# Patient Record
Sex: FEMALE | Race: WHITE | NOT HISPANIC OR LATINO
[De-identification: names, ages, dates, MRNs, and addresses within clinical notes are randomized per-mention and may not be internally consistent; named-entity substitution may affect disease eponyms.]

---

## 2020-12-11 ENCOUNTER — TRANSCRIPTION ENCOUNTER (OUTPATIENT)
Age: 80
End: 2020-12-11

## 2020-12-12 ENCOUNTER — RESULT REVIEW (OUTPATIENT)
Age: 80
End: 2020-12-12

## 2020-12-12 ENCOUNTER — INPATIENT (INPATIENT)
Facility: HOSPITAL | Age: 80
LOS: 9 days | Discharge: NURSING, REHAB OR LONG-TERM CARE | DRG: 853 | End: 2020-12-22
Attending: SURGERY | Admitting: SURGERY
Payer: MEDICARE

## 2020-12-12 VITALS
WEIGHT: 149.91 LBS | SYSTOLIC BLOOD PRESSURE: 170 MMHG | HEART RATE: 91 BPM | OXYGEN SATURATION: 96 % | DIASTOLIC BLOOD PRESSURE: 84 MMHG | RESPIRATION RATE: 18 BRPM | TEMPERATURE: 100 F | HEIGHT: 64 IN

## 2020-12-12 DIAGNOSIS — K81.9 CHOLECYSTITIS, UNSPECIFIED: ICD-10-CM

## 2020-12-12 DIAGNOSIS — Z90.49 ACQUIRED ABSENCE OF OTHER SPECIFIED PARTS OF DIGESTIVE TRACT: Chronic | ICD-10-CM

## 2020-12-12 LAB
ALBUMIN SERPL ELPH-MCNC: 3.8 G/DL — SIGNIFICANT CHANGE UP (ref 3.3–5)
ALP SERPL-CCNC: 98 U/L — SIGNIFICANT CHANGE UP (ref 40–120)
ALT FLD-CCNC: 35 U/L — SIGNIFICANT CHANGE UP (ref 10–45)
ANION GAP SERPL CALC-SCNC: 14 MMOL/L — SIGNIFICANT CHANGE UP (ref 5–17)
APTT BLD: 25 SEC — LOW (ref 27.5–35.5)
AST SERPL-CCNC: 36 U/L — SIGNIFICANT CHANGE UP (ref 10–40)
BASE EXCESS BLDV CALC-SCNC: 2.8 MMOL/L — HIGH (ref -2–2)
BASOPHILS # BLD AUTO: 0 K/UL — SIGNIFICANT CHANGE UP (ref 0–0.2)
BASOPHILS NFR BLD AUTO: 0 % — SIGNIFICANT CHANGE UP (ref 0–2)
BILIRUB SERPL-MCNC: 1 MG/DL — SIGNIFICANT CHANGE UP (ref 0.2–1.2)
BUN SERPL-MCNC: 14 MG/DL — SIGNIFICANT CHANGE UP (ref 7–23)
CA-I SERPL-SCNC: 1.07 MMOL/L — LOW (ref 1.12–1.3)
CALCIUM SERPL-MCNC: 8.7 MG/DL — SIGNIFICANT CHANGE UP (ref 8.4–10.5)
CHLORIDE BLDV-SCNC: 96 MMOL/L — SIGNIFICANT CHANGE UP (ref 96–108)
CHLORIDE SERPL-SCNC: 92 MMOL/L — LOW (ref 96–108)
CO2 BLDV-SCNC: 28 MMOL/L — SIGNIFICANT CHANGE UP (ref 22–30)
CO2 SERPL-SCNC: 24 MMOL/L — SIGNIFICANT CHANGE UP (ref 22–31)
CREAT SERPL-MCNC: 0.71 MG/DL — SIGNIFICANT CHANGE UP (ref 0.5–1.3)
EOSINOPHIL # BLD AUTO: 0 K/UL — SIGNIFICANT CHANGE UP (ref 0–0.5)
EOSINOPHIL NFR BLD AUTO: 0 % — SIGNIFICANT CHANGE UP (ref 0–6)
GAS PNL BLDV: 125 MMOL/L — LOW (ref 135–145)
GAS PNL BLDV: SIGNIFICANT CHANGE UP
GLUCOSE BLDV-MCNC: 145 MG/DL — HIGH (ref 70–99)
GLUCOSE SERPL-MCNC: 142 MG/DL — HIGH (ref 70–99)
HCO3 BLDV-SCNC: 27 MMOL/L — SIGNIFICANT CHANGE UP (ref 21–29)
HCT VFR BLD CALC: 42.8 % — SIGNIFICANT CHANGE UP (ref 34.5–45)
HCT VFR BLDA CALC: 47 % — SIGNIFICANT CHANGE UP (ref 39–50)
HGB BLD CALC-MCNC: 15.4 G/DL — SIGNIFICANT CHANGE UP (ref 11.5–15.5)
HGB BLD-MCNC: 14.6 G/DL — SIGNIFICANT CHANGE UP (ref 11.5–15.5)
HIV 1 & 2 AB SERPL IA.RAPID: SIGNIFICANT CHANGE UP
INR BLD: 1.19 RATIO — HIGH (ref 0.88–1.16)
LACTATE BLDV-MCNC: 2 MMOL/L — SIGNIFICANT CHANGE UP (ref 0.7–2)
LYMPHOCYTES # BLD AUTO: 1.05 K/UL — SIGNIFICANT CHANGE UP (ref 1–3.3)
LYMPHOCYTES # BLD AUTO: 4.3 % — LOW (ref 13–44)
MAGNESIUM SERPL-MCNC: 1.9 MG/DL — SIGNIFICANT CHANGE UP (ref 1.6–2.6)
MCHC RBC-ENTMCNC: 30.6 PG — SIGNIFICANT CHANGE UP (ref 27–34)
MCHC RBC-ENTMCNC: 34.1 GM/DL — SIGNIFICANT CHANGE UP (ref 32–36)
MCV RBC AUTO: 89.7 FL — SIGNIFICANT CHANGE UP (ref 80–100)
MONOCYTES # BLD AUTO: 0 K/UL — SIGNIFICANT CHANGE UP (ref 0–0.9)
MONOCYTES NFR BLD AUTO: 0 % — LOW (ref 2–14)
NEUTROPHILS # BLD AUTO: 23.28 K/UL — HIGH (ref 1.8–7.4)
NEUTROPHILS NFR BLD AUTO: 94.8 % — HIGH (ref 43–77)
PCO2 BLDV: 43 MMHG — SIGNIFICANT CHANGE UP (ref 35–50)
PH BLDV: 7.42 — SIGNIFICANT CHANGE UP (ref 7.35–7.45)
PHOSPHATE SERPL-MCNC: 2.3 MG/DL — LOW (ref 2.5–4.5)
PLATELET # BLD AUTO: 265 K/UL — SIGNIFICANT CHANGE UP (ref 150–400)
PO2 BLDV: 35 MMHG — SIGNIFICANT CHANGE UP (ref 25–45)
POTASSIUM BLDV-SCNC: 2.9 MMOL/L — CRITICAL LOW (ref 3.5–5.3)
POTASSIUM SERPL-MCNC: 3 MMOL/L — LOW (ref 3.5–5.3)
POTASSIUM SERPL-SCNC: 3 MMOL/L — LOW (ref 3.5–5.3)
PROT SERPL-MCNC: 6.6 G/DL — SIGNIFICANT CHANGE UP (ref 6–8.3)
PROTHROM AB SERPL-ACNC: 14.2 SEC — HIGH (ref 10.6–13.6)
RBC # BLD: 4.77 M/UL — SIGNIFICANT CHANGE UP (ref 3.8–5.2)
RBC # FLD: 13.2 % — SIGNIFICANT CHANGE UP (ref 10.3–14.5)
RH IG SCN BLD-IMP: POSITIVE — SIGNIFICANT CHANGE UP
SAO2 % BLDV: 63 % — LOW (ref 67–88)
SARS-COV-2 RNA SPEC QL NAA+PROBE: SIGNIFICANT CHANGE UP
SODIUM SERPL-SCNC: 130 MMOL/L — LOW (ref 135–145)
WBC # BLD: 24.33 K/UL — HIGH (ref 3.8–10.5)
WBC # FLD AUTO: 24.33 K/UL — HIGH (ref 3.8–10.5)

## 2020-12-12 PROCEDURE — 99285 EMERGENCY DEPT VISIT HI MDM: CPT | Mod: CS,GC

## 2020-12-12 PROCEDURE — 71045 X-RAY EXAM CHEST 1 VIEW: CPT | Mod: 26

## 2020-12-12 PROCEDURE — 76705 ECHO EXAM OF ABDOMEN: CPT | Mod: 26,RT

## 2020-12-12 PROCEDURE — 47562 LAPAROSCOPIC CHOLECYSTECTOMY: CPT

## 2020-12-12 PROCEDURE — 88304 TISSUE EXAM BY PATHOLOGIST: CPT | Mod: 26

## 2020-12-12 PROCEDURE — 74177 CT ABD & PELVIS W/CONTRAST: CPT | Mod: 26

## 2020-12-12 RX ORDER — ACETAMINOPHEN 500 MG
1000 TABLET ORAL EVERY 6 HOURS
Refills: 0 | Status: COMPLETED | OUTPATIENT
Start: 2020-12-12 | End: 2020-12-13

## 2020-12-12 RX ORDER — SODIUM CHLORIDE 9 MG/ML
1000 INJECTION INTRAMUSCULAR; INTRAVENOUS; SUBCUTANEOUS ONCE
Refills: 0 | Status: COMPLETED | OUTPATIENT
Start: 2020-12-12 | End: 2020-12-12

## 2020-12-12 RX ORDER — ACETAMINOPHEN 500 MG
975 TABLET ORAL ONCE
Refills: 0 | Status: COMPLETED | OUTPATIENT
Start: 2020-12-12 | End: 2020-12-12

## 2020-12-12 RX ORDER — METOPROLOL TARTRATE 50 MG
25 TABLET ORAL
Refills: 0 | Status: DISCONTINUED | OUTPATIENT
Start: 2020-12-12 | End: 2020-12-12

## 2020-12-12 RX ORDER — ONDANSETRON 8 MG/1
4 TABLET, FILM COATED ORAL ONCE
Refills: 0 | Status: DISCONTINUED | OUTPATIENT
Start: 2020-12-12 | End: 2020-12-12

## 2020-12-12 RX ORDER — MORPHINE SULFATE 50 MG/1
2 CAPSULE, EXTENDED RELEASE ORAL ONCE
Refills: 0 | Status: COMPLETED | OUTPATIENT
Start: 2020-12-12 | End: 2020-12-12

## 2020-12-12 RX ORDER — POTASSIUM CHLORIDE 20 MEQ
10 PACKET (EA) ORAL ONCE
Refills: 0 | Status: COMPLETED | OUTPATIENT
Start: 2020-12-12 | End: 2020-12-12

## 2020-12-12 RX ORDER — OXYCODONE HYDROCHLORIDE 5 MG/1
5 TABLET ORAL EVERY 6 HOURS
Refills: 0 | Status: DISCONTINUED | OUTPATIENT
Start: 2020-12-12 | End: 2020-12-18

## 2020-12-12 RX ORDER — OXYCODONE HYDROCHLORIDE 5 MG/1
2.5 TABLET ORAL EVERY 4 HOURS
Refills: 0 | Status: DISCONTINUED | OUTPATIENT
Start: 2020-12-12 | End: 2020-12-18

## 2020-12-12 RX ORDER — ASPIRIN/CALCIUM CARB/MAGNESIUM 324 MG
81 TABLET ORAL DAILY
Refills: 0 | Status: DISCONTINUED | OUTPATIENT
Start: 2020-12-12 | End: 2020-12-12

## 2020-12-12 RX ORDER — SODIUM CHLORIDE 9 MG/ML
1000 INJECTION, SOLUTION INTRAVENOUS
Refills: 0 | Status: DISCONTINUED | OUTPATIENT
Start: 2020-12-12 | End: 2020-12-12

## 2020-12-12 RX ORDER — METOPROLOL TARTRATE 50 MG
25 TABLET ORAL
Refills: 0 | Status: DISCONTINUED | OUTPATIENT
Start: 2020-12-12 | End: 2020-12-22

## 2020-12-12 RX ORDER — POTASSIUM CHLORIDE 20 MEQ
40 PACKET (EA) ORAL ONCE
Refills: 0 | Status: COMPLETED | OUTPATIENT
Start: 2020-12-12 | End: 2020-12-12

## 2020-12-12 RX ORDER — PIPERACILLIN AND TAZOBACTAM 4; .5 G/20ML; G/20ML
3.38 INJECTION, POWDER, LYOPHILIZED, FOR SOLUTION INTRAVENOUS EVERY 8 HOURS
Refills: 0 | Status: COMPLETED | OUTPATIENT
Start: 2020-12-12 | End: 2020-12-19

## 2020-12-12 RX ORDER — PIPERACILLIN AND TAZOBACTAM 4; .5 G/20ML; G/20ML
3.38 INJECTION, POWDER, LYOPHILIZED, FOR SOLUTION INTRAVENOUS EVERY 8 HOURS
Refills: 0 | Status: DISCONTINUED | OUTPATIENT
Start: 2020-12-12 | End: 2020-12-12

## 2020-12-12 RX ORDER — LEVOTHYROXINE SODIUM 125 MCG
125 TABLET ORAL DAILY
Refills: 0 | Status: DISCONTINUED | OUTPATIENT
Start: 2020-12-12 | End: 2020-12-12

## 2020-12-12 RX ORDER — PIPERACILLIN AND TAZOBACTAM 4; .5 G/20ML; G/20ML
3.38 INJECTION, POWDER, LYOPHILIZED, FOR SOLUTION INTRAVENOUS ONCE
Refills: 0 | Status: COMPLETED | OUTPATIENT
Start: 2020-12-12 | End: 2020-12-12

## 2020-12-12 RX ORDER — ENOXAPARIN SODIUM 100 MG/ML
40 INJECTION SUBCUTANEOUS DAILY
Refills: 0 | Status: DISCONTINUED | OUTPATIENT
Start: 2020-12-12 | End: 2020-12-17

## 2020-12-12 RX ORDER — ASPIRIN/CALCIUM CARB/MAGNESIUM 324 MG
81 TABLET ORAL DAILY
Refills: 0 | Status: DISCONTINUED | OUTPATIENT
Start: 2020-12-13 | End: 2020-12-17

## 2020-12-12 RX ORDER — ENOXAPARIN SODIUM 100 MG/ML
40 INJECTION SUBCUTANEOUS DAILY
Refills: 0 | Status: DISCONTINUED | OUTPATIENT
Start: 2020-12-12 | End: 2020-12-12

## 2020-12-12 RX ORDER — LEVOTHYROXINE SODIUM 125 MCG
125 TABLET ORAL DAILY
Refills: 0 | Status: DISCONTINUED | OUTPATIENT
Start: 2020-12-12 | End: 2020-12-22

## 2020-12-12 RX ORDER — SODIUM CHLORIDE 9 MG/ML
1000 INJECTION, SOLUTION INTRAVENOUS
Refills: 0 | Status: DISCONTINUED | OUTPATIENT
Start: 2020-12-12 | End: 2020-12-13

## 2020-12-12 RX ORDER — HYDROMORPHONE HYDROCHLORIDE 2 MG/ML
0.2 INJECTION INTRAMUSCULAR; INTRAVENOUS; SUBCUTANEOUS
Refills: 0 | Status: DISCONTINUED | OUTPATIENT
Start: 2020-12-12 | End: 2020-12-12

## 2020-12-12 RX ORDER — SIMVASTATIN 20 MG/1
20 TABLET, FILM COATED ORAL AT BEDTIME
Refills: 0 | Status: DISCONTINUED | OUTPATIENT
Start: 2020-12-12 | End: 2020-12-12

## 2020-12-12 RX ADMIN — Medication 100 MILLIEQUIVALENT(S): at 11:05

## 2020-12-12 RX ADMIN — Medication 975 MILLIGRAM(S): at 11:04

## 2020-12-12 RX ADMIN — PIPERACILLIN AND TAZOBACTAM 25 GRAM(S): 4; .5 INJECTION, POWDER, LYOPHILIZED, FOR SOLUTION INTRAVENOUS at 22:05

## 2020-12-12 RX ADMIN — SODIUM CHLORIDE 1000 MILLILITER(S): 9 INJECTION INTRAMUSCULAR; INTRAVENOUS; SUBCUTANEOUS at 11:04

## 2020-12-12 RX ADMIN — PIPERACILLIN AND TAZOBACTAM 200 GRAM(S): 4; .5 INJECTION, POWDER, LYOPHILIZED, FOR SOLUTION INTRAVENOUS at 11:31

## 2020-12-12 RX ADMIN — Medication 40 MILLIEQUIVALENT(S): at 11:10

## 2020-12-12 RX ADMIN — SODIUM CHLORIDE 50 MILLILITER(S): 9 INJECTION, SOLUTION INTRAVENOUS at 21:00

## 2020-12-12 NOTE — H&P ADULT - NSHPPHYSICALEXAM_GEN_ALL_CORE
Gen: AAOx3, NAD, mentating normally  Neuro: Cranial nerves II-XII grossly intact  HEENT: Atraumatic, normocephalic  CV: RRR, normal S1/S2, no audible m/r/g  Pulm: Breathing comfortably on RA. Equal chest rise b/l. Lung fields CTAB  Abd: Soft, mildly distended, tender in the right estevan-abdomen, mostly in the RUQ, with some focal guarding. No rebound tenderness.  Back/flank: No CVA tenderness  Extremities: WWP. Moving all 4 extremities spontaneously. Strength 5/5. Sensation intact  Skin: No rashes or suspicious lesions

## 2020-12-12 NOTE — H&P ADULT - HISTORY OF PRESENT ILLNESS
80F pmhx HTN, COPD, Hypothyroidism, MI (age 36), lung ca s/p partial right pneumonectomy, 30 pack/yr smoker, open appy (70 yrs ago), who is presenting with 2 days of epigastric pain & emesis. She reports feeling increased bloating over the last few weeks. Two nights ago she began experiencing epigastric abdominal pain, which was followed by several episodes of emesis. The patient reports her pain continued through the following day and was associated with some chills. She denies CP, SOB, dysuria, diarrhea or constipation.    She reports her MI at age 36 was secondary to hypokalemia, which was from a diuretic she was told to take (unsure why). She reports having had a TTE and stress test about 20 years ago, which were both normal. She lives alone after her   6 months ago. Her bedroom is up 1 flight of stairs, which she walks without issue. She can walk about 3 city blocks before becoming tired.

## 2020-12-12 NOTE — ED ADULT NURSE NOTE - OBJECTIVE STATEMENT
81 y/o female brought in by EMS with a c/o 8/10 abdominal pain. According to EMS patient was nauseous so they gave 4mg IVP zofran and started a bolus of NS, patient received about 100cc prior to arrival to ED. A&Ox3. Ambulatory. PMH HTN, HLD, MI, lung CA (in remission), diverticulosis. Patient reports feeling bloating for the past month, and as of thursday having 8/10 RLQ abdominal pain. Patient reports nausea and 1 episodes of vomiting on Tuesday. Patient reports she is not passing gas and her last BM was thursday. Abdomen is soft, distended, with generalized tenderness to palpation, and hypoactive bowel sounds. Patient denies chest pain, SOB, fever, chills, diarrhea, urinary symptoms, blood in stool/urine. 79 y/o female brought in by EMS with a c/o 8/10 abdominal pain. According to EMS patient was nauseous so they gave 4mg IVP zofran and started a bolus of NS, patient received about 100cc prior to arrival to ED. A&Ox3. Ambulatory. PMH HTN, HLD, MI, lung CA (in remission), diverticulosis. Patient reports feeling bloating for the past month, and as of thursday having 8/10 RLQ abdominal pain. Patient reports nausea and 1 episodes of vomiting on Tuesday. Patient reports she is not passing gas and her last BM was thursday. Abdomen is soft, distended, with generalized tenderness to palpation, and hypoactive bowel sounds. Patient denies chest pain, SOB, fever, chills, diarrhea, urinary symptoms, blood in stool/urine. Safety measures maintained. NSR on monitor. MD at bedside. No acute distress or further complaints at this time.

## 2020-12-12 NOTE — ED PROVIDER NOTE - CARE PLAN
----- Message from Lin Ybarra sent at 10/11/2018  2:09 PM CDT -----  Contact: mike Prince  MRN: [id number]  Home Phone [phone]  Work Phone Not on file.  Mobile [phone]    Patient Care Team:  Freeman Saavedra MD as PCP - General (Family Medicine)  Cleo Clark MD as Obstetrician (Obstetrics and Gynecology)  OB? No  What phone number can you be reached at? [phone]  Message: Pt is on the birth control patch / Xulane (dose unknown) apply one patch weekly. The insurance is requiring that the pt have an Rx for a 90 day supply, and must be filled at [facility] Pt requested to speak to nurse. Thank you.   [facility] [place]   
Tito STRAUSS desire refill of Ortho Evra patch. Pt insurance made her change pharmacies, and pt needs a 90 day supply  Patient Active Problem List   Diagnosis    Asthma, currently active    Supervision of normal pregnancy    Full-term premature rupture of membranes with onset of labor within 24 hours of rupture     Prior to Admission medications    Medication Sig Start Date End Date Taking? Authorizing Provider   albuterol sulfate (PROAIR RESPICLICK) 90 mcg/actuation AePB Inhale 180 mcg into the lungs 4 (four) times daily as needed (wheezing). 11/9/16   Freeman Saavedra MD   beclomethasone (QVAR) 80 mcg/actuation Aero Inhale 2 puffs into the lungs 2 (two) times daily. 11/9/16   Freeman Saavedra MD   clonazePAM (KLONOPIN) 0.5 MG tablet Take 1 tablet (0.5 mg total) by mouth 2 (two) times daily as needed for Anxiety (panic, anxiety). 3/5/18 3/5/19  Puneet Cali MD   escitalopram oxalate (LEXAPRO) 10 MG tablet TAKE 1 TABLET BY MOUTH ONCE DAILY 6/28/18   Puneet Cali MD   norelgestromin-ethinyl estradiol (ORTHO EVRA) 150-35 mcg/24 hr Place 1 patch onto the skin once a week. 8/28/18 8/28/19  Cleo Clark MD   phentermine (ADIPEX-P) 37.5 mg tablet Take 1 tablet (37.5 mg total) by mouth before breakfast. 5/2/18   Cleo Clark MD   phentermine (ADIPEX-P) 37.5 mg tablet Take 1 tablet (37.5 mg total) by mouth before breakfast. 8/28/18   Cleo Clark MD   VENTOLIN HFA 90 mcg/actuation inhaler INHALE TWO PUFFS BY MOUTH EVERY 4 HOURS AS NEEDED 9/16/17   Freeman Saavedra MD       
Principal Discharge DX:	Cholecystitis

## 2020-12-12 NOTE — ED PROVIDER NOTE - FAMILY HISTORY
Mother  Still living? Unknown  Family history of colonic diverticulitis, Age at diagnosis: Age Unknown     Sibling  Still living? Unknown  Family history of colonic diverticulitis, Age at diagnosis: Age Unknown

## 2020-12-12 NOTE — ED ADULT NURSE REASSESSMENT NOTE - NS ED NURSE REASSESS COMMENT FT1
Surgery resident at bedside obtaining consent. Patient escorted to restroom via felipe smith assisted by RN. Patient fully undressed and in a gown belongings placed in labeled bag and valuables placed in Safe. Patient verbalized understanding. Patient to be transported to OR shortly.

## 2020-12-12 NOTE — ED PROVIDER NOTE - NS ED ROS FT
REVIEW OF SYSTEMS:    CONSTITUTIONAL: No weakness, +chills  EYES/ENT: No visual changes;  No vertigo or throat pain   NECK: No pain or stiffness  RESPIRATORY: No cough, wheezing, hemoptysis; No shortness of breath  CARDIOVASCULAR: No chest pain or palpitations  GASTROINTESTINAL: + abdominal  pain. +nausea, vomiting, No diarrhea or constipation. No melena or hematochezia.  GENITOURINARY: No dysuria, frequency or hematuria  NEUROLOGICAL: No numbness or weakness  SKIN: No itching, burning, rashes, or lesions   All other review of systems is negative unless indicated above.

## 2020-12-12 NOTE — ED PROVIDER NOTE - CLINICAL SUMMARY MEDICAL DECISION MAKING FREE TEXT BOX
80y F with PMH of HTN, HLD, MI c/b afib p/w abd pain and vomiting x2 days, inability to tolerate po intake, concerning for SBO vs diverticulitis. Plan: EKG, CTAP, CBC, CMP, cultures, pain control, IVF.

## 2020-12-12 NOTE — CHART NOTE - NSCHARTNOTEFT_GEN_A_CORE
ACS/Trauma POST-OP NOTE:     Status post: Laparoscopic Cholecystectomy  Subjective:  Patient seen and examined at bedside  In no acute distress  Has not yet ambulated or voided  Denies CP, SOB, N/V, Abd pain, dizziness      Objective:    Vital Signs Last 24 Hrs  T(C): 36.8 (12 Dec 2020 22:39), Max: 37.6 (12 Dec 2020 09:36)  T(F): 98.2 (12 Dec 2020 22:39), Max: 99.6 (12 Dec 2020 09:36)  HR: 96 (12 Dec 2020 22:39) (88 - 110)  BP: 159/78 (12 Dec 2020 22:39) (140/66 - 177/86)  BP(mean): 101 (12 Dec 2020 21:15) (95 - 107)  RR: 17 (12 Dec 2020 22:39) (16 - 18)  SpO2: 92% (12 Dec 2020 22:39) (91% - 100%)    Physical Exam:  General Appearance:  Appears well, NAD, AAO x3  Chest: Equal expansion bilaterally, equal breath sounds  CV: Pulse regular presently  Abdomen: Soft, nondistended, mild tenderness near umbilical port site, 4 port site dressings c/d/i  Extremities: Grossly symmetric, Warm and well perfused x4      I&O's Detail    12 Dec 2020 07:01  -  12 Dec 2020 22:44  --------------------------------------------------------  IN:    IV PiggyBack: 100 mL    Lactated Ringers: 50 mL  Total IN: 150 mL    OUT:  Total OUT: 0 mL    Total NET: 150 mL          LABS:                        14.6   24.33 )-----------( 265      ( 12 Dec 2020 10:11 )             42.8     12-12    130<L>  |  92<L>  |  14  ----------------------------<  142<H>  3.0<L>   |  24  |  0.71    Ca    8.7      12 Dec 2020 10:11  Phos  2.3     12-12  Mg     1.9     12-12    TPro  6.6  /  Alb  3.8  /  TBili  1.0  /  DBili  x   /  AST  36  /  ALT  35  /  AlkPhos  98  12-12    PT/INR - ( 12 Dec 2020 13:16 )   PT: 14.2 sec;   INR: 1.19 ratio         PTT - ( 12 Dec 2020 13:16 )  PTT:25.0 sec        Daily Height in cm: 162.56 (12 Dec 2020 16:43)    Daily     MEDICATIONS  (STANDING):  acetaminophen  IVPB .. 1000 milliGRAM(s) IV Intermittent every 6 hours  enoxaparin Injectable 40 milliGRAM(s) SubCutaneous daily  lactated ringers. 1000 milliLiter(s) (50 mL/Hr) IV Continuous <Continuous>  levothyroxine 125 MICROGram(s) Oral daily  metoprolol tartrate 25 milliGRAM(s) Oral two times a day  piperacillin/tazobactam IVPB.. 3.375 Gram(s) IV Intermittent every 8 hours    MEDICATIONS  (PRN):  oxyCODONE    IR 2.5 milliGRAM(s) Oral every 4 hours PRN Moderate Pain (4 - 6)  oxyCODONE    IR 5 milliGRAM(s) Oral every 6 hours PRN Severe Pain (7 - 10)      ASSESSMENT: 80 year old femal who presented with acute cholecystitis and is now several hours status post laparoscopic cholecystectomy, recovering well on the floor.    PLAN:  - Pain management   - Diet: NPO with sips/chips, ADAT in AM  - Monitor Vitals   - f/u AM labs   - VTE ppx    ACS/Trauma Surgery   p. [id number] ACS/Trauma POST-OP NOTE:     Status post: Laparoscopic Cholecystectomy  Subjective:  Patient seen and examined at bedside  In no acute distress  Has not yet ambulated or voided  Denies CP, SOB, N/V, Abd pain, dizziness      Objective:    Vital Signs Last 24 Hrs  T(C): 36.8 (12 Dec 2020 22:39), Max: 37.6 (12 Dec 2020 09:36)  T(F): 98.2 (12 Dec 2020 22:39), Max: 99.6 (12 Dec 2020 09:36)  HR: 96 (12 Dec 2020 22:39) (88 - 110)  BP: 159/78 (12 Dec 2020 22:39) (140/66 - 177/86)  BP(mean): 101 (12 Dec 2020 21:15) (95 - 107)  RR: 17 (12 Dec 2020 22:39) (16 - 18)  SpO2: 92% (12 Dec 2020 22:39) (91% - 100%)    Physical Exam:  General Appearance:  Appears well, NAD, AAO x3  Chest: Equal expansion bilaterally, equal breath sounds  CV: Pulse regular presently  Abdomen: Soft, nondistended, mild tenderness near umbilical port site, 4 port site dressings c/d/i  Extremities: Grossly symmetric, Warm and well perfused x4      I&O's Detail    12 Dec 2020 07:01  -  12 Dec 2020 22:44  --------------------------------------------------------  IN:    IV PiggyBack: 100 mL    Lactated Ringers: 50 mL  Total IN: 150 mL    OUT:  Total OUT: 0 mL    Total NET: 150 mL          LABS:                        14.6   24.33 )-----------( 265      ( 12 Dec 2020 10:11 )             42.8     12-12    130<L>  |  92<L>  |  14  ----------------------------<  142<H>  3.0<L>   |  24  |  0.71    Ca    8.7      12 Dec 2020 10:11  Phos  2.3     12-12  Mg     1.9     12-12    TPro  6.6  /  Alb  3.8  /  TBili  1.0  /  DBili  x   /  AST  36  /  ALT  35  /  AlkPhos  98  12-12    PT/INR - ( 12 Dec 2020 13:16 )   PT: 14.2 sec;   INR: 1.19 ratio         PTT - ( 12 Dec 2020 13:16 )  PTT:25.0 sec        Daily Height in cm: 162.56 (12 Dec 2020 16:43)    Daily     MEDICATIONS  (STANDING):  acetaminophen  IVPB .. 1000 milliGRAM(s) IV Intermittent every 6 hours  enoxaparin Injectable 40 milliGRAM(s) SubCutaneous daily  lactated ringers. 1000 milliLiter(s) (50 mL/Hr) IV Continuous <Continuous>  levothyroxine 125 MICROGram(s) Oral daily  metoprolol tartrate 25 milliGRAM(s) Oral two times a day  piperacillin/tazobactam IVPB.. 3.375 Gram(s) IV Intermittent every 8 hours    MEDICATIONS  (PRN):  oxyCODONE    IR 2.5 milliGRAM(s) Oral every 4 hours PRN Moderate Pain (4 - 6)  oxyCODONE    IR 5 milliGRAM(s) Oral every 6 hours PRN Severe Pain (7 - 10)      ASSESSMENT: 80 year old femal who presented with acute cholecystitis and is now several hours status post laparoscopic cholecystectomy, recovering well on the floor.    PLAN:  - Pain management   - Diet: regular  - Monitor Vitals   - f/u AM labs   - VTE ppx    ACS/Trauma Surgery   p. [id number]

## 2020-12-12 NOTE — ED PROVIDER NOTE - ATTENDING CONTRIBUTION TO CARE
Freeman DO: I have personally performed a face to face medical and diagnostic evaluation of the patient. I have discussed with and reviewed the resident's note and agree with the History, ROS, Physical Exam and MDM unless otherwise indicated. A brief summary of my personal evaluation and impression can be found below.    80F hx of htn, MI age 36 c/b afib (reportedly not on tx and no cardiac issues since then), h/o appendectomy, presenting w/ 2 days of mod to severe nonradiating gradually worsening abd pain RLQ w/ abd distension, vomiting x 2 over the last 2 days, difficulty tolerating po, no diarrhea and last stool 2 days ago, reportedly passing gas, no urinary complaints. No numbness/weakness. Denies melena/hematochezia. Denies fevers, endorses chills.   On assessment, well appearing, axo3, mucous membranes dry, sclera clear and nonicteric, heart s1s2- no murmurs, lungs cta b/l, abd mildly distended + mod TTP RLQ and RUQ, no LE edema, pulses intact and symmetrical in all four extremitiies.   Pt orally 99.6F, plan for ct a/p eval for colitis vs diverticulitis vs obstruction, pain reproducible, clinical symptoms less suggestive of  mesenteric ischemia and reproducible and timing less suggestive of vascular pathology. Pain control. bcx and consider empiric abx if rectally febrile. screening ekg and cxr

## 2020-12-12 NOTE — ED ADULT NURSE REASSESSMENT NOTE - NS ED NURSE REASSESS COMMENT FT1
Pt undressed and belongings placed in [place] bag and placed in belonging cabinet in ED. Valuables checked w/ patient at bedside and placed in envelope. Pt transported to OR in stretcher by transport.

## 2020-12-12 NOTE — H&P ADULT - NSICDXFAMILYHX_GEN_ALL_CORE_FT
FAMILY HISTORY:  Mother  Still living? Unknown  Family history of colonic diverticulitis, Age at diagnosis: Age Unknown    Sibling  Still living? Unknown  Family history of colonic diverticulitis, Age at diagnosis: Age Unknown

## 2020-12-12 NOTE — ED PROVIDER NOTE - PHYSICAL EXAMINATION
PHYSICAL EXAM:  GENERAL: uncomfortable appearing elderly woman  HEAD:  Atraumatic, Normocephalic  EYES: EOMI, PERRLA, conjunctiva and sclera clear  NECK: Supple, No JVD  CHEST/LUNG: Clear to auscultation bilaterally; No wheeze  HEART: Regular rate and rhythm; No murmurs, rubs, or gallops  ABDOMEN: Soft, + TTP diffusely, Bowel sounds difficult to appreciate, mild distention   EXTREMITIES:  2+ Peripheral Pulses, No clubbing, cyanosis, or edema  PSYCH: AAOx3  NEUROLOGY: non-focal, THORPE  SKIN: No rashes or lesions

## 2020-12-12 NOTE — H&P ADULT - NSHPLABSRESULTS_GEN_ALL_CORE
CBC (12-12 @ 10:11)                              14.6                           24.33<H>  )----------------(  265        94.8<H>% Neutrophils, 4.3<L>% Lymphocytes, ANC: 23.28<H>                              42.8      BMP (12-12 @ 10:11)             130<L>  |  92<L>   |  14    		Ca++ --      Ca 8.7                ---------------------------------( 142<H>		Mg 1.9                3.0<L>  |  24      |  0.71  			Ph 2.3<L>    LFTs (12-12 @ 10:11)      TPro 6.6 / Alb 3.8 / TBili 1.0 / DBili -- / AST 36 / ALT 35 / AlkPhos 98    Coags (12-12 @ 13:16)  aPTT 25.0<L> / INR 1.19<H> / PT 14.2<H>        VBG (12-12 @ 10:11)     7.42 / 43 / 35 / 27 / 2.8<H> / 63<L>%     Lactate: 2.0      < from: CT Abdomen and Pelvis w/ IV Cont (12.12.20 @ 12:04) >    FINDINGS:  LOWER CHEST: Calcified coronary artery atherosclerosis. Elevated right hemidiaphragm.    LIVER: Normal morphology. Subcentimeter hypodensity at the hepatic dome, too small to adequately characterize (2:17).  BILE DUCTS: Normal caliber.  GALLBLADDER: Distended gallbladder with wall thickening to8 mm and surrounding fat stranding. Cholelithiasis. Probable calculi within the cystic duct (2:34, 602:49). Discontinuity of gallbladder mucosal enhancement (for example: 602:31 and 602:41) concerning for mucosal ulceration, without evidence of frankperforation.  SPLEEN: Within normal limits.  PANCREAS: Within normal limits.  ADRENALS: Within normal limits.  KIDNEYS/URETERS: Right lower pole 3.6 x 2.4 cm simple cyst along with bilateral subcentimeter hypodensities which are too small to adequately characterize. No hydronephrosis.    BLADDER: Within normal limits.  REPRODUCTIVE ORGANS: Normal uterus and adnexa. Pessary positioned within the superior vagina.    BOWEL: Small hiatal hernia. Wall thickening of the first portion of the duodenum which appear secondary to gallbladder inflammation. Periampullary duodenal diverticulum. No bowel obstruction. Appendix is normal. Moderate diverticulosis extending from transverse colon through sigmoid, without evidence of acute diverticulitis.  PERITONEUM: No ascites.  VESSELS: Calcified atherosclerosis. The infrarenal abdominal aorta is mildly ectatic to 2.0 cm diameter (602:43).  RETROPERITONEUM/LYMPH NODES: No lymphadenopathy.  ABDOMINAL WALL: Small fat-containing umbilical hernia.  BONES: Thoracolumbar degenerative changes.    IMPRESSION:  Acute cholecystitis with probable visualization of a gallstone within the cystic duct and findings suggesting mucosal ulceration.    < end of copied text >

## 2020-12-12 NOTE — ED ADULT NURSE REASSESSMENT NOTE - NS ED NURSE REASSESS COMMENT FT1
Surgery at the bedside assessing patient. Safety measures maintained. Bed in the lowest position. No acute distress noted or further complaints at this time.

## 2020-12-12 NOTE — ED PROVIDER NOTE - OBJECTIVE STATEMENT
80 y F with PMH of HTN, HLD, presenting with 2 days of abd pain and NB emesis. Pain started suddenly after vomiting her dinner 2 nights ago and is 8/10 in severity. It is worse on the R side, denies radiation to the back. Unable to tolerate po since then, vomited her dinner last night as well. Reports feeling "hot" and chills, abd bloating and nausea. Feels like she has been distended for about 2 months now, not passing gas but has been belching. Last BM 2 days ago. PSH - appendectomy at 10 yrs old.  Fam hx of diverticulitis in both her mother and sister.  passed away 6 mos ago, lives alone.   PMh also notable for MI at age 36 and afib.  PMD Emmanuel Walters 80 y F with PMH of HTN, HLD, hypothyroidism presenting with 2 days of abd pain and NB emesis. Pain started suddenly after vomiting her dinner 2 nights ago and is 8/10 in severity. It is worse on the R side, denies radiation to the back. Unable to tolerate po since then, vomited her dinner last night as well. Reports feeling "hot" and chills, abd bloating and nausea. Feels like she has been distended for about 2 months now, not passing gas but has been belching. Last BM 2 days ago. PSH - appendectomy at 10 yrs old.  Fam hx of diverticulitis in both her mother and sister.  passed away 6 mos ago, lives alone.   PMh also notable for MI at age 36 and afib.  Meds: synthroid, metoprolol BID, zocor. Pharmacy [facility] at [address]  PMD Peter Walters

## 2020-12-13 LAB
ALBUMIN SERPL ELPH-MCNC: 3 G/DL — LOW (ref 3.3–5)
ALP SERPL-CCNC: 100 U/L — SIGNIFICANT CHANGE UP (ref 40–120)
ALT FLD-CCNC: 48 U/L — HIGH (ref 10–45)
ANION GAP SERPL CALC-SCNC: 12 MMOL/L — SIGNIFICANT CHANGE UP (ref 5–17)
AST SERPL-CCNC: 48 U/L — HIGH (ref 10–40)
BILIRUB SERPL-MCNC: 0.7 MG/DL — SIGNIFICANT CHANGE UP (ref 0.2–1.2)
BUN SERPL-MCNC: 13 MG/DL — SIGNIFICANT CHANGE UP (ref 7–23)
CALCIUM SERPL-MCNC: 8.4 MG/DL — SIGNIFICANT CHANGE UP (ref 8.4–10.5)
CHLORIDE SERPL-SCNC: 100 MMOL/L — SIGNIFICANT CHANGE UP (ref 96–108)
CO2 SERPL-SCNC: 21 MMOL/L — LOW (ref 22–31)
CREAT SERPL-MCNC: 0.75 MG/DL — SIGNIFICANT CHANGE UP (ref 0.5–1.3)
GLUCOSE SERPL-MCNC: 112 MG/DL — HIGH (ref 70–99)
HCT VFR BLD CALC: 39.4 % — SIGNIFICANT CHANGE UP (ref 34.5–45)
HGB BLD-MCNC: 13.2 G/DL — SIGNIFICANT CHANGE UP (ref 11.5–15.5)
MAGNESIUM SERPL-MCNC: 1.9 MG/DL — SIGNIFICANT CHANGE UP (ref 1.6–2.6)
MCHC RBC-ENTMCNC: 30.8 PG — SIGNIFICANT CHANGE UP (ref 27–34)
MCHC RBC-ENTMCNC: 33.5 GM/DL — SIGNIFICANT CHANGE UP (ref 32–36)
MCV RBC AUTO: 91.8 FL — SIGNIFICANT CHANGE UP (ref 80–100)
NRBC # BLD: 0 /100 WBCS — SIGNIFICANT CHANGE UP (ref 0–0)
NT-PROBNP SERPL-SCNC: 1001 PG/ML — HIGH (ref 0–300)
PHOSPHATE SERPL-MCNC: 2.3 MG/DL — LOW (ref 2.5–4.5)
PLATELET # BLD AUTO: 221 K/UL — SIGNIFICANT CHANGE UP (ref 150–400)
POTASSIUM SERPL-MCNC: 3.8 MMOL/L — SIGNIFICANT CHANGE UP (ref 3.5–5.3)
POTASSIUM SERPL-SCNC: 3.8 MMOL/L — SIGNIFICANT CHANGE UP (ref 3.5–5.3)
PROT SERPL-MCNC: 5.6 G/DL — LOW (ref 6–8.3)
RBC # BLD: 4.29 M/UL — SIGNIFICANT CHANGE UP (ref 3.8–5.2)
RBC # FLD: 13.6 % — SIGNIFICANT CHANGE UP (ref 10.3–14.5)
SODIUM SERPL-SCNC: 133 MMOL/L — LOW (ref 135–145)
WBC # BLD: 17.05 K/UL — HIGH (ref 3.8–10.5)
WBC # FLD AUTO: 17.05 K/UL — HIGH (ref 3.8–10.5)

## 2020-12-13 RX ORDER — BUDESONIDE AND FORMOTEROL FUMARATE DIHYDRATE 160; 4.5 UG/1; UG/1
2 AEROSOL RESPIRATORY (INHALATION)
Refills: 0 | Status: DISCONTINUED | OUTPATIENT
Start: 2020-12-13 | End: 2020-12-15

## 2020-12-13 RX ORDER — SODIUM,POTASSIUM PHOSPHATES 278-250MG
1 POWDER IN PACKET (EA) ORAL EVERY 6 HOURS
Refills: 0 | Status: COMPLETED | OUTPATIENT
Start: 2020-12-13 | End: 2020-12-14

## 2020-12-13 RX ORDER — SIMVASTATIN 20 MG/1
20 TABLET, FILM COATED ORAL AT BEDTIME
Refills: 0 | Status: DISCONTINUED | OUTPATIENT
Start: 2020-12-13 | End: 2020-12-22

## 2020-12-13 RX ORDER — IPRATROPIUM/ALBUTEROL SULFATE 18-103MCG
3 AEROSOL WITH ADAPTER (GRAM) INHALATION EVERY 6 HOURS
Refills: 0 | Status: DISCONTINUED | OUTPATIENT
Start: 2020-12-13 | End: 2020-12-22

## 2020-12-13 RX ADMIN — PIPERACILLIN AND TAZOBACTAM 25 GRAM(S): 4; .5 INJECTION, POWDER, LYOPHILIZED, FOR SOLUTION INTRAVENOUS at 13:24

## 2020-12-13 RX ADMIN — Medication 25 MILLIGRAM(S): at 18:02

## 2020-12-13 RX ADMIN — Medication 1000 MILLIGRAM(S): at 18:32

## 2020-12-13 RX ADMIN — Medication 1000 MILLIGRAM(S): at 05:49

## 2020-12-13 RX ADMIN — BUDESONIDE AND FORMOTEROL FUMARATE DIHYDRATE 2 PUFF(S): 160; 4.5 AEROSOL RESPIRATORY (INHALATION) at 10:43

## 2020-12-13 RX ADMIN — Medication 400 MILLIGRAM(S): at 00:06

## 2020-12-13 RX ADMIN — Medication 1 TABLET(S): at 18:03

## 2020-12-13 RX ADMIN — Medication 25 MILLIGRAM(S): at 05:18

## 2020-12-13 RX ADMIN — Medication 400 MILLIGRAM(S): at 11:19

## 2020-12-13 RX ADMIN — Medication 1000 MILLIGRAM(S): at 00:36

## 2020-12-13 RX ADMIN — Medication 400 MILLIGRAM(S): at 05:19

## 2020-12-13 RX ADMIN — Medication 1000 MILLIGRAM(S): at 11:49

## 2020-12-13 RX ADMIN — SIMVASTATIN 20 MILLIGRAM(S): 20 TABLET, FILM COATED ORAL at 22:13

## 2020-12-13 RX ADMIN — Medication 400 MILLIGRAM(S): at 18:02

## 2020-12-13 RX ADMIN — Medication 81 MILLIGRAM(S): at 11:19

## 2020-12-13 RX ADMIN — PIPERACILLIN AND TAZOBACTAM 25 GRAM(S): 4; .5 INJECTION, POWDER, LYOPHILIZED, FOR SOLUTION INTRAVENOUS at 22:12

## 2020-12-13 RX ADMIN — PIPERACILLIN AND TAZOBACTAM 25 GRAM(S): 4; .5 INJECTION, POWDER, LYOPHILIZED, FOR SOLUTION INTRAVENOUS at 05:19

## 2020-12-13 RX ADMIN — ENOXAPARIN SODIUM 40 MILLIGRAM(S): 100 INJECTION SUBCUTANEOUS at 11:20

## 2020-12-13 RX ADMIN — BUDESONIDE AND FORMOTEROL FUMARATE DIHYDRATE 2 PUFF(S): 160; 4.5 AEROSOL RESPIRATORY (INHALATION) at 22:14

## 2020-12-13 RX ADMIN — Medication 3 MILLILITER(S): at 18:02

## 2020-12-13 RX ADMIN — Medication 125 MICROGRAM(S): at 05:18

## 2020-12-13 NOTE — PROGRESS NOTE ADULT - SUBJECTIVE AND OBJECTIVE BOX
GENERAL SURGERY PROGRESS NOTE   ___________________________________________________________________    VANI GONZALEZ | [id number] | 80y Female | [facility] 2MON 202 W1 | LOS 1d    Attending: Ming Dasilva    ___________________________________________________________________      SUBJECTIVE:   Patient seen today during morning rounds at bedside and without acute distress. Denies chest pain, fever, severe pain, or SOB.     Diet, Regular (12-12-20 @ 19:57) [Active]      PMH:   MI (myocardial infarction)    Hypertension    History of appendectomy          OBJECTIVE:    Allegies:  NKDA    MEDICATIONS  (STANDING):  acetaminophen  IVPB .. 1000 milliGRAM(s) IV Intermittent every 6 hours  aspirin  chewable 81 milliGRAM(s) Oral daily  budesonide 160 MICROgram(s)/formoterol 4.5 MICROgram(s) Inhaler 2 Puff(s) Inhalation two times a day  enoxaparin Injectable 40 milliGRAM(s) SubCutaneous daily  lactated ringers. 1000 milliLiter(s) (50 mL/Hr) IV Continuous <Continuous>  levothyroxine 125 MICROGram(s) Oral daily  metoprolol tartrate 25 milliGRAM(s) Oral two times a day  piperacillin/tazobactam IVPB.. 3.375 Gram(s) IV Intermittent every 8 hours    MEDICATIONS  (PRN):  oxyCODONE    IR 2.5 milliGRAM(s) Oral every 4 hours PRN Moderate Pain (4 - 6)  oxyCODONE    IR 5 milliGRAM(s) Oral every 6 hours PRN Severe Pain (7 - 10)      Vitals:  Height (cm): 162.6  Weight (kg): 68  BMI (kg/m2): 25.7  T(C): 36.9 (12-13-20 @ 09:26), Max: 37.1 (12-13-20 @ 05:18)  HR: 78 (12-13-20 @ 09:26) (76 - 110)  BP: 158/85 (12-13-20 @ 09:26) (140/66 - 174/75)  RR: 18 (12-13-20 @ 09:26) (16 - 18)  SpO2: 91% (12-13-20 @ 09:26) (91% - 100%)      Physical Exam:  General Appearance:  Appears well, NAD, AAO x3  Chest: Equal expansion bilaterally, equal breath sounds  CV: Pulse regular presently  Abdomen: Soft, nondistended, mild tenderness near umbilical port site, 4 port site dressings c/d/i  Extremities: Grossly symmetric, Warm and well perfused x4    Intake&Output:  Totals:    12-12-20 @ 07:01  -  12-13-20 @ 07:00  --------------------------------------------------------  IN: 1050 mL / OUT: 900 mL / NET: 150 mL      Outputs:    12-12-20 @ 07:01  -  12-13-20 @ 07:00  --------------------------------------------------------  OUT:    Post-Void Residual per Intermittent Catheterization (mL): 800 mL    Voided (mL): 100 mL  Total OUT: 900 mL        Urine Output:    12-12-20 @ 07:01  -  12-13-20 @ 07:00  --------------------------------------------------------  OUT: 13.24 mL/kg/d        Laboratory:                        13.2   17.05 )-----------( 221      ( 13 Dec 2020 07:01 )             39.4               12-13    133<L>  |  100  |  13  ----------------------------<  112<H>  3.8   |  21<L>  |  0.75    Ca    8.4      13 Dec 2020 07:00  Phos  2.3     12-13  Mg     1.9     12-13    TPro  5.6<L>  /  Alb  3.0<L>  /  TBili  0.7  /  DBili  x   /  AST  48<H>  /  ALT  48<H>  /  AlkPhos  100  12-13    LIVER FUNCTIONS - ( 13 Dec 2020 07:00 )  Alb: 3.0 g/dL / Pro: 5.6 g/dL / ALK PHOS: 100 U/L / ALT: 48 U/L / AST: 48 U/L / GGT: x           PT/INR - ( 12 Dec 2020 13:16 )   PT: 14.2 sec;   INR: 1.19 ratio         PTT - ( 12 Dec 2020 13:16 )  PTT:25.0 sec    COVID-19 PCR: NotDetec (12 Dec 2020 11:29)  ,   ,   CAPILLARY BLOOD GLUCOSE            Recent Imaging:      Reviewed laboratory and imaging

## 2020-12-13 NOTE — PROGRESS NOTE ADULT - ATTENDING COMMENTS
complains of wheezing - also present on exam  patient stated she takes budesonide inhaler which was given with good effect  I will request pulmonary consultation to further optimize  will optimize pulmonary status prior to discharge

## 2020-12-13 NOTE — PROGRESS NOTE ADULT - ASSESSMENT
80F pmhx HTN, COPD, Hypothyroidism, MI (age 36), lung ca s/p partial right pneumonectomy, 30 pack/yr smoker, open appy (70 yrs ago) now s/p laparoscopic cholecystectomy.     PLAN:  - Diet: REgular  - Zosyn  - Pain meds prn  - DVT ppx lovenox  - ASA 81    ACS Surgery [id number] 80F pmhx HTN, COPD, Hypothyroidism, MI (age 36), lung ca s/p partial right pneumonectomy, 30 pack/yr smoker, open appy (70 yrs ago) now s/p laparoscopic cholecystectomy.     PLAN:  - Diet: Regular  - Zosyn  - Pain meds prn  - DVT ppx lovenox  - ASA 81    ACS Surgery [phone]

## 2020-12-14 DIAGNOSIS — K81.9 CHOLECYSTITIS, UNSPECIFIED: ICD-10-CM

## 2020-12-14 DIAGNOSIS — E03.9 HYPOTHYROIDISM, UNSPECIFIED: ICD-10-CM

## 2020-12-14 DIAGNOSIS — J96.01 ACUTE RESPIRATORY FAILURE WITH HYPOXIA: ICD-10-CM

## 2020-12-14 DIAGNOSIS — R00.0 TACHYCARDIA, UNSPECIFIED: ICD-10-CM

## 2020-12-14 DIAGNOSIS — Z29.9 ENCOUNTER FOR PROPHYLACTIC MEASURES, UNSPECIFIED: ICD-10-CM

## 2020-12-14 DIAGNOSIS — F32.9 MAJOR DEPRESSIVE DISORDER, SINGLE EPISODE, UNSPECIFIED: ICD-10-CM

## 2020-12-14 DIAGNOSIS — R74.01 ELEVATION OF LEVELS OF LIVER TRANSAMINASE LEVELS: ICD-10-CM

## 2020-12-14 DIAGNOSIS — J44.9 CHRONIC OBSTRUCTIVE PULMONARY DISEASE, UNSPECIFIED: ICD-10-CM

## 2020-12-14 LAB
ALBUMIN SERPL ELPH-MCNC: 3.4 G/DL — SIGNIFICANT CHANGE UP (ref 3.3–5)
ALP SERPL-CCNC: 210 U/L — HIGH (ref 40–120)
ALT FLD-CCNC: 95 U/L — HIGH (ref 10–45)
ANION GAP SERPL CALC-SCNC: 14 MMOL/L — SIGNIFICANT CHANGE UP (ref 5–17)
AST SERPL-CCNC: 123 U/L — HIGH (ref 10–40)
BILIRUB DIRECT SERPL-MCNC: 0.4 MG/DL — HIGH (ref 0–0.2)
BILIRUB INDIRECT FLD-MCNC: 0.3 MG/DL — SIGNIFICANT CHANGE UP (ref 0.2–1)
BILIRUB SERPL-MCNC: 0.7 MG/DL — SIGNIFICANT CHANGE UP (ref 0.2–1.2)
BUN SERPL-MCNC: 15 MG/DL — SIGNIFICANT CHANGE UP (ref 7–23)
CALCIUM SERPL-MCNC: 8.8 MG/DL — SIGNIFICANT CHANGE UP (ref 8.4–10.5)
CHLORIDE SERPL-SCNC: 104 MMOL/L — SIGNIFICANT CHANGE UP (ref 96–108)
CO2 SERPL-SCNC: 23 MMOL/L — SIGNIFICANT CHANGE UP (ref 22–31)
CREAT SERPL-MCNC: 0.76 MG/DL — SIGNIFICANT CHANGE UP (ref 0.5–1.3)
GLUCOSE SERPL-MCNC: 83 MG/DL — SIGNIFICANT CHANGE UP (ref 70–99)
HCT VFR BLD CALC: 40.3 % — SIGNIFICANT CHANGE UP (ref 34.5–45)
HGB BLD-MCNC: 13.6 G/DL — SIGNIFICANT CHANGE UP (ref 11.5–15.5)
MAGNESIUM SERPL-MCNC: 2.2 MG/DL — SIGNIFICANT CHANGE UP (ref 1.6–2.6)
MCHC RBC-ENTMCNC: 30.6 PG — SIGNIFICANT CHANGE UP (ref 27–34)
MCHC RBC-ENTMCNC: 33.7 GM/DL — SIGNIFICANT CHANGE UP (ref 32–36)
MCV RBC AUTO: 90.8 FL — SIGNIFICANT CHANGE UP (ref 80–100)
NRBC # BLD: 0 /100 WBCS — SIGNIFICANT CHANGE UP (ref 0–0)
PHOSPHATE SERPL-MCNC: 1.6 MG/DL — LOW (ref 2.5–4.5)
PLATELET # BLD AUTO: 262 K/UL — SIGNIFICANT CHANGE UP (ref 150–400)
POTASSIUM SERPL-MCNC: 3.3 MMOL/L — LOW (ref 3.5–5.3)
POTASSIUM SERPL-SCNC: 3.3 MMOL/L — LOW (ref 3.5–5.3)
PROT SERPL-MCNC: 6.1 G/DL — SIGNIFICANT CHANGE UP (ref 6–8.3)
RBC # BLD: 4.44 M/UL — SIGNIFICANT CHANGE UP (ref 3.8–5.2)
RBC # FLD: 13.7 % — SIGNIFICANT CHANGE UP (ref 10.3–14.5)
SODIUM SERPL-SCNC: 141 MMOL/L — SIGNIFICANT CHANGE UP (ref 135–145)
TROPONIN T, HIGH SENSITIVITY RESULT: 21 NG/L — SIGNIFICANT CHANGE UP (ref 0–51)
TROPONIN T, HIGH SENSITIVITY RESULT: 36 NG/L — SIGNIFICANT CHANGE UP (ref 0–51)
TSH SERPL-MCNC: 1.4 UIU/ML — SIGNIFICANT CHANGE UP (ref 0.27–4.2)
WBC # BLD: 13.44 K/UL — HIGH (ref 3.8–10.5)
WBC # FLD AUTO: 13.44 K/UL — HIGH (ref 3.8–10.5)

## 2020-12-14 PROCEDURE — 99223 1ST HOSP IP/OBS HIGH 75: CPT | Mod: GC

## 2020-12-14 PROCEDURE — 71045 X-RAY EXAM CHEST 1 VIEW: CPT | Mod: 26

## 2020-12-14 PROCEDURE — 99223 1ST HOSP IP/OBS HIGH 75: CPT

## 2020-12-14 PROCEDURE — 93010 ELECTROCARDIOGRAM REPORT: CPT

## 2020-12-14 RX ORDER — ESCITALOPRAM OXALATE 10 MG/1
10 TABLET, FILM COATED ORAL DAILY
Refills: 0 | Status: DISCONTINUED | OUTPATIENT
Start: 2020-12-14 | End: 2020-12-22

## 2020-12-14 RX ORDER — POTASSIUM CHLORIDE 20 MEQ
10 PACKET (EA) ORAL
Refills: 0 | Status: COMPLETED | OUTPATIENT
Start: 2020-12-14 | End: 2020-12-14

## 2020-12-14 RX ORDER — KETOROLAC TROMETHAMINE 30 MG/ML
15 SYRINGE (ML) INJECTION ONCE
Refills: 0 | Status: DISCONTINUED | OUTPATIENT
Start: 2020-12-14 | End: 2020-12-14

## 2020-12-14 RX ORDER — POLYETHYLENE GLYCOL 3350 17 G/17G
17 POWDER, FOR SOLUTION ORAL DAILY
Refills: 0 | Status: DISCONTINUED | OUTPATIENT
Start: 2020-12-14 | End: 2020-12-16

## 2020-12-14 RX ORDER — METOPROLOL TARTRATE 50 MG
5 TABLET ORAL ONCE
Refills: 0 | Status: COMPLETED | OUTPATIENT
Start: 2020-12-14 | End: 2020-12-14

## 2020-12-14 RX ORDER — ONDANSETRON 8 MG/1
4 TABLET, FILM COATED ORAL ONCE
Refills: 0 | Status: COMPLETED | OUTPATIENT
Start: 2020-12-14 | End: 2020-12-14

## 2020-12-14 RX ORDER — POTASSIUM CHLORIDE 20 MEQ
40 PACKET (EA) ORAL EVERY 4 HOURS
Refills: 0 | Status: DISCONTINUED | OUTPATIENT
Start: 2020-12-14 | End: 2020-12-14

## 2020-12-14 RX ORDER — POLYETHYLENE GLYCOL 3350 17 G/17G
17 POWDER, FOR SOLUTION ORAL ONCE
Refills: 0 | Status: COMPLETED | OUTPATIENT
Start: 2020-12-14 | End: 2020-12-14

## 2020-12-14 RX ORDER — LEVOTHYROXINE SODIUM 125 MCG
1 TABLET ORAL
Qty: 0 | Refills: 0 | DISCHARGE

## 2020-12-14 RX ORDER — METOCLOPRAMIDE HCL 10 MG
5 TABLET ORAL ONCE
Refills: 0 | Status: COMPLETED | OUTPATIENT
Start: 2020-12-14 | End: 2020-12-14

## 2020-12-14 RX ORDER — SENNA PLUS 8.6 MG/1
2 TABLET ORAL AT BEDTIME
Refills: 0 | Status: DISCONTINUED | OUTPATIENT
Start: 2020-12-14 | End: 2020-12-16

## 2020-12-14 RX ADMIN — ENOXAPARIN SODIUM 40 MILLIGRAM(S): 100 INJECTION SUBCUTANEOUS at 11:23

## 2020-12-14 RX ADMIN — Medication 15 MILLIGRAM(S): at 08:09

## 2020-12-14 RX ADMIN — Medication 5 MILLIGRAM(S): at 07:40

## 2020-12-14 RX ADMIN — PIPERACILLIN AND TAZOBACTAM 25 GRAM(S): 4; .5 INJECTION, POWDER, LYOPHILIZED, FOR SOLUTION INTRAVENOUS at 13:21

## 2020-12-14 RX ADMIN — ESCITALOPRAM OXALATE 10 MILLIGRAM(S): 10 TABLET, FILM COATED ORAL at 17:50

## 2020-12-14 RX ADMIN — Medication 100 MILLIEQUIVALENT(S): at 13:42

## 2020-12-14 RX ADMIN — Medication 62.5 MILLIMOLE(S): at 09:54

## 2020-12-14 RX ADMIN — Medication 25 MILLIGRAM(S): at 05:41

## 2020-12-14 RX ADMIN — BUDESONIDE AND FORMOTEROL FUMARATE DIHYDRATE 2 PUFF(S): 160; 4.5 AEROSOL RESPIRATORY (INHALATION) at 09:55

## 2020-12-14 RX ADMIN — Medication 15 MILLIGRAM(S): at 07:39

## 2020-12-14 RX ADMIN — Medication 81 MILLIGRAM(S): at 11:23

## 2020-12-14 RX ADMIN — Medication 5 MILLIGRAM(S): at 22:48

## 2020-12-14 RX ADMIN — PIPERACILLIN AND TAZOBACTAM 25 GRAM(S): 4; .5 INJECTION, POWDER, LYOPHILIZED, FOR SOLUTION INTRAVENOUS at 21:48

## 2020-12-14 RX ADMIN — Medication 1 TABLET(S): at 01:11

## 2020-12-14 RX ADMIN — Medication 40 MILLIEQUIVALENT(S): at 09:54

## 2020-12-14 RX ADMIN — Medication 3 MILLILITER(S): at 11:23

## 2020-12-14 RX ADMIN — Medication 125 MICROGRAM(S): at 05:41

## 2020-12-14 RX ADMIN — POLYETHYLENE GLYCOL 3350 17 GRAM(S): 17 POWDER, FOR SOLUTION ORAL at 17:52

## 2020-12-14 RX ADMIN — Medication 83.33 MILLIMOLE(S): at 14:10

## 2020-12-14 RX ADMIN — PIPERACILLIN AND TAZOBACTAM 25 GRAM(S): 4; .5 INJECTION, POWDER, LYOPHILIZED, FOR SOLUTION INTRAVENOUS at 05:40

## 2020-12-14 RX ADMIN — ONDANSETRON 4 MILLIGRAM(S): 8 TABLET, FILM COATED ORAL at 20:39

## 2020-12-14 RX ADMIN — Medication 3 MILLILITER(S): at 17:51

## 2020-12-14 RX ADMIN — Medication 100 MILLIEQUIVALENT(S): at 18:20

## 2020-12-14 RX ADMIN — Medication 25 MILLIGRAM(S): at 17:50

## 2020-12-14 RX ADMIN — Medication 100 MILLIEQUIVALENT(S): at 15:35

## 2020-12-14 NOTE — CONSULT NOTE ADULT - PROBLEM SELECTOR RECOMMENDATION 6
Adviar BID when daughter brings it in today  Pulm following  No wheezing on exam Resumed home Lexapro 10mg PO daily confirmed with PMD

## 2020-12-14 NOTE — CONSULT NOTE ADULT - SUBJECTIVE AND OBJECTIVE BOX
VANI GONZALEZ  80y  Female      Patient is a 80y old  Female who presents with a chief complaint of abdominal pain  HPI:  80F pmhx HTN, COPD, Hypothyroidism, MI (age 36), lung ca s/p partial right pneumonectomy, 30 pack/yr smoker, open appy (70 yrs ago), who is presenting with 2 days of epigastric pain & emesis. She reports feeling increased bloating over the last few weeks. Two nights ago she began experiencing epigastric abdominal pain, which was followed by several episodes of emesis. The patient reports her pain continued through the following day and was associated with some chills. She denies CP, SOB, dysuria, diarrhea or constipation.  She also states that she was treated for Atrial Fibrillation several years ago for 1 year but not sure of which medication she was treated with.     She reports her MI at age 36 was secondary to hypokalemia, which was from a diuretic she was told to take (unsure why). She reports having had a TTE and stress test about 20 years ago, which were both normal. She lives alone after her   6 months ago. Her bedroom is up 1 flight of stairs, which she walks without issue. She can walk about 3 city blocks before becoming tired.    INTERVAL HPI/OVERNIGHT EVENTS: She has new onset rapid heart rate and shortness of breath.            REVIEW OF SYSTEMS:  CONSTITUTIONAL: No fever, weight loss, or fatigue  EYES: No eye pain, visual disturbances, or discharge  ENMT:  No difficulty hearing, tinnitus, vertigo; No sinus or throat pain  NECK: No pain or stiffness  BREASTS: No pain, masses, or nipple discharge  RESPIRATORY: No cough, wheezing, chills or hemoptysis; No shortness of breath  CARDIOVASCULAR: No chest pain, palpitations, dizziness, or leg swelling  GASTROINTESTINAL: No abdominal or epigastric pain. No nausea, vomiting, or hematemesis; No diarrhea or constipation. No melena or hematochezia.  GENITOURINARY: No dysuria, frequency, hematuria, or incontinence  NEUROLOGICAL: No headaches, memory loss, loss of strength, numbness, or tremors  SKIN: No itching, burning, rashes, or lesions   LYMPH NODES: No enlarged glands  ENDOCRINE: No heat or cold intolerance; No hair loss  MUSCULOSKELETAL: No joint pain or swelling; No muscle, back, or extremity pain  PSYCHIATRIC: No depression, anxiety, mood swings, or difficulty sleeping  HEME/LYMPH: No easy bruising, or bleeding gums  ALLERY AND IMMUNOLOGIC: No hives or eczema    T(C): 37 (20 @ 13:45), Max: 37.4 (20 @ 05:11)  HR: 82 (20 @ 13:45) (60 - 128)  BP: 150/80 (20 @ 13:45) (122/73 - 166/91)  RR: 18 (20 @ 13:45) (18 - 20)  SpO2: 95% (20 @ 13:45) (85% - 95%)  Wt(kg): --Vital Signs Last 24 Hrs  T(C): 37 (14 Dec 2020 13:45), Max: 37.4 (14 Dec 2020 05:11)  T(F): 98.6 (14 Dec 2020 13:45), Max: 99.3 (14 Dec 2020 05:11)  HR: 82 (14 Dec 2020 13:45) (60 - 128)  BP: 150/80 (14 Dec 2020 13:45) (122/73 - 166/91)  BP(mean): --  RR: 18 (14 Dec 2020 13:45) (18 - 20)  SpO2: 95% (14 Dec 2020 13:45) (85% - 95%)    PHYSICAL EXAM:  GENERAL: NAD, well-groomed, well-developed; non toxic  HEAD:  Atraumatic, Normocephalic  EYES: EOMI, PERRLA, conjunctiva and sclera clear  ENMT: No tonsillar erythema, exudates, or enlargement; Moist mucous membranes, Good dentition, No lesions  NECK: Supple, No JVD, Normal thyroid  NERVOUS SYSTEM:  Alert & Oriented X3, Good concentration; Motor Strength 5/5 B/L upper and lower extremities; DTRs 2+ intact and symmetric  CHEST/LUNG: Clear to percussion bilaterally; No rales, rhonchi, wheezing, or rubs  HEART: Regular rate and rhythm; No murmurs, rubs, or gallops  ABDOMEN: Soft, Nontender, Nondistended; Bowel sounds present  EXTREMITIES:  2+ Peripheral Pulses, No clubbing, cyanosis, or edema  LYMPH: No lymphadenopathy noted  SKIN: No rashes or lesions    Consultant(s) Notes Reviewed:  [x ] YES  [ ] NO  Care Discussed with Consultants/Other Providers [ x] YES  [ ] NO    LABS:                        13.6   13.44 )-----------( 262      ( 14 Dec 2020 08:02 )             40.3     12-14    141  |  104  |  15  ----------------------------<  83  3.3<L>   |  23  |  0.76    Ca    8.8      14 Dec 2020 08:02  Phos  1.6     12-14  Mg     2.2     12-14    TPro  5.6<L>  /  Alb  3.0<L>  /  TBili  0.7  /  DBili  x   /  AST  48<H>  /  ALT  48<H>  /  AlkPhos  100  12-13        CAPILLARY BLOOD GLUCOSE                RADIOLOGY & ADDITIONAL TESTS:    Imaging Personally Reviewed:  [ ] YES  [ ] NO VANI GONZALEZ  80y  Female      Patient is a 80y old  Female who presents with a chief complaint of abdominal pain  HPI:  80F pmhx HTN, COPD, Hypothyroidism, MI (age 36), lung ca s/p partial right pneumonectomy, 30 pack/yr smoker, open appy (70 yrs ago), who is presenting with 2 days of epigastric pain & emesis. She reports feeling increased bloating over the last few weeks. Two nights ago she began experiencing epigastric abdominal pain, which was followed by several episodes of emesis. The patient reports her pain continued through the following day and was associated with some chills. She denies CP, SOB, dysuria, diarrhea or constipation.  She also states that she was treated for Atrial Fibrillation several years ago for 1 year but not sure of which medication she was treated with.     She reports her MI at age 36 was secondary to hypokalemia, which was from a diuretic she was told to take (unsure why). She reports having had a TTE and stress test about 20 years ago, which were both normal. She lives alone after her   6 months ago. Her bedroom is up 1 flight of stairs, which she walks without issue. She can walk about 3 city blocks before becoming tired.    INTERVAL HPI/OVERNIGHT EVENTS: She has new onset rapid heart rate and shortness of breath.     PAST MEDICAL & SURGICAL HISTORY:  MI (myocardial infarction)    Hypertension    History of appendectomy    Allergies    No Known Allergies    Intolerances    Social History:  Former smoker no ETOH or drug use    FAMILY HISTORY:  Family history of colonic diverticulitis (Mother, Sibling)           REVIEW OF SYSTEMS:  CONSTITUTIONAL: No fever, weight loss, or fatigue  EYES: No eye pain, visual disturbances, or discharge  ENMT:  No difficulty hearing, tinnitus, vertigo; No sinus or throat pain  NECK: No pain or stiffness  BREASTS: No pain, masses, or nipple discharge  RESPIRATORY: No cough, wheezing, chills or hemoptysis; No shortness of breath  CARDIOVASCULAR: No chest pain, palpitations, dizziness, or leg swelling  GASTROINTESTINAL: No abdominal or epigastric pain. No nausea, vomiting, or hematemesis; No diarrhea or constipation. No melena or hematochezia.  GENITOURINARY: No dysuria, frequency, hematuria, or incontinence  NEUROLOGICAL: No headaches, memory loss, loss of strength, numbness, or tremors  SKIN: No itching, burning, rashes, or lesions   LYMPH NODES: No enlarged glands  ENDOCRINE: No heat or cold intolerance; No hair loss  MUSCULOSKELETAL: No joint pain or swelling; No muscle, back, or extremity pain  PSYCHIATRIC: No depression, anxiety, mood swings, or difficulty sleeping  HEME/LYMPH: No easy bruising, or bleeding gums  ALLERY AND IMMUNOLOGIC: No hives or eczema    T(C): 37 (20 @ 13:45), Max: 37.4 (20 @ 05:11)  HR: 82 (20 @ 13:45) (60 - 128)  BP: 150/80 (20 @ 13:45) (122/73 - 166/91)  RR: 18 (20 @ 13:45) (18 - 20)  SpO2: 95% (20 @ 13:45) (85% - 95%)  Wt(kg): --Vital Signs Last 24 Hrs  T(C): 37 (14 Dec 2020 13:45), Max: 37.4 (14 Dec 2020 05:11)  T(F): 98.6 (14 Dec 2020 13:45), Max: 99.3 (14 Dec 2020 05:11)  HR: 82 (14 Dec 2020 13:45) (60 - 128)  BP: 150/80 (14 Dec 2020 13:45) (122/73 - 166/91)  BP(mean): --  RR: 18 (14 Dec 2020 13:45) (18 - 20)  SpO2: 95% (14 Dec 2020 13:45) (85% - 95%)    PHYSICAL EXAM:  GENERAL: NAD, well-groomed, well-developed; non toxic  HEAD:  Atraumatic, Normocephalic  EYES: EOMI, PERRLA, conjunctiva and sclera clear  ENMT: No tonsillar erythema, exudates, or enlargement; Moist mucous membranes, Good dentition, No lesions  NECK: Supple, No JVD, Normal thyroid  NERVOUS SYSTEM:  Alert & Oriented X3, Good concentration; Motor Strength 5/5 B/L upper and lower extremities; DTRs 2+ intact and symmetric  CHEST/LUNG: Clear to percussion bilaterally; No rales, rhonchi, wheezing, or rubs  HEART: Regular rate and rhythm; No murmurs, rubs, or gallops  ABDOMEN: Soft, Nontender, Nondistended; Bowel sounds present  EXTREMITIES:  2+ Peripheral Pulses, No clubbing, cyanosis, or edema  LYMPH: No lymphadenopathy noted  SKIN: No rashes or lesions    Consultant(s) Notes Reviewed:  [x ] YES  [ ] NO  Care Discussed with Consultants/Other Providers [ x] YES  [ ] NO    LABS:                        13.6   13.44 )-----------( 262      ( 14 Dec 2020 08:02 )             40.3     12-14    141  |  104  |  15  ----------------------------<  83  3.3<L>   |  23  |  0.76    Ca    8.8      14 Dec 2020 08:02  Phos  1.6     12-14  Mg     2.2     12-14    TPro  5.6<L>  /  Alb  3.0<L>  /  TBili  0.7  /  DBili  x   /  AST  48<H>  /  ALT  48<H>  /  AlkPhos  100  12-13        CAPILLARY BLOOD GLUCOSE                RADIOLOGY & ADDITIONAL TESTS:    Imaging Personally Reviewed:  [ ] YES  [ ] NO

## 2020-12-14 NOTE — CONSULT NOTE ADULT - PROBLEM SELECTOR RECOMMENDATION 7
Lovenox ppx  Regular diet  PT pending  IS  Keep Mg > 2 K>4 and phos repleted Adviar BID when daughter brings it in today  Pulm following  No wheezing on exam

## 2020-12-14 NOTE — CONSULT NOTE ADULT - PROBLEM SELECTOR RECOMMENDATION 3
-No hx of liver disease   -Added on LFTs for today, if goes up then can check RUQ US if surgery team thinks necessary, but no need for further work up for now

## 2020-12-14 NOTE — CONSULT NOTE ADULT - SUBJECTIVE AND OBJECTIVE BOX
HPI:    PAST MEDICAL & SURGICAL HISTORY:  MI (myocardial infarction)    Hypertension    History of appendectomy        FAMILY HISTORY:  Family history of colonic diverticulitis (Mother, Sibling)        SOCIAL HISTORY:  Smoking: __ packs x ___ years  EtOH Use:  Marital Status:  Occupation:  Exposures:  Recent Travel:    Allergies    No Known Allergies    Intolerances        HOME MEDICATIONS:    REVIEW OF SYSTEMS:  CONSTITUTIONAL: No weakness, fevers or chills  EYES/ENT: No visual changes;  No vertigo or throat pain   NECK: No pain or stiffness  RESPIRATORY: No cough, wheezing, hemoptysis; No shortness of breath  CARDIOVASCULAR: No chest pain or palpitations  GASTROINTESTINAL: No abdominal or epigastric pain. No nausea, vomiting, or hematemesis; No diarrhea or constipation. No melena or hematochezia.  GENITOURINARY: No dysuria, frequency or hematuria  NEUROLOGICAL: No numbness or weakness  SKIN: No itching, burning, rashes, or lesions   All other review of systems is negative unless indicated above.    OBJECTIVE:  ICU Vital Signs Last 24 Hrs  T(C): 36.8 (14 Dec 2020 08:34), Max: 37.4 (14 Dec 2020 05:11)  T(F): 98.2 (14 Dec 2020 08:34), Max: 99.3 (14 Dec 2020 05:11)  HR: 86 (14 Dec 2020 09:12) (60 - 128)  BP: 151/83 (14 Dec 2020 09:12) (122/73 - 166/91)  BP(mean): --  ABP: --  ABP(mean): --  RR: 18 (14 Dec 2020 09:12) (18 - 20)  SpO2: 92% (14 Dec 2020 09:12) (85% - 95%)        12-13 @ 07:01  -  12-14 @ 07:00  --------------------------------------------------------  IN: 2040 mL / OUT: 2350 mL / NET: -310 mL    12-14 @ 07:01  -  12-14 @ 09:36  --------------------------------------------------------  IN: 0 mL / OUT: 450 mL / NET: -450 mL      CAPILLARY BLOOD GLUCOSE          PHYSICAL EXAM:  General: WN/WD NAD  Neurology: A&Ox3, nonfocal, THORPE x 4  Eyes: PERRLA/ EOMI, Gross vision intact  ENT/Neck: Neck supple, trachea midline, No JVD, Gross hearing intact  Respiratory: CTA B/L, No wheezing, rales, rhonchi  CV: RRR, +S1/S2, -S3/S4, no murmurs, rubs or gallops  Abdominal: Soft, NT, ND +BS, No HSM  MSK: 5/5 strength UE/LE bilaterally  Extremities: No edema, 2+ peripheral pulses  Skin: No Rashes, Hematoma, Ecchymosis  Incisions:   Tubes:    HOSPITAL MEDICATIONS:  MEDICATIONS  (STANDING):  albuterol/ipratropium for Nebulization 3 milliLiter(s) Nebulizer every 6 hours  aspirin  chewable 81 milliGRAM(s) Oral daily  budesonide 160 MICROgram(s)/formoterol 4.5 MICROgram(s) Inhaler 2 Puff(s) Inhalation two times a day  enoxaparin Injectable 40 milliGRAM(s) SubCutaneous daily  levothyroxine 125 MICROGram(s) Oral daily  metoprolol tartrate 25 milliGRAM(s) Oral two times a day  piperacillin/tazobactam IVPB.. 3.375 Gram(s) IV Intermittent every 8 hours  potassium chloride    Tablet ER 40 milliEquivalent(s) Oral every 4 hours  simvastatin 20 milliGRAM(s) Oral at bedtime  sodium phosphate IVPB 30 milliMole(s) IV Intermittent once  sodium phosphate IVPB 15 milliMole(s) IV Intermittent once    MEDICATIONS  (PRN):  oxyCODONE    IR 2.5 milliGRAM(s) Oral every 4 hours PRN Moderate Pain (4 - 6)  oxyCODONE    IR 5 milliGRAM(s) Oral every 6 hours PRN Severe Pain (7 - 10)      LABS:                        13.6   13.44 )-----------( 262      ( 14 Dec 2020 08:02 )             40.3     12-14    141  |  104  |  15  ----------------------------<  83  3.3<L>   |  23  |  0.76    Ca    8.8      14 Dec 2020 08:02  Phos  1.6     12-14  Mg     2.2     12-14    TPro  5.6<L>  /  Alb  3.0<L>  /  TBili  0.7  /  DBili  x   /  AST  48<H>  /  ALT  48<H>  /  AlkPhos  100  12-13    PT/INR - ( 12 Dec 2020 13:16 )   PT: 14.2 sec;   INR: 1.19 ratio         PTT - ( 12 Dec 2020 13:16 )  PTT:25.0 sec      Venous Blood Gas:  12-12 @ 10:11  7.42/43/35/27/63  VBG Lactate: 2.0      MICROBIOLOGY:     RADIOLOGY:  [ ] Reviewed by me    Point of Care Ultrasound Findings:    PFT:    EKG:   HPI:  80F PMH HTN, COPD, Hypothyroidism, MI (age 36), lung ca s/p partial right pneumonectomy, 30 pack/yr smoker, open appy (70 yrs ago), who is presenting with 2 days of epigastric pain & emesis. She reports feeling increased bloating over the last few weeks. Two nights ago she began experiencing epigastric abdominal pain, which was followed by several episodes of emesis. She was found to have cholecystisis and underwent laparoscopic cholecystectomy on . Post-operatively, patient has been noted to be having decrased SpO2 while in bed. Currently oxycodone for pain. States that she cannot take deep breath 2/2 abdominal pain. Incentive spirometer at bedside which she doesn't use. Uses advair 500 at home, currently not on inhalers in the hospital. When off supplemental O2 and asked to transiently hyperventilate on RA, patient's SpO2 uptrended to 98%.    Pulmonary consulted for hypoxemia    PAST MEDICAL & SURGICAL HISTORY:  MI (myocardial infarction)    Hypertension    History of appendectomy        FAMILY HISTORY:  Family history of colonic diverticulitis (Mother, Sibling)        SOCIAL HISTORY:  Smokin pack years  EtOH Use: denies  Occupation: none  Exposures: none  Recent Travel: none    Allergies    No Known Allergies    Intolerances        HOME MEDICATIONS:    REVIEW OF SYSTEMS:  CONSTITUTIONAL: No weakness, fevers or chills  EYES/ENT: No visual changes;  No vertigo or throat pain   NECK: No pain or stiffness  RESPIRATORY: +cough. No wheezing, hemoptysis; No shortness of breath  CARDIOVASCULAR: No chest pain or palpitations  GASTROINTESTINAL: +abdominal/epigastric pain with inspiration. No nausea, vomiting, or hematemesis; No diarrhea or constipation. No melena or hematochezia.  GENITOURINARY: No dysuria, frequency or hematuria  NEUROLOGICAL: No numbness or weakness  SKIN: No itching, burning, rashes, or lesions   All other review of systems is negative unless indicated above.    OBJECTIVE:  ICU Vital Signs Last 24 Hrs  T(C): 36.8 (14 Dec 2020 08:34), Max: 37.4 (14 Dec 2020 05:11)  T(F): 98.2 (14 Dec 2020 08:34), Max: 99.3 (14 Dec 2020 05:11)  HR: 86 (14 Dec 2020 09:12) (60 - 128)  BP: 151/83 (14 Dec 2020 09:12) (122/73 - 166/91)  BP(mean): --  ABP: --  ABP(mean): --  RR: 18 (14 Dec 2020 09:12) (18 - 20)  SpO2: 92% (14 Dec 2020 09:12) (85% - 95%)         @ 07:01  -   @ 07:00  --------------------------------------------------------  IN: 2040 mL / OUT: 2350 mL / NET: -310 mL     @ 07: @ 09:36  --------------------------------------------------------  IN: 0 mL / OUT: 450 mL / NET: -450 mL      CAPILLARY BLOOD GLUCOSE          PHYSICAL EXAM:  General: WN/WD NAD  Neurology: A&Ox3, nonfocal, THORPE x 4  Eyes: PERRLA/ EOMI, Gross vision intact  ENT/Neck: Neck supple, trachea midline, No JVD, Gross hearing intact  Respiratory: Decreased BS 2/2 splinting. Otherwise CTA bilaterally.   CV: RRR, +S1/S2, -S3/S4, no murmurs, rubs or gallops  Abdominal: Soft, +TTP, ND +BS, No HSM  MSK: 5/5 strength UE/LE bilaterally  Extremities: No edema, 2+ peripheral pulses  Skin: No Rashes, Hematoma, Ecchymosis      HOSPITAL MEDICATIONS:  MEDICATIONS  (STANDING):  albuterol/ipratropium for Nebulization 3 milliLiter(s) Nebulizer every 6 hours  aspirin  chewable 81 milliGRAM(s) Oral daily  budesonide 160 MICROgram(s)/formoterol 4.5 MICROgram(s) Inhaler 2 Puff(s) Inhalation two times a day  enoxaparin Injectable 40 milliGRAM(s) SubCutaneous daily  levothyroxine 125 MICROGram(s) Oral daily  metoprolol tartrate 25 milliGRAM(s) Oral two times a day  piperacillin/tazobactam IVPB.. 3.375 Gram(s) IV Intermittent every 8 hours  potassium chloride    Tablet ER 40 milliEquivalent(s) Oral every 4 hours  simvastatin 20 milliGRAM(s) Oral at bedtime  sodium phosphate IVPB 30 milliMole(s) IV Intermittent once  sodium phosphate IVPB 15 milliMole(s) IV Intermittent once    MEDICATIONS  (PRN):  oxyCODONE    IR 2.5 milliGRAM(s) Oral every 4 hours PRN Moderate Pain (4 - 6)  oxyCODONE    IR 5 milliGRAM(s) Oral every 6 hours PRN Severe Pain (7 - 10)      LABS:                        13.6   13.44 )-----------( 262      ( 14 Dec 2020 08:02 )             40.3     12-    141  |  104  |  15  ----------------------------<  83  3.3<L>   |  23  |  0.76    Ca    8.8      14 Dec 2020 08:02  Phos  1.6       Mg     2.2         TPro  5.6<L>  /  Alb  3.0<L>  /  TBili  0.7  /  DBili  x   /  AST  48<H>  /  ALT  48<H>  /  AlkPhos  100  1213    PT/INR - ( 12 Dec 2020 13:16 )   PT: 14.2 sec;   INR: 1.19 ratio         PTT - ( 12 Dec 2020 13:16 )  PTT:25.0 sec      Venous Blood Gas:   @ 10:11  7.42/43/35/27/63  VBG Lactate: 2.0

## 2020-12-14 NOTE — CONSULT NOTE ADULT - PROBLEM SELECTOR RECOMMENDATION 2
Afib 2/2 sepsis vs. hyperthyroid vs. less likely PE  -Rectal temp negative, no signs of infectious etiology no sepsis  -On Levothyroxine 125mcg PO Daily only 6 days a week did have elevated Free T4 recently so will check again here  -Discussed with her PMD DR. Walters 12/14 who agrees given age and fact that she is not persistent afib would hold off on full anticoagulation now and he will reassess in the office.  Will not start Full AC based on this discussion  -Metoprolol 25mg PO BID - did not miss doses  -If persists after advair resumed, pain controlled and if TSH/T4 is normal would consider CTA at that point

## 2020-12-14 NOTE — CONSULT NOTE ADULT - PROBLEM SELECTOR RECOMMENDATION 9
COPD off of Advair vs. splinting from pain causing atelectasis vs. hyperthyroid vs. less likely PE  Daughter brining in her Advair and should resume when she brings it  Pain control

## 2020-12-14 NOTE — PROGRESS NOTE ADULT - ATTENDING COMMENTS
Patient seen and examined and agree with resident note.  POD # 2 s/p laparoscopic cholecystectomy for gangrenous cholecystitis  Mrs. Corrales went into atrial fibrillation with RVR with resolution after given metoprolol. The patient did not complain of chest pain or dyspnea.   CXR was clear   Tolerating diet   Labs reviewed and improving leukocytosis  Appreciate pulmonary consult and will continue symbicort and duonebs for COPD.

## 2020-12-14 NOTE — PROGRESS NOTE ADULT - SUBJECTIVE AND OBJECTIVE BOX
CHIEF COMPLAINT:    Interval Events:    REVIEW OF SYSTEMS:  CONSTITUTIONAL: No weakness, fevers or chills  EYES/ENT: No visual changes;  No vertigo or throat pain   NECK: No pain or stiffness  RESPIRATORY: No cough, wheezing, hemoptysis; No shortness of breath  CARDIOVASCULAR: No chest pain or palpitations  GASTROINTESTINAL: No abdominal or epigastric pain. No nausea, vomiting, or hematemesis; No diarrhea or constipation. No melena or hematochezia.  GENITOURINARY: No dysuria, frequency or hematuria  NEUROLOGICAL: No numbness or weakness  SKIN: No itching, burning, rashes, or lesions   All other review of systems is negative unless indicated above.    OBJECTIVE:  ICU Vital Signs Last 24 Hrs  T(C): 36.8 (14 Dec 2020 08:34), Max: 37.4 (14 Dec 2020 05:11)  T(F): 98.2 (14 Dec 2020 08:34), Max: 99.3 (14 Dec 2020 05:11)  HR: 86 (14 Dec 2020 09:12) (60 - 128)  BP: 151/83 (14 Dec 2020 09:12) (122/73 - 166/91)  BP(mean): --  ABP: --  ABP(mean): --  RR: 18 (14 Dec 2020 09:12) (18 - 20)  SpO2: 92% (14 Dec 2020 09:12) (85% - 95%)        12-13 @ 07:01  -  12-14 @ 07:00  --------------------------------------------------------  IN: 2040 mL / OUT: 2350 mL / NET: -310 mL    12-14 @ 07:01  -  12-14 @ 09:34  --------------------------------------------------------  IN: 0 mL / OUT: 450 mL / NET: -450 mL      CAPILLARY BLOOD GLUCOSE          PHYSICAL EXAM:  General: WN/WD NAD  Neurology: A&Ox3, nonfocal, THORPE x 4  Eyes: PERRLA/ EOMI, Gross vision intact  ENT/Neck: Neck supple, trachea midline, No JVD, Gross hearing intact  Respiratory: CTA B/L, No wheezing, rales, rhonchi  CV: RRR, +S1/S2, -S3/S4, no murmurs, rubs or gallops  Abdominal: Soft, NT, ND +BS, No HSM  MSK: 5/5 strength UE/LE bilaterally  Extremities: No edema, 2+ peripheral pulses  Skin: No Rashes, Hematoma, Ecchymosis  Incisions:   Tubes:    HOSPITAL MEDICATIONS:  MEDICATIONS  (STANDING):  albuterol/ipratropium for Nebulization 3 milliLiter(s) Nebulizer every 6 hours  aspirin  chewable 81 milliGRAM(s) Oral daily  budesonide 160 MICROgram(s)/formoterol 4.5 MICROgram(s) Inhaler 2 Puff(s) Inhalation two times a day  enoxaparin Injectable 40 milliGRAM(s) SubCutaneous daily  levothyroxine 125 MICROGram(s) Oral daily  metoprolol tartrate 25 milliGRAM(s) Oral two times a day  piperacillin/tazobactam IVPB.. 3.375 Gram(s) IV Intermittent every 8 hours  potassium chloride    Tablet ER 40 milliEquivalent(s) Oral every 4 hours  simvastatin 20 milliGRAM(s) Oral at bedtime  sodium phosphate IVPB 30 milliMole(s) IV Intermittent once  sodium phosphate IVPB 15 milliMole(s) IV Intermittent once    MEDICATIONS  (PRN):  oxyCODONE    IR 2.5 milliGRAM(s) Oral every 4 hours PRN Moderate Pain (4 - 6)  oxyCODONE    IR 5 milliGRAM(s) Oral every 6 hours PRN Severe Pain (7 - 10)      LABS:                        13.6   13.44 )-----------( 262      ( 14 Dec 2020 08:02 )             40.3     Hgb Trend: 13.6<--, 13.2<--, 14.6<--  12-14    141  |  104  |  15  ----------------------------<  83  3.3<L>   |  23  |  0.76    Ca    8.8      14 Dec 2020 08:02  Phos  1.6     12-14  Mg     2.2     12-14    TPro  5.6<L>  /  Alb  3.0<L>  /  TBili  0.7  /  DBili  x   /  AST  48<H>  /  ALT  48<H>  /  AlkPhos  100  12-13    Creatinine Trend: 0.76<--, 0.75<--, 0.71<--  PT/INR - ( 12 Dec 2020 13:16 )   PT: 14.2 sec;   INR: 1.19 ratio         PTT - ( 12 Dec 2020 13:16 )  PTT:25.0 sec      Venous Blood Gas:  12-12 @ 10:11  7.42/43/35/27/63  VBG Lactate: 2.0      MICROBIOLOGY:     Culture - Blood (collected 12 Dec 2020 13:26)  Source: .Blood Blood-Venous  Preliminary Report (13 Dec 2020 14:01):    No growth to date.    Culture - Blood (collected 12 Dec 2020 13:26)  Source: .Blood Blood-Peripheral  Preliminary Report (13 Dec 2020 14:01):    No growth to date.        RADIOLOGY:  [ ] Reviewed by me    PULMONARY FUNCTION TESTS:    EKG:

## 2020-12-14 NOTE — PROGRESS NOTE ADULT - ASSESSMENT
80F pmhx HTN, COPD, Hypothyroidism, MI (age 36), lung ca s/p partial right pneumonectomy, 30 pack/yr smoker, open appy (70 yrs ago) now s/p laparoscopic cholecystectomy.     PLAN:  - Diet: Regular  - Zosyn  - Pain meds prn  - DVT ppx lovenox  - ASA 81  - Will consult gyn for pessary placement    [facility] [id number] 80F pmhx HTN, COPD, Hypothyroidism, MI (age 36), lung ca s/p partial right pneumonectomy, 30 pack/yr smoker, open appy (70 yrs ago) now s/p laparoscopic cholecystectomy.     PLAN:  - Diet: Regular  - Zosyn  - Pain meds prn  - DVT ppx lovenox  - ASA 81  - Will consult gyn for pessary placement  - AM CXR     [facility] [id number]

## 2020-12-14 NOTE — PROGRESS NOTE ADULT - SUBJECTIVE AND OBJECTIVE BOX
GENERAL SURGERY PROGRESS NOTE   ___________________________________________________________________    VANI GONZALEZ | [id number] | 80y Female | [facility] 2MON 202 W1 | LOS 2d    Attending: Ming Dasilva    ___________________________________________________________________      SUBJECTIVE:   Patient seen today during morning rounds at bedside and without acute distress. Denies chest pain, fever, severe pain, or SOB.     OVernight: Patient with urinary retention was straight cath following bladder scan.     Diet, Regular (12-12-20 @ 19:57) [Active]      PMH:   MI (myocardial infarction)    Hypertension    History of appendectomy          OBJECTIVE:    Allegies:  NKDA    MEDICATIONS  (STANDING):  albuterol/ipratropium for Nebulization 3 milliLiter(s) Nebulizer every 6 hours  aspirin  chewable 81 milliGRAM(s) Oral daily  budesonide 160 MICROgram(s)/formoterol 4.5 MICROgram(s) Inhaler 2 Puff(s) Inhalation two times a day  enoxaparin Injectable 40 milliGRAM(s) SubCutaneous daily  levothyroxine 125 MICROGram(s) Oral daily  metoprolol tartrate 25 milliGRAM(s) Oral two times a day  piperacillin/tazobactam IVPB.. 3.375 Gram(s) IV Intermittent every 8 hours  simvastatin 20 milliGRAM(s) Oral at bedtime    MEDICATIONS  (PRN):  oxyCODONE    IR 2.5 milliGRAM(s) Oral every 4 hours PRN Moderate Pain (4 - 6)  oxyCODONE    IR 5 milliGRAM(s) Oral every 6 hours PRN Severe Pain (7 - 10)      Vitals:    T(C): 36.6 (12-14-20 @ 00:58), Max: 37.3 (12-13-20 @ 16:31)  HR: 88 (12-14-20 @ 00:58) (60 - 97)  BP: 161/94 (12-14-20 @ 00:58) (122/73 - 174/75)  RR: 18 (12-14-20 @ 00:58) (18 - 18)  SpO2: 94% (12-14-20 @ 00:58) (91% - 95%)      Physical Exam:  General Appearance:  Appears well, NAD, AAO x3  Chest: Equal expansion bilaterally, equal breath sounds  CV: Pulse regular presently  Abdomen: Soft, nondistended, mild tenderness near umbilical port site, 4 port site dressings c/d/i  Extremities: Grossly symmetric, Warm and well perfused x4    Intake&Output:  Totals:    12-12-20 @ 07:01  -  12-13-20 @ 07:00  --------------------------------------------------------  IN: 1050 mL / OUT: 900 mL / NET: 150 mL    12-13-20 @ 07:01  -  12-14-20 @ 04:58  --------------------------------------------------------  IN: 1940 mL / OUT: 2250 mL / NET: -310 mL      Outputs:    12-12-20 @ 07:01  -  12-13-20 @ 07:00  --------------------------------------------------------  OUT:    Post-Void Residual per Intermittent Catheterization (mL): 800 mL    Voided (mL): 100 mL  Total OUT: 900 mL      12-13-20 @ 07:01  -  12-14-20 @ 04:58  --------------------------------------------------------  OUT:    Intermittent Catheterization - Urethral (mL): 1800 mL    Voided (mL): 450 mL  Total OUT: 2250 mL        Urine Output:    12-12-20 @ 07:01  -  12-13-20 @ 07:00  --------------------------------------------------------  OUT: 13.24 mL/kg/d    12-13-20 @ 07:01  -  12-14-20 @ 04:58  --------------------------------------------------------  OUT: 33.09 mL/kg/d        Laboratory:                        13.2   17.05 )-----------( 221      ( 13 Dec 2020 07:01 )             39.4               12-13    133<L>  |  100  |  13  ----------------------------<  112<H>  3.8   |  21<L>  |  0.75    Ca    8.4      13 Dec 2020 07:00  Phos  2.3     12-13  Mg     1.9     12-13    TPro  5.6<L>  /  Alb  3.0<L>  /  TBili  0.7  /  DBili  x   /  AST  48<H>  /  ALT  48<H>  /  AlkPhos  100  12-13    LIVER FUNCTIONS - ( 13 Dec 2020 07:00 )  Alb: 3.0 g/dL / Pro: 5.6 g/dL / ALK PHOS: 100 U/L / ALT: 48 U/L / AST: 48 U/L / GGT: x           PT/INR - ( 12 Dec 2020 13:16 )   PT: 14.2 sec;   INR: 1.19 ratio         PTT - ( 12 Dec 2020 13:16 )  PTT:25.0 sec    COVID-19 PCR: NotDetec (12 Dec 2020 11:29)  , CARDIAC MARKERS ( 13 Dec 2020 15:29 )  x     / x     / x     / x     / x      High Sensitivity Troponin:x      Serum Pro-BNP: 1001 pg/mL  ----------    ,   CAPILLARY BLOOD GLUCOSE            Recent Imaging:      Reviewed laboratory and imaging

## 2020-12-15 ENCOUNTER — TRANSCRIPTION ENCOUNTER (OUTPATIENT)
Age: 80
End: 2020-12-15

## 2020-12-15 LAB
ALBUMIN SERPL ELPH-MCNC: 3.1 G/DL — LOW (ref 3.3–5)
ALP SERPL-CCNC: 526 U/L — HIGH (ref 40–120)
ALT FLD-CCNC: 281 U/L — HIGH (ref 10–45)
ANION GAP SERPL CALC-SCNC: 15 MMOL/L — SIGNIFICANT CHANGE UP (ref 5–17)
AST SERPL-CCNC: 255 U/L — HIGH (ref 10–40)
BILIRUB DIRECT SERPL-MCNC: 2.7 MG/DL — HIGH (ref 0–0.2)
BILIRUB INDIRECT FLD-MCNC: 0.7 MG/DL — SIGNIFICANT CHANGE UP (ref 0.2–1)
BILIRUB SERPL-MCNC: 3.4 MG/DL — HIGH (ref 0.2–1.2)
BUN SERPL-MCNC: 8 MG/DL — SIGNIFICANT CHANGE UP (ref 7–23)
CALCIUM SERPL-MCNC: 8.1 MG/DL — LOW (ref 8.4–10.5)
CHLORIDE SERPL-SCNC: 101 MMOL/L — SIGNIFICANT CHANGE UP (ref 96–108)
CO2 SERPL-SCNC: 22 MMOL/L — SIGNIFICANT CHANGE UP (ref 22–31)
CREAT SERPL-MCNC: 0.6 MG/DL — SIGNIFICANT CHANGE UP (ref 0.5–1.3)
GLUCOSE SERPL-MCNC: 120 MG/DL — HIGH (ref 70–99)
HCT VFR BLD CALC: 39 % — SIGNIFICANT CHANGE UP (ref 34.5–45)
HGB BLD-MCNC: 13.3 G/DL — SIGNIFICANT CHANGE UP (ref 11.5–15.5)
MAGNESIUM SERPL-MCNC: 2 MG/DL — SIGNIFICANT CHANGE UP (ref 1.6–2.6)
MCHC RBC-ENTMCNC: 30.8 PG — SIGNIFICANT CHANGE UP (ref 27–34)
MCHC RBC-ENTMCNC: 34.1 GM/DL — SIGNIFICANT CHANGE UP (ref 32–36)
MCV RBC AUTO: 90.3 FL — SIGNIFICANT CHANGE UP (ref 80–100)
NRBC # BLD: 0 /100 WBCS — SIGNIFICANT CHANGE UP (ref 0–0)
PHOSPHATE SERPL-MCNC: 2.1 MG/DL — LOW (ref 2.5–4.5)
PLATELET # BLD AUTO: 294 K/UL — SIGNIFICANT CHANGE UP (ref 150–400)
POTASSIUM SERPL-MCNC: 3.1 MMOL/L — LOW (ref 3.5–5.3)
POTASSIUM SERPL-SCNC: 3.1 MMOL/L — LOW (ref 3.5–5.3)
PROT SERPL-MCNC: 5.8 G/DL — LOW (ref 6–8.3)
RBC # BLD: 4.32 M/UL — SIGNIFICANT CHANGE UP (ref 3.8–5.2)
RBC # FLD: 13.8 % — SIGNIFICANT CHANGE UP (ref 10.3–14.5)
SODIUM SERPL-SCNC: 138 MMOL/L — SIGNIFICANT CHANGE UP (ref 135–145)
T4 FREE SERPL-MCNC: 1.5 NG/DL — SIGNIFICANT CHANGE UP (ref 0.9–1.8)
WBC # BLD: 11.66 K/UL — HIGH (ref 3.8–10.5)
WBC # FLD AUTO: 11.66 K/UL — HIGH (ref 3.8–10.5)

## 2020-12-15 PROCEDURE — 99233 SBSQ HOSP IP/OBS HIGH 50: CPT

## 2020-12-15 PROCEDURE — 99233 SBSQ HOSP IP/OBS HIGH 50: CPT | Mod: GC

## 2020-12-15 RX ORDER — ONDANSETRON 8 MG/1
4 TABLET, FILM COATED ORAL EVERY 6 HOURS
Refills: 0 | Status: DISCONTINUED | OUTPATIENT
Start: 2020-12-15 | End: 2020-12-22

## 2020-12-15 RX ORDER — ONDANSETRON 8 MG/1
4 TABLET, FILM COATED ORAL ONCE
Refills: 0 | Status: COMPLETED | OUTPATIENT
Start: 2020-12-15 | End: 2020-12-15

## 2020-12-15 RX ORDER — POTASSIUM PHOSPHATE, MONOBASIC POTASSIUM PHOSPHATE, DIBASIC 236; 224 MG/ML; MG/ML
15 INJECTION, SOLUTION INTRAVENOUS ONCE
Refills: 0 | Status: COMPLETED | OUTPATIENT
Start: 2020-12-15 | End: 2020-12-15

## 2020-12-15 RX ORDER — DEXTROSE MONOHYDRATE, SODIUM CHLORIDE, AND POTASSIUM CHLORIDE 50; .745; 4.5 G/1000ML; G/1000ML; G/1000ML
1000 INJECTION, SOLUTION INTRAVENOUS
Refills: 0 | Status: DISCONTINUED | OUTPATIENT
Start: 2020-12-15 | End: 2020-12-17

## 2020-12-15 RX ORDER — POTASSIUM CHLORIDE 20 MEQ
40 PACKET (EA) ORAL EVERY 4 HOURS
Refills: 0 | Status: COMPLETED | OUTPATIENT
Start: 2020-12-15 | End: 2020-12-15

## 2020-12-15 RX ADMIN — PIPERACILLIN AND TAZOBACTAM 25 GRAM(S): 4; .5 INJECTION, POWDER, LYOPHILIZED, FOR SOLUTION INTRAVENOUS at 13:27

## 2020-12-15 RX ADMIN — Medication 40 MILLIEQUIVALENT(S): at 10:05

## 2020-12-15 RX ADMIN — BUDESONIDE AND FORMOTEROL FUMARATE DIHYDRATE 2 PUFF(S): 160; 4.5 AEROSOL RESPIRATORY (INHALATION) at 10:05

## 2020-12-15 RX ADMIN — Medication 125 MICROGRAM(S): at 05:51

## 2020-12-15 RX ADMIN — POTASSIUM PHOSPHATE, MONOBASIC POTASSIUM PHOSPHATE, DIBASIC 62.5 MILLIMOLE(S): 236; 224 INJECTION, SOLUTION INTRAVENOUS at 19:31

## 2020-12-15 RX ADMIN — Medication 10 MILLIGRAM(S): at 11:14

## 2020-12-15 RX ADMIN — Medication 25 MILLIGRAM(S): at 05:51

## 2020-12-15 RX ADMIN — ESCITALOPRAM OXALATE 10 MILLIGRAM(S): 10 TABLET, FILM COATED ORAL at 11:14

## 2020-12-15 RX ADMIN — ENOXAPARIN SODIUM 40 MILLIGRAM(S): 100 INJECTION SUBCUTANEOUS at 11:13

## 2020-12-15 RX ADMIN — PIPERACILLIN AND TAZOBACTAM 25 GRAM(S): 4; .5 INJECTION, POWDER, LYOPHILIZED, FOR SOLUTION INTRAVENOUS at 05:52

## 2020-12-15 RX ADMIN — DEXTROSE MONOHYDRATE, SODIUM CHLORIDE, AND POTASSIUM CHLORIDE 100 MILLILITER(S): 50; .745; 4.5 INJECTION, SOLUTION INTRAVENOUS at 13:30

## 2020-12-15 RX ADMIN — SIMVASTATIN 20 MILLIGRAM(S): 20 TABLET, FILM COATED ORAL at 21:57

## 2020-12-15 RX ADMIN — Medication 3 MILLILITER(S): at 11:14

## 2020-12-15 RX ADMIN — Medication 81 MILLIGRAM(S): at 11:14

## 2020-12-15 RX ADMIN — Medication 3 MILLILITER(S): at 17:27

## 2020-12-15 RX ADMIN — SENNA PLUS 2 TABLET(S): 8.6 TABLET ORAL at 21:54

## 2020-12-15 RX ADMIN — Medication 83.33 MILLIMOLE(S): at 08:26

## 2020-12-15 RX ADMIN — PIPERACILLIN AND TAZOBACTAM 25 GRAM(S): 4; .5 INJECTION, POWDER, LYOPHILIZED, FOR SOLUTION INTRAVENOUS at 21:54

## 2020-12-15 RX ADMIN — ONDANSETRON 4 MILLIGRAM(S): 8 TABLET, FILM COATED ORAL at 10:50

## 2020-12-15 RX ADMIN — Medication 25 MILLIGRAM(S): at 17:26

## 2020-12-15 RX ADMIN — Medication 40 MILLIEQUIVALENT(S): at 08:25

## 2020-12-15 NOTE — DISCHARGE NOTE PROVIDER - CARE PROVIDERS DIRECT ADDRESSES
[email or web address]

## 2020-12-15 NOTE — DISCHARGE NOTE PROVIDER - HOSPITAL COURSE
80F pmhx HTN, COPD, Hypothyroidism, MI (age 36), lung ca s/p partial right pneumonectomy, 30 pack/yr smoker, open appy (70 yrs ago) now s/p laparoscopic cholecystectomy for gangrenous cholecystitis. Patient during this admission had poor urine output due to pessary removal. Patient to have Sterling cathter placed and discharged for follow up with PCP for pessary replacement. Post operative course complicated by atrial fibrillation resolved following administration of metoprolol. Patient afebrile, stable, tolerating pain with oral medications. Surgical sites clean dry and intact. Patient seen by pulmonologist inpatient for asthma related symptoms with recommendations for duoneb treatments. 80F pmhx HTN, COPD (30 PYH smoking), Hypothyroidism, diverticulosis, CAD/MI (age 36), lung ca s/p partial right pneumonectomy, p/w abd pain s/p laparoscopic cholecystectomy 12/12 for acute gangrenous cholecystitis with hospital course c/b afib RVR , acute hypoxic respiratory failure, and transaminitis MRCP showing postoperative changes without CBD stone and acute blood loss anemia likely secondary to diverticular bleed. During the early part of this admission the patient was having difficulty voiding due to pessary removal, a huggins was placed and then removed. The patient passed trial of void. Patient to follow up with PCP in regards to replacement of pessary. Post operatively pulmonary was consulted for acute hypoxic respiratory failure postoperatively likely secondary to splinting yielding hypoventilation with resulting atelectasis. Patient brought in her own home Advair and resumed taking it and the hypoxic respiratory failure resolved. Patient instructed to follow up with her primary pulmonologist, Dr. Saez. Hepatology was consulted for transaminitis. A MRCP was completed with no choledocholithiasis. Gastroenterology was consulted for rectal bleed, the patient received 1 unit of packed red blood cells. Acute GI blood loss anemia likely secondary to diverticular bleed which resolved. Patient to follow up outpatient with Dr. Cadena. Medicine was consulted for medical co-management. Patient with an episode of Afib with RVR while in the hospital, PCP Dr. Walters aware. At this time the patient is not in persistent afib and hospitalist and PCP recommend to hold off on full AC and to continue with home metoprolol dosing.   Patient was evaluated by PT whom recommends discharge to [facility], and patient in agreeance.     At the time of discharge, the patient was hemodynamically stable, tolerating PO diet, voiding urine, passing stool, ambulating and was comfortable with adequate pain control. The patient was instructed to follow up with Dr. Dasilva, Dr. Saez, Dr. Cadena and PCP Dr. Walters within 1-2 weeks after discharge. The patient felt comfortable with discharge. The patient was discharged to subacute rehab. The patient had no other issues.   80F pmhx HTN, COPD (30 PYH smoking), Hypothyroidism, diverticulosis, CAD/MI (age 36), lung ca s/p partial right pneumonectomy, p/w abd pain s/p laparoscopic cholecystectomy 12/12 for acute gangrenous cholecystitis with hospital course c/b afib RVR , acute hypoxic respiratory failure, and transaminitis MRCP showing postoperative changes without CBD stone and acute blood loss anemia likely secondary to diverticular bleed. During the early part of this admission the patient was having difficulty voiding due to pessary removal, a hgugins was placed and then removed. The patient passed trial of void. Patient to follow up with PCP in regards to replacement of pessary. Post operatively pulmonary was consulted for acute hypoxic respiratory failure postoperatively likely secondary to splinting yielding hypoventilation with resulting atelectasis. Patient brought in her own home Advair and resumed taking it and the hypoxic respiratory failure resolved. Patient instructed to follow up with her primary pulmonologist, Dr. Saez. Hepatology was consulted for transaminitis. A MRCP was completed with no choledocholithiasis. Gastroenterology was consulted for rectal bleed, the patient received 1 unit of packed red blood cells. Acute GI blood loss anemia likely secondary to diverticular bleed which resolved. Patient to follow up outpatient with Dr. Cadena. Medicine was consulted for medical co-management. Patient with an episode of Afib with RVR while in the hospital, PCP Dr. Walters aware. At this time the patient is not in persistent afib and hospitalist and PCP recommend to hold off on full AC and to continue with home metoprolol dosing.   Patient was evaluated by PT whom recommends discharge to [facility], and patient in agreeance.     At the time of discharge, the patient was hemodynamically stable, tolerating PO diet, voiding urine, passing stool, ambulating and was comfortable with adequate pain control. The patient was instructed to follow up with Dr. Dasilva, Dr. Saez, Dr. Cadena and PCP Dr. Walters within 1-2 weeks after discharge. The patient felt comfortable with discharge. The patient was discharged to subacute rehab. The patient had no other issues.    12/28/20 Post discharge addendum  The patient had sepsis on admission secondary to acute cholecystitis.    The patient also had acute postoperative hypoxic respiratory failure requiring supplemental oxygen support and pulmonary consultation.  Apolinar Fowler MD

## 2020-12-15 NOTE — PROGRESS NOTE ADULT - ASSESSMENT
80F PMH HTN, COPD, Hypothyroidism, MI (age 36), lung ca s/p partial right pneumonectomy, 30 pack/yr smoker, open appy (70 yrs ago), currently 3 days s/p lap caroline with transient hypoxemia 2/2 to what appears to be splinting due to post-operative pain in the context of sub-optimal inhaler use.     # Post-operative splinting yielding hypoventilation with resulting atelectasis, V/W mismatch and hypoxemia  - IS 10x/hour ATC  - Duonebs standing q6h  - pain control to prevent splinting  - COPD inhaler use as below  - Would advise OOB to chair as tolerated  - Incentive spirometer as tolerated    # COPD:  - currently not in exacerbation. Please start patient on Advair 500 (patient states she will be bringing it from home)  - If Advair cannot be obtained, please use Symbicort 160 2 puffs BID  - Duonebs as above  - No need for azithromycin    Irvin Norris, PGY-5  Pulmonary and Critical Care Medicine  [phone]

## 2020-12-15 NOTE — CONSULT NOTE ADULT - SUBJECTIVE AND OBJECTIVE BOX
Gastroenterology Consultation:    Patient is a 80y old  Female who presents with a chief complaint of S/p cholecystectomy (15 Dec 2020 09:03)      Admitted on: 12-12-20  HPI:  80F pmhx HTN, COPD, Hypothyroidism, MI (age 36), lung ca s/p partial right pneumonectomy, 30 pack/yr smoker, open appy (70 yrs ago), who  presented with 2 days of epigastric pain & emesis. Patient was found to have acute cholecystitis and today is day 3 post operative. Hepatology are consulted for elevated LFTs. To note that LFTs were completely normal the day of OR and gradually worsened  over the next few days to reach 3.4/2.7/526/255/281 with mixed pattern but mainly cholestatic . Patient still complain of RUQ pain associated with nausea . Patient denies any history of liver disease , any alcohol abuse.         PAST MEDICAL & SURGICAL HISTORY:  MI (myocardial infarction)    Hypertension    History of appendectomy      FAMILY HISTORY:  Family history of colonic diverticulitis (Mother, Sibling)      Home Medications:  Advair Diskus 100 mcg-50 mcg inhalation powder: 1 puff(s) inhaled 2 times a day (14 Dec 2020 14:24)  aspirin 81 mg oral tablet, chewable: 1 tab(s) orally once a day (12 Dec 2020 15:34)  Colace 100 mg oral capsule: 1 cap(s) orally 2 times a day (12 Dec 2020 15:34)  Lexapro 10 mg oral tablet: 1 tab(s) orally once a day (14 Dec 2020 14:24)  metoprolol tartrate 25 mg oral tablet: 1 tab(s) orally 2 times a day (12 Dec 2020 15:34)  Synthroid 125 mcg (0.125 mg) oral tablet: 1 tab(s) orally once a day HOLD ON SUNDAYS (14 Dec 2020 14:06)  Zocor 20 mg oral tablet: 1 tab(s) orally once a day (at bedtime) (12 Dec 2020 15:34)    MEDICATIONS  (STANDING):  albuterol/ipratropium for Nebulization 3 milliLiter(s) Nebulizer every 6 hours  aspirin  chewable 81 milliGRAM(s) Oral daily  dextrose 5% + sodium chloride 0.45% with potassium chloride 20 mEq/L 1000 milliLiter(s) (100 mL/Hr) IV Continuous <Continuous>  enoxaparin Injectable 40 milliGRAM(s) SubCutaneous daily  escitalopram 10 milliGRAM(s) Oral daily  levothyroxine 125 MICROGram(s) Oral daily  metoprolol tartrate 25 milliGRAM(s) Oral two times a day  piperacillin/tazobactam IVPB.. 3.375 Gram(s) IV Intermittent every 8 hours  polyethylene glycol 3350 17 Gram(s) Oral daily  senna 2 Tablet(s) Oral at bedtime  simvastatin 20 milliGRAM(s) Oral at bedtime    MEDICATIONS  (PRN):  bisacodyl Suppository 10 milliGRAM(s) Rectal daily PRN Constipation  oxyCODONE    IR 2.5 milliGRAM(s) Oral every 4 hours PRN Moderate Pain (4 - 6)  oxyCODONE    IR 5 milliGRAM(s) Oral every 6 hours PRN Severe Pain (7 - 10)      Allergies  No Known Allergies      Review of Systems:   Constitutional:  No Fever, No Chills  ENT/Mouth:  No Hearing Changes,  No Difficulty Swallowing  Eyes:  No Eye Pain, No Vision Changes  Cardiovascular:  No Chest Pain, No Palpitations  Respiratory:  No Cough, No Dyspnea  Gastrointestinal:  As described in HPI  Musculoskeletal:  No Joint Swelling, No Back Pain  Skin:  No Skin Lesions, No Jaundice  Neuro:  No Syncope, No Dizziness  Heme/Lymph:  No Bruising, No Bleeding.          Physical Examination:  T(C): 37.3 (12-15-20 @ 13:22), Max: 37.3 (12-15-20 @ 13:22)  HR: 87 (12-15-20 @ 13:22) (77 - 90)  BP: 145/77 (12-15-20 @ 13:22) (139/78 - 187/77)  RR: 18 (12-15-20 @ 13:22) (18 - 18)  SpO2: 94% (12-15-20 @ 13:22) (92% - 97%)      12-13-20 @ 07:01  -  12-14-20 @ 07:00  --------------------------------------------------------  IN: 2040 mL / OUT: 2350 mL / NET: -310 mL    12-14-20 @ 07:01  -  12-15-20 @ 07:00  --------------------------------------------------------  IN: 2450 mL / OUT: 1550 mL / NET: 900 mL    12-15-20 @ 07:01  -  12-15-20 @ 15:52  --------------------------------------------------------  IN: 740 mL / OUT: 1000 mL / NET: -260 mL        Constitutional: No acute distress.  Eyes:. Conjunctivae are clear, Sclera is non-icteric.  Ears Nose and Throat: The external ears are normal appearing,  Oral mucosa is pink and moist.  Respiratory:  No signs of respiratory distress. Lung sounds are clear bilaterally.  Cardiovascular:  S1 S2, Regular rate and rhythm.  GI: Abdomen is soft, symmetric, and  tenderness at RUQ without distension.  Bowel sounds are present and normoactive in all four quadrants. No masses, hepatomegaly, or splenomegaly are noted.   Neuro: No Tremor, No involuntary movements  Skin: No rashes, No Jaundice.          Data: (reviewed by attending)                        13.3   11.66 )-----------( 294      ( 15 Dec 2020 07:12 )             39.0     Hgb Trend:  13.3  12-15-20 @ 07:12  13.6  12-14-20 @ 08:02  13.2  12-13-20 @ 07:01      12-13-20 @ 07:01  -  12-14-20 @ 07:00  --------------------------------------------------------  IN: 0 mL      12-15    138  |  101  |  8   ----------------------------<  120<H>  3.1<L>   |  22  |  0.60    Ca    8.1<L>      15 Dec 2020 07:11  Phos  2.1     12-15  Mg     2.0     12-15    TPro  5.8<L>  /  Alb  3.1<L>  /  TBili  3.4<H>  /  DBili  2.7<H>  /  AST  255<H>  /  ALT  281<H>  /  AlkPhos  526<H>  12-15    Liver panel trend:  TBili 3.4   /      /      /   AlkP 526   /   Tptn 5.8   /   Alb 3.1    /   DBili 2.7      12-15  TBili 0.7   /      /   ALT 95   /   AlkP 210   /   Tptn 6.1   /   Alb 3.4    /   DBili 0.4      12-14  TBili 0.7   /   AST 48   /   ALT 48   /   AlkP 100   /   Tptn 5.6   /   Alb 3.0    /   DBili --      12-13  TBili 1.0   /   AST 36   /   ALT 35   /   AlkP 98   /   Tptn 6.6   /   Alb 3.8    /   DBili --      12-12              Radiology:(reviewed by attending)

## 2020-12-15 NOTE — PROGRESS NOTE ADULT - SUBJECTIVE AND OBJECTIVE BOX
TRAUMA SURGERY [facility] MEDICINE PROGRESS NOTE    Patient is a 80y old  Female who presents with a chief complaint of S/p cholecystectomy (15 Dec 2020 09:03)    SUBJECTIVE / OVERNIGHT EVENTS:  overnight no acute events  no n/v/f/chills, cp, sob, abd pain  this morning, has been feeling nausea bc constipated for 5 days now    CAPILLARY BLOOD GLUCOSE    I&O's Summary    14 Dec 2020 07:01  -  15 Dec 2020 07:00  --------------------------------------------------------  IN: 2450 mL / OUT: 1550 mL / NET: 900 mL    15 Dec 2020 07:01  -  15 Dec 2020 16:15  --------------------------------------------------------  IN: 740 mL / OUT: 1000 mL / NET: -260 mL    Vital Signs Last 24 Hrs  T(C): 37.3 (15 Dec 2020 13:22), Max: 37.3 (15 Dec 2020 13:22)  T(F): 99.1 (15 Dec 2020 13:22), Max: 99.1 (15 Dec 2020 13:22)  HR: 87 (15 Dec 2020 13:22) (77 - 90)  BP: 145/77 (15 Dec 2020 13:22) (139/78 - 187/77)  BP(mean): --  RR: 18 (15 Dec 2020 13:22) (18 - 18)  SpO2: 94% (15 Dec 2020 13:22) (92% - 97%)    PHYSICAL EXAM:  GENERAL:  Well appearing, f  in NAD  HEAD:  NCAT  EYES: PERRLA, conjunctiva clear  NECK: Supple, No JVD  CHEST/LUNG: CTA B/L. No w/r/r.  HEART: Reg rate. Normal S1, S2. No m/r/g.   ABDOMEN: SNTND. Bowel sounds present  EXTREMITIES:  2+ Peripheral Pulses, No clubbing, cyanosis, edema.  PSYCH: AAOx3  SKIN: No rashes or lesions    LABS:                        13.3   11.66 )-----------( 294      ( 15 Dec 2020 07:12 )             39.0     12-15    138  |  101  |  8   ----------------------------<  120<H>  3.1<L>   |  22  |  0.60    Ca    8.1<L>      15 Dec 2020 07:11  Phos  2.1     12-15  Mg     2.0     12-15    TPro  5.8<L>  /  Alb  3.1<L>  /  TBili  3.4<H>  /  DBili  2.7<H>  /  AST  255<H>  /  ALT  281<H>  /  AlkPhos  526<H>  12-15      MEDICATIONS  (STANDING):  albuterol/ipratropium for Nebulization 3 milliLiter(s) Nebulizer every 6 hours  aspirin  chewable 81 milliGRAM(s) Oral daily  dextrose 5% + sodium chloride 0.45% with potassium chloride 20 mEq/L 1000 milliLiter(s) (100 mL/Hr) IV Continuous <Continuous>  enoxaparin Injectable 40 milliGRAM(s) SubCutaneous daily  escitalopram 10 milliGRAM(s) Oral daily  levothyroxine 125 MICROGram(s) Oral daily  metoprolol tartrate 25 milliGRAM(s) Oral two times a day  piperacillin/tazobactam IVPB.. 3.375 Gram(s) IV Intermittent every 8 hours  polyethylene glycol 3350 17 Gram(s) Oral daily  senna 2 Tablet(s) Oral at bedtime  simvastatin 20 milliGRAM(s) Oral at bedtime    MEDICATIONS  (PRN):  bisacodyl Suppository 10 milliGRAM(s) Rectal daily PRN Constipation  oxyCODONE    IR 2.5 milliGRAM(s) Oral every 4 hours PRN Moderate Pain (4 - 6)  oxyCODONE    IR 5 milliGRAM(s) Oral every 6 hours PRN Severe Pain (7 - 10)

## 2020-12-15 NOTE — DISCHARGE NOTE PROVIDER - NSDCFUADDINST_GEN_ALL_CORE_FT
Please follow up outpatient with your primary care doctor, gastroenterologist and pulmonologist doctor within 2 weeks of discharge from the hospital.     Please follow up with Dr. Dasilva, your surgeon, within 2 weeks of discharge from the hospital.

## 2020-12-15 NOTE — PROGRESS NOTE ADULT - ATTENDING COMMENTS
Patient seen and examined, data and imaging personally reviewed.  She is feeling better today on Advair.  LUngs are clear she is not in respiratory distress.  Agree with current management.  She will follow up with her pulmonologist - Dr. William Saez as an outpatient.  Please reconsult as needed.

## 2020-12-15 NOTE — DISCHARGE NOTE PROVIDER - PROVIDER TOKENS
PROVIDER:[TOKEN:[[id number]],FOLLOWUP:[2 weeks]],PROVIDER:[TOKEN:[[id number]],FOLLOWUP:[2 weeks]]

## 2020-12-15 NOTE — PROGRESS NOTE ADULT - ATTENDING COMMENTS
Patient seen and examined and agree with resident note.  POD # 2 s/p laparoscopic cholecystectomy for gangrenous cholecystitis  Mrs. Corrales went into atrial fibrillation with RVR with resolution after given metoprolol. The patient did not complain of chest pain or dyspnea.   CXR was clear   Tolerating diet   Labs reviewed and improving leukocytosis  Appreciate pulmonary consult and will continue symbicort and duonebs for COPD. Patient seen and examined and agree with resident note.  POD # 3 s/p laparoscopic cholecystectomy for gangrenous cholecystitis  -Had nausea this am which resolved. Tolerating diet.   Atrial fibrillation with RVR with rate control -will continue metoprolol. The patient did not complain of chest pain or dyspnea.   Labs reviewed and leukocytosis continues to improve.   LFTs increased today including TB at 3.4 and DB 2.7  AST//281  COPD- continue symbicort and duonebs.  Hypophosphatemia -  repleted and will recheck AM labs    Will make patient NPo at this time for further workup of transamitis and hyperbilirubinemia. GI to be consulted and will order an MRI to rule out biliary obstruction.

## 2020-12-15 NOTE — PHYSICAL THERAPY INITIAL EVALUATION ADULT - ADDITIONAL COMMENTS
Pt lives alone in a PH, 1 [address]ON[address] with 11 steps to bedroom. Previously (I) with ADLs and ambulated without an assistive device. Has a walk-in shower, (+) grab bars. DME: single cane.

## 2020-12-15 NOTE — PHYSICAL THERAPY INITIAL EVALUATION ADULT - ACTIVE RANGE OF MOTION EXAMINATION, REHAB EVAL
bilateral lower extremity Active ROM was WNL (within normal limits)/ange. upper extremity Active ROM was WNL (within normal limits)

## 2020-12-15 NOTE — PHYSICAL THERAPY INITIAL EVALUATION ADULT - DISCHARGE DISPOSITION, PT EVAL
Anticipating Home with Home PT for dec'd strength, balance and gait for functional mobility. DME: TBA. CM Cassie aware./home w/ home PT

## 2020-12-15 NOTE — PROGRESS NOTE ADULT - SUBJECTIVE AND OBJECTIVE BOX
SURGERY DAILY PROGRESS NOTE:     SUBJECTIVE/ROS: Patient seen and examined. She admits to persistent nausea, denies vomiting. Abdominal pain is controlled. She denies passing flatus, no bm yet.        MEDICATIONS  (STANDING):  albuterol/ipratropium for Nebulization 3 milliLiter(s) Nebulizer every 6 hours  aspirin  chewable 81 milliGRAM(s) Oral daily  budesonide 160 MICROgram(s)/formoterol 4.5 MICROgram(s) Inhaler 2 Puff(s) Inhalation two times a day  enoxaparin Injectable 40 milliGRAM(s) SubCutaneous daily  escitalopram 10 milliGRAM(s) Oral daily  levothyroxine 125 MICROGram(s) Oral daily  metoprolol tartrate 25 milliGRAM(s) Oral two times a day  piperacillin/tazobactam IVPB.. 3.375 Gram(s) IV Intermittent every 8 hours  polyethylene glycol 3350 17 Gram(s) Oral daily  senna 2 Tablet(s) Oral at bedtime  simvastatin 20 milliGRAM(s) Oral at bedtime    MEDICATIONS  (PRN):  oxyCODONE    IR 2.5 milliGRAM(s) Oral every 4 hours PRN Moderate Pain (4 - 6)  oxyCODONE    IR 5 milliGRAM(s) Oral every 6 hours PRN Severe Pain (7 - 10)      OBJECTIVE:    Vital Signs Last 24 Hrs  T(C): 37.2 (15 Dec 2020 05:53), Max: 37.2 (15 Dec 2020 00:51)  T(F): 98.9 (15 Dec 2020 05:53), Max: 98.9 (15 Dec 2020 00:51)  HR: 83 (15 Dec 2020 05:53) (82 - 109)  BP: 176/79 (15 Dec 2020 06:20) (139/78 - 187/77)  BP(mean): --  RR: 18 (15 Dec 2020 05:53) (18 - 19)  SpO2: 96% (15 Dec 2020 05:53) (92% - 96%)        I&O's Detail    14 Dec 2020 07:01  -  15 Dec 2020 07:00  --------------------------------------------------------  IN:    IV PiggyBack: 750 mL    IV PiggyBack: 300 mL    IV PiggyBack: 300 mL    Oral Fluid: 1100 mL  Total IN: 2450 mL    OUT:    Voided (mL): 1550 mL  Total OUT: 1550 mL    Total NET: 900 mL          Daily     Daily     LABS:                        13.6   13.44 )-----------( 262      ( 14 Dec 2020 08:02 )             40.3     12-14    141  |  104  |  15  ----------------------------<  83  3.3<L>   |  23  |  0.76    Ca    8.8      14 Dec 2020 08:02  Phos  1.6     12-14  Mg     2.2     12-14    TPro  6.1  /  Alb  3.4  /  TBili  0.7  /  DBili  0.4<H>  /  AST  123<H>  /  ALT  95<H>  /  AlkPhos  210<H>  12-14                    Physical Exam:  General Appearance:  Appears well, NAD, AAO x3  Chest: Equal expansion bilaterally, equal breath sounds  CV: Pulse regular presently  Abdomen: Soft, nondistended, mild tenderness near umbilical port site, 4 port site dressings c/d/i  Extremities: Grossly symmetric, Warm and well perfused x4

## 2020-12-15 NOTE — DISCHARGE NOTE PROVIDER - NSDCMRMEDTOKEN_GEN_ALL_CORE_FT
Advair Diskus 100 mcg-50 mcg inhalation powder: 1 puff(s) inhaled 2 times a day  aspirin 81 mg oral tablet, chewable: 1 tab(s) orally once a day  Colace 100 mg oral capsule: 1 cap(s) orally 2 times a day  Lexapro 10 mg oral tablet: 1 tab(s) orally once a day  metoprolol tartrate 25 mg oral tablet: 1 tab(s) orally 2 times a day  Synthroid 125 mcg (0.125 mg) oral tablet: 1 tab(s) orally once a day HOLD ON SUNDAYS  Zocor 20 mg oral tablet: 1 tab(s) orally once a day (at bedtime)   Advair Diskus 100 mcg-50 mcg inhalation powder: 1 puff(s) inhaled 2 times a day  aspirin 81 mg oral tablet, chewable: 1 tab(s) orally once a day  Colace 100 mg oral capsule: 1 cap(s) orally 2 times a day  Lexapro 10 mg oral tablet: 1 tab(s) orally once a day  metoprolol tartrate 25 mg oral tablet: 1 tab(s) orally 2 times a day  pantoprazole 40 mg oral delayed release tablet: 1 tab(s) orally once a day (before a meal)  Please continue for 30 days   Synthroid 125 mcg (0.125 mg) oral tablet: 1 tab(s) orally once a day HOLD ON SUNDAYS  Zocor 20 mg oral tablet: 1 tab(s) orally once a day (at bedtime)

## 2020-12-15 NOTE — DISCHARGE NOTE PROVIDER - NSDCCPCAREPLAN_GEN_ALL_CORE_FT
PRINCIPAL DISCHARGE DIAGNOSIS  Diagnosis: Cholecystitis  Assessment and Plan of Treatment:        PRINCIPAL DISCHARGE DIAGNOSIS  Diagnosis: Cholecystitis  Assessment and Plan of Treatment: Recovering from surgery  WOUND CARE: The stitches will dissolve on their own.   BATHING: Please do not submerge wound underwater. You may shower and/or sponge bathe.  ACTIVITY: No heavy lifting or straining. Otherwise, you may return to your usual level of physical activity. If you are taking narcotic pain medication (such as Percocet), do NOT drive a car, operate machinery or make important decisions.  DIET: Return to your usual diet.  NOTIFY YOUR SURGEON IF: You have any bleeding that does not stop, any pus draining from your wound, any fever (over 100.4 F) or chills, persistent nausea/vomiting, persistent diarrhea, or if your pain is not controlled on your discharge pain medications.  FOLLOW-UP:  1. Please call to make a follow-up appointment within one week of discharge with Dr. Dasilva.   2. Please follow up with your primary care physician in one-two weeks regarding your hospitalization.        SECONDARY DISCHARGE DIAGNOSES  Diagnosis: GI bleed  Assessment and Plan of Treatment: You were seen by the gastroenterology team while in the hospital. You received one unit of red blood cells for a downtrending blood count when you had the bloody bowel movements.   Please follow up with your gastroenterologist, Dr. Cadena, within 2 weeks of discharge.   You are being discharge on a medication called Protonix. It is used for acid reflux. Please take once a day for 30 days.    Diagnosis: COPD (chronic obstructive pulmonary disease)  Assessment and Plan of Treatment: Please follow up with your pulmonologist, Dr. Saez, within 2 weeks of dicharge from the hospital.

## 2020-12-15 NOTE — PROGRESS NOTE ADULT - SUBJECTIVE AND OBJECTIVE BOX
CHIEF COMPLAINT: Abdominal pain, nausea    Interval Events: No acute events overnight. Patient reports that she feels nauseous this AM and has not had a BM. She reports that her breathing is acceptable at rest but worsens with exertion. She denies using the incentive spirometer.    REVIEW OF SYSTEMS:  Constitutional: No fevers or chills.    CV: No chest pain. No palpitations.  Resp: No dyspnea at rest. No wheezing.  GI: +Nausea. No vomiting. No diarrhea.  MSK: No joint pain or pain in any extremities  Integumentary: No skin lesions. No pedal edema.  Neurological: No gross motor weakness. No sensory changes.  [x] All other systems negative  [ ] Unable to assess ROS because ________    OBJECTIVE:  ICU Vital Signs Last 24 Hrs  T(C): 37.2 (15 Dec 2020 05:53), Max: 37.2 (15 Dec 2020 00:51)  T(F): 98.9 (15 Dec 2020 05:53), Max: 98.9 (15 Dec 2020 00:51)  HR: 83 (15 Dec 2020 05:53) (82 - 86)  BP: 176/79 (15 Dec 2020 06:20) (139/78 - 187/77)  BP(mean): --  ABP: --  ABP(mean): --  RR: 18 (15 Dec 2020 05:53) (18 - 18)  SpO2: 96% (15 Dec 2020 05:53) (92% - 96%)        12-14 @ 07:01  -  12-15 @ 07:00  --------------------------------------------------------  IN: 2450 mL / OUT: 1550 mL / NET: 900 mL    12-15 @ 07:01  -  12-15 @ 09:03  --------------------------------------------------------  IN: 500 mL / OUT: 0 mL / NET: 500 mL      CAPILLARY BLOOD GLUCOSE          PHYSICAL EXAM:  General: Awake, alert, oriented X 3.   HEENT: Atraumatic, normocephalic.   Lymph Nodes: No palpable lymphadenopathy  Neck: No JVD. No carotid bruit.   Respiratory: Clear to auscultation bilaterally.  Cardiovascular: S1 S2 normal. No murmurs, rubs or gallops.   Abdomen: Soft.  Extremities: Warm to touch. Peripheral pulse palpable. No pedal edema.   Neurological: Motor and sensory examination equal and normal in all four extremities.  Psychiatry: Appropriate mood and affect.    HOSPITAL MEDICATIONS:  MEDICATIONS  (STANDING):  albuterol/ipratropium for Nebulization 3 milliLiter(s) Nebulizer every 6 hours  aspirin  chewable 81 milliGRAM(s) Oral daily  budesonide 160 MICROgram(s)/formoterol 4.5 MICROgram(s) Inhaler 2 Puff(s) Inhalation two times a day  enoxaparin Injectable 40 milliGRAM(s) SubCutaneous daily  escitalopram 10 milliGRAM(s) Oral daily  levothyroxine 125 MICROGram(s) Oral daily  metoprolol tartrate 25 milliGRAM(s) Oral two times a day  piperacillin/tazobactam IVPB.. 3.375 Gram(s) IV Intermittent every 8 hours  polyethylene glycol 3350 17 Gram(s) Oral daily  potassium chloride    Tablet ER 40 milliEquivalent(s) Oral every 4 hours  senna 2 Tablet(s) Oral at bedtime  simvastatin 20 milliGRAM(s) Oral at bedtime    MEDICATIONS  (PRN):  oxyCODONE    IR 2.5 milliGRAM(s) Oral every 4 hours PRN Moderate Pain (4 - 6)  oxyCODONE    IR 5 milliGRAM(s) Oral every 6 hours PRN Severe Pain (7 - 10)      LABS:                        13.3   11.66 )-----------( 294      ( 15 Dec 2020 07:12 )             39.0     12-15    138  |  101  |  8   ----------------------------<  120<H>  3.1<L>   |  22  |  0.60    Ca    8.1<L>      15 Dec 2020 07:11  Phos  2.1     12-15  Mg     2.0     12-15    TPro  6.1  /  Alb  3.4  /  TBili  0.7  /  DBili  0.4<H>  /  AST  123<H>  /  ALT  95<H>  /  AlkPhos  210<H>  12-14              MICROBIOLOGY:     RADIOLOGY:  [ ] Reviewed and interpreted by me    Point of Care Ultrasound Findings:    PFT:    EKG:

## 2020-12-15 NOTE — PHYSICAL THERAPY INITIAL EVALUATION ADULT - PERTINENT HX OF CURRENT PROBLEM, REHAB EVAL
80F PMHx of HTN, COPD, Hypothyroidism, MI (age 36), lung ca s/p partial right pneumonectomy, 30 pack/yr smoker, open appy (70 yrs ago) p/w epigastric pain & emesis x 2 days. Found with acute cholecystitis and now s/p lap cholecystectomy on 12/12. She reports her MI at age 36 was secondary to hypokalemia, which was from a diuretic she was told to take (unsure why). She reports having had a TTE and stress test about 20 years ago, which were both normal. CONT'D BELOW:

## 2020-12-15 NOTE — PROGRESS NOTE ADULT - ASSESSMENT
80F pmhx HTN, COPD (30 PYH smoking), Hypothyroidism, MI (age 36), lung ca s/p partial right pneumonectomy, p/w abd pain s/p laparoscopic cholecystectomy 12/12, with hospital course c/b afib RVR, acute hypoxic respiratory failure, and transaminitis awaiting MRCP.

## 2020-12-15 NOTE — PHYSICAL THERAPY INITIAL EVALUATION ADULT - PRECAUTIONS/LIMITATIONS, REHAB EVAL
CT Abdomen and Pelvis 12/12: Acute cholecystitis with probable visualization of a gallstone within the cystic duct and findings suggesting mucosal ulceration. CXR 12/14: The heart is enlarged. Elevated right hemidiaphragm. (-) pleural effusion or pneumothorax. No free air is seen under the diaphragm. No acute bony pathology could be identified.

## 2020-12-15 NOTE — PHYSICAL THERAPY INITIAL EVALUATION ADULT - PLANNED THERAPY INTERVENTIONS, PT EVAL
gait training/transfer training/balance training/bed mobility training/GOAL: Pt will ascend/descend (12) steps (I) with U HR and step to pattern in 4 weeks./strengthening

## 2020-12-15 NOTE — PROGRESS NOTE ADULT - ASSESSMENT
80F pmhx HTN, COPD, Hypothyroidism, MI (age 36), lung ca s/p partial right pneumonectomy, 30 pack/yr smoker, open appy (70 yrs ago) now s/p laparoscopic cholecystectomy, POD 3.     PLAN:  - Diet: Regular  - Zosyn  - Pain meds prn  - DVT ppx lovenox  - ASA 81      ACS Surgery x90

## 2020-12-16 DIAGNOSIS — K59.00 CONSTIPATION, UNSPECIFIED: ICD-10-CM

## 2020-12-16 LAB
ALBUMIN SERPL ELPH-MCNC: 2.8 G/DL — LOW (ref 3.3–5)
ALBUMIN SERPL ELPH-MCNC: 2.9 G/DL — LOW (ref 3.3–5)
ALP SERPL-CCNC: 387 U/L — HIGH (ref 40–120)
ALP SERPL-CCNC: 428 U/L — HIGH (ref 40–120)
ALT FLD-CCNC: 227 U/L — HIGH (ref 10–45)
ALT FLD-CCNC: 249 U/L — HIGH (ref 10–45)
ANION GAP SERPL CALC-SCNC: 11 MMOL/L — SIGNIFICANT CHANGE UP (ref 5–17)
ANION GAP SERPL CALC-SCNC: 11 MMOL/L — SIGNIFICANT CHANGE UP (ref 5–17)
AST SERPL-CCNC: 115 U/L — HIGH (ref 10–40)
AST SERPL-CCNC: 155 U/L — HIGH (ref 10–40)
BASE EXCESS BLDV CALC-SCNC: 1.3 MMOL/L — SIGNIFICANT CHANGE UP (ref -2–2)
BILIRUB SERPL-MCNC: 1 MG/DL — SIGNIFICANT CHANGE UP (ref 0.2–1.2)
BILIRUB SERPL-MCNC: 1.9 MG/DL — HIGH (ref 0.2–1.2)
BUN SERPL-MCNC: 16 MG/DL — SIGNIFICANT CHANGE UP (ref 7–23)
BUN SERPL-MCNC: 8 MG/DL — SIGNIFICANT CHANGE UP (ref 7–23)
CA-I SERPL-SCNC: 1.09 MMOL/L — LOW (ref 1.12–1.3)
CALCIUM SERPL-MCNC: 8.4 MG/DL — SIGNIFICANT CHANGE UP (ref 8.4–10.5)
CALCIUM SERPL-MCNC: 8.4 MG/DL — SIGNIFICANT CHANGE UP (ref 8.4–10.5)
CHLORIDE BLDV-SCNC: 106 MMOL/L — SIGNIFICANT CHANGE UP (ref 96–108)
CHLORIDE SERPL-SCNC: 100 MMOL/L — SIGNIFICANT CHANGE UP (ref 96–108)
CHLORIDE SERPL-SCNC: 103 MMOL/L — SIGNIFICANT CHANGE UP (ref 96–108)
CK MB CFR SERPL CALC: 1.5 NG/ML — SIGNIFICANT CHANGE UP (ref 0–3.8)
CK SERPL-CCNC: 28 U/L — SIGNIFICANT CHANGE UP (ref 25–170)
CO2 BLDV-SCNC: 26 MMOL/L — SIGNIFICANT CHANGE UP (ref 22–30)
CO2 SERPL-SCNC: 23 MMOL/L — SIGNIFICANT CHANGE UP (ref 22–31)
CO2 SERPL-SCNC: 23 MMOL/L — SIGNIFICANT CHANGE UP (ref 22–31)
CREAT SERPL-MCNC: 0.67 MG/DL — SIGNIFICANT CHANGE UP (ref 0.5–1.3)
CREAT SERPL-MCNC: 0.71 MG/DL — SIGNIFICANT CHANGE UP (ref 0.5–1.3)
GAS PNL BLDV: 132 MMOL/L — LOW (ref 135–145)
GAS PNL BLDV: SIGNIFICANT CHANGE UP
GAS PNL BLDV: SIGNIFICANT CHANGE UP
GLUCOSE BLDC GLUCOMTR-MCNC: 124 MG/DL — HIGH (ref 70–99)
GLUCOSE BLDV-MCNC: 152 MG/DL — HIGH (ref 70–99)
GLUCOSE SERPL-MCNC: 144 MG/DL — HIGH (ref 70–99)
GLUCOSE SERPL-MCNC: 151 MG/DL — HIGH (ref 70–99)
HAV IGM SER-ACNC: SIGNIFICANT CHANGE UP
HBV CORE IGM SER-ACNC: SIGNIFICANT CHANGE UP
HBV SURFACE AG SER-ACNC: SIGNIFICANT CHANGE UP
HCO3 BLDV-SCNC: 25 MMOL/L — SIGNIFICANT CHANGE UP (ref 21–29)
HCT VFR BLD CALC: 36.5 % — SIGNIFICANT CHANGE UP (ref 34.5–45)
HCT VFR BLD CALC: 37.7 % — SIGNIFICANT CHANGE UP (ref 34.5–45)
HCT VFR BLDA CALC: 40 % — SIGNIFICANT CHANGE UP (ref 39–50)
HCV AB S/CO SERPL IA: 0.11 S/CO — SIGNIFICANT CHANGE UP (ref 0–0.99)
HCV AB SERPL-IMP: SIGNIFICANT CHANGE UP
HGB BLD CALC-MCNC: 13 G/DL — SIGNIFICANT CHANGE UP (ref 11.5–15.5)
HGB BLD-MCNC: 12.2 G/DL — SIGNIFICANT CHANGE UP (ref 11.5–15.5)
HGB BLD-MCNC: 12.2 G/DL — SIGNIFICANT CHANGE UP (ref 11.5–15.5)
LACTATE BLDV-MCNC: 2.4 MMOL/L — HIGH (ref 0.7–2)
MAGNESIUM SERPL-MCNC: 2.1 MG/DL — SIGNIFICANT CHANGE UP (ref 1.6–2.6)
MCHC RBC-ENTMCNC: 30.1 PG — SIGNIFICANT CHANGE UP (ref 27–34)
MCHC RBC-ENTMCNC: 30.5 PG — SIGNIFICANT CHANGE UP (ref 27–34)
MCHC RBC-ENTMCNC: 32.4 GM/DL — SIGNIFICANT CHANGE UP (ref 32–36)
MCHC RBC-ENTMCNC: 33.4 GM/DL — SIGNIFICANT CHANGE UP (ref 32–36)
MCV RBC AUTO: 91.3 FL — SIGNIFICANT CHANGE UP (ref 80–100)
MCV RBC AUTO: 93.1 FL — SIGNIFICANT CHANGE UP (ref 80–100)
NRBC # BLD: 0 /100 WBCS — SIGNIFICANT CHANGE UP (ref 0–0)
NRBC # BLD: 0 /100 WBCS — SIGNIFICANT CHANGE UP (ref 0–0)
OTHER CELLS CSF MANUAL: 17 ML/DL — LOW (ref 18–22)
PCO2 BLDV: 39 MMHG — SIGNIFICANT CHANGE UP (ref 35–50)
PH BLDV: 7.42 — SIGNIFICANT CHANGE UP (ref 7.35–7.45)
PHOSPHATE SERPL-MCNC: 2.9 MG/DL — SIGNIFICANT CHANGE UP (ref 2.5–4.5)
PLATELET # BLD AUTO: 297 K/UL — SIGNIFICANT CHANGE UP (ref 150–400)
PLATELET # BLD AUTO: 362 K/UL — SIGNIFICANT CHANGE UP (ref 150–400)
PO2 BLDV: 84 MMHG — HIGH (ref 25–45)
POTASSIUM BLDV-SCNC: 3.5 MMOL/L — SIGNIFICANT CHANGE UP (ref 3.5–5.3)
POTASSIUM SERPL-MCNC: 3.6 MMOL/L — SIGNIFICANT CHANGE UP (ref 3.5–5.3)
POTASSIUM SERPL-MCNC: 3.9 MMOL/L — SIGNIFICANT CHANGE UP (ref 3.5–5.3)
POTASSIUM SERPL-SCNC: 3.6 MMOL/L — SIGNIFICANT CHANGE UP (ref 3.5–5.3)
POTASSIUM SERPL-SCNC: 3.9 MMOL/L — SIGNIFICANT CHANGE UP (ref 3.5–5.3)
PROT SERPL-MCNC: 5.9 G/DL — LOW (ref 6–8.3)
PROT SERPL-MCNC: 5.9 G/DL — LOW (ref 6–8.3)
RBC # BLD: 4 M/UL — SIGNIFICANT CHANGE UP (ref 3.8–5.2)
RBC # BLD: 4.05 M/UL — SIGNIFICANT CHANGE UP (ref 3.8–5.2)
RBC # FLD: 13.8 % — SIGNIFICANT CHANGE UP (ref 10.3–14.5)
RBC # FLD: 13.9 % — SIGNIFICANT CHANGE UP (ref 10.3–14.5)
SAO2 % BLDV: 97 % — HIGH (ref 67–88)
SODIUM SERPL-SCNC: 134 MMOL/L — LOW (ref 135–145)
SODIUM SERPL-SCNC: 137 MMOL/L — SIGNIFICANT CHANGE UP (ref 135–145)
SURGICAL PATHOLOGY STUDY: SIGNIFICANT CHANGE UP
TROPONIN T, HIGH SENSITIVITY RESULT: 21 NG/L — SIGNIFICANT CHANGE UP (ref 0–51)
WBC # BLD: 13.84 K/UL — HIGH (ref 3.8–10.5)
WBC # BLD: 9.22 K/UL — SIGNIFICANT CHANGE UP (ref 3.8–10.5)
WBC # FLD AUTO: 13.84 K/UL — HIGH (ref 3.8–10.5)
WBC # FLD AUTO: 9.22 K/UL — SIGNIFICANT CHANGE UP (ref 3.8–10.5)

## 2020-12-16 PROCEDURE — 99233 SBSQ HOSP IP/OBS HIGH 50: CPT

## 2020-12-16 PROCEDURE — 74181 MRI ABDOMEN W/O CONTRAST: CPT | Mod: 26

## 2020-12-16 PROCEDURE — 93010 ELECTROCARDIOGRAM REPORT: CPT

## 2020-12-16 RX ORDER — PANTOPRAZOLE SODIUM 20 MG/1
40 TABLET, DELAYED RELEASE ORAL
Refills: 0 | Status: DISCONTINUED | OUTPATIENT
Start: 2020-12-16 | End: 2020-12-17

## 2020-12-16 RX ORDER — MAGNESIUM HYDROXIDE 400 MG/1
30 TABLET, CHEWABLE ORAL ONCE
Refills: 0 | Status: COMPLETED | OUTPATIENT
Start: 2020-12-16 | End: 2020-12-16

## 2020-12-16 RX ORDER — POLYETHYLENE GLYCOL 3350 17 G/17G
17 POWDER, FOR SOLUTION ORAL
Refills: 0 | Status: DISCONTINUED | OUTPATIENT
Start: 2020-12-16 | End: 2020-12-16

## 2020-12-16 RX ORDER — HYDRALAZINE HCL 50 MG
5 TABLET ORAL ONCE
Refills: 0 | Status: COMPLETED | OUTPATIENT
Start: 2020-12-16 | End: 2020-12-16

## 2020-12-16 RX ADMIN — Medication 81 MILLIGRAM(S): at 12:47

## 2020-12-16 RX ADMIN — ONDANSETRON 4 MILLIGRAM(S): 8 TABLET, FILM COATED ORAL at 20:00

## 2020-12-16 RX ADMIN — Medication 3 MILLILITER(S): at 23:28

## 2020-12-16 RX ADMIN — Medication 3 MILLILITER(S): at 17:49

## 2020-12-16 RX ADMIN — Medication 25 MILLIGRAM(S): at 17:50

## 2020-12-16 RX ADMIN — POLYETHYLENE GLYCOL 3350 17 GRAM(S): 17 POWDER, FOR SOLUTION ORAL at 17:50

## 2020-12-16 RX ADMIN — MAGNESIUM HYDROXIDE 30 MILLILITER(S): 400 TABLET, CHEWABLE ORAL at 13:03

## 2020-12-16 RX ADMIN — PIPERACILLIN AND TAZOBACTAM 25 GRAM(S): 4; .5 INJECTION, POWDER, LYOPHILIZED, FOR SOLUTION INTRAVENOUS at 21:38

## 2020-12-16 RX ADMIN — Medication 25 MILLIGRAM(S): at 05:00

## 2020-12-16 RX ADMIN — SIMVASTATIN 20 MILLIGRAM(S): 20 TABLET, FILM COATED ORAL at 21:38

## 2020-12-16 RX ADMIN — Medication 125 MICROGRAM(S): at 05:00

## 2020-12-16 RX ADMIN — PIPERACILLIN AND TAZOBACTAM 25 GRAM(S): 4; .5 INJECTION, POWDER, LYOPHILIZED, FOR SOLUTION INTRAVENOUS at 05:00

## 2020-12-16 RX ADMIN — ESCITALOPRAM OXALATE 10 MILLIGRAM(S): 10 TABLET, FILM COATED ORAL at 12:48

## 2020-12-16 RX ADMIN — ENOXAPARIN SODIUM 40 MILLIGRAM(S): 100 INJECTION SUBCUTANEOUS at 12:47

## 2020-12-16 RX ADMIN — PIPERACILLIN AND TAZOBACTAM 25 GRAM(S): 4; .5 INJECTION, POWDER, LYOPHILIZED, FOR SOLUTION INTRAVENOUS at 13:04

## 2020-12-16 RX ADMIN — DEXTROSE MONOHYDRATE, SODIUM CHLORIDE, AND POTASSIUM CHLORIDE 50 MILLILITER(S): 50; .745; 4.5 INJECTION, SOLUTION INTRAVENOUS at 20:00

## 2020-12-16 RX ADMIN — Medication 5 MILLIGRAM(S): at 01:21

## 2020-12-16 NOTE — PROGRESS NOTE ADULT - SUBJECTIVE AND OBJECTIVE BOX
General Surgery Progress Note    S: Patient seen and examined at bedside. Patient was hypertensive overnight. Still reporting pain. Denies chest pain, shortness of breath, nausea/vomiting. Has not had a BM for 6 days. Reports feelings gassy and bloated, requesting an enema. Tolerating regular diet    Physical Exam:  General: No acute distress  Respiratory: Nonlabored, lungs clear to auscultation  Abdominal: Soft, nondistended, mildly tender. No rebound or guarding.   Extremities: WWP, moving all extremities spontaneously      PAST MEDICAL HISTORY:  MI (myocardial infarction)    Hypertension          MEDICATIONS:  albuterol/ipratropium for Nebulization 3 milliLiter(s) Nebulizer every 6 hours  aspirin  chewable 81 milliGRAM(s) Oral daily  bisacodyl Suppository 10 milliGRAM(s) Rectal daily PRN  dextrose 5% + sodium chloride 0.45% with potassium chloride 20 mEq/L 1000 milliLiter(s) IV Continuous <Continuous>  enoxaparin Injectable 40 milliGRAM(s) SubCutaneous daily  escitalopram 10 milliGRAM(s) Oral daily  levothyroxine 125 MICROGram(s) Oral daily  metoprolol tartrate 25 milliGRAM(s) Oral two times a day  ondansetron Injectable 4 milliGRAM(s) IV Push every 6 hours PRN  oxyCODONE    IR 2.5 milliGRAM(s) Oral every 4 hours PRN  oxyCODONE    IR 5 milliGRAM(s) Oral every 6 hours PRN  piperacillin/tazobactam IVPB.. 3.375 Gram(s) IV Intermittent every 8 hours  polyethylene glycol 3350 17 Gram(s) Oral daily  senna 2 Tablet(s) Oral at bedtime  simvastatin 20 milliGRAM(s) Oral at bedtime  sorbitol 70%/mineral oil/magnesium hydroxide/glycerin Enema 120 milliLiter(s) Rectal once      ALLERGIES:  No Known Allergies      VITALS & I/Os:  Vital Signs Last 24 Hrs  T(C): 37.2 (16 Dec 2020 05:03), Max: 37.3 (15 Dec 2020 13:22)  T(F): 99 (16 Dec 2020 05:03), Max: 99.1 (15 Dec 2020 13:22)  HR: 86 (16 Dec 2020 05:03) (77 - 90)  BP: 158/78 (16 Dec 2020 05:51) (144/78 - 180/94)  BP(mean): --  RR: 18 (16 Dec 2020 05:03) (18 - 18)  SpO2: 92% (16 Dec 2020 05:03) (91% - 97%)    I&O's Summary    15 Dec 2020 07:01  -  16 Dec 2020 07:00  --------------------------------------------------------  IN: 2840 mL / OUT: 1450 mL / NET: 1390 mL          LABS:                        12.2   9.22  )-----------( 297      ( 16 Dec 2020 00:51 )             36.5     12-16    137  |  103  |  8   ----------------------------<  144<H>  3.9   |  23  |  0.67    Ca    8.4      16 Dec 2020 00:50  Phos  2.9     12-16  Mg     2.1     12-16    TPro  5.9<L>  /  Alb  2.9<L>  /  TBili  1.9<H>  /  DBili  x   /  AST  155<H>  /  ALT  249<H>  /  AlkPhos  428<H>  12-16    Lactate:

## 2020-12-16 NOTE — PROGRESS NOTE ADULT - SUBJECTIVE AND OBJECTIVE BOX
[facility] Division of Hospital Medicine  Adamaris Medley MD  Pager (M-F, 0Q-0N): [phone]  Other Times:  [phone]    Patient is a 80y old  Female who presents with a chief complaint of abd pain (15 Dec 2020 16:15)      SUBJECTIVE / OVERNIGHT EVENTS:  c/o constipation x 6 days with bloating,  Had enema today with small balls coming out but still feels very constipated.  No nausea/vomiting    ADDITIONAL REVIEW OF SYSTEMS:    MEDICATIONS  (STANDING):  albuterol/ipratropium for Nebulization 3 milliLiter(s) Nebulizer every 6 hours  aspirin  chewable 81 milliGRAM(s) Oral daily  dextrose 5% + sodium chloride 0.45% with potassium chloride 20 mEq/L 1000 milliLiter(s) (100 mL/Hr) IV Continuous <Continuous>  enoxaparin Injectable 40 milliGRAM(s) SubCutaneous daily  escitalopram 10 milliGRAM(s) Oral daily  levothyroxine 125 MICROGram(s) Oral daily  magnesium hydroxide Suspension 30 milliLiter(s) Oral once  metoprolol tartrate 25 milliGRAM(s) Oral two times a day  piperacillin/tazobactam IVPB.. 3.375 Gram(s) IV Intermittent every 8 hours  polyethylene glycol 3350 17 Gram(s) Oral two times a day  senna 2 Tablet(s) Oral at bedtime  simvastatin 20 milliGRAM(s) Oral at bedtime    MEDICATIONS  (PRN):  bisacodyl Suppository 10 milliGRAM(s) Rectal daily PRN Constipation  ondansetron Injectable 4 milliGRAM(s) IV Push every 6 hours PRN Nausea and/or Vomiting  oxyCODONE    IR 2.5 milliGRAM(s) Oral every 4 hours PRN Moderate Pain (4 - 6)  oxyCODONE    IR 5 milliGRAM(s) Oral every 6 hours PRN Severe Pain (7 - 10)      CAPILLARY BLOOD GLUCOSE        I&O's Summary    15 Dec 2020 07:01  -  16 Dec 2020 07:00  --------------------------------------------------------  IN: 2840 mL / OUT: 1450 mL / NET: 1390 mL        PHYSICAL EXAM:  Vital Signs Last 24 Hrs  T(C): 36.8 (16 Dec 2020 10:08), Max: 37.3 (15 Dec 2020 13:22)  T(F): 98.2 (16 Dec 2020 10:08), Max: 99.1 (15 Dec 2020 13:22)  HR: 70 (16 Dec 2020 10:08) (70 - 87)  BP: 149/67 (16 Dec 2020 10:08) (144/78 - 180/94)  BP(mean): --  RR: 18 (16 Dec 2020 10:08) (18 - 18)  SpO2: 95% (16 Dec 2020 10:08) (91% - 95%)  GENERAL:  Well appearing, f  in NAD  HEAD:  NCAT  EYES: PERRLA, conjunctiva clear  NECK: Supple, No JVD  CHEST/LUNG: CTA B/L. No w/r/r.  HEART: Reg rate. Normal S1, S2. No m/r/g.   ABDOMEN: SNTND. Bowel sounds present x 4 quadrants, no rebound or guarding  EXTREMITIES:  2+ Peripheral Pulses, No clubbing, cyanosis, edema.  PSYCH: AAOx3  SKIN: No rashes or lesions    LABS:                        12.2 9.22  )-----------( 297      ( 16 Dec 2020 00:51 )             36.5     12-16    137  |  103  |  8   ----------------------------<  144<H>  3.9   |  23  |  0.67    Ca    8.4      16 Dec 2020 00:50  Phos  2.9     12-16  Mg     2.1     12-16    TPro  5.9<L>  /  Alb  2.9<L>  /  TBili  1.9<H>  /  DBili  x   /  AST  155<H>  /  ALT  249<H>  /  AlkPhos  428<H>  12-16                RADIOLOGY & ADDITIONAL TESTS:  Results Reviewed:   Imaging Personally Reviewed:  < from: MR MRCP No Cont (12.16.20 @ 09:30) >  IMPRESSION:  Status post cholecystectomy with small loculated fluid in the operative bed with layering debris, may reflect postsurgical change. However, a small biloma cannot be entirely excluded. One week CT or ultrasound follow-up may be obtained as clinically indicated. Normal caliber biliary tree without choledocholithiasis.    < end of copied text >    Electrocardiogram Personally Reviewed:    COORDINATION OF CARE:  Care Discussed with Consultants/Other Providers [Y/N]: Dr Hensley trauma  Prior or Outpatient Records Reviewed [Y/N]:

## 2020-12-16 NOTE — PROGRESS NOTE ADULT - ASSESSMENT
80F pmhx HTN, COPD, Hypothyroidism, MI (age 36), lung ca s/p partial right pneumonectomy, 30 pack/yr smoker, open appy (70 yrs ago) now s/p laparoscopic cholecystectomy, POD 4.     PLAN:  - Diet: Regular  - Zosyn  - Pain meds prn  - DVT ppx lovenox  - ASA 81  - MRCP today  - Enema for bowl function  - LFTs donwtrending, will continue to trend  - Dispo planning      ACS Surgery [phone]

## 2020-12-16 NOTE — PROGRESS NOTE ADULT - SUBJECTIVE AND OBJECTIVE BOX
GENERAL SURGERY PROGRESS NOTE   ___________________________________________________________________    VANI GONZALEZ | [id number] | 80y Female | [facility]MON 202 W1 | LOS 4d    Attending: Ming Dasilva    ___________________________________________________________________      SUBJECTIVE:   Patient seen today during morning rounds at bedside and without acute distress. Denies chest pain, fever, severe pain, or SOB. Uncomfortable because she has not been able to have a bowel movement.     Diet, NPO:   Except Medications (12-15-20 @ 13:15) [Active]          PMH:   MI (myocardial infarction)    Hypertension    History of appendectomy          OBJECTIVE:    Allegies:  NKDA    MEDICATIONS  (STANDING):  albuterol/ipratropium for Nebulization 3 milliLiter(s) Nebulizer every 6 hours  aspirin  chewable 81 milliGRAM(s) Oral daily  dextrose 5% + sodium chloride 0.45% with potassium chloride 20 mEq/L 1000 milliLiter(s) (100 mL/Hr) IV Continuous <Continuous>  enoxaparin Injectable 40 milliGRAM(s) SubCutaneous daily  escitalopram 10 milliGRAM(s) Oral daily  levothyroxine 125 MICROGram(s) Oral daily  metoprolol tartrate 25 milliGRAM(s) Oral two times a day  piperacillin/tazobactam IVPB.. 3.375 Gram(s) IV Intermittent every 8 hours  polyethylene glycol 3350 17 Gram(s) Oral daily  senna 2 Tablet(s) Oral at bedtime  simvastatin 20 milliGRAM(s) Oral at bedtime  sorbitol 70%/mineral oil/magnesium hydroxide/glycerin Enema 120 milliLiter(s) Rectal once    MEDICATIONS  (PRN):  bisacodyl Suppository 10 milliGRAM(s) Rectal daily PRN Constipation  ondansetron Injectable 4 milliGRAM(s) IV Push every 6 hours PRN Nausea and/or Vomiting  oxyCODONE    IR 2.5 milliGRAM(s) Oral every 4 hours PRN Moderate Pain (4 - 6)  oxyCODONE    IR 5 milliGRAM(s) Oral every 6 hours PRN Severe Pain (7 - 10)      Vitals:    T(C): 37.2 (12-16-20 @ 05:03), Max: 37.3 (12-15-20 @ 13:22)  HR: 86 (12-16-20 @ 05:03) (77 - 90)  BP: 158/78 (12-16-20 @ 05:51) (144/78 - 180/94)  RR: 18 (12-16-20 @ 05:03) (18 - 18)  SpO2: 92% (12-16-20 @ 05:03) (91% - 97%)      Physical Exam:   Constitutional: resting in bed with no acute distress  Neuro: AAOx3  Respiratory:  unlabored breathing, clear respiration  Gastrointestinal: Soft, nondistended, mild tenderness near umbilical port site, 4 port site dressings c/d/i  Extremities:  No edema, no calf tenderness  Skin: Non-jaundiced, no cyanosis or rash observed    Intake&Output:  Totals:    12-15-20 @ 07:01  -  12-16-20 @ 07:00  --------------------------------------------------------  IN: 2840 mL / OUT: 1450 mL / NET: 1390 mL      Outputs:    12-15-20 @ 07:01  -  12-16-20 @ 07:00  --------------------------------------------------------  OUT:    Voided (mL): 1450 mL  Total OUT: 1450 mL        Urine Output:    12-15-20 @ 07:01  -  12-16-20 @ 07:00  --------------------------------------------------------  OUT: 21.32 mL/kg/d        Laboratory:                        12.2   9.22  )-----------( 297      ( 16 Dec 2020 00:51 )             36.5               12-16    137  |  103  |  8   ----------------------------<  144<H>  3.9   |  23  |  0.67    Ca    8.4      16 Dec 2020 00:50  Phos  2.9     12-16  Mg     2.1     12-16    TPro  5.9<L>  /  Alb  2.9<L>  /  TBili  1.9<H>  /  DBili  x   /  AST  155<H>  /  ALT  249<H>  /  AlkPhos  428<H>  12-16    LIVER FUNCTIONS - ( 16 Dec 2020 00:50 )  Alb: 2.9 g/dL / Pro: 5.9 g/dL / ALK PHOS: 428 U/L / ALT: 249 U/L / AST: 155 U/L / GGT: x               COVID-19 PCR: NotDetec (12 Dec 2020 11:29)  ,   ,   CAPILLARY BLOOD GLUCOSE            Recent Imaging:      Reviewed laboratory and imaging

## 2020-12-16 NOTE — PROGRESS NOTE ADULT - ATTENDING COMMENTS
Patient seen and examined and agree with resident note.  POD # 5 s/p laparoscopic cholecystectomy for gangrenous cholecystitis  -Patient complaining of constipation and given suppository today.  Does not have appetite at this time but will continue on clear liquid diet.  LFTs improving and MRCP negative for biliary obstruction. Small biloma noted.   Atrial fibrillation with RVR with rate control -will continue metoprolol.   Labs reviewed and leukocytosis slight elevation but patient is afebrile so will continue to monitor.   COPD- continue symbicort and duonebs.  Awaiting bowel movement. Once patient is less nauseous will advance diet.

## 2020-12-16 NOTE — PROGRESS NOTE ADULT - ASSESSMENT
80F pmhx HTN, COPD, Hypothyroidism, MI (age 36), lung ca s/p partial right pneumonectomy, 30 pack/yr smoker, open appy (70 yrs ago) now s/p laparoscopic cholecystectomy, POD 4. Patient with increasing total bilirubin but now trending down and LFTs as well. Undergoing MRCP for evaluation of retained stone which is likely to have passed recently explaining improving LFTs and Bilirubin.      PLAN:  - Diet: Regular after MRCP  - Zosyn  - Pain meds prn  - DVT ppx lovenox  - ASA 81  - F/U MRCP   - Enema for constipation; bowel regimen  - Discharge planning    ACS Surgery [phone]

## 2020-12-16 NOTE — PROGRESS NOTE ADULT - ASSESSMENT
80F pmhx HTN, COPD (30 PYH smoking), Hypothyroidism, MI (age 36), lung ca s/p partial right pneumonectomy, p/w abd pain s/p laparoscopic cholecystectomy 12/12, with hospital course c/b afib RVR, acute hypoxic respiratory failure, and transaminitis MRCP showing small loculated fluid in the operative bed with layering debris, may reflect postsurgical change. However, a small biloma cannot be entirely excluded.

## 2020-12-17 LAB
ALBUMIN SERPL ELPH-MCNC: 2.4 G/DL — LOW (ref 3.3–5)
ALP SERPL-CCNC: 260 U/L — HIGH (ref 40–120)
ALT FLD-CCNC: 193 U/L — HIGH (ref 10–45)
ANION GAP SERPL CALC-SCNC: 10 MMOL/L — SIGNIFICANT CHANGE UP (ref 5–17)
AST SERPL-CCNC: 91 U/L — HIGH (ref 10–40)
BILIRUB SERPL-MCNC: 0.5 MG/DL — SIGNIFICANT CHANGE UP (ref 0.2–1.2)
BLD GP AB SCN SERPL QL: NEGATIVE — SIGNIFICANT CHANGE UP
BUN SERPL-MCNC: 43 MG/DL — HIGH (ref 7–23)
CALCIUM SERPL-MCNC: 8 MG/DL — LOW (ref 8.4–10.5)
CHLORIDE SERPL-SCNC: 101 MMOL/L — SIGNIFICANT CHANGE UP (ref 96–108)
CO2 SERPL-SCNC: 23 MMOL/L — SIGNIFICANT CHANGE UP (ref 22–31)
CREAT SERPL-MCNC: 0.76 MG/DL — SIGNIFICANT CHANGE UP (ref 0.5–1.3)
CULTURE RESULTS: SIGNIFICANT CHANGE UP
CULTURE RESULTS: SIGNIFICANT CHANGE UP
GLUCOSE SERPL-MCNC: 126 MG/DL — HIGH (ref 70–99)
HCT VFR BLD CALC: 24.4 % — LOW (ref 34.5–45)
HCT VFR BLD CALC: 27.6 % — LOW (ref 34.5–45)
HCT VFR BLD CALC: 27.7 % — LOW (ref 34.5–45)
HGB BLD-MCNC: 8.2 G/DL — LOW (ref 11.5–15.5)
HGB BLD-MCNC: 8.8 G/DL — LOW (ref 11.5–15.5)
HGB BLD-MCNC: 9.3 G/DL — LOW (ref 11.5–15.5)
MAGNESIUM SERPL-MCNC: 1.8 MG/DL — SIGNIFICANT CHANGE UP (ref 1.6–2.6)
MCHC RBC-ENTMCNC: 30 PG — SIGNIFICANT CHANGE UP (ref 27–34)
MCHC RBC-ENTMCNC: 30.8 PG — SIGNIFICANT CHANGE UP (ref 27–34)
MCHC RBC-ENTMCNC: 30.9 PG — SIGNIFICANT CHANGE UP (ref 27–34)
MCHC RBC-ENTMCNC: 31.8 GM/DL — LOW (ref 32–36)
MCHC RBC-ENTMCNC: 33.6 GM/DL — SIGNIFICANT CHANGE UP (ref 32–36)
MCHC RBC-ENTMCNC: 33.7 GM/DL — SIGNIFICANT CHANGE UP (ref 32–36)
MCV RBC AUTO: 91.7 FL — SIGNIFICANT CHANGE UP (ref 80–100)
MCV RBC AUTO: 91.7 FL — SIGNIFICANT CHANGE UP (ref 80–100)
MCV RBC AUTO: 94.5 FL — SIGNIFICANT CHANGE UP (ref 80–100)
NRBC # BLD: 0 /100 WBCS — SIGNIFICANT CHANGE UP (ref 0–0)
PHOSPHATE SERPL-MCNC: 2.3 MG/DL — LOW (ref 2.5–4.5)
PLATELET # BLD AUTO: 316 K/UL — SIGNIFICANT CHANGE UP (ref 150–400)
PLATELET # BLD AUTO: 323 K/UL — SIGNIFICANT CHANGE UP (ref 150–400)
PLATELET # BLD AUTO: 327 K/UL — SIGNIFICANT CHANGE UP (ref 150–400)
POTASSIUM SERPL-MCNC: 4.2 MMOL/L — SIGNIFICANT CHANGE UP (ref 3.5–5.3)
POTASSIUM SERPL-SCNC: 4.2 MMOL/L — SIGNIFICANT CHANGE UP (ref 3.5–5.3)
PROT SERPL-MCNC: 4.8 G/DL — LOW (ref 6–8.3)
RBC # BLD: 2.66 M/UL — LOW (ref 3.8–5.2)
RBC # BLD: 2.93 M/UL — LOW (ref 3.8–5.2)
RBC # BLD: 3.01 M/UL — LOW (ref 3.8–5.2)
RBC # FLD: 13.8 % — SIGNIFICANT CHANGE UP (ref 10.3–14.5)
RBC # FLD: 13.8 % — SIGNIFICANT CHANGE UP (ref 10.3–14.5)
RBC # FLD: 13.9 % — SIGNIFICANT CHANGE UP (ref 10.3–14.5)
RH IG SCN BLD-IMP: POSITIVE — SIGNIFICANT CHANGE UP
SODIUM SERPL-SCNC: 134 MMOL/L — LOW (ref 135–145)
SPECIMEN SOURCE: SIGNIFICANT CHANGE UP
SPECIMEN SOURCE: SIGNIFICANT CHANGE UP
WBC # BLD: 13.34 K/UL — HIGH (ref 3.8–10.5)
WBC # BLD: 15.29 K/UL — HIGH (ref 3.8–10.5)
WBC # BLD: 15.77 K/UL — HIGH (ref 3.8–10.5)
WBC # FLD AUTO: 13.34 K/UL — HIGH (ref 3.8–10.5)
WBC # FLD AUTO: 15.29 K/UL — HIGH (ref 3.8–10.5)
WBC # FLD AUTO: 15.77 K/UL — HIGH (ref 3.8–10.5)

## 2020-12-17 PROCEDURE — 99232 SBSQ HOSP IP/OBS MODERATE 35: CPT | Mod: GC

## 2020-12-17 PROCEDURE — 74177 CT ABD & PELVIS W/CONTRAST: CPT | Mod: 26

## 2020-12-17 PROCEDURE — 99222 1ST HOSP IP/OBS MODERATE 55: CPT | Mod: GC

## 2020-12-17 PROCEDURE — 99233 SBSQ HOSP IP/OBS HIGH 50: CPT

## 2020-12-17 RX ORDER — ACETAMINOPHEN 500 MG
1000 TABLET ORAL EVERY 6 HOURS
Refills: 0 | Status: COMPLETED | OUTPATIENT
Start: 2020-12-17 | End: 2020-12-18

## 2020-12-17 RX ORDER — ACETAMINOPHEN 500 MG
650 TABLET ORAL ONCE
Refills: 0 | Status: DISCONTINUED | OUTPATIENT
Start: 2020-12-17 | End: 2020-12-17

## 2020-12-17 RX ORDER — MAGNESIUM SULFATE 500 MG/ML
2 VIAL (ML) INJECTION ONCE
Refills: 0 | Status: COMPLETED | OUTPATIENT
Start: 2020-12-17 | End: 2020-12-17

## 2020-12-17 RX ORDER — PANTOPRAZOLE SODIUM 20 MG/1
40 TABLET, DELAYED RELEASE ORAL EVERY 12 HOURS
Refills: 0 | Status: DISCONTINUED | OUTPATIENT
Start: 2020-12-17 | End: 2020-12-19

## 2020-12-17 RX ORDER — DEXTROSE MONOHYDRATE, SODIUM CHLORIDE, AND POTASSIUM CHLORIDE 50; .745; 4.5 G/1000ML; G/1000ML; G/1000ML
1000 INJECTION, SOLUTION INTRAVENOUS
Refills: 0 | Status: DISCONTINUED | OUTPATIENT
Start: 2020-12-17 | End: 2020-12-19

## 2020-12-17 RX ADMIN — Medication 125 MICROGRAM(S): at 05:22

## 2020-12-17 RX ADMIN — PANTOPRAZOLE SODIUM 40 MILLIGRAM(S): 20 TABLET, DELAYED RELEASE ORAL at 17:16

## 2020-12-17 RX ADMIN — PIPERACILLIN AND TAZOBACTAM 25 GRAM(S): 4; .5 INJECTION, POWDER, LYOPHILIZED, FOR SOLUTION INTRAVENOUS at 13:57

## 2020-12-17 RX ADMIN — PANTOPRAZOLE SODIUM 40 MILLIGRAM(S): 20 TABLET, DELAYED RELEASE ORAL at 05:22

## 2020-12-17 RX ADMIN — Medication 3 MILLILITER(S): at 12:52

## 2020-12-17 RX ADMIN — ESCITALOPRAM OXALATE 10 MILLIGRAM(S): 10 TABLET, FILM COATED ORAL at 11:44

## 2020-12-17 RX ADMIN — Medication 400 MILLIGRAM(S): at 23:03

## 2020-12-17 RX ADMIN — Medication 3 MILLILITER(S): at 17:16

## 2020-12-17 RX ADMIN — PIPERACILLIN AND TAZOBACTAM 25 GRAM(S): 4; .5 INJECTION, POWDER, LYOPHILIZED, FOR SOLUTION INTRAVENOUS at 21:18

## 2020-12-17 RX ADMIN — Medication 83.33 MILLIMOLE(S): at 11:42

## 2020-12-17 RX ADMIN — Medication 3 MILLILITER(S): at 23:04

## 2020-12-17 RX ADMIN — PIPERACILLIN AND TAZOBACTAM 25 GRAM(S): 4; .5 INJECTION, POWDER, LYOPHILIZED, FOR SOLUTION INTRAVENOUS at 05:22

## 2020-12-17 RX ADMIN — DEXTROSE MONOHYDRATE, SODIUM CHLORIDE, AND POTASSIUM CHLORIDE 100 MILLILITER(S): 50; .745; 4.5 INJECTION, SOLUTION INTRAVENOUS at 21:18

## 2020-12-17 RX ADMIN — SIMVASTATIN 20 MILLIGRAM(S): 20 TABLET, FILM COATED ORAL at 21:18

## 2020-12-17 RX ADMIN — Medication 400 MILLIGRAM(S): at 17:18

## 2020-12-17 RX ADMIN — Medication 25 MILLIGRAM(S): at 17:17

## 2020-12-17 RX ADMIN — Medication 1000 MILLIGRAM(S): at 23:33

## 2020-12-17 RX ADMIN — Medication 3 MILLILITER(S): at 05:23

## 2020-12-17 RX ADMIN — Medication 50 GRAM(S): at 08:55

## 2020-12-17 NOTE — PROGRESS NOTE ADULT - ATTENDING COMMENTS
Patient seen and examined and agree with resident note.  POD # 6 s/p laparoscopic cholecystectomy for gangrenous cholecystitis  Patient with GIB and drop in Hb 12-->9 and underwent CT scan that demonstrated diffuse colonic diverticulosis.   NPO for GIB.   Hemodynamically stable at this time.   LFTs improving and MRCP negative for biliary obstruction. Small biloma noted.   Atrial fibrillation with RVR with rate control -will continue metoprolol.   Labs reviewed and leukocytosis elevation likely secondary to GIB.   COPD- continue symbicort and duonebs.  Appreciate GI consult and will transfuse as needed for acute blood loss anemia.

## 2020-12-17 NOTE — PROGRESS NOTE ADULT - ASSESSMENT
80F pmhx HTN, COPD (30 PYH smoking), Hypothyroidism, diverticulosis, CAD/MI (age 36), lung ca s/p partial right pneumonectomy, p/w abd pain s/p laparoscopic cholecystectomy 12/12, with hospital course c/b afib RVR, acute hypoxic respiratory failure, and transaminitis MRCP showing postoperative changes, now with hospital course c/b acute blood loss anemia.

## 2020-12-17 NOTE — PROGRESS NOTE ADULT - SUBJECTIVE AND OBJECTIVE BOX
TRAUMA SURGERY [facility] MEDICINE PROGRESS NOTE    Patient is a 80y old  Female who presents with a chief complaint of cholecystitis (17 Dec 2020 12:31)    SUBJECTIVE / OVERNIGHT EVENTS:  overnight no acute events  was having continued bloody dark stool.  overall feels terrible  with nausea this morning  states she hasn't eating in 3 days.  no f/chills, cp    CAPILLARY BLOOD GLUCOSE      POCT Blood Glucose.: 124 mg/dL (16 Dec 2020 17:17)    I&O's Summary    16 Dec 2020 07:01  -  17 Dec 2020 07:00  --------------------------------------------------------  IN: 1540 mL / OUT: 0 mL / NET: 1540 mL        Vital Signs Last 24 Hrs  T(C): 37.2 (17 Dec 2020 09:37), Max: 37.2 (17 Dec 2020 04:54)  T(F): 98.9 (17 Dec 2020 09:37), Max: 98.9 (17 Dec 2020 04:54)  HR: 114 (17 Dec 2020 10:50) (80 - 114)  BP: 99/64 (17 Dec 2020 10:50) (94/58 - 164/71)  BP(mean): --  RR: 18 (17 Dec 2020 10:50) (18 - 18)  SpO2: 98% (17 Dec 2020 10:50) (94% - 98%)    PHYSICAL EXAM:  GENERAL:  Well appearing, f  in NAD  HEAD:  NCAT  EYES: PERRLA, conjunctiva clear  NECK: Supple, No JVD  CHEST/LUNG: CTA B/L. No w/r/r.  HEART: Reg rate. Normal S1, S2. No m/r/g.   ABDOMEN: SNTND. Bowel sounds present  EXTREMITIES:  2+ Peripheral Pulses, No clubbing, cyanosis, edema.  PSYCH: AAOx3, irritable mood  SKIN: No rashes or lesions    LABS:                        8.8    15.29 )-----------( 316      ( 17 Dec 2020 09:00 )             27.7     12-17    134<L>  |  101  |  43<H>  ----------------------------<  126<H>  4.2   |  23  |  0.76    Ca    8.0<L>      17 Dec 2020 07:39  Phos  2.3     12-17  Mg     1.8     12-17    TPro  4.8<L>  /  Alb  2.4<L>  /  TBili  0.5  /  DBili  x   /  AST  91<H>  /  ALT  193<H>  /  AlkPhos  260<H>  12-17      CARDIAC MARKERS ( 16 Dec 2020 17:33 )  x     / x     / 28 U/L / x     / 1.5 ng/mL    MEDICATIONS  (STANDING):  albuterol/ipratropium for Nebulization 3 milliLiter(s) Nebulizer every 6 hours  dextrose 5% + sodium chloride 0.45% with potassium chloride 20 mEq/L 1000 milliLiter(s) (50 mL/Hr) IV Continuous <Continuous>  escitalopram 10 milliGRAM(s) Oral daily  levothyroxine 125 MICROGram(s) Oral daily  metoprolol tartrate 25 milliGRAM(s) Oral two times a day  pantoprazole  Injectable 40 milliGRAM(s) IV Push every 12 hours  piperacillin/tazobactam IVPB.. 3.375 Gram(s) IV Intermittent every 8 hours  simvastatin 20 milliGRAM(s) Oral at bedtime    MEDICATIONS  (PRN):  ondansetron Injectable 4 milliGRAM(s) IV Push every 6 hours PRN Nausea and/or Vomiting  oxyCODONE    IR 5 milliGRAM(s) Oral every 6 hours PRN Severe Pain (7 - 10)  oxyCODONE    IR 2.5 milliGRAM(s) Oral every 4 hours PRN Moderate Pain (4 - 6)

## 2020-12-17 NOTE — PROVIDER CONTACT NOTE (OTHER) - SITUATION
BP 94/68, 
Pt had bloody BM
+;  EKG : A-fib w/rapid ventricular response,
Bladder scan urine 710cc
HR Elevated
Pt BP elevated to 187/77.
Pt c/o mild nausea.
Pt due to void at 0300 and only voided 100cc
Pt states she is unable to void
pt with bp of 180/94
BP dropped from 153/74 @1649 to 103/68 @2038; repeat /67 @2139

## 2020-12-17 NOTE — PROVIDER CONTACT NOTE (OTHER) - RECOMMENDATIONS
MD notified and aware
MD notified and aware
MD notified and aware, MD @ bedside to assess
Straight cath
EKG? Labs?
MD Notified. Maybe bladder scan and straight cath?
MD Ricketts notified.
Pending
Straight cath for residual urine?
TAN Loyd notified.
one time BP medication

## 2020-12-17 NOTE — PROVIDER CONTACT NOTE (OTHER) - BACKGROUND
pt a/f lap caroline
pt s/p lap caroline
Hx MI
Pt s/p lap caroline on 12/12.
Pt s/p lap caroline on 12/12. Had recurrent c/o nausea over night. Episode of emesis x1.
pt had a lap choli
pt s/p lap caroline
pt s/p lap choli
s/p lap caroline on 12/12
s/p lap caroline on 12/12. PMH COPD.

## 2020-12-17 NOTE — PROGRESS NOTE ADULT - SUBJECTIVE AND OBJECTIVE BOX
Chief Complaint:  Patient is a 80y old  Female who presents with a chief complaint of abdominal pain (16 Dec 2020 12:42)      Interval Events:   - overnight patient with several bloody bowel movements, downtrending Hg, also w/ worsening leukocytosis  - liver enzymes improving, MRCP w/ nl biliary tree    Allergies:  No Known Allergies      Hospital Medications:  albuterol/ipratropium for Nebulization 3 milliLiter(s) Nebulizer every 6 hours  dextrose 5% + sodium chloride 0.45% with potassium chloride 20 mEq/L 1000 milliLiter(s) IV Continuous <Continuous>  escitalopram 10 milliGRAM(s) Oral daily  levothyroxine 125 MICROGram(s) Oral daily  metoprolol tartrate 25 milliGRAM(s) Oral two times a day  ondansetron Injectable 4 milliGRAM(s) IV Push every 6 hours PRN  oxyCODONE    IR 5 milliGRAM(s) Oral every 6 hours PRN  oxyCODONE    IR 2.5 milliGRAM(s) Oral every 4 hours PRN  pantoprazole    Tablet 40 milliGRAM(s) Oral before breakfast  piperacillin/tazobactam IVPB.. 3.375 Gram(s) IV Intermittent every 8 hours  simvastatin 20 milliGRAM(s) Oral at bedtime        PHYSICAL EXAM:   Vital Signs:  Vital Signs Last 24 Hrs  T(C): 37.2 (17 Dec 2020 09:37), Max: 37.2 (17 Dec 2020 04:54)  T(F): 98.9 (17 Dec 2020 09:37), Max: 98.9 (17 Dec 2020 04:54)  HR: 114 (17 Dec 2020 10:50) (80 - 114)  BP: 99/64 (17 Dec 2020 10:50) (94/58 - 164/71)  BP(mean): --  RR: 18 (17 Dec 2020 10:50) (18 - 18)  SpO2: 98% (17 Dec 2020 10:50) (94% - 98%)  Daily     Daily     Constitutional: No acute distress.  Eyes:. Conjunctivae are clear, Sclera is non-icteric.  Ears Nose and Throat: The external ears are normal appearing,  Oral mucosa is pink and moist.  Respiratory:  No signs of respiratory distress. Lung sounds are clear bilaterally.  Cardiovascular:  S1 S2, Regular rate and rhythm.  GI: Abdomen is soft, symmetric, and  tenderness at RUQ without distension.  Bowel sounds are present and normoactive in all four quadrants. No masses, hepatomegaly, or splenomegaly are noted.   Neuro: No Tremor, No involuntary movements  Skin: No rashes, No Jaundice.      LABS:                        8.8    15.29 )-----------( 316      ( 17 Dec 2020 09:00 )             27.7     Mean Cell Volume: 94.5 fl (12-17-20 @ 09:00)    12-17    134<L>  |  101  |  43<H>  ----------------------------<  126<H>  4.2   |  23  |  0.76    Ca    8.0<L>      17 Dec 2020 07:39  Phos  2.3     12-17  Mg     1.8     12-17    TPro  4.8<L>  /  Alb  2.4<L>  /  TBili  0.5  /  DBili  x   /  AST  91<H>  /  ALT  193<H>  /  AlkPhos  260<H>  12-17    LIVER FUNCTIONS - ( 17 Dec 2020 07:39 )  Alb: 2.4 g/dL / Pro: 4.8 g/dL / ALK PHOS: 260 U/L / ALT: 193 U/L / AST: 91 U/L / GGT: x                     Imaging:             Chief Complaint:  Patient is a 80y old  Female who presents with a chief complaint of abdominal pain (16 Dec 2020 12:42)      Interval Events:   - liver enzymes improving  - no complaints    Allergies:  No Known Allergies      Hospital Medications:  albuterol/ipratropium for Nebulization 3 milliLiter(s) Nebulizer every 6 hours  dextrose 5% + sodium chloride 0.45% with potassium chloride 20 mEq/L 1000 milliLiter(s) IV Continuous <Continuous>  escitalopram 10 milliGRAM(s) Oral daily  levothyroxine 125 MICROGram(s) Oral daily  metoprolol tartrate 25 milliGRAM(s) Oral two times a day  ondansetron Injectable 4 milliGRAM(s) IV Push every 6 hours PRN  oxyCODONE    IR 5 milliGRAM(s) Oral every 6 hours PRN  oxyCODONE    IR 2.5 milliGRAM(s) Oral every 4 hours PRN  pantoprazole    Tablet 40 milliGRAM(s) Oral before breakfast  piperacillin/tazobactam IVPB.. 3.375 Gram(s) IV Intermittent every 8 hours  simvastatin 20 milliGRAM(s) Oral at bedtime        PHYSICAL EXAM:   Vital Signs:  Vital Signs Last 24 Hrs  T(C): 37.2 (17 Dec 2020 09:37), Max: 37.2 (17 Dec 2020 04:54)  T(F): 98.9 (17 Dec 2020 09:37), Max: 98.9 (17 Dec 2020 04:54)  HR: 114 (17 Dec 2020 10:50) (80 - 114)  BP: 99/64 (17 Dec 2020 10:50) (94/58 - 164/71)  BP(mean): --  RR: 18 (17 Dec 2020 10:50) (18 - 18)  SpO2: 98% (17 Dec 2020 10:50) (94% - 98%)  Daily     Daily     Constitutional: No acute distress.  Eyes:. Conjunctivae are clear, Sclera is non-icteric.  Ears Nose and Throat: The external ears are normal appearing,  Oral mucosa is pink and moist.  Respiratory:  No signs of respiratory distress. Lung sounds are clear bilaterally.  Cardiovascular:  S1 S2, Regular rate and rhythm.  GI: Abdomen is soft, symmetric, and  tenderness at RUQ without distension.  Bowel sounds are present and normoactive in all four quadrants. No masses, hepatomegaly, or splenomegaly are noted.   Neuro: No Tremor, No involuntary movements  Skin: No rashes, No Jaundice.      LABS:                        8.8    15.29 )-----------( 316      ( 17 Dec 2020 09:00 )             27.7     Mean Cell Volume: 94.5 fl (12-17-20 @ 09:00)    12-17    134<L>  |  101  |  43<H>  ----------------------------<  126<H>  4.2   |  23  |  0.76    Ca    8.0<L>      17 Dec 2020 07:39  Phos  2.3     12-17  Mg     1.8     12-17    TPro  4.8<L>  /  Alb  2.4<L>  /  TBili  0.5  /  DBili  x   /  AST  91<H>  /  ALT  193<H>  /  AlkPhos  260<H>  12-17    LIVER FUNCTIONS - ( 17 Dec 2020 07:39 )  Alb: 2.4 g/dL / Pro: 4.8 g/dL / ALK PHOS: 260 U/L / ALT: 193 U/L / AST: 91 U/L / GGT: x                     Imaging:

## 2020-12-17 NOTE — PROVIDER CONTACT NOTE (OTHER) - ACTION/TREATMENT ORDERED:
As per MD, no further action at this time, will continue to monitor.
As per MD, will come to bedside to assess. Awaiting further orders, will continue to monitor.
As per MD, no further action at this time, will continue to monitor.
Straight cath
MD made aware. Will continue to monitor
Pending
Pending IVP Zofran order. Continue to monitor.
Per TAN Loyd straight cath patient now & will attempt to have gyn come in AM to place patient's pessary. After that patient will have another TOV. No further intervention required, cont to monitor
Pt placed on 2L nasal cannula & sat increased to 91%. Per Trauma team do STAT EKG & draw STAT troponin. Team will be to bedside to assess patient during AM rounds shortly. Day shift RN Viky made aware
Reassess VS. Continue to monitor.
hydralazine ordered will give and recheck BP in 30 minutes

## 2020-12-17 NOTE — PROGRESS NOTE ADULT - SUBJECTIVE AND OBJECTIVE BOX
SURGERY DAILY PROGRESS NOTE:     24 hour events:     SUBJECTIVE/ROS: Patient seen and examined. She had rectal bleeding with her bowel movements overnight.      MEDICATIONS  (STANDING):  albuterol/ipratropium for Nebulization 3 milliLiter(s) Nebulizer every 6 hours  aspirin  chewable 81 milliGRAM(s) Oral daily  dextrose 5% + sodium chloride 0.45% with potassium chloride 20 mEq/L 1000 milliLiter(s) (50 mL/Hr) IV Continuous <Continuous>  enoxaparin Injectable 40 milliGRAM(s) SubCutaneous daily  escitalopram 10 milliGRAM(s) Oral daily  levothyroxine 125 MICROGram(s) Oral daily  magnesium sulfate  IVPB 2 Gram(s) IV Intermittent once  metoprolol tartrate 25 milliGRAM(s) Oral two times a day  pantoprazole    Tablet 40 milliGRAM(s) Oral before breakfast  piperacillin/tazobactam IVPB.. 3.375 Gram(s) IV Intermittent every 8 hours  simvastatin 20 milliGRAM(s) Oral at bedtime  sodium phosphate IVPB 30 milliMole(s) IV Intermittent once    MEDICATIONS  (PRN):  ondansetron Injectable 4 milliGRAM(s) IV Push every 6 hours PRN Nausea and/or Vomiting  oxyCODONE    IR 5 milliGRAM(s) Oral every 6 hours PRN Severe Pain (7 - 10)  oxyCODONE    IR 2.5 milliGRAM(s) Oral every 4 hours PRN Moderate Pain (4 - 6)      OBJECTIVE:    Vital Signs Last 24 Hrs  T(C): 37.2 (17 Dec 2020 04:54), Max: 37.2 (17 Dec 2020 04:54)  T(F): 98.9 (17 Dec 2020 04:54), Max: 98.9 (17 Dec 2020 04:54)  HR: 98 (17 Dec 2020 04:54) (70 - 100)  BP: 112/75 (17 Dec 2020 04:54) (94/58 - 164/71)  BP(mean): --  RR: 18 (17 Dec 2020 04:54) (18 - 18)  SpO2: 96% (17 Dec 2020 04:54) (95% - 97%)        I&O's Detail    16 Dec 2020 07:01  -  17 Dec 2020 07:00  --------------------------------------------------------  IN:    dextrose 5% + sodium chloride 0.45% w/ Additives: 900 mL    IV PiggyBack: 300 mL    Oral Fluid: 340 mL  Total IN: 1540 mL    OUT:    Voided (mL): 0 mL  Total OUT: 0 mL    Total NET: 1540 mL          Daily     Daily     LABS:                        9.3    13.34 )-----------( 323      ( 17 Dec 2020 07:39 )             27.6     12-17    134<L>  |  101  |  43<H>  ----------------------------<  126<H>  4.2   |  23  |  0.76    Ca    8.0<L>      17 Dec 2020 07:39  Phos  2.3     12-17  Mg     1.8     12-17    TPro  4.8<L>  /  Alb  2.4<L>  /  TBili  0.5  /  DBili  x   /  AST  91<H>  /  ALT  193<H>  /  AlkPhos  260<H>  12-17                  PHYSICAL EXAM:    Constitutional: resting in bed with no acute distress  Neuro: AAOx3  Respiratory:  unlabored breathing, clear respiration  Gastrointestinal: Soft, nondistended, mild tenderness near umbilical port site, 4 port site dressings c/d/i  Extremities:  No edema, no calf tenderness  Skin: Non-jaundiced, no cyanosis or rash observed           SURGERY DAILY PROGRESS NOTE:     24 hour events: Pt was hypotensive to 94/68 overnight and had dark red bloody stools.    SUBJECTIVE/ROS: Patient seen and examined. Pain and nausea are controlled. She denies vomiting.     MEDICATIONS  (STANDING):  albuterol/ipratropium for Nebulization 3 milliLiter(s) Nebulizer every 6 hours  aspirin  chewable 81 milliGRAM(s) Oral daily  dextrose 5% + sodium chloride 0.45% with potassium chloride 20 mEq/L 1000 milliLiter(s) (50 mL/Hr) IV Continuous <Continuous>  enoxaparin Injectable 40 milliGRAM(s) SubCutaneous daily  escitalopram 10 milliGRAM(s) Oral daily  levothyroxine 125 MICROGram(s) Oral daily  magnesium sulfate  IVPB 2 Gram(s) IV Intermittent once  metoprolol tartrate 25 milliGRAM(s) Oral two times a day  pantoprazole    Tablet 40 milliGRAM(s) Oral before breakfast  piperacillin/tazobactam IVPB.. 3.375 Gram(s) IV Intermittent every 8 hours  simvastatin 20 milliGRAM(s) Oral at bedtime  sodium phosphate IVPB 30 milliMole(s) IV Intermittent once    MEDICATIONS  (PRN):  ondansetron Injectable 4 milliGRAM(s) IV Push every 6 hours PRN Nausea and/or Vomiting  oxyCODONE    IR 5 milliGRAM(s) Oral every 6 hours PRN Severe Pain (7 - 10)  oxyCODONE    IR 2.5 milliGRAM(s) Oral every 4 hours PRN Moderate Pain (4 - 6)      OBJECTIVE:    Vital Signs Last 24 Hrs  T(C): 37.2 (17 Dec 2020 04:54), Max: 37.2 (17 Dec 2020 04:54)  T(F): 98.9 (17 Dec 2020 04:54), Max: 98.9 (17 Dec 2020 04:54)  HR: 98 (17 Dec 2020 04:54) (70 - 100)  BP: 112/75 (17 Dec 2020 04:54) (94/58 - 164/71)  BP(mean): --  RR: 18 (17 Dec 2020 04:54) (18 - 18)  SpO2: 96% (17 Dec 2020 04:54) (95% - 97%)        I&O's Detail    16 Dec 2020 07:01  -  17 Dec 2020 07:00  --------------------------------------------------------  IN:    dextrose 5% + sodium chloride 0.45% w/ Additives: 900 mL    IV PiggyBack: 300 mL    Oral Fluid: 340 mL  Total IN: 1540 mL    OUT:    Voided (mL): 0 mL  Total OUT: 0 mL    Total NET: 1540 mL          Daily     Daily     LABS:                        9.3    13.34 )-----------( 323      ( 17 Dec 2020 07:39 )             27.6     12-17    134<L>  |  101  |  43<H>  ----------------------------<  126<H>  4.2   |  23  |  0.76    Ca    8.0<L>      17 Dec 2020 07:39  Phos  2.3     12-17  Mg     1.8     12-17    TPro  4.8<L>  /  Alb  2.4<L>  /  TBili  0.5  /  DBili  x   /  AST  91<H>  /  ALT  193<H>  /  AlkPhos  260<H>  12-17                  PHYSICAL EXAM:    Constitutional: resting in bed with no acute distress  Neuro: AAOx3  Respiratory:  unlabored breathing, clear respiration  Gastrointestinal: Soft, nondistended, mild tenderness near umbilical port site, 4 port site dressings c/d/i  Extremities:  No edema, no calf tenderness  Skin: Non-jaundiced, no cyanosis or rash observed

## 2020-12-17 NOTE — CONSULT NOTE ADULT - SUBJECTIVE AND OBJECTIVE BOX
Patient is a 80y old  Female who presented with a chief complaint of cholecystitis (17 Dec 2020 12:31)      HPI:    80F pmhx HTN, COPD (30 PYH smoking), Hypothyroidism, diverticulosis, CAD/MI (age 36), lung ca s/p partial right pneumonectomy, p/w abd pain s/p laparoscopic cholecystectomy 12/12 (acute gangrenous cholecystitis), with hospital course c/b afib RVR (per Medicine - no AC), acute hypoxic respiratory failure, and transaminitis MRCP showing postoperative changes without CBD stone (Hepatology following, LFTs improving), now with hospital course c/b acute blood loss anemia overnight for which we are asked to see the pt.    Pt known to Dr Cadena   7/2016 Colonoscopy +sigmoid diverticulosis +hemorrhoids  Pt reports no BM x 7 days  was given laxatives and then received SMOG enema yesterday AM with subsequent stools (reports 6-10 stools yesterday during day); overnight reported episodes of dropping BP with hypotension down to 94/58 overnight with associated crampy abdominal discomfort followed by rectal bleeding -dark red blood.  Also noted to have rise in WBC yesterday PM with associated elevated Lactate.  Pt on IV Zosyn since 12/12/2020    Today reports generalized weakness, mild abdominal discomfort   no nausea or vomiting  no melena  no fever or chills      PAST MEDICAL & SURGICAL HISTORY:  MI (myocardial infarction)  Hypertension  History of appendectomy  Lap Cholecystectomy 12/12/2020      Allergies    No Known Allergies    Intolerances        MEDICATIONS  (STANDING):  albuterol/ipratropium for Nebulization 3 milliLiter(s) Nebulizer every 6 hours  dextrose 5% + sodium chloride 0.45% with potassium chloride 20 mEq/L 1000 milliLiter(s) (50 mL/Hr) IV Continuous <Continuous>  escitalopram 10 milliGRAM(s) Oral daily  levothyroxine 125 MICROGram(s) Oral daily  metoprolol tartrate 25 milliGRAM(s) Oral two times a day  pantoprazole  Injectable 40 milliGRAM(s) IV Push every 12 hours  piperacillin/tazobactam IVPB.. 3.375 Gram(s) IV Intermittent every 8 hours  simvastatin 20 milliGRAM(s) Oral at bedtime    MEDICATIONS  (PRN):  ondansetron Injectable 4 milliGRAM(s) IV Push every 6 hours PRN Nausea and/or Vomiting  oxyCODONE    IR 5 milliGRAM(s) Oral every 6 hours PRN Severe Pain (7 - 10)  oxyCODONE    IR 2.5 milliGRAM(s) Oral every 4 hours PRN Moderate Pain (4 - 6)      Social History:  former tobacco  no ETOH abuse    Family History   IBD (  ) Yes   ( X ) No  GI Malignancy (  )  Yes    ( X ) No    Health Management     Last Colonoscopy - 7/2016 (see HPI)      Advanced Directives: (   X  ) None    (      ) DNR    (     ) DNI    (     ) Health Care Proxy:     Review of Systems:    General:  No wt loss, fevers, chills, night sweats  CV:  No pain, palpitations, +AF (new, post-op)  Resp:  No dyspnea, cough, tachypnea, wheezing  GI:  see HPI  :  No pain, bleeding, incontinence, nocturia +pessary in place  Muscle:  No pain, focal weakness  Neuro:  No weakness, tingling, memory problems  Psych:  No fatigue, insomnia, mood problems, depression  Endocrine:  No polyuria, polydypsia, cold/heat intolerance  Heme:  No petechiae, ecchymosis, easy bruisability  Skin:  No rash, tattoos, scars, edema      Vital Signs Last 24 Hrs  T(C): 37.2 (17 Dec 2020 09:37), Max: 37.2 (17 Dec 2020 04:54)  T(F): 98.9 (17 Dec 2020 09:37), Max: 98.9 (17 Dec 2020 04:54)  HR: 114 (17 Dec 2020 10:50) (80 - 114)  BP: 99/64 (17 Dec 2020 10:50) (94/58 - 164/71)  BP(mean): --  RR: 18 (17 Dec 2020 10:50) (18 - 18)  SpO2: 98% (17 Dec 2020 10:50) (94% - 98%)    PHYSICAL EXAM:    Constitutional: NAD, well-developed WF non toxic appearing but appears fatigued  Neck: No LAD, supple no JVD  Respiratory: Clear b/l  Cardiovascular: S1 and S2, RRR  Gastrointestinal: BS+, soft, op. port sites clean +mild-moderate left abdomen tenderness with mod/deep palpation without rebound, guarding or rigidity  Rectal: +dark red blood visible at anal introitus normal tone  +dark red blood on gloved finger, no clots, no palpable mass/lesion. no fecal impaction  Extremities: No peripheral edema, neg clubbing, cyanosis  Vascular: 2+ peripheral pulses  Neurological: A/O x 3, no focal deficits  Psychiatric: Normal mood, normal affect  Skin: No rashes        LABS:                        8.8    15.29 )-----------( 316      ( 17 Dec 2020 09:00 )             27.7     Hemoglobin: 9.3 g/dL (12.17.20 @ 07:39)   Hemoglobin: 12.2 g/dL (12.16.20 @ 17:33)     12-17    134<L>  |  101  |  43<H>  ----------------------------<  126<H>  4.2   |  23  |  0.76    Ca    8.0<L>      17 Dec 2020 07:39  Phos  2.3     12-17  Mg     1.8     12-17    TPro  4.8<L>  /  Alb  2.4<L>  /  TBili  0.5  /  DBili  x   /  AST  91<H>  /  ALT  193<H>  /  AlkPhos  260<H>  12-17    Thyroid Stimulating Hormone, Serum (12.14.20 @ 17:58)   Thyroid Stimulating Hormone, Serum: 1.40 uIU/mL     Blood Gas Venous - Lactate (12.16.20 @ 17:36)   Blood Gas Venous - Lactate: 2.4:     Troponin T, High Sensitivity (12.16.20 @ 17:33)   Troponin T, High Sensitivity Result: 21: Specimen not hemolyzed   CKMB Mass Assay (12.16.20 @ 17:33)   CKMB Units: 1.5 ng/mL     COVID-19 PCR . (12.12.20 @ 11:29)   COVID-19 PCR: NotDetec      RADIOLOGY & ADDITIONAL TESTS:  EXAM:  MR MRCP                        PROCEDURE DATE:  12/16/2020        INTERPRETATION:  CLINICAL INFORMATION: Postop day 4 cholecystectomy with elevated LFTs, though improving. Assess the common bile duct.    COMPARISON: Abdominal ultrasound and CT abdomen and pelvis 12/12/2020.    PROCEDURE:  MRI/MRCP was performed without intravenous contrast. Radial and 3D MRCP sequences were obtained.  IV Contrast: None.  MRCP was performed.    FINDINGS:    Evaluation of the solid visceral organs and vasculature is limited without the administration of intravenous contrast.    LOWER CHEST: Trace bilateral pleural effusions. Elevation of the right hemidiaphragm.    LIVER: Normal morphology. No significant steatosis.    GALLBLADDER/BILE DUCTS: Post cholecystectomy. Trace perihepatic ascites and small loculated fluid in the operative bed measuring 5.5 x 2.4 cm (series 4, image 19), with dependent debris.  Debris demonstrates mild T1 hyperintensity and T2 hypointensity, possibly bile, sludge or hemorrhagic debris. Biliary tree is normal caliber with the extrahepatic common bile duct measuring 8 mm with normal distal tapering. No choledocholithiasis.    SPLEEN: Within normal limits.  PANCREAS: Within normal limits.  ADRENALS: Within normal limits.  KIDNEYS/URETERS: No hydronephrosis. Bilateral cysts.    VISUALIZED PORTIONS:  BOWEL: A 1.9 cm periampullary duodenal diverticulum. Mild wall thickening of the first portion of the duodenum, likely reactive, similar to 12/12/2020 CT. Colonic diverticulosis, heavily involving the left colon.  PERITONEUM: Trace perihepatic ascites and small loculated fluid in the operative bed, as above.  VESSELS: Within normal limits.  RETROPERITONEUM/LYMPH NODES: No lymphadenopathy.  ABDOMINAL WALL: Left flank post surgical change.  BONES: L4 vertebral body hemangioma. Mild degenerative changes.    IMPRESSION:  Status post cholecystectomy with small loculated fluid in the operative bed with layering debris, may reflect postsurgical change. However, a small biloma cannot be entirely excluded. One week CT or ultrasound follow-up may be obtained as clinically indicated. Normal caliber biliary tree without choledocholithiasis.    Findings were discussed with Dr. Brooks 12/16/2020 11:07 AM by Dr. Ramirezwith repeat back confirmation.

## 2020-12-17 NOTE — CONSULT NOTE ADULT - ASSESSMENT
80F pmhx HTN, COPD (30 PYH smoking), Hypothyroidism, diverticulosis, CAD/MI (age 36), lung ca s/p partial right pneumonectomy, p/w abd pain s/p laparoscopic cholecystectomy 12/12 (acute gangrenous cholecystitis), with hospital course c/b afib RVR (per Medicine - no AC), acute hypoxic respiratory failure, and transaminitis MRCP showing postoperative changes without CBD stone (Hepatology following, LFTs improving), now with hospital course c/b acute blood loss anemia    7/2016 Colonoscopy +sigmoid diverticulosis +hemorrhoids  COVID negative    Acute GI blood loss anemia - given clinical presentation of severe constipation followed by hypotension and abdominal cramping preceding episodes of rectal bleeding; high suspicion for ischemic colitis though Diverticular bleed is also in DDx    RECS:  -CT with IV contrast (ordered); if shows left sided colitis, then likely etiology is ischemic colitis in this setting.  If no colitis, then likely diverticular bleed  -Continue IV Zosyn  -trend lactate  -IV hydration  -continue IV PPI for now  -keep NPO for now  -transfusion support PRN  -hold off on endoscopic evaluation at this time    Discussed with Surgical attending, PA and Hospitalist  Discussed with pt, all questions answered    Thank you for the courtesy of this consult.    Curly Oliveira PA-C    [facility]  [phone]  After hours and weekend coverage [phone]    
80F pmhx HTN, COPD, Hypothyroidism, MI (age 36), lung ca s/p partial right pneumonectomy, 30 pack/yr smoker, open appy (70 yrs ago) now s/p laparoscopic cholecystectomy with new tachycardia.
80F pmhx HTN, COPD, Hypothyroidism, MI (age 36), lung ca s/p partial right pneumonectomy, 30 pack/yr smoker, open appy (70 yrs ago), who  presented with 2 days of epigastric pain & emesis. Patient was found to have acute cholecystitis and today is day 3 post operative. Hepatology are consulted for elevated LFTs.      #transaminitis : r/o biliary obstruction versus less likely others ( medication induced , viral )    REC:  MRCP abdomen   acute hepatitis panel   trend LFTs  avoid hepatotoxic medications 
80F PMH HTN, COPD, Hypothyroidism, MI (age 36), lung ca s/p partial right pneumonectomy, 30 pack/yr smoker, open appy (70 yrs ago), currently 3 days s/p lap caroline with transient hypoxemia 2/2 to what appears to be splinting due to post-operative pain in the context of sub-optimal inhaler use.     # Post-operative splinting yielding hypoventilation with resulting atelectasis, V/W mismatch and hypoxemia  - IS 10x/hour ATC  - Duonebs standing q6h  - pain control to prevent splinting  - COPD inhaler use as below    # COPD:  - currently not in exacerbation. Please start patient on Advair 500 (patient states she will be bringing it from home)  - If Advair cannot be obtained, please use Symbicort 160 2 puffs BID  - Duonebs as above  - No need for azithromycin        Christiano Izaguirre MD  PGY-6  Pulmonary and Critical Care Fellow  Pager: [phone]

## 2020-12-17 NOTE — PROVIDER CONTACT NOTE (OTHER) - NAME OF MD/NP/PA/DO NOTIFIED:
Irene ward
MD Irvin Quevedo
MD Lane
MD Mccarthy, Jem
MD Ricketts
TAN Loyd
TAN Loyd
TAN Thurman
Trauma team
MD Lane
MD Lane

## 2020-12-17 NOTE — PROVIDER CONTACT NOTE (OTHER) - DATE AND TIME:
17-Dec-2020 01:30
13-Dec-2020 03:10
13-Dec-2020 17:08
14-Dec-2020 01:35
14-Dec-2020 06:45
14-Dec-2020 07:26
14-Dec-2020 20:38
15-Dec-2020 06:06
16-Dec-2020 01:10
16-Dec-2020 21:27
16-Dec-2020 21:40

## 2020-12-17 NOTE — PROVIDER CONTACT NOTE (OTHER) - REASON
+;  EKG : A-fib w/rapid ventricular response
BP elevated to 187/77
Bladder scan urine 710cc
C/o nausea
HR Elevated
Pt due to void at 0300 and only voided 100cc
Pt states she is unable to fully void
pt with elevated BP
Pt had bloody BM
BP 94/68, 
BP dropped from 153/74 @1649 to 103/68 @2038; repeat /67 @2139

## 2020-12-17 NOTE — CONSULT NOTE ADULT - ATTENDING COMMENTS
Suspect retained stone. Please obtain MRI/MRCP.
Patient seen and examined, data and imaging personally reviewed.  History as noted above.  Agree with plan as outlined above.  IN addition, getting out of bed to chair will help prevent atelectasis.  Incentive spirometry.
Resolving bleed  Suspect diverticular  Continue clears  will follow up
Desiree Garcia DO  [facility]  [phone]

## 2020-12-17 NOTE — PROGRESS NOTE ADULT - ASSESSMENT
80F pmhx HTN, COPD, Hypothyroidism, MI (age 36), lung ca s/p partial right pneumonectomy, 30 pack/yr smoker, open appy (70 yrs ago), who  presented with 2 days of epigastric pain & emesis. Patient was found to have acute cholecystitis and today is day 4 post operative. Hepatology are consulted for elevated LFTs.      #transaminitis : now improving, likely from passed stone, MRCP wnl  # anemia - acute on chronic anemia, reports bloody stool    REC:  acute hepatitis panel   trend LFTs  avoid hepatotoxic medications   trend CBC  pantorpazole 40mg IV BID  pending clinical course can discuss role of EGD 80F pmhx HTN, COPD, Hypothyroidism, MI (age 36), lung ca s/p partial right pneumonectomy, 30 pack/yr smoker, open appy (70 yrs ago), who  presented with 2 days of epigastric pain & emesis. Patient was found to have acute cholecystitis and today is day 4 post operative. Hepatology are consulted for elevated LFTs.      #transaminitis : now improving, likely from passed stone, MRCP wnl  # anemia - acute on chronic anemia, reports bloody stool    REC:  trend LFTs  avoid hepatotoxic medications   trend CBC

## 2020-12-18 LAB
ALBUMIN SERPL ELPH-MCNC: 2.5 G/DL — LOW (ref 3.3–5)
ALP SERPL-CCNC: 196 U/L — HIGH (ref 40–120)
ALT FLD-CCNC: 146 U/L — HIGH (ref 10–45)
ANION GAP SERPL CALC-SCNC: 8 MMOL/L — SIGNIFICANT CHANGE UP (ref 5–17)
AST SERPL-CCNC: 55 U/L — HIGH (ref 10–40)
BILIRUB SERPL-MCNC: 0.4 MG/DL — SIGNIFICANT CHANGE UP (ref 0.2–1.2)
BUN SERPL-MCNC: 24 MG/DL — HIGH (ref 7–23)
CALCIUM SERPL-MCNC: 7.9 MG/DL — LOW (ref 8.4–10.5)
CHLORIDE SERPL-SCNC: 105 MMOL/L — SIGNIFICANT CHANGE UP (ref 96–108)
CO2 SERPL-SCNC: 24 MMOL/L — SIGNIFICANT CHANGE UP (ref 22–31)
CREAT SERPL-MCNC: 0.74 MG/DL — SIGNIFICANT CHANGE UP (ref 0.5–1.3)
GLUCOSE SERPL-MCNC: 91 MG/DL — SIGNIFICANT CHANGE UP (ref 70–99)
HCT VFR BLD CALC: 24.6 % — LOW (ref 34.5–45)
HCT VFR BLD CALC: 24.8 % — LOW (ref 34.5–45)
HCT VFR BLD CALC: 26.9 % — LOW (ref 34.5–45)
HGB BLD-MCNC: 7.7 G/DL — LOW (ref 11.5–15.5)
HGB BLD-MCNC: 8.1 G/DL — LOW (ref 11.5–15.5)
HGB BLD-MCNC: 9 G/DL — LOW (ref 11.5–15.5)
MAGNESIUM SERPL-MCNC: 2.1 MG/DL — SIGNIFICANT CHANGE UP (ref 1.6–2.6)
MCHC RBC-ENTMCNC: 30.2 PG — SIGNIFICANT CHANGE UP (ref 27–34)
MCHC RBC-ENTMCNC: 30.5 PG — SIGNIFICANT CHANGE UP (ref 27–34)
MCHC RBC-ENTMCNC: 30.9 PG — SIGNIFICANT CHANGE UP (ref 27–34)
MCHC RBC-ENTMCNC: 31.3 GM/DL — LOW (ref 32–36)
MCHC RBC-ENTMCNC: 32.7 GM/DL — SIGNIFICANT CHANGE UP (ref 32–36)
MCHC RBC-ENTMCNC: 33.5 GM/DL — SIGNIFICANT CHANGE UP (ref 32–36)
MCV RBC AUTO: 91.2 FL — SIGNIFICANT CHANGE UP (ref 80–100)
MCV RBC AUTO: 92.5 FL — SIGNIFICANT CHANGE UP (ref 80–100)
MCV RBC AUTO: 98.8 FL — SIGNIFICANT CHANGE UP (ref 80–100)
NRBC # BLD: 0 /100 WBCS — SIGNIFICANT CHANGE UP (ref 0–0)
PHOSPHATE SERPL-MCNC: 3.1 MG/DL — SIGNIFICANT CHANGE UP (ref 2.5–4.5)
PLATELET # BLD AUTO: 301 K/UL — SIGNIFICANT CHANGE UP (ref 150–400)
PLATELET # BLD AUTO: 306 K/UL — SIGNIFICANT CHANGE UP (ref 150–400)
PLATELET # BLD AUTO: 311 K/UL — SIGNIFICANT CHANGE UP (ref 150–400)
POTASSIUM SERPL-MCNC: 3.7 MMOL/L — SIGNIFICANT CHANGE UP (ref 3.5–5.3)
POTASSIUM SERPL-SCNC: 3.7 MMOL/L — SIGNIFICANT CHANGE UP (ref 3.5–5.3)
PROT SERPL-MCNC: 4.7 G/DL — LOW (ref 6–8.3)
RBC # BLD: 2.49 M/UL — LOW (ref 3.8–5.2)
RBC # BLD: 2.68 M/UL — LOW (ref 3.8–5.2)
RBC # BLD: 2.95 M/UL — LOW (ref 3.8–5.2)
RBC # FLD: 13.9 % — SIGNIFICANT CHANGE UP (ref 10.3–14.5)
RBC # FLD: 14.1 % — SIGNIFICANT CHANGE UP (ref 10.3–14.5)
RBC # FLD: 14.1 % — SIGNIFICANT CHANGE UP (ref 10.3–14.5)
SODIUM SERPL-SCNC: 137 MMOL/L — SIGNIFICANT CHANGE UP (ref 135–145)
WBC # BLD: 11.47 K/UL — HIGH (ref 3.8–10.5)
WBC # BLD: 11.58 K/UL — HIGH (ref 3.8–10.5)
WBC # BLD: 11.67 K/UL — HIGH (ref 3.8–10.5)
WBC # FLD AUTO: 11.47 K/UL — HIGH (ref 3.8–10.5)
WBC # FLD AUTO: 11.58 K/UL — HIGH (ref 3.8–10.5)
WBC # FLD AUTO: 11.67 K/UL — HIGH (ref 3.8–10.5)

## 2020-12-18 PROCEDURE — 99232 SBSQ HOSP IP/OBS MODERATE 35: CPT | Mod: GC

## 2020-12-18 PROCEDURE — 99233 SBSQ HOSP IP/OBS HIGH 50: CPT

## 2020-12-18 RX ORDER — LANOLIN ALCOHOL/MO/W.PET/CERES
3 CREAM (GRAM) TOPICAL AT BEDTIME
Refills: 0 | Status: DISCONTINUED | OUTPATIENT
Start: 2020-12-18 | End: 2020-12-22

## 2020-12-18 RX ORDER — OXYCODONE HYDROCHLORIDE 5 MG/1
2.5 TABLET ORAL EVERY 4 HOURS
Refills: 0 | Status: DISCONTINUED | OUTPATIENT
Start: 2020-12-18 | End: 2020-12-20

## 2020-12-18 RX ORDER — POTASSIUM CHLORIDE 20 MEQ
10 PACKET (EA) ORAL
Refills: 0 | Status: COMPLETED | OUTPATIENT
Start: 2020-12-18 | End: 2020-12-18

## 2020-12-18 RX ADMIN — DEXTROSE MONOHYDRATE, SODIUM CHLORIDE, AND POTASSIUM CHLORIDE 100 MILLILITER(S): 50; .745; 4.5 INJECTION, SOLUTION INTRAVENOUS at 21:40

## 2020-12-18 RX ADMIN — PIPERACILLIN AND TAZOBACTAM 25 GRAM(S): 4; .5 INJECTION, POWDER, LYOPHILIZED, FOR SOLUTION INTRAVENOUS at 15:13

## 2020-12-18 RX ADMIN — Medication 1000 MILLIGRAM(S): at 12:14

## 2020-12-18 RX ADMIN — PIPERACILLIN AND TAZOBACTAM 25 GRAM(S): 4; .5 INJECTION, POWDER, LYOPHILIZED, FOR SOLUTION INTRAVENOUS at 05:43

## 2020-12-18 RX ADMIN — PIPERACILLIN AND TAZOBACTAM 25 GRAM(S): 4; .5 INJECTION, POWDER, LYOPHILIZED, FOR SOLUTION INTRAVENOUS at 21:39

## 2020-12-18 RX ADMIN — Medication 125 MICROGRAM(S): at 05:43

## 2020-12-18 RX ADMIN — Medication 1000 MILLIGRAM(S): at 06:13

## 2020-12-18 RX ADMIN — Medication 400 MILLIGRAM(S): at 05:43

## 2020-12-18 RX ADMIN — Medication 400 MILLIGRAM(S): at 11:44

## 2020-12-18 RX ADMIN — Medication 3 MILLILITER(S): at 05:43

## 2020-12-18 RX ADMIN — Medication 25 MILLIGRAM(S): at 05:43

## 2020-12-18 RX ADMIN — PANTOPRAZOLE SODIUM 40 MILLIGRAM(S): 20 TABLET, DELAYED RELEASE ORAL at 05:43

## 2020-12-18 RX ADMIN — Medication 3 MILLIGRAM(S): at 22:07

## 2020-12-18 RX ADMIN — Medication 3 MILLILITER(S): at 11:45

## 2020-12-18 RX ADMIN — Medication 3 MILLILITER(S): at 17:55

## 2020-12-18 RX ADMIN — Medication 100 MILLIEQUIVALENT(S): at 13:26

## 2020-12-18 RX ADMIN — Medication 100 MILLIEQUIVALENT(S): at 19:28

## 2020-12-18 RX ADMIN — ESCITALOPRAM OXALATE 10 MILLIGRAM(S): 10 TABLET, FILM COATED ORAL at 11:45

## 2020-12-18 RX ADMIN — SIMVASTATIN 20 MILLIGRAM(S): 20 TABLET, FILM COATED ORAL at 21:39

## 2020-12-18 RX ADMIN — PANTOPRAZOLE SODIUM 40 MILLIGRAM(S): 20 TABLET, DELAYED RELEASE ORAL at 17:56

## 2020-12-18 RX ADMIN — Medication 25 MILLIGRAM(S): at 17:56

## 2020-12-18 RX ADMIN — Medication 100 MILLIEQUIVALENT(S): at 16:31

## 2020-12-18 NOTE — PROGRESS NOTE ADULT - SUBJECTIVE AND OBJECTIVE BOX
GENERAL SURGERY PROGRESS NOTE   ___________________________________________________________________    VANI GONZALEZ | [id number] | 80y Female | [facility] 2MON 202 W1 | LOS 6d    Attending: Ming Dasilva    ___________________________________________________________________      SUBJECTIVE:   Patient seen today during morning rounds at bedside and without acute distress. Denies chest pain, fever, severe pain, or SOB.     Diet, NPO:   Except Medications (12-18-20 @ 10:21) [Active]          PMH:   MI (myocardial infarction)    Hypertension    History of appendectomy          OBJECTIVE:    Allegies:  NKDA    MEDICATIONS  (STANDING):  acetaminophen  IVPB .. 1000 milliGRAM(s) IV Intermittent every 6 hours  albuterol/ipratropium for Nebulization 3 milliLiter(s) Nebulizer every 6 hours  dextrose 5% + sodium chloride 0.45% with potassium chloride 20 mEq/L 1000 milliLiter(s) (100 mL/Hr) IV Continuous <Continuous>  escitalopram 10 milliGRAM(s) Oral daily  levothyroxine 125 MICROGram(s) Oral daily  metoprolol tartrate 25 milliGRAM(s) Oral two times a day  pantoprazole  Injectable 40 milliGRAM(s) IV Push every 12 hours  piperacillin/tazobactam IVPB.. 3.375 Gram(s) IV Intermittent every 8 hours  simvastatin 20 milliGRAM(s) Oral at bedtime    MEDICATIONS  (PRN):  ondansetron Injectable 4 milliGRAM(s) IV Push every 6 hours PRN Nausea and/or Vomiting  oxyCODONE    IR 2.5 milliGRAM(s) Oral every 4 hours PRN Moderate Pain (4 - 6)  oxyCODONE    IR 5 milliGRAM(s) Oral every 6 hours PRN Severe Pain (7 - 10)      Vitals:    T(C): 37.2 (12-18-20 @ 09:28), Max: 37.4 (12-18-20 @ 00:19)  HR: 66 (12-18-20 @ 09:28) (66 - 114)  BP: 125/57 (12-18-20 @ 09:28) (99/64 - 145/73)  RR: 18 (12-18-20 @ 09:28) (18 - 18)  SpO2: 98% (12-18-20 @ 09:28) (94% - 98%)      Constitutional: resting in bed with no acute distress  Neuro: AAOx3  Respiratory:  unlabored breathing, clear respiration  Gastrointestinal: Soft, nondistended, mild tenderness near umbilical port site, 4 port site dressings c/d/i  Extremities:  No edema, no calf tenderness  Skin: Non-jaundiced, no cyanosis or rash observed      Intake&Output:  Totals:    12-17-20 @ 07:01  -  12-18-20 @ 07:00  --------------------------------------------------------  IN: 1600 mL / OUT: 1500 mL / NET: 100 mL    12-18-20 @ 07:01  -  12-18-20 @ 10:39  --------------------------------------------------------  IN: 0 mL / OUT: 800 mL / NET: -800 mL      Outputs:    12-17-20 @ 07:01  -  12-18-20 @ 07:00  --------------------------------------------------------  OUT:    Voided (mL): 1500 mL  Total OUT: 1500 mL      12-18-20 @ 07:01  -  12-18-20 @ 10:39  --------------------------------------------------------  OUT:    Voided (mL): 800 mL  Total OUT: 800 mL        Urine Output:    12-17-20 @ 07:01  -  12-18-20 @ 07:00  --------------------------------------------------------  OUT: 22.06 mL/kg/d    12-18-20 @ 07:01  -  12-18-20 @ 10:39  --------------------------------------------------------  OUT: 11.76 mL/kg/d        Laboratory:                        7.7    11.58 )-----------( 301      ( 18 Dec 2020 07:13 )             24.6               12-18    137  |  105  |  24<H>  ----------------------------<  91  3.7   |  24  |  0.74    Ca    7.9<L>      18 Dec 2020 07:13  Phos  3.1     12-18  Mg     2.1     12-18    TPro  4.7<L>  /  Alb  2.5<L>  /  TBili  0.4  /  DBili  x   /  AST  55<H>  /  ALT  146<H>  /  AlkPhos  196<H>  12-18    LIVER FUNCTIONS - ( 18 Dec 2020 07:13 )  Alb: 2.5 g/dL / Pro: 4.7 g/dL / ALK PHOS: 196 U/L / ALT: 146 U/L / AST: 55 U/L / GGT: x               COVID-19 PCR: NotDetec (12 Dec 2020 11:29)  ,   ,   CAPILLARY BLOOD GLUCOSE            Recent Imaging:      Reviewed laboratory and imaging

## 2020-12-18 NOTE — PROGRESS NOTE ADULT - ASSESSMENT
80F pmhx HTN, COPD, Hypothyroidism, MI (age 36), lung ca s/p partial right pneumonectomy, 30 pack/yr smoker, open appy (70 yrs ago) now s/p laparoscopic cholecystectomy, POD 4. Patient with increasing total bilirubin but now trending down and LFTs as well. Undergoing MRCP for evaluation of retained stone which is likely to have passed recently explaining improving LFTs and Bilirubin.  Now with rectal bleeding    PLAN:  - 1u PRBC; cardiac history hgb < 8  - NPO except meds; IV Fluids  - CBC at 12PM  - Zosyn  - Pain meds prn  - DVT ppx lovenox   - ASA 81    ACS Surgery [phone]

## 2020-12-18 NOTE — PROGRESS NOTE ADULT - ATTENDING COMMENTS
Patient seen and examined  No acute complaints   Vitals stable   abdominal exam benign    - Given sharp increase in BUN, this may be an Upper GI bleed.  - However, patient does not seem to be currently bleeding, and hematocrit showed reasonable increase in response to one unit of PRBCs today.  - if continues to be stable without signs of bleeding, will discharge home with GI follow up as outpatient

## 2020-12-18 NOTE — PROGRESS NOTE ADULT - SUBJECTIVE AND OBJECTIVE BOX
TRAUMA SURGERY [facility] MEDICINE PROGRESS NOTE    Patient is a 80y old  Female who presents with a chief complaint of This is a "80F pmhx HTN, COPD, Hypothyroidism, MI (age 36), lung ca s/p partial right pneumonectomy, 30 pack/yr smoker, open appy (70 yrs ago), who is presenting with 2 days of epigastric pain & emesis. S/p laparoscopic cholecystectomy hospital course c/b afib RVR, acute hypoxic respiratory failure, and transaminitis MRCP showing postoperative changes, now with hospital course c/b acute blood loss anemia." Plan for blood transfusion. (18 Dec 2020 10:22)    SUBJECTIVE / OVERNIGHT EVENTS:  overnight no events.  d/w RN, no report of melena or BRBPR since overnight, no BM today  saw pt at bedside who reports feeling much better today  no n/v/f/chills, cp, sob  still some abd pain  tolerated clears yesterday    CAPILLARY BLOOD GLUCOSE        I&O's Summary    17 Dec 2020 07:01  -  18 Dec 2020 07:00  --------------------------------------------------------  IN: 1600 mL / OUT: 1500 mL / NET: 100 mL    18 Dec 2020 07:01  -  18 Dec 2020 12:10  --------------------------------------------------------  IN: 0 mL / OUT: 800 mL / NET: -800 mL        Vital Signs Last 24 Hrs  T(C): 37 (18 Dec 2020 11:25), Max: 37.4 (18 Dec 2020 00:19)  T(F): 98.6 (18 Dec 2020 11:25), Max: 99.3 (18 Dec 2020 00:19)  HR: 65 (18 Dec 2020 11:25) (61 - 91)  BP: 138/61 (18 Dec 2020 11:25) (122/68 - 145/73)  BP(mean): --  RR: 18 (18 Dec 2020 11:25) (18 - 18)  SpO2: 95% (18 Dec 2020 11:25) (94% - 98%)    PHYSICAL EXAM:  GENERAL:  Well appearing, f  in NAD  HEAD:  NCAT  EYES: PERRLA, conjunctiva clear  NECK: Supple, No JVD  CHEST/LUNG: CTA B/L. No w/r/r.  HEART: Reg rate. Normal S1, S2. No m/r/g.   ABDOMEN: SNTND. Bowel sounds present  EXTREMITIES:  2+ Peripheral Pulses, No clubbing, cyanosis, edema.  PSYCH: AAOx3  SKIN: No rashes or lesions    LABS:                        7.7    11.58 )-----------( 301      ( 18 Dec 2020 07:13 )             24.6     12-18    137  |  105  |  24<H>  ----------------------------<  91  3.7   |  24  |  0.74    Ca    7.9<L>      18 Dec 2020 07:13  Phos  3.1     12-18  Mg     2.1     12-18    TPro  4.7<L>  /  Alb  2.5<L>  /  TBili  0.4  /  DBili  x   /  AST  55<H>  /  ALT  146<H>  /  AlkPhos  196<H>  12-18      CARDIAC MARKERS ( 16 Dec 2020 17:33 )  x     / x     / 28 U/L / x     / 1.5 ng/mL            MEDICATIONS  (STANDING):  albuterol/ipratropium for Nebulization 3 milliLiter(s) Nebulizer every 6 hours  dextrose 5% + sodium chloride 0.45% with potassium chloride 20 mEq/L 1000 milliLiter(s) (100 mL/Hr) IV Continuous <Continuous>  escitalopram 10 milliGRAM(s) Oral daily  levothyroxine 125 MICROGram(s) Oral daily  metoprolol tartrate 25 milliGRAM(s) Oral two times a day  pantoprazole  Injectable 40 milliGRAM(s) IV Push every 12 hours  piperacillin/tazobactam IVPB.. 3.375 Gram(s) IV Intermittent every 8 hours  potassium chloride  10 mEq/100 mL IVPB 10 milliEquivalent(s) IV Intermittent every 1 hour  simvastatin 20 milliGRAM(s) Oral at bedtime    MEDICATIONS  (PRN):  ondansetron Injectable 4 milliGRAM(s) IV Push every 6 hours PRN Nausea and/or Vomiting  oxyCODONE    IR 2.5 milliGRAM(s) Oral every 4 hours PRN Moderate Pain (4 - 6)  oxyCODONE    IR 5 milliGRAM(s) Oral every 6 hours PRN Severe Pain (7 - 10)

## 2020-12-18 NOTE — DIETITIAN INITIAL EVALUATION ADULT. - ADD RECOMMEND
1) Medical team to advance diet when medically feasible via tolerated route. Consider advancing to clear liquid, followed by low fiber diet as tolerated. If NPO status > 7 days, consider alternate means of nutrition. 2) Consider Ensure Clear BID pending diet advancement. 3) RD to remain available for diet education pending during further diet advancement.

## 2020-12-18 NOTE — DIETITIAN INITIAL EVALUATION ADULT. - ORAL INTAKE PTA/DIET HISTORY
Pt reports good PO intake and appetite at baseline, normally is a "light eater" but denies any recent changes in take. Confirms NKFA. Pt denies chewing/swallowing difficulty, nausea, vomiting, diarrhea, constipation PTA. Denies micronutrient supplementation.

## 2020-12-18 NOTE — PROGRESS NOTE ADULT - SUBJECTIVE AND OBJECTIVE BOX
Patient is a 80y old  Female who presented with a chief complaint of abd pain (17 Dec 2020 12:41)      INTERVAL HPI/OVERNIGHT EVENTS:  no abdominal pain, nausea or vomiting  feeling "much better"   no dizziness or generalized weakness  no further episodes of BRBPR yesterday; had 1 documented BM overnight- no passage of clots  tolerated clears PO yesterday    planned transfusion of 1 unit PRBCs today    MEDICATIONS  (STANDING):  acetaminophen  IVPB .. 1000 milliGRAM(s) IV Intermittent every 6 hours  albuterol/ipratropium for Nebulization 3 milliLiter(s) Nebulizer every 6 hours  dextrose 5% + sodium chloride 0.45% with potassium chloride 20 mEq/L 1000 milliLiter(s) (100 mL/Hr) IV Continuous <Continuous>  escitalopram 10 milliGRAM(s) Oral daily  levothyroxine 125 MICROGram(s) Oral daily  metoprolol tartrate 25 milliGRAM(s) Oral two times a day  pantoprazole  Injectable 40 milliGRAM(s) IV Push every 12 hours  piperacillin/tazobactam IVPB.. 3.375 Gram(s) IV Intermittent every 8 hours  potassium chloride  10 mEq/100 mL IVPB 10 milliEquivalent(s) IV Intermittent every 1 hour  simvastatin 20 milliGRAM(s) Oral at bedtime    MEDICATIONS  (PRN):  ondansetron Injectable 4 milliGRAM(s) IV Push every 6 hours PRN Nausea and/or Vomiting  oxyCODONE    IR 2.5 milliGRAM(s) Oral every 4 hours PRN Moderate Pain (4 - 6)  oxyCODONE    IR 5 milliGRAM(s) Oral every 6 hours PRN Severe Pain (7 - 10)      Allergies  No Known Allergies      Review of Systems:  General:  No wt loss, fevers, chills, night sweats  CV:  No pain, palpitations, +AF (new, post-op)  Resp:  No dyspnea, cough, tachypnea, wheezing  GI:  see HPI  :  No pain, bleeding, incontinence, nocturia +pessary in place  Muscle:  No pain, focal weakness  Neuro:  No weakness, tingling, memory problems  Psych:  No fatigue, insomnia, mood problems, depression  Endocrine:  No polyuria, polydypsia, cold/heat intolerance  Heme:  No petechiae, ecchymosis, easy bruisability  Skin:  No rash, tattoos, scars, edema    Vital Signs Last 24 Hrs  T(C): 37.2 (18 Dec 2020 09:28), Max: 37.4 (18 Dec 2020 00:19)  T(F): 98.9 (18 Dec 2020 09:28), Max: 99.3 (18 Dec 2020 00:19)  HR: 66 (18 Dec 2020 09:28) (66 - 114)  BP: 125/57 (18 Dec 2020 09:28) (99/64 - 145/73)  BP(mean): --  RR: 18 (18 Dec 2020 09:28) (18 - 18)  SpO2: 98% (18 Dec 2020 09:28) (94% - 98%)    PHYSICAL EXAM:  Constitutional: NAD, well-developed WF non toxic appearing alert and non toxic appearing  Neck: No LAD, supple no JVD  Respiratory: Clear b/l  Cardiovascular: S1 and S2, RRR  Gastrointestinal: BS+, soft, op. port sites clean ND/NT  Extremities: No peripheral edema, neg clubbing, cyanosis  Vascular: 2+ peripheral pulses  Neurological: A/O x 3, no focal deficits  Psychiatric: Normal mood, normal affect  Skin: No rashes      LABS:                        7.7    11.58 )-----------( 301      ( 18 Dec 2020 07:13 )             24.6   Hemoglobin: 8.2 g/dL (12.17.20 @ 18:26)   Hemoglobin: 8.8 g/dL (12.17.20 @ 09:00)   Hemoglobin: 9.3 g/dL (12.17.20 @ 07:39)   Hemoglobin: 12.2 g/dL (12.16.20 @ 17:33)   12-18    137  |  105  |  24<H>  ----------------------------<  91  3.7   |  24  |  0.74    Ca    7.9<L>      18 Dec 2020 07:13  Phos  3.1     12-18  Mg     2.1     12-18    TPro  4.7<L>  /  Alb  2.5<L>  /  TBili  0.4  /  DBili  x   /  AST  55<H>  /  ALT  146<H>  /  AlkPhos  196<H>  12-18        RADIOLOGY & ADDITIONAL TESTS:  	EXAM:  CT ABDOMEN AND PELVIS IC                          PROCEDURE DATE:  12/17/2020          INTERPRETATION:  CLINICAL INFORMATION: Severe constipation followed by abdominal discomfort and hypotension with bright red blood per rectum. Evaluatefor ischemic colitis. Postop day 4 status post cholecystectomy.    COMPARISON: Prior abdominal CT dated 12/12/2020. Correlation is also made with abdominal MRI dated 12/16/2020.    PROCEDURE:  CT of the Abdomen and Pelvis was performed with intravenous contrast.  Intravenous contrast: 90 ml Omnipaque 350. 10 ml discarded.  Oral contrast: None.  Sagittal and coronal reformats were performed.    FINDINGS:  LOWER CHEST: Coronary arterial calcification. Bibasilar linear atelectasis.    LIVER: The superior most aspect of the dome of the liver is not included in the field-of-view. Subcentimeter hypodense foci in the liver are too small to characterize, but unchanged.  BILE DUCTS: Normal caliber.  GALLBLADDER: Status post cholecystectomy. A fluid collection is again noted in the gallbladder fossa, measuring approximately 4.9 x 2 cm (3:42), previously 5.5 x 2.4 cm on 12/16/2020.  SPLEEN: Peripheral subcentimeter hypodense focus, too small to characterize, but unchanged.  PANCREAS: Within normal limits.  ADRENALS: Within normal limits.  KIDNEYS/URETERS: No hydronephrosis. 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 Right renal cyst. Subcentimeter hypodense foci in the kidneys bilaterally are too small to characterize.    BLADDER: Distended urinary bladder.  REPRODUCTIVE ORGANS: Uterus and adnexa are within normal limits. Pessary in the vagina.    BOWEL: No bowel obstruction. Diffuse colonic diverticulosis. Periampullary duodenal diverticulum.  PERITONEUM: Trace perihepatic ascites.  VESSELS: Atherosclerotic calcification. Replaced right hepatic artery arising from the superior mesenteric artery.  RETROPERITONEUM/LYMPH NODES: No lymphadenopathy.  ABDOMINAL WALL: Small fat-containing umbilical hernia. Small amount of gas in the subcutaneous tissues of the left lateral abdomen and in the periumbilical subcutaneous tissues, likely related to recent surgery.  BONES: Degenerative changes in the spine.    IMPRESSION:  Status post cholecystectomy. A fluid collection is again noted in the postcholecystectomy bed, similar in size to the prior MRI dated 12/16/2020.  Trace perihepatic ascites is also again noted.    Diffuse colonic diverticulosis.    Periampullary duodenal diverticulum.

## 2020-12-18 NOTE — DIETITIAN INITIAL EVALUATION ADULT. - REASON FOR ADMISSION
This is a "80F pmhx HTN, COPD, Hypothyroidism, MI (age 36), lung ca s/p partial right pneumonectomy, 30 pack/yr smoker, open appy (70 yrs ago), who is presenting with 2 days of epigastric pain & emesis. S/p laparoscopic cholecystectomy hospital course c/b afib RVR, acute hypoxic respiratory failure, and transaminitis MRCP showing postoperative changes, now with hospital course c/b acute blood loss anemia." Plan for blood transfusion.

## 2020-12-18 NOTE — PROGRESS NOTE ADULT - ASSESSMENT
80F pmhx HTN, COPD (30 PYH smoking), Hypothyroidism, diverticulosis, CAD/MI (age 36), lung ca s/p partial right pneumonectomy, p/w abd pain s/p laparoscopic cholecystectomy 12/12 (acute gangrenous cholecystitis), with hospital course c/b afib RVR (per Medicine - no AC), acute hypoxic respiratory failure, and transaminitis MRCP showing postoperative changes without CBD stone (Hepatology following, LFTs improving), now with hospital course c/b acute blood loss anemia    7/2016 Colonoscopy +sigmoid diverticulosis +hemorrhoids, otherwise normal to Terminal ileum  COVID negative  CT (w IV contrast) - no colitis +left sided tics    Acute GI blood loss anemia - likely diverticular bleed (first episode; now resolved), drift in Hgb likely 2/2 equilibration. No active/ongoing bleeding at this time    RECS:  -Continue IV Zosyn per primary team  -continue IV PPI for now  -ok for PO solids (ordered)  -agree with plans for transfusion today  -Monitor CBC and BMs  -Counseled pt next few stools may be dark 2/2 passage of "old"/retained blood from within GI tract  -no role for endoscopic evaluation at this time. If has recurrent active/brisk rectal bleeding then recommend nuclear PRBC scan to localize bleed source and consult IR for possible angio +/- embolization    Discussed with Surgical team  Discussed with pt, all questions answered    Curly Oliveira PA-C    [facility]  [phone]  After hours and weekend coverage [phone]

## 2020-12-18 NOTE — DIETITIAN INITIAL EVALUATION ADULT. - OTHER INFO
Weights: pt reports weight has been relatively stable, denies any recent weight changes. Current dosing weight is ~ 150lbs     Pt initially advanced to regular diet however noted with nausea due constipation & LFTs rising with concern retained stone, now likely to have passed as MRCP negative for obstruction. Pt noted with multiple bloody BMs on 12/16 after receiving laxatives and enema, concern for ischemic colitis or diverticular bleed. Pt reports good tolerance to clear liquid diet, no nausea, emesis noted. Discussed typical diet advancement, RD remain available for diet education as appropriate pending further diet advancement. Patient with no nutrition-related questions at this time. Made aware RD remains available as needed.

## 2020-12-19 LAB
ALBUMIN SERPL ELPH-MCNC: 2.7 G/DL — LOW (ref 3.3–5)
ALP SERPL-CCNC: 174 U/L — HIGH (ref 40–120)
ALT FLD-CCNC: 116 U/L — HIGH (ref 10–45)
ANION GAP SERPL CALC-SCNC: 9 MMOL/L — SIGNIFICANT CHANGE UP (ref 5–17)
AST SERPL-CCNC: 42 U/L — HIGH (ref 10–40)
BILIRUB SERPL-MCNC: 0.4 MG/DL — SIGNIFICANT CHANGE UP (ref 0.2–1.2)
BUN SERPL-MCNC: 10 MG/DL — SIGNIFICANT CHANGE UP (ref 7–23)
CALCIUM SERPL-MCNC: 8.1 MG/DL — LOW (ref 8.4–10.5)
CHLORIDE SERPL-SCNC: 104 MMOL/L — SIGNIFICANT CHANGE UP (ref 96–108)
CO2 SERPL-SCNC: 24 MMOL/L — SIGNIFICANT CHANGE UP (ref 22–31)
CREAT SERPL-MCNC: 0.78 MG/DL — SIGNIFICANT CHANGE UP (ref 0.5–1.3)
GLUCOSE SERPL-MCNC: 109 MG/DL — HIGH (ref 70–99)
HCT VFR BLD CALC: 26.6 % — LOW (ref 34.5–45)
HCT VFR BLD CALC: 27.5 % — LOW (ref 34.5–45)
HGB BLD-MCNC: 8.7 G/DL — LOW (ref 11.5–15.5)
HGB BLD-MCNC: 8.9 G/DL — LOW (ref 11.5–15.5)
MAGNESIUM SERPL-MCNC: 2 MG/DL — SIGNIFICANT CHANGE UP (ref 1.6–2.6)
MCHC RBC-ENTMCNC: 30.2 PG — SIGNIFICANT CHANGE UP (ref 27–34)
MCHC RBC-ENTMCNC: 30.5 PG — SIGNIFICANT CHANGE UP (ref 27–34)
MCHC RBC-ENTMCNC: 32.4 GM/DL — SIGNIFICANT CHANGE UP (ref 32–36)
MCHC RBC-ENTMCNC: 32.7 GM/DL — SIGNIFICANT CHANGE UP (ref 32–36)
MCV RBC AUTO: 92.4 FL — SIGNIFICANT CHANGE UP (ref 80–100)
MCV RBC AUTO: 94.2 FL — SIGNIFICANT CHANGE UP (ref 80–100)
NRBC # BLD: 0 /100 WBCS — SIGNIFICANT CHANGE UP (ref 0–0)
NRBC # BLD: 0 /100 WBCS — SIGNIFICANT CHANGE UP (ref 0–0)
PHOSPHATE SERPL-MCNC: 3.4 MG/DL — SIGNIFICANT CHANGE UP (ref 2.5–4.5)
PLATELET # BLD AUTO: 314 K/UL — SIGNIFICANT CHANGE UP (ref 150–400)
PLATELET # BLD AUTO: 323 K/UL — SIGNIFICANT CHANGE UP (ref 150–400)
POTASSIUM SERPL-MCNC: 3.6 MMOL/L — SIGNIFICANT CHANGE UP (ref 3.5–5.3)
POTASSIUM SERPL-SCNC: 3.6 MMOL/L — SIGNIFICANT CHANGE UP (ref 3.5–5.3)
PROT SERPL-MCNC: 4.7 G/DL — LOW (ref 6–8.3)
RBC # BLD: 2.88 M/UL — LOW (ref 3.8–5.2)
RBC # BLD: 2.92 M/UL — LOW (ref 3.8–5.2)
RBC # FLD: 13.9 % — SIGNIFICANT CHANGE UP (ref 10.3–14.5)
RBC # FLD: 14.2 % — SIGNIFICANT CHANGE UP (ref 10.3–14.5)
SODIUM SERPL-SCNC: 137 MMOL/L — SIGNIFICANT CHANGE UP (ref 135–145)
WBC # BLD: 10.75 K/UL — HIGH (ref 3.8–10.5)
WBC # BLD: 10.79 K/UL — HIGH (ref 3.8–10.5)
WBC # FLD AUTO: 10.75 K/UL — HIGH (ref 3.8–10.5)
WBC # FLD AUTO: 10.79 K/UL — HIGH (ref 3.8–10.5)

## 2020-12-19 RX ORDER — POTASSIUM CHLORIDE 20 MEQ
10 PACKET (EA) ORAL
Refills: 0 | Status: COMPLETED | OUTPATIENT
Start: 2020-12-19 | End: 2020-12-19

## 2020-12-19 RX ORDER — SODIUM CHLORIDE 9 MG/ML
3 INJECTION INTRAMUSCULAR; INTRAVENOUS; SUBCUTANEOUS EVERY 8 HOURS
Refills: 0 | Status: DISCONTINUED | OUTPATIENT
Start: 2020-12-19 | End: 2020-12-22

## 2020-12-19 RX ORDER — ENOXAPARIN SODIUM 100 MG/ML
40 INJECTION SUBCUTANEOUS DAILY
Refills: 0 | Status: DISCONTINUED | OUTPATIENT
Start: 2020-12-19 | End: 2020-12-22

## 2020-12-19 RX ORDER — PANTOPRAZOLE SODIUM 20 MG/1
40 TABLET, DELAYED RELEASE ORAL
Refills: 0 | Status: DISCONTINUED | OUTPATIENT
Start: 2020-12-19 | End: 2020-12-22

## 2020-12-19 RX ADMIN — PANTOPRAZOLE SODIUM 40 MILLIGRAM(S): 20 TABLET, DELAYED RELEASE ORAL at 05:08

## 2020-12-19 RX ADMIN — Medication 3 MILLILITER(S): at 13:28

## 2020-12-19 RX ADMIN — SIMVASTATIN 20 MILLIGRAM(S): 20 TABLET, FILM COATED ORAL at 21:06

## 2020-12-19 RX ADMIN — Medication 25 MILLIGRAM(S): at 18:12

## 2020-12-19 RX ADMIN — Medication 3 MILLILITER(S): at 18:12

## 2020-12-19 RX ADMIN — Medication 125 MICROGRAM(S): at 05:08

## 2020-12-19 RX ADMIN — SODIUM CHLORIDE 3 MILLILITER(S): 9 INJECTION INTRAMUSCULAR; INTRAVENOUS; SUBCUTANEOUS at 12:45

## 2020-12-19 RX ADMIN — PIPERACILLIN AND TAZOBACTAM 25 GRAM(S): 4; .5 INJECTION, POWDER, LYOPHILIZED, FOR SOLUTION INTRAVENOUS at 05:08

## 2020-12-19 RX ADMIN — Medication 3 MILLILITER(S): at 05:08

## 2020-12-19 RX ADMIN — ESCITALOPRAM OXALATE 10 MILLIGRAM(S): 10 TABLET, FILM COATED ORAL at 13:29

## 2020-12-19 RX ADMIN — PIPERACILLIN AND TAZOBACTAM 25 GRAM(S): 4; .5 INJECTION, POWDER, LYOPHILIZED, FOR SOLUTION INTRAVENOUS at 13:28

## 2020-12-19 RX ADMIN — Medication 100 MILLIEQUIVALENT(S): at 15:44

## 2020-12-19 RX ADMIN — DEXTROSE MONOHYDRATE, SODIUM CHLORIDE, AND POTASSIUM CHLORIDE 100 MILLILITER(S): 50; .745; 4.5 INJECTION, SOLUTION INTRAVENOUS at 05:08

## 2020-12-19 RX ADMIN — Medication 100 MILLIEQUIVALENT(S): at 17:30

## 2020-12-19 RX ADMIN — ENOXAPARIN SODIUM 40 MILLIGRAM(S): 100 INJECTION SUBCUTANEOUS at 13:31

## 2020-12-19 RX ADMIN — Medication 25 MILLIGRAM(S): at 05:08

## 2020-12-19 RX ADMIN — SODIUM CHLORIDE 3 MILLILITER(S): 9 INJECTION INTRAMUSCULAR; INTRAVENOUS; SUBCUTANEOUS at 21:05

## 2020-12-19 RX ADMIN — Medication 100 MILLIEQUIVALENT(S): at 18:01

## 2020-12-19 NOTE — PROGRESS NOTE ADULT - ATTENDING COMMENTS
Patient seen and examined  No new complaints  vitals stable  hematocrit stable    - GI bleed seems to have stopped for now.    - Will restart lovenox and follow daily serial CBCs  - Will change PPI to PO, continue PPI (although not a home med) give the chance that this was a upper GI bleed  - Antibiotic course done, will stop  - Awaiting rehab placement on Monday (12/21/20)

## 2020-12-19 NOTE — PROGRESS NOTE ADULT - ASSESSMENT
80F pmhx HTN, COPD, Hypothyroidism, MI (age 36), lung ca s/p partial right pneumonectomy, 30 pack/yr smoker, open appy (70 yrs ago) now s/p laparoscopic cholecystectomy. Patient with increasing total bilirubin but now trending down and LFTs as well. Undergoing MRCP for evaluation of retained stone which is likely to have passed recently explaining improving LFTs and Bilirubin.  Presented with case of rectal bleeding that resolved spontaneously and was evaluated by GI who refers likely caused by diverticulosis and is currently stable.     PLAN:  - F/U CBC transfuse as needed <8 Hgb  - Diet: Regular  - Appreciate GI recs Nuclear pRBC/IR embolization for bleeding source  - Zosyn  - Pain meds prn  - DVT ppx lovenox   - Holding ASA and plavix    ACS Surgery [id number]  80F pmhx HTN, COPD, Hypothyroidism, MI (age 36), lung ca s/p partial right pneumonectomy, 30 pack/yr smoker, open appy (70 yrs ago) now s/p laparoscopic cholecystectomy. Patient with increasing total bilirubin but now trending down and LFTs as well. Undergoing MRCP for evaluation of retained stone which is likely to have passed recently explaining improving LFTs and Bilirubin.  Presented with case of rectal bleeding that resolved spontaneously and was evaluated by GI who refers likely caused by diverticulosis and is currently stable.     PLAN:  - F/U CBC transfuse as needed <8 Hgb  - Diet: Regular  - Appreciate GI recs Nuclear pRBC/IR embolization for bleeding source  - Zosyn  - Pain meds prn  - DVT ppx lovenox   - Holding ASA and plavix    ACS [id number]

## 2020-12-19 NOTE — PROGRESS NOTE ADULT - SUBJECTIVE AND OBJECTIVE BOX
GENERAL SURGERY PROGRESS NOTE   ___________________________________________________________________    VANI GONZALEZ | [id number] | 80y Female | [facility] 2MON 202 W1 | LOS 7d    Attending: Ming Dasilva    ___________________________________________________________________      SUBJECTIVE:   Patient seen today during morning rounds at bedside and without acute distress. Denies chest pain, fever, severe pain, or SOB.     Diet, Regular (12-18-20 @ 10:45) [Active]          PMH:   MI (myocardial infarction)    Hypertension    History of appendectomy          OBJECTIVE:    Allegies:  NKDA    MEDICATIONS  (STANDING):  albuterol/ipratropium for Nebulization 3 milliLiter(s) Nebulizer every 6 hours  dextrose 5% + sodium chloride 0.45% with potassium chloride 20 mEq/L 1000 milliLiter(s) (100 mL/Hr) IV Continuous <Continuous>  escitalopram 10 milliGRAM(s) Oral daily  levothyroxine 125 MICROGram(s) Oral daily  metoprolol tartrate 25 milliGRAM(s) Oral two times a day  pantoprazole  Injectable 40 milliGRAM(s) IV Push every 12 hours  piperacillin/tazobactam IVPB.. 3.375 Gram(s) IV Intermittent every 8 hours  simvastatin 20 milliGRAM(s) Oral at bedtime    MEDICATIONS  (PRN):  melatonin 3 milliGRAM(s) Oral at bedtime PRN Insomnia  ondansetron Injectable 4 milliGRAM(s) IV Push every 6 hours PRN Nausea and/or Vomiting  oxyCODONE    IR 2.5 milliGRAM(s) Oral every 4 hours PRN Moderate Pain (4 - 6)      Vitals:    T(C): 37 (12-18-20 @ 21:39), Max: 37.4 (12-18-20 @ 00:19)  HR: 71 (12-18-20 @ 21:39) (61 - 82)  BP: 142/75 (12-18-20 @ 21:39) (122/73 - 146/66)  RR: 18 (12-18-20 @ 21:39) (18 - 18)  SpO2: 95% (12-18-20 @ 21:39) (94% - 98%)      Physical Exam:   Constitutional: resting in bed with no acute distress  Neuro: AAOx3  Respiratory:  unlabored breathing, clear respiration  Gastrointestinal: Soft, nondistended, non tender, CDI  Extremities:  No edema, no calf tenderness  Skin: Non-jaundiced, no cyanosis or rash observed    Intake&Output:  Totals:    12-17-20 @ 07:01  -  12-18-20 @ 07:00  --------------------------------------------------------  IN: 1600 mL / OUT: 1500 mL / NET: 100 mL    12-18-20 @ 07:01  -  12-19-20 @ 00:00  --------------------------------------------------------  IN: 2560 mL / OUT: 2700 mL / NET: -140 mL      Outputs:    12-17-20 @ 07:01  -  12-18-20 @ 07:00  --------------------------------------------------------  OUT:    Voided (mL): 1500 mL  Total OUT: 1500 mL      12-18-20 @ 07:01  -  12-19-20 @ 00:00  --------------------------------------------------------  OUT:    Voided (mL): 2700 mL  Total OUT: 2700 mL        Urine Output:    12-17-20 @ 07:01  -  12-18-20 @ 07:00  --------------------------------------------------------  OUT: 22.06 mL/kg/d    12-18-20 @ 07:01  -  12-19-20 @ 00:00  --------------------------------------------------------  OUT: 39.71 mL/kg/d        Laboratory:                        9.0    11.47 )-----------( 306      ( 18 Dec 2020 16:09 )             26.9               12-18    137  |  105  |  24<H>  ----------------------------<  91  3.7   |  24  |  0.74    Ca    7.9<L>      18 Dec 2020 07:13  Phos  3.1     12-18  Mg     2.1     12-18    TPro  4.7<L>  /  Alb  2.5<L>  /  TBili  0.4  /  DBili  x   /  AST  55<H>  /  ALT  146<H>  /  AlkPhos  196<H>  12-18    LIVER FUNCTIONS - ( 18 Dec 2020 07:13 )  Alb: 2.5 g/dL / Pro: 4.7 g/dL / ALK PHOS: 196 U/L / ALT: 146 U/L / AST: 55 U/L / GGT: x               COVID-19 PCR: NotDetec (12 Dec 2020 11:29)  ,   ,   CAPILLARY BLOOD GLUCOSE            Recent Imaging:      Reviewed laboratory and imaging

## 2020-12-20 LAB
ALBUMIN SERPL ELPH-MCNC: 2.9 G/DL — LOW (ref 3.3–5)
ALP SERPL-CCNC: 180 U/L — HIGH (ref 40–120)
ALT FLD-CCNC: 126 U/L — HIGH (ref 10–45)
ANION GAP SERPL CALC-SCNC: 11 MMOL/L — SIGNIFICANT CHANGE UP (ref 5–17)
AST SERPL-CCNC: 55 U/L — HIGH (ref 10–40)
BILIRUB SERPL-MCNC: 0.4 MG/DL — SIGNIFICANT CHANGE UP (ref 0.2–1.2)
BUN SERPL-MCNC: 9 MG/DL — SIGNIFICANT CHANGE UP (ref 7–23)
CALCIUM SERPL-MCNC: 8.8 MG/DL — SIGNIFICANT CHANGE UP (ref 8.4–10.5)
CHLORIDE SERPL-SCNC: 101 MMOL/L — SIGNIFICANT CHANGE UP (ref 96–108)
CO2 SERPL-SCNC: 25 MMOL/L — SIGNIFICANT CHANGE UP (ref 22–31)
CREAT SERPL-MCNC: 0.77 MG/DL — SIGNIFICANT CHANGE UP (ref 0.5–1.3)
GLUCOSE SERPL-MCNC: 78 MG/DL — SIGNIFICANT CHANGE UP (ref 70–99)
HCT VFR BLD CALC: 29.1 % — LOW (ref 34.5–45)
HGB BLD-MCNC: 9.5 G/DL — LOW (ref 11.5–15.5)
MAGNESIUM SERPL-MCNC: 2 MG/DL — SIGNIFICANT CHANGE UP (ref 1.6–2.6)
MCHC RBC-ENTMCNC: 30.1 PG — SIGNIFICANT CHANGE UP (ref 27–34)
MCHC RBC-ENTMCNC: 32.6 GM/DL — SIGNIFICANT CHANGE UP (ref 32–36)
MCV RBC AUTO: 92.1 FL — SIGNIFICANT CHANGE UP (ref 80–100)
NRBC # BLD: 0 /100 WBCS — SIGNIFICANT CHANGE UP (ref 0–0)
PHOSPHATE SERPL-MCNC: 3.3 MG/DL — SIGNIFICANT CHANGE UP (ref 2.5–4.5)
PLATELET # BLD AUTO: 397 K/UL — SIGNIFICANT CHANGE UP (ref 150–400)
POTASSIUM SERPL-MCNC: 3.7 MMOL/L — SIGNIFICANT CHANGE UP (ref 3.5–5.3)
POTASSIUM SERPL-SCNC: 3.7 MMOL/L — SIGNIFICANT CHANGE UP (ref 3.5–5.3)
PROT SERPL-MCNC: 5.2 G/DL — LOW (ref 6–8.3)
RBC # BLD: 3.16 M/UL — LOW (ref 3.8–5.2)
RBC # FLD: 14 % — SIGNIFICANT CHANGE UP (ref 10.3–14.5)
SARS-COV-2 RNA SPEC QL NAA+PROBE: SIGNIFICANT CHANGE UP
SODIUM SERPL-SCNC: 137 MMOL/L — SIGNIFICANT CHANGE UP (ref 135–145)
WBC # BLD: 11.17 K/UL — HIGH (ref 3.8–10.5)
WBC # FLD AUTO: 11.17 K/UL — HIGH (ref 3.8–10.5)

## 2020-12-20 RX ORDER — POTASSIUM CHLORIDE 20 MEQ
20 PACKET (EA) ORAL
Refills: 0 | Status: COMPLETED | OUTPATIENT
Start: 2020-12-20 | End: 2020-12-20

## 2020-12-20 RX ORDER — ASPIRIN/CALCIUM CARB/MAGNESIUM 324 MG
81 TABLET ORAL DAILY
Refills: 0 | Status: DISCONTINUED | OUTPATIENT
Start: 2020-12-20 | End: 2020-12-22

## 2020-12-20 RX ORDER — SENNA PLUS 8.6 MG/1
1 TABLET ORAL AT BEDTIME
Refills: 0 | Status: DISCONTINUED | OUTPATIENT
Start: 2020-12-20 | End: 2020-12-22

## 2020-12-20 RX ORDER — POLYETHYLENE GLYCOL 3350 17 G/17G
17 POWDER, FOR SOLUTION ORAL DAILY
Refills: 0 | Status: DISCONTINUED | OUTPATIENT
Start: 2020-12-20 | End: 2020-12-22

## 2020-12-20 RX ADMIN — Medication 20 MILLIEQUIVALENT(S): at 18:53

## 2020-12-20 RX ADMIN — PANTOPRAZOLE SODIUM 40 MILLIGRAM(S): 20 TABLET, DELAYED RELEASE ORAL at 06:09

## 2020-12-20 RX ADMIN — Medication 10 MILLIEQUIVALENT(S): at 12:21

## 2020-12-20 RX ADMIN — Medication 3 MILLILITER(S): at 06:09

## 2020-12-20 RX ADMIN — Medication 25 MILLIGRAM(S): at 18:12

## 2020-12-20 RX ADMIN — POLYETHYLENE GLYCOL 3350 17 GRAM(S): 17 POWDER, FOR SOLUTION ORAL at 12:16

## 2020-12-20 RX ADMIN — SODIUM CHLORIDE 3 MILLILITER(S): 9 INJECTION INTRAMUSCULAR; INTRAVENOUS; SUBCUTANEOUS at 06:08

## 2020-12-20 RX ADMIN — Medication 3 MILLILITER(S): at 00:43

## 2020-12-20 RX ADMIN — Medication 81 MILLIGRAM(S): at 12:16

## 2020-12-20 RX ADMIN — ESCITALOPRAM OXALATE 10 MILLIGRAM(S): 10 TABLET, FILM COATED ORAL at 12:16

## 2020-12-20 RX ADMIN — Medication 3 MILLILITER(S): at 18:12

## 2020-12-20 RX ADMIN — ENOXAPARIN SODIUM 40 MILLIGRAM(S): 100 INJECTION SUBCUTANEOUS at 12:16

## 2020-12-20 RX ADMIN — Medication 125 MICROGRAM(S): at 06:09

## 2020-12-20 RX ADMIN — SODIUM CHLORIDE 3 MILLILITER(S): 9 INJECTION INTRAMUSCULAR; INTRAVENOUS; SUBCUTANEOUS at 15:22

## 2020-12-20 RX ADMIN — SODIUM CHLORIDE 3 MILLILITER(S): 9 INJECTION INTRAMUSCULAR; INTRAVENOUS; SUBCUTANEOUS at 21:48

## 2020-12-20 RX ADMIN — SIMVASTATIN 20 MILLIGRAM(S): 20 TABLET, FILM COATED ORAL at 21:50

## 2020-12-20 RX ADMIN — Medication 3 MILLILITER(S): at 23:30

## 2020-12-20 RX ADMIN — Medication 25 MILLIGRAM(S): at 06:09

## 2020-12-20 NOTE — PROGRESS NOTE ADULT - ATTENDING COMMENTS
Patient seen and examined  No new complaints  Doing well  Tolerating diet    hct=29 (from 26 yesterday without transfusion)    - No signs of ongoing bleeding  - Rehab placement tomorrow

## 2020-12-20 NOTE — PROGRESS NOTE ADULT - ASSESSMENT
80F pmhx HTN, COPD, Hypothyroidism, MI (age 36), lung ca s/p partial right pneumonectomy, 30 pack/yr smoker, open appy (70 yrs ago) now s/p laparoscopic cholecystectomy, POD 4. Patient with increasing total bilirubin but now trending down and LFTs as well. Undergoing MRCP for evaluation of retained stone which is likely to have passed recently explaining improving LFTs and Bilirubin.  Now with rectal bleeding    PLAN:  - cardiac history hgb < 8; no pRBC in past 24 hours  - NPO except meds; IV Fluids  - CBC at 12PM  - Zosyn  - Pain meds prn  - DVT ppx lovenox   - ASA 81  - If stable, d/c Monday w/ GI follow-up    ACS Surgery [id number]  80F pmhx HTN, COPD, Hypothyroidism, MI (age 36), lung ca s/p partial right pneumonectomy, 30 pack/yr smoker, open appy (70 yrs ago) now s/p laparoscopic cholecystectomy, POD 4. Patient with increasing total bilirubin but now trending down and LFTs as well. Undergoing MRCP for evaluation of retained stone which is likely to have passed recently explaining improving LFTs and Bilirubin.  Now with rectal bleeding    PLAN:  - cardiac history hgb < 8; no pRBC in past 24 hours  - NPO except meds; IV Fluids  - Zosyn  - Pain meds prn  - DVT ppx lovenox   - ASA 81  - If stable, d/c Monday w/ GI follow-up    ACS Surgery [id number]  80F pmhx HTN, COPD, Hypothyroidism, MI (age 36), lung ca s/p partial right pneumonectomy, 30 pack/yr smoker, open appy (70 yrs ago) now s/p laparoscopic cholecystectomy, POD 4. Patient with increasing total bilirubin but now trending down and LFTs as well. Undergoing MRCP for evaluation of retained stone which is likely to have passed recently explaining improving LFTs and Bilirubin.  Now with rectal bleeding    PLAN:  - cardiac history hgb < 8; no pRBC in past 24 hours  - NPO except meds; IV Fluids  - Zosyn  - Pain meds prn  - DVT ppx lovenox   - ASA 81  - If stable, d/c Monday w/ GI follow-up  - Home ASA today  - COVID swab    ACS Surgery [id number]

## 2020-12-20 NOTE — PROGRESS NOTE ADULT - SUBJECTIVE AND OBJECTIVE BOX
General Surgery Progress Note    S: Patient seen and examined at bedside. No events overnight. Pain well controlled. Denies chest pain, shortness of breath, nausea/vomiting. Passing gas, but denies bowel movements yesterday    Physical Exam:  General: No acute distress  Respiratory: Nonlabored, no use of accessory muscles  Abdominal: Soft, nondistended, nontender. No rebound or guarding. Surgical incisions c/d/i  Extremities: WWP, moving all extremities spontaneously      PAST MEDICAL HISTORY:  MI (myocardial infarction)    Hypertension          MEDICATIONS:  albuterol/ipratropium for Nebulization 3 milliLiter(s) Nebulizer every 6 hours  enoxaparin Injectable 40 milliGRAM(s) SubCutaneous daily  escitalopram 10 milliGRAM(s) Oral daily  levothyroxine 125 MICROGram(s) Oral daily  melatonin 3 milliGRAM(s) Oral at bedtime PRN  metoprolol tartrate 25 milliGRAM(s) Oral two times a day  ondansetron Injectable 4 milliGRAM(s) IV Push every 6 hours PRN  oxyCODONE    IR 2.5 milliGRAM(s) Oral every 4 hours PRN  pantoprazole    Tablet 40 milliGRAM(s) Oral before breakfast  simvastatin 20 milliGRAM(s) Oral at bedtime  sodium chloride 0.9% lock flush 3 milliLiter(s) IV Push every 8 hours      ALLERGIES:  No Known Allergies      VITALS & I/Os:  Vital Signs Last 24 Hrs  T(C): 37.1 (19 Dec 2020 21:20), Max: 37.1 (19 Dec 2020 05:07)  T(F): 98.7 (19 Dec 2020 21:20), Max: 98.8 (19 Dec 2020 17:28)  HR: 69 (19 Dec 2020 21:20) (66 - 78)  BP: 153/73 (19 Dec 2020 21:20) (117/66 - 165/77)  BP(mean): --  RR: 18 (19 Dec 2020 21:20) (18 - 18)  SpO2: 93% (19 Dec 2020 21:20) (93% - 96%)    I&O's Summary    18 Dec 2020 07:01  -  19 Dec 2020 07:00  --------------------------------------------------------  IN: 4060 mL / OUT: 3450 mL / NET: 610 mL    19 Dec 2020 07:01  -  20 Dec 2020 00:38  --------------------------------------------------------  IN: 840 mL / OUT: 2300 mL / NET: -1460 mL          LABS:                        8.7    10.75 )-----------( 323      ( 19 Dec 2020 07:42 )             26.6     12-19    137  |  104  |  10  ----------------------------<  109<H>  3.6   |  24  |  0.78    Ca    8.1<L>      19 Dec 2020 07:38  Phos  3.4     12-19  Mg     2.0     12-19    TPro  4.7<L>  /  Alb  2.7<L>  /  TBili  0.4  /  DBili  x   /  AST  42<H>  /  ALT  116<H>  /  AlkPhos  174<H>  12-19    Lactate:

## 2020-12-21 LAB
ANION GAP SERPL CALC-SCNC: 12 MMOL/L — SIGNIFICANT CHANGE UP (ref 5–17)
BUN SERPL-MCNC: 14 MG/DL — SIGNIFICANT CHANGE UP (ref 7–23)
CALCIUM SERPL-MCNC: 8.7 MG/DL — SIGNIFICANT CHANGE UP (ref 8.4–10.5)
CHLORIDE SERPL-SCNC: 100 MMOL/L — SIGNIFICANT CHANGE UP (ref 96–108)
CO2 SERPL-SCNC: 24 MMOL/L — SIGNIFICANT CHANGE UP (ref 22–31)
CREAT SERPL-MCNC: 0.88 MG/DL — SIGNIFICANT CHANGE UP (ref 0.5–1.3)
GLUCOSE SERPL-MCNC: 74 MG/DL — SIGNIFICANT CHANGE UP (ref 70–99)
HCT VFR BLD CALC: 28.8 % — LOW (ref 34.5–45)
HGB BLD-MCNC: 9.7 G/DL — LOW (ref 11.5–15.5)
MAGNESIUM SERPL-MCNC: 2.1 MG/DL — SIGNIFICANT CHANGE UP (ref 1.6–2.6)
MCHC RBC-ENTMCNC: 31.1 PG — SIGNIFICANT CHANGE UP (ref 27–34)
MCHC RBC-ENTMCNC: 33.7 GM/DL — SIGNIFICANT CHANGE UP (ref 32–36)
MCV RBC AUTO: 92.3 FL — SIGNIFICANT CHANGE UP (ref 80–100)
NRBC # BLD: 0 /100 WBCS — SIGNIFICANT CHANGE UP (ref 0–0)
PHOSPHATE SERPL-MCNC: 3 MG/DL — SIGNIFICANT CHANGE UP (ref 2.5–4.5)
PLATELET # BLD AUTO: 464 K/UL — HIGH (ref 150–400)
POTASSIUM SERPL-MCNC: 3.6 MMOL/L — SIGNIFICANT CHANGE UP (ref 3.5–5.3)
POTASSIUM SERPL-SCNC: 3.6 MMOL/L — SIGNIFICANT CHANGE UP (ref 3.5–5.3)
RBC # BLD: 3.12 M/UL — LOW (ref 3.8–5.2)
RBC # FLD: 14.1 % — SIGNIFICANT CHANGE UP (ref 10.3–14.5)
SODIUM SERPL-SCNC: 136 MMOL/L — SIGNIFICANT CHANGE UP (ref 135–145)
WBC # BLD: 11.58 K/UL — HIGH (ref 3.8–10.5)
WBC # FLD AUTO: 11.58 K/UL — HIGH (ref 3.8–10.5)

## 2020-12-21 PROCEDURE — 99232 SBSQ HOSP IP/OBS MODERATE 35: CPT

## 2020-12-21 PROCEDURE — 99233 SBSQ HOSP IP/OBS HIGH 50: CPT

## 2020-12-21 RX ORDER — PANTOPRAZOLE SODIUM 20 MG/1
1 TABLET, DELAYED RELEASE ORAL
Qty: 0 | Refills: 0 | DISCHARGE
Start: 2020-12-21 | End: 2021-01-21

## 2020-12-21 RX ORDER — POTASSIUM CHLORIDE 20 MEQ
20 PACKET (EA) ORAL ONCE
Refills: 0 | Status: COMPLETED | OUTPATIENT
Start: 2020-12-21 | End: 2020-12-21

## 2020-12-21 RX ADMIN — Medication 3 MILLILITER(S): at 05:32

## 2020-12-21 RX ADMIN — ENOXAPARIN SODIUM 40 MILLIGRAM(S): 100 INJECTION SUBCUTANEOUS at 11:43

## 2020-12-21 RX ADMIN — Medication 20 MILLIEQUIVALENT(S): at 17:36

## 2020-12-21 RX ADMIN — Medication 3 MILLIGRAM(S): at 22:38

## 2020-12-21 RX ADMIN — Medication 125 MICROGRAM(S): at 05:32

## 2020-12-21 RX ADMIN — SENNA PLUS 1 TABLET(S): 8.6 TABLET ORAL at 21:09

## 2020-12-21 RX ADMIN — ESCITALOPRAM OXALATE 10 MILLIGRAM(S): 10 TABLET, FILM COATED ORAL at 11:43

## 2020-12-21 RX ADMIN — PANTOPRAZOLE SODIUM 40 MILLIGRAM(S): 20 TABLET, DELAYED RELEASE ORAL at 05:32

## 2020-12-21 RX ADMIN — Medication 3 MILLILITER(S): at 11:43

## 2020-12-21 RX ADMIN — Medication 25 MILLIGRAM(S): at 17:36

## 2020-12-21 RX ADMIN — SODIUM CHLORIDE 3 MILLILITER(S): 9 INJECTION INTRAMUSCULAR; INTRAVENOUS; SUBCUTANEOUS at 20:28

## 2020-12-21 RX ADMIN — SODIUM CHLORIDE 3 MILLILITER(S): 9 INJECTION INTRAMUSCULAR; INTRAVENOUS; SUBCUTANEOUS at 14:11

## 2020-12-21 RX ADMIN — Medication 3 MILLILITER(S): at 17:36

## 2020-12-21 RX ADMIN — SIMVASTATIN 20 MILLIGRAM(S): 20 TABLET, FILM COATED ORAL at 21:09

## 2020-12-21 RX ADMIN — Medication 81 MILLIGRAM(S): at 11:43

## 2020-12-21 RX ADMIN — POLYETHYLENE GLYCOL 3350 17 GRAM(S): 17 POWDER, FOR SOLUTION ORAL at 11:42

## 2020-12-21 RX ADMIN — SODIUM CHLORIDE 3 MILLILITER(S): 9 INJECTION INTRAMUSCULAR; INTRAVENOUS; SUBCUTANEOUS at 05:30

## 2020-12-21 NOTE — PROGRESS NOTE ADULT - SUBJECTIVE AND OBJECTIVE BOX
General Surgery Progress Note    S: Patient seen and examined at bedside. No events overnight. Pain well controlled. Denies chest pain, shortness of breath, nausea/vomiting. Had a normal BM overnight    Physical Exam:  General: No acute distress  Respiratory: Nonlabored, no use of accessory muscles  Abdominal: Soft, nondistended, nontender. No rebound or guarding. Incisions c/d/i  Extremities: WWP, moving all extremities spontaneously      PAST MEDICAL HISTORY:  MI (myocardial infarction)    Hypertension          MEDICATIONS:  albuterol/ipratropium for Nebulization 3 milliLiter(s) Nebulizer every 6 hours  aspirin enteric coated 81 milliGRAM(s) Oral daily  enoxaparin Injectable 40 milliGRAM(s) SubCutaneous daily  escitalopram 10 milliGRAM(s) Oral daily  levothyroxine 125 MICROGram(s) Oral daily  melatonin 3 milliGRAM(s) Oral at bedtime PRN  metoprolol tartrate 25 milliGRAM(s) Oral two times a day  ondansetron Injectable 4 milliGRAM(s) IV Push every 6 hours PRN  pantoprazole    Tablet 40 milliGRAM(s) Oral before breakfast  polyethylene glycol 3350 17 Gram(s) Oral daily  senna 1 Tablet(s) Oral at bedtime  simvastatin 20 milliGRAM(s) Oral at bedtime  sodium chloride 0.9% lock flush 3 milliLiter(s) IV Push every 8 hours      ALLERGIES:  No Known Allergies      VITALS & I/Os:  Vital Signs Last 24 Hrs  T(C): 37.1 (21 Dec 2020 10:09), Max: 37.4 (20 Dec 2020 20:21)  T(F): 98.8 (21 Dec 2020 10:09), Max: 99.3 (20 Dec 2020 20:21)  HR: 83 (21 Dec 2020 10:09) (60 - 83)  BP: 117/65 (21 Dec 2020 10:09) (117/65 - 137/72)  BP(mean): --  RR: 18 (21 Dec 2020 10:09) (18 - 18)  SpO2: 97% (21 Dec 2020 10:09) (93% - 97%)    I&O's Summary    20 Dec 2020 07:01  -  21 Dec 2020 07:00  --------------------------------------------------------  IN: 580 mL / OUT: 1050 mL / NET: -470 mL    21 Dec 2020 07:01  -  21 Dec 2020 10:27  --------------------------------------------------------  IN: 340 mL / OUT: 600 mL / NET: -260 mL          LABS:                        9.7    11.58 )-----------( 464      ( 21 Dec 2020 07:26 )             28.8     12-21    136  |  100  |  14  ----------------------------<  74  3.6   |  24  |  0.88    Ca    8.7      21 Dec 2020 07:24  Phos  3.0     12-21  Mg     2.1     12-21    TPro  5.2<L>  /  Alb  2.9<L>  /  TBili  0.4  /  DBili  x   /  AST  55<H>  /  ALT  126<H>  /  AlkPhos  180<H>  12-20    Lactate:

## 2020-12-21 NOTE — PROGRESS NOTE ADULT - SUBJECTIVE AND OBJECTIVE BOX
Patient is a 80y old  Female who presented with a chief complaint of abd pain (18 Dec 2020 12:09)      INTERVAL HPI/OVERNIGHT EVENTS:  tolerating PO  no further rectal bleeding  +formed BM yesterday without blood  no further transfusion requirement  no abdominal pain, nausea or vomiting  no CP or SOB  no fever or chills      awaiting rehab placement      MEDICATIONS  (STANDING):  albuterol/ipratropium for Nebulization 3 milliLiter(s) Nebulizer every 6 hours  aspirin enteric coated 81 milliGRAM(s) Oral daily  enoxaparin Injectable 40 milliGRAM(s) SubCutaneous daily  escitalopram 10 milliGRAM(s) Oral daily  levothyroxine 125 MICROGram(s) Oral daily  metoprolol tartrate 25 milliGRAM(s) Oral two times a day  pantoprazole    Tablet 40 milliGRAM(s) Oral before breakfast  polyethylene glycol 3350 17 Gram(s) Oral daily  senna 1 Tablet(s) Oral at bedtime  simvastatin 20 milliGRAM(s) Oral at bedtime  sodium chloride 0.9% lock flush 3 milliLiter(s) IV Push every 8 hours    MEDICATIONS  (PRN):  melatonin 3 milliGRAM(s) Oral at bedtime PRN Insomnia  ondansetron Injectable 4 milliGRAM(s) IV Push every 6 hours PRN Nausea and/or Vomiting      Allergies  No Known Allergies      Review of Systems:  General:  No wt loss, fevers, chills, night sweats  CV:  No pain, palpitations, +AF (new, post-op)  Resp:  No dyspnea, cough, tachypnea, wheezing  GI:  see HPI  :  No pain, bleeding, incontinence, nocturia +pessary in place  Muscle:  No pain, focal weakness  Neuro:  No weakness, tingling, memory problems  Psych:  No fatigue, insomnia, mood problems, depression  Endocrine:  No polyuria, polydypsia, cold/heat intolerance  Heme:  No petechiae, ecchymosis, easy bruisability  Skin:  No rash, tattoos, scars, edema    Vital Signs Last 24 Hrs  T(C): 37.1 (21 Dec 2020 10:09), Max: 37.4 (20 Dec 2020 20:21)  T(F): 98.8 (21 Dec 2020 10:09), Max: 99.3 (20 Dec 2020 20:21)  HR: 83 (21 Dec 2020 10:09) (60 - 83)  BP: 117/65 (21 Dec 2020 10:09) (117/65 - 137/72)  BP(mean): --  RR: 18 (21 Dec 2020 10:09) (18 - 18)  SpO2: 97% (21 Dec 2020 10:09) (93% - 97%)    PHYSICAL EXAM:  Constitutional: NAD, well-developed WF non toxic appearing alert and non toxic appearing  Neck: No LAD, supple no JVD  Respiratory: Clear b/l  Cardiovascular: S1 and S2, RRR  Gastrointestinal: BS+, soft, op. port sites clean ND/NT  Extremities: No peripheral edema, neg clubbing, cyanosis  Vascular: 2+ peripheral pulses  Neurological: A/O x 3, no focal deficits  Psychiatric: Normal mood, normal affect  Skin: No rashes    LABS:                        9.7    11.58 )-----------( 464      ( 21 Dec 2020 07:26 )             28.8     12-21    136  |  100  |  14  ----------------------------<  74  3.6   |  24  |  0.88    Ca    8.7      21 Dec 2020 07:24  Phos  3.0     12-21  Mg     2.1     12-21    TPro  5.2<L>  /  Alb  2.9<L>  /  TBili  0.4  /  DBili  x   /  AST  55<H>  /  ALT  126<H>  /  AlkPhos  180<H>  12-20      RADIOLOGY & ADDITIONAL TESTS:

## 2020-12-21 NOTE — PROGRESS NOTE ADULT - ATTENDING COMMENTS
ATTENDING ATTESTATION  I have seen and examined this patient on rounds thismorning with the surgery team. I have reviewed all new labs, imaging and reports. I have participated in formulating the plan for the day, and have read and agree with the history, ROS, exam, assessment and plan as stated above.   POD 8 from [facility] caroline. Will plan for discharge home today with PPI and GI follow up.     Arminda Veronica M.D., M.S.  Dept of Trauma, Acute and Critical Care

## 2020-12-21 NOTE — PROGRESS NOTE ADULT - ASSESSMENT
80F pmhx HTN, COPD, Hypothyroidism, MI (age 36), lung ca s/p partial right pneumonectomy, 30 pack/yr smoker, open appy (70 yrs ago) now s/p laparoscopic cholecystectomy, POD 4. Patient with increasing total bilirubin but now trending down and LFTs as well. Undergoing MRCP for evaluation of retained stone which is likely to have passed recently explaining improving LFTs and Bilirubin.  Now with rectal bleeding    PLAN:  - cardiac history hgb < 8; no pRBC in past 24 hours  - NPO except meds; IV Fluids  - Zosyn  - Pain meds prn  - DVT ppx lovenox   - ASA 81  - Dispo: d/c today w/ GI follow-up  - Home ASA today      ACS Surgery [id number]

## 2020-12-21 NOTE — PROGRESS NOTE ADULT - SUBJECTIVE AND OBJECTIVE BOX
[facility] Division of Hospital Medicine  Jose Maria Colon MD  Pager (TONY, 3K-0P): [phone]  Other Times:  [phone]    Patient is a 80y old  Female who presents with a chief complaint of abd pain (18 Dec 2020 12:09)    SUBJECTIVE / OVERNIGHT EVENTS: No events overnight. Feels well. Notes no abdominal pain today. States she was ambulating about the unit and was a little tired afterwards. No nausea/vomiting. No chest pain. No fever/chills. Eager to leave the hospital.    MEDICATIONS  (STANDING):  albuterol/ipratropium for Nebulization 3 milliLiter(s) Nebulizer every 6 hours  aspirin enteric coated 81 milliGRAM(s) Oral daily  enoxaparin Injectable 40 milliGRAM(s) SubCutaneous daily  escitalopram 10 milliGRAM(s) Oral daily  levothyroxine 125 MICROGram(s) Oral daily  metoprolol tartrate 25 milliGRAM(s) Oral two times a day  pantoprazole    Tablet 40 milliGRAM(s) Oral before breakfast  polyethylene glycol 3350 17 Gram(s) Oral daily  senna 1 Tablet(s) Oral at bedtime  simvastatin 20 milliGRAM(s) Oral at bedtime  sodium chloride 0.9% lock flush 3 milliLiter(s) IV Push every 8 hours    MEDICATIONS  (PRN):  melatonin 3 milliGRAM(s) Oral at bedtime PRN Insomnia  ondansetron Injectable 4 milliGRAM(s) IV Push every 6 hours PRN Nausea and/or Vomiting      CAPILLARY BLOOD GLUCOSE        I&O's Summary    20 Dec 2020 07:01  -  21 Dec 2020 07:00  --------------------------------------------------------  IN: 580 mL / OUT: 1050 mL / NET: -470 mL    21 Dec 2020 07:01  -  21 Dec 2020 14:25  --------------------------------------------------------  IN: 700 mL / OUT: 700 mL / NET: 0 mL        PHYSICAL EXAM:  Vital Signs Last 24 Hrs  T(C): 37.1 (21 Dec 2020 14:10), Max: 37.4 (20 Dec 2020 20:21)  T(F): 98.7 (21 Dec 2020 14:10), Max: 99.3 (20 Dec 2020 20:21)  HR: 79 (21 Dec 2020 14:10) (60 - 83)  BP: 104/63 (21 Dec 2020 14:10) (104/63 - 137/72)  BP(mean): --  RR: 18 (21 Dec 2020 14:10) (18 - 18)  SpO2: 93% (21 Dec 2020 14:10) (93% - 97%)  GENERAL:  Well appearing woman in NAD, lying comfortably in bed  EYES: PERRL, conjunctiva clear  NECK: Supple, No JVD  CHEST/LUNG: CTA B/L. No w/r/r.  HEART: Reg rate. Normal S1, S2. No m/r/g.   ABDOMEN: Soft. NT/ND. Bowel sounds present. Surgical wounds are healing well without drainage.  EXTREMITIES:  2+ Peripheral Pulses, No clubbing, cyanosis, edema.  PSYCH: AAOx3    LABS:                        9.7    11.58 )-----------( 464      ( 21 Dec 2020 07:26 )             28.8     12-21    136  |  100  |  14  ----------------------------<  74  3.6   |  24  |  0.88    Ca    8.7      21 Dec 2020 07:24  Phos  3.0     12-21  Mg     2.1     12-21    TPro  5.2<L>  /  Alb  2.9<L>  /  TBili  0.4  /  DBili  x   /  AST  55<H>  /  ALT  126<H>  /  AlkPhos  180<H>  12-20      RADIOLOGY & ADDITIONAL TESTS:  Results Reviewed: No new studies    COORDINATION OF CARE:  Care Discussed with Consultants/Other Providers [Y]: TAN Santos regarding discharge planning  Prior or Outpatient Records Reviewed [Y/N]:

## 2020-12-21 NOTE — PROGRESS NOTE ADULT - ASSESSMENT
80F pmhx HTN, COPD (30 PYH smoking), Hypothyroidism, diverticulosis, CAD/MI (age 36), lung ca s/p partial right pneumonectomy, p/w abd pain s/p laparoscopic cholecystectomy 12/12 (acute gangrenous cholecystitis), with hospital course c/b afib RVR (per Medicine - no AC), acute hypoxic respiratory failure, and transaminitis MRCP showing postoperative changes without CBD stone (Hepatology following, LFTs improving), now with hospital course c/b acute blood loss anemia    7/2016 Colonoscopy +sigmoid diverticulosis +hemorrhoids, otherwise normal to Terminal ileum  COVID negative  CT (w IV contrast) - no colitis +left sided tics    Acute GI blood loss anemia - likely diverticular bleed (first episode; now resolved). s/p 1 unit PRBCS 12/18/2020 with brisk Hgb response. No active/ongoing bleeding at this time    RECS:  -continue PPI daily    -PO diet as tolerated  -Monitor CBC and BMs  -No GI objection to d/c planning  -continue bowel regimen avoid constipation  -no role for endoscopic evaluation at this time. If has recurrent active/brisk rectal bleeding while hospitalized, then recommend nuclear PRBC scan or CTA to localize bleed source and consult IR for possible angio +/- embolization    Discussed with pt, all questions answered. Pt counseled that if she noticed rectal bleeding +/- clots after discharge, she shoudl present to ER ASAP and can call GI office to notify primary GI (Dr Cadena)    Curly Oliveira PA-C    [facility]  [phone]  After hours and weekend coverage [phone]

## 2020-12-21 NOTE — PROGRESS NOTE ADULT - ATTENDING COMMENTS
s/p cholecystectomy for cholecystitis, s/p diverticular bleed,hgb stable. s/p treatment for constipation    plan agree with  above management.

## 2020-12-22 ENCOUNTER — TRANSCRIPTION ENCOUNTER (OUTPATIENT)
Age: 80
End: 2020-12-22

## 2020-12-22 VITALS
SYSTOLIC BLOOD PRESSURE: 120 MMHG | DIASTOLIC BLOOD PRESSURE: 76 MMHG | TEMPERATURE: 100 F | RESPIRATION RATE: 18 BRPM | HEART RATE: 78 BPM | OXYGEN SATURATION: 95 %

## 2020-12-22 PROBLEM — I21.9 ACUTE MYOCARDIAL INFARCTION, UNSPECIFIED: Chronic | Status: ACTIVE | Noted: 2020-12-12

## 2020-12-22 PROBLEM — I10 ESSENTIAL (PRIMARY) HYPERTENSION: Chronic | Status: ACTIVE | Noted: 2020-12-12

## 2020-12-22 LAB
HCT VFR BLD CALC: 29.6 % — LOW (ref 34.5–45)
HGB BLD-MCNC: 9.6 G/DL — LOW (ref 11.5–15.5)

## 2020-12-22 PROCEDURE — 82553 CREATINE MB FRACTION: CPT

## 2020-12-22 PROCEDURE — 82803 BLOOD GASES ANY COMBINATION: CPT

## 2020-12-22 PROCEDURE — 93005 ELECTROCARDIOGRAM TRACING: CPT

## 2020-12-22 PROCEDURE — 83735 ASSAY OF MAGNESIUM: CPT

## 2020-12-22 PROCEDURE — 76705 ECHO EXAM OF ABDOMEN: CPT

## 2020-12-22 PROCEDURE — 82550 ASSAY OF CK (CPK): CPT

## 2020-12-22 PROCEDURE — C1889: CPT

## 2020-12-22 PROCEDURE — 86901 BLOOD TYPING SEROLOGIC RH(D): CPT

## 2020-12-22 PROCEDURE — 99232 SBSQ HOSP IP/OBS MODERATE 35: CPT

## 2020-12-22 PROCEDURE — 36430 TRANSFUSION BLD/BLD COMPNT: CPT

## 2020-12-22 PROCEDURE — 84132 ASSAY OF SERUM POTASSIUM: CPT

## 2020-12-22 PROCEDURE — 96375 TX/PRO/DX INJ NEW DRUG ADDON: CPT

## 2020-12-22 PROCEDURE — 85610 PROTHROMBIN TIME: CPT

## 2020-12-22 PROCEDURE — 80053 COMPREHEN METABOLIC PANEL: CPT

## 2020-12-22 PROCEDURE — 82435 ASSAY OF BLOOD CHLORIDE: CPT

## 2020-12-22 PROCEDURE — 83690 ASSAY OF LIPASE: CPT

## 2020-12-22 PROCEDURE — 85014 HEMATOCRIT: CPT

## 2020-12-22 PROCEDURE — 84100 ASSAY OF PHOSPHORUS: CPT

## 2020-12-22 PROCEDURE — 80048 BASIC METABOLIC PNL TOTAL CA: CPT

## 2020-12-22 PROCEDURE — 82947 ASSAY GLUCOSE BLOOD QUANT: CPT

## 2020-12-22 PROCEDURE — 86703 HIV-1/HIV-2 1 RESULT ANTBDY: CPT

## 2020-12-22 PROCEDURE — 87040 BLOOD CULTURE FOR BACTERIA: CPT

## 2020-12-22 PROCEDURE — 86900 BLOOD TYPING SEROLOGIC ABO: CPT

## 2020-12-22 PROCEDURE — 97116 GAIT TRAINING THERAPY: CPT

## 2020-12-22 PROCEDURE — 88304 TISSUE EXAM BY PATHOLOGIST: CPT

## 2020-12-22 PROCEDURE — 85018 HEMOGLOBIN: CPT

## 2020-12-22 PROCEDURE — 97110 THERAPEUTIC EXERCISES: CPT

## 2020-12-22 PROCEDURE — 84439 ASSAY OF FREE THYROXINE: CPT

## 2020-12-22 PROCEDURE — 84295 ASSAY OF SERUM SODIUM: CPT

## 2020-12-22 PROCEDURE — 99285 EMERGENCY DEPT VISIT HI MDM: CPT | Mod: 25

## 2020-12-22 PROCEDURE — 85025 COMPLETE CBC W/AUTO DIFF WBC: CPT

## 2020-12-22 PROCEDURE — 83880 ASSAY OF NATRIURETIC PEPTIDE: CPT

## 2020-12-22 PROCEDURE — 86923 COMPATIBILITY TEST ELECTRIC: CPT

## 2020-12-22 PROCEDURE — 97161 PT EVAL LOW COMPLEX 20 MIN: CPT

## 2020-12-22 PROCEDURE — 84443 ASSAY THYROID STIM HORMONE: CPT

## 2020-12-22 PROCEDURE — 85730 THROMBOPLASTIN TIME PARTIAL: CPT

## 2020-12-22 PROCEDURE — 83605 ASSAY OF LACTIC ACID: CPT

## 2020-12-22 PROCEDURE — 74181 MRI ABDOMEN W/O CONTRAST: CPT

## 2020-12-22 PROCEDURE — 84484 ASSAY OF TROPONIN QUANT: CPT

## 2020-12-22 PROCEDURE — 96374 THER/PROPH/DIAG INJ IV PUSH: CPT | Mod: XU

## 2020-12-22 PROCEDURE — 80076 HEPATIC FUNCTION PANEL: CPT

## 2020-12-22 PROCEDURE — 82565 ASSAY OF CREATININE: CPT

## 2020-12-22 PROCEDURE — 74177 CT ABD & PELVIS W/CONTRAST: CPT

## 2020-12-22 PROCEDURE — U0003: CPT

## 2020-12-22 PROCEDURE — 94640 AIRWAY INHALATION TREATMENT: CPT

## 2020-12-22 PROCEDURE — 82330 ASSAY OF CALCIUM: CPT

## 2020-12-22 PROCEDURE — 82962 GLUCOSE BLOOD TEST: CPT

## 2020-12-22 PROCEDURE — P9016: CPT

## 2020-12-22 PROCEDURE — 80074 ACUTE HEPATITIS PANEL: CPT

## 2020-12-22 PROCEDURE — 86850 RBC ANTIBODY SCREEN: CPT

## 2020-12-22 PROCEDURE — 85027 COMPLETE CBC AUTOMATED: CPT

## 2020-12-22 PROCEDURE — 71045 X-RAY EXAM CHEST 1 VIEW: CPT

## 2020-12-22 RX ADMIN — ESCITALOPRAM OXALATE 10 MILLIGRAM(S): 10 TABLET, FILM COATED ORAL at 12:47

## 2020-12-22 RX ADMIN — PANTOPRAZOLE SODIUM 40 MILLIGRAM(S): 20 TABLET, DELAYED RELEASE ORAL at 05:00

## 2020-12-22 RX ADMIN — SODIUM CHLORIDE 3 MILLILITER(S): 9 INJECTION INTRAMUSCULAR; INTRAVENOUS; SUBCUTANEOUS at 13:31

## 2020-12-22 RX ADMIN — Medication 25 MILLIGRAM(S): at 05:00

## 2020-12-22 RX ADMIN — SODIUM CHLORIDE 3 MILLILITER(S): 9 INJECTION INTRAMUSCULAR; INTRAVENOUS; SUBCUTANEOUS at 05:02

## 2020-12-22 RX ADMIN — Medication 81 MILLIGRAM(S): at 12:47

## 2020-12-22 RX ADMIN — ENOXAPARIN SODIUM 40 MILLIGRAM(S): 100 INJECTION SUBCUTANEOUS at 12:46

## 2020-12-22 RX ADMIN — Medication 125 MICROGRAM(S): at 05:00

## 2020-12-22 RX ADMIN — Medication 3 MILLILITER(S): at 12:46

## 2020-12-22 RX ADMIN — POLYETHYLENE GLYCOL 3350 17 GRAM(S): 17 POWDER, FOR SOLUTION ORAL at 12:46

## 2020-12-22 RX ADMIN — Medication 3 MILLILITER(S): at 05:00

## 2020-12-22 NOTE — PROGRESS NOTE ADULT - SUBJECTIVE AND OBJECTIVE BOX
SURGERY DAILY PROGRESS NOTE:       SUBJECTIVE/ROS: Patient seen and evaluated on AM rounds. No overnight events. Pain well controlled. Tolerating a diet. Patient reports normal bowel movements and passing flatus. Denies nausea, vomiting, chest pain, shortness of breath       OBJECTIVE:    Vital Signs Last 24 Hrs  T(C): 36.4 (22 Dec 2020 04:39), Max: 37.1 (21 Dec 2020 10:09)  T(F): 97.6 (22 Dec 2020 04:39), Max: 98.8 (21 Dec 2020 10:09)  HR: 73 (22 Dec 2020 04:39) (64 - 83)  BP: 123/60 (22 Dec 2020 04:39) (104/63 - 149/67)  BP(mean): --  RR: 18 (22 Dec 2020 04:39) (18 - 18)  SpO2: 94% (22 Dec 2020 04:39) (92% - 97%)  I&O's Detail    21 Dec 2020 07:01  -  22 Dec 2020 07:00  --------------------------------------------------------  IN:    Oral Fluid: 700 mL  Total IN: 700 mL    OUT:    Voided (mL): 1100 mL  Total OUT: 1100 mL    Total NET: -400 mL        Daily     Daily   MEDICATIONS  (STANDING):  albuterol/ipratropium for Nebulization 3 milliLiter(s) Nebulizer every 6 hours  aspirin enteric coated 81 milliGRAM(s) Oral daily  enoxaparin Injectable 40 milliGRAM(s) SubCutaneous daily  escitalopram 10 milliGRAM(s) Oral daily  levothyroxine 125 MICROGram(s) Oral daily  metoprolol tartrate 25 milliGRAM(s) Oral two times a day  pantoprazole    Tablet 40 milliGRAM(s) Oral before breakfast  polyethylene glycol 3350 17 Gram(s) Oral daily  senna 1 Tablet(s) Oral at bedtime  simvastatin 20 milliGRAM(s) Oral at bedtime  sodium chloride 0.9% lock flush 3 milliLiter(s) IV Push every 8 hours    MEDICATIONS  (PRN):  melatonin 3 milliGRAM(s) Oral at bedtime PRN Insomnia  ondansetron Injectable 4 milliGRAM(s) IV Push every 6 hours PRN Nausea and/or Vomiting      LABS:                        9.7    11.58 )-----------( 464      ( 21 Dec 2020 07:26 )             28.8     12-21    136  |  100  |  14  ----------------------------<  74  3.6   |  24  |  0.88    Ca    8.7      21 Dec 2020 07:24  Phos  3.0     12-21  Mg     2.1     12-21    TPro  5.2<L>  /  Alb  2.9<L>  /  TBili  0.4  /  DBili  x   /  AST  55<H>  /  ALT  126<H>  /  AlkPhos  180<H>  12-20        Physical Exam:  General: No acute distress  Respiratory: Nonlabored, no use of accessory muscles  Abdominal: Soft, nondistended, nontender. No rebound or guarding. Incisions c/d/i  Extremities: WWP, moving all extremities spontaneously

## 2020-12-22 NOTE — PROGRESS NOTE ADULT - PROBLEM SELECTOR PLAN 2
RESOLVED. COPD off of Advair vs. splinting from pain causing atelectasis   - c/w duonebs  - Pain control as above, encourage use  - encourage IS 10x/hr  - bowel regimen while on opioids  - pulm following; outpatient f/u with Dr. Saez.
RESOLVED. COPD off of Advair vs. splinting from pain causing atelectasis   - c/w duonebs  - Pain control as above, encourage use  - encourage IS 10x/hr  - bowel regimen while on opioids  - pulm following; outpatient f/u with Dr. Saez.
RESOLVED. COPD off of Advair vs. splinting from pain causing atelectasis   - c/w home advair, trevor standing  - Pain control as above, encourage use  - encourage IS 10x/hr  - bowel regimen while on opioids  - pulm following
s/p lap caroline but now with rising LFTs, no retained stone on MRCP  downtrending today, continue to monitor  GI following
s/p lap craoline but now with rising LFTs, concerning for retained stone  - GI following  - NPO, MRCP ordered
RESOLVED. COPD off of Advair vs. splinting from pain causing atelectasis   - c/w home advair, trevor standing  - Pain control as above, encourage use  - encourage IS 10x/hr  - bowel regimen while on opioids  - pulm following

## 2020-12-22 NOTE — PROGRESS NOTE ADULT - PROVIDER SPECIALTY LIST ADULT
Gastroenterology
Gastroenterology
Hospitalist
Hospitalist
Pulmonology
Pulmonology
Surgery
Trauma Surgery
Trauma Surgery
Gastroenterology
Surgery
Surgery
Hepatology
Surgery
Trauma Surgery
Hospitalist
Hospitalist
Internal Medicine
Internal Medicine

## 2020-12-22 NOTE — PROGRESS NOTE ADULT - PROBLEM SELECTOR PLAN 7
- home Lexapro 10mg PO daily confirmed with PMD.
dvt ppx: lovenox  dispo: monitoring lfts, bowel movement
dvt ppx: lovenox  dispo: now awaiting MRCP, monitoring LFTs
- home Lexapro 10mg PO daily confirmed with PMD.

## 2020-12-22 NOTE — PROGRESS NOTE ADULT - PROBLEM SELECTOR PROBLEM 8
Need for prophylactic measure
Constipation, unspecified constipation type
Need for prophylactic measure

## 2020-12-22 NOTE — PROVIDER CONTACT NOTE (MEDICATION) - ACTION/TREATMENT ORDERED:
Pt educated on importance of medication, risks of refusal, pt verbalized understanding, will continue to monitor.
Pt educated on risks of refusal and importance of of medication. Pt verbalized understanding. No further action at this time, will continue to monitor.

## 2020-12-22 NOTE — PROGRESS NOTE ADULT - PROBLEM SELECTOR PLAN 1
melena per RN report. brisk UGIB vs LGIB (diverticular vs ischemic colitis)  - change to PPI iv q12h for now  - d/w GI team - plan for CTA abd for further eval, NPO  - monitor cbc closely, transfuse for hgb<8 given CAD hx  - trend lactate  - HOLDING asa and lovenox
melena per RN report. Now resolved.  - c/w PPI daily  - GI f/u appreciated - no plan for endoscopic intervention. Outpatient f/u recommended.  - monitor cbc closely as inpatient, transfuse for hgb<8 given CAD hx. H/H remains stable again today.  - ASA and lovenox ppx have been resumed without issue  - Regular diet as tolerated
COPD off of Advair vs. splinting from pain causing atelectasis   - c/w home advair, duonebs standing  - Pain control as above, encourage use  - encourage IS 10x/hr  - bowel regimen while on opioids  - may need home O2 if does not improve  - pulm following
COPD off of Advair vs. splinting from pain causing atelectasis   - c/w home advair, duonebs standing  - Pain control as above, encourage use  - encourage IS 10x/hr  - bowel regimen while on opioids - added bisacodyl rectal today  - may need home O2 if does not improve  - pulm following
melena per RN report. Now resolved.  - c/w PPI daily  - GI f/u appreciated - no plan for endoscopic intervention. Outpatient f/u recommended.  - monitor cbc closely as inpatient, transfuse for hgb<8 given CAD hx  - ASA and lovenox ppx have been resumed without issue  - Regular diet as tolerated
melena per RN report. brisk UGIB vs LGIB (diverticular vs ischemic colitis)  last BM 12/17 as of this morning  - c/w PPI iv q12h  - d/w GI team - no plan for EGD, diet as per GI  - monitor cbc closely, transfuse for hgb<8 given CAD hx. 1 U PRBC TODAY.  - HOLDING asa and lovenox

## 2020-12-22 NOTE — PROGRESS NOTE ADULT - ASSESSMENT
80F pmhx HTN, COPD (30 PYH smoking), Hypothyroidism, diverticulosis, CAD/MI (age 36), lung ca s/p partial right pneumonectomy, p/w abd pain s/p laparoscopic cholecystectomy 12/12 (acute gangrenous cholecystitis), with hospital course c/b afib RVR (per Medicine - no AC), acute hypoxic respiratory failure, and transaminitis MRCP showing postoperative changes without CBD stone (Hepatology following, LFTs improving), now with hospital course c/b acute blood loss anemia    7/2016 Colonoscopy +sigmoid diverticulosis +hemorrhoids, otherwise normal to Terminal ileum  COVID negative  CT (w IV contrast) - no colitis +left sided tics    Acute GI blood loss anemia - likely diverticular bleed (first episode; now resolved). s/p 1 unit PRBCS 12/18/2020 with brisk Hgb response. No active/ongoing bleeding at this time    RECS:  -continue PPI daily    -PO diet as tolerated  -Monitor CBC and BMs  -No GI objection to d/c plans  -continue bowel regimen avoid constipation    Discussed with pt, all questions answered. Pt counseled that if she noticed rectal bleeding +/- clots after discharge, she should present to ER ASAP and can call GI office to notify primary GI (Dr Cadena)  follow-up with Dr Cadena after d/c from rehab    Curly Oliveira PA-C    [facility]  [phone]  After hours and weekend coverage [phone]

## 2020-12-22 NOTE — PROGRESS NOTE ADULT - SUBJECTIVE AND OBJECTIVE BOX
[facility] Division of Hospital Medicine  Jose Maria Colon MD  Pager (ALICIA-AMBREEN, 8A-5P): [phone]  Other Times:  [phone]    Patient is a 80y old  Female who presents with a chief complaint of Abdominal Pain (21 Dec 2020 14:25)    SUBJECTIVE / OVERNIGHT EVENTS: Feels well, has no complaints. Reported dark stool to me today. Denies abdominal pain. No nausea/vomiting. No chest pain. No fever/chills. No BRBPR.    MEDICATIONS  (STANDING):  albuterol/ipratropium for Nebulization 3 milliLiter(s) Nebulizer every 6 hours  aspirin enteric coated 81 milliGRAM(s) Oral daily  enoxaparin Injectable 40 milliGRAM(s) SubCutaneous daily  escitalopram 10 milliGRAM(s) Oral daily  levothyroxine 125 MICROGram(s) Oral daily  metoprolol tartrate 25 milliGRAM(s) Oral two times a day  pantoprazole    Tablet 40 milliGRAM(s) Oral before breakfast  polyethylene glycol 3350 17 Gram(s) Oral daily  senna 1 Tablet(s) Oral at bedtime  simvastatin 20 milliGRAM(s) Oral at bedtime  sodium chloride 0.9% lock flush 3 milliLiter(s) IV Push every 8 hours    MEDICATIONS  (PRN):  melatonin 3 milliGRAM(s) Oral at bedtime PRN Insomnia  ondansetron Injectable 4 milliGRAM(s) IV Push every 6 hours PRN Nausea and/or Vomiting      CAPILLARY BLOOD GLUCOSE        I&O's Summary    21 Dec 2020 07:01  -  22 Dec 2020 07:00  --------------------------------------------------------  IN: 700 mL / OUT: 1100 mL / NET: -400 mL    22 Dec 2020 07:01  -  22 Dec 2020 13:24  --------------------------------------------------------  IN: 340 mL / OUT: 600 mL / NET: -260 mL        PHYSICAL EXAM:  Vital Signs Last 24 Hrs  T(C): 36.8 (22 Dec 2020 09:19), Max: 37.1 (21 Dec 2020 14:10)  T(F): 98.3 (22 Dec 2020 09:19), Max: 98.7 (21 Dec 2020 14:10)  HR: 73 (22 Dec 2020 09:19) (64 - 79)  BP: 122/71 (22 Dec 2020 09:19) (104/63 - 149/67)  BP(mean): --  RR: 18 (22 Dec 2020 09:19) (18 - 18)  SpO2: 93% (22 Dec 2020 09:19) (92% - 94%)  GENERAL:  Well appearing woman in NAD, lying comfortably in bed  EYES: PERRL, conjunctiva clear  NECK: Supple, No JVD  CHEST/LUNG: CTA B/L. No w/r/r.  HEART: Reg rate. Normal S1, S2. No m/r/g.   ABDOMEN: Soft. NT/ND. Bowel sounds present. Surgical wounds are healing well without drainage.  EXTREMITIES:  2+ Peripheral Pulses, No clubbing, cyanosis, edema.  PSYCH: AAOx3    LABS:                        9.6    x     )-----------( x        ( 22 Dec 2020 10:34 )             29.6     12-21    136  |  100  |  14  ----------------------------<  74  3.6   |  24  |  0.88    Ca    8.7      21 Dec 2020 07:24  Phos  3.0     12-21  Mg     2.1     12-21    RADIOLOGY & ADDITIONAL TESTS:  Results Reviewed: No new studies    COORDINATION OF CARE:  Care Discussed with Consultants/Other Providers [Y]: Dr. Veronica regarding discharge planning

## 2020-12-22 NOTE — PROGRESS NOTE ADULT - PROBLEM SELECTOR PLAN 3
s/p lap caroline but with rising LFTs, no retained stone on MRCP  - improved, hepatology signed off
s/p lap caroline but with rising LFTs, no retained stone on MRCP  - improved, hepatology signed off
Afib RVR resolved, 2/2 sepsis vs. hyperthyroid   -On Levothyroxine 125mcg PO Daily   -Prior d/w PMD DR. Walters 12/14 who agrees given age and fact that she is not persistent afib would hold off on full anticoagulation now and he will reassess in the office.  Will not start Full AC based on this discussion  -Metoprolol 25mg PO BID
Afib RVR resolved, 2/2 sepsis vs. hyperthyroid   -On Levothyroxine 125mcg PO Daily   -Prior d/w PMD DR. Walters 12/14 who agrees given age and fact that she is not persistent afib would hold off on full anticoagulation now and he will reassess in the office.  Will not start Full AC based on this discussion  -Metoprolol 25mg PO BID
s/p lap caroline but with rising LFTs, no retained stone on MRCP  - improved, hepatology signed off  - monitor CMP
s/p lap caroline but with rising LFTs, no retained stone on MRCP  - improved, hepatology signed off  - monitor CMP

## 2020-12-22 NOTE — PROGRESS NOTE ADULT - PROBLEM SELECTOR PROBLEM 3
Atrial fibrillation, unspecified type
Atrial fibrillation, unspecified type
Transaminitis

## 2020-12-22 NOTE — PROGRESS NOTE ADULT - PROBLEM SELECTOR PLAN 8
dvt ppx: Lovenox for DVT PPx  dispo: Discharge planning to [facility] in progress - no medical contraindication to transfer
dvt ppx: Lovenox for DVT PPx  dispo: Discharge planning to [facility] in progress - no medical contraindication to transfer
dvt ppx: scds - lovenox on  hold  dispo: monitoring cbc closely
dvt ppx: scds - lovenox on  hold  dispo: monitoring cbc
likely from post op status and pain meds  dose milk of mag x 1 and start miralax bid  abdomen benign and passing small stool with enema

## 2020-12-22 NOTE — PROGRESS NOTE ADULT - PROBLEM SELECTOR PROBLEM 2
Acute respiratory failure with hypoxia
Transaminitis
Transaminitis
Acute respiratory failure with hypoxia

## 2020-12-22 NOTE — PROVIDER CONTACT NOTE (MEDICATION) - ASSESSMENT
VSS, a&ox4, no c/o pain or discomfort, pt satting adequately on RA, no s/sx of resp distress
VSS, a&ox4, no c/o pain or discomfort, no respiratory distress, O2 sat @ 94% on RA

## 2020-12-22 NOTE — PROGRESS NOTE ADULT - PROBLEM SELECTOR PLAN 4
Afib RVR resolved, 2/2 sepsis vs. hyperthyroid   -On Levothyroxine 125mcg PO Daily   -Prior d/w PMD DR. Walters 12/14 who agrees given age and fact that she is not persistent afib would hold off on full anticoagulation now and he will reassess in the office.  Will not start Full AC based on this discussion  -Metoprolol 25mg PO BID
- pain control and management per surgery  - Zosyn per surgery.
- pain control and management per surgery  - Zosyn per surgery.
Afib RVR resolved, 2/2 sepsis vs. hyperthyroid   -On Levothyroxine 125mcg PO Daily   -Prior d/w PMD DR. Walters 12/14 who agrees given age and fact that she is not persistent afib would hold off on full anticoagulation now and he will reassess in the office.  Will not start Full AC based on this discussion  -Metoprolol 25mg PO BID

## 2020-12-22 NOTE — DISCHARGE NOTE NURSING/CASE MANAGEMENT/SOCIAL WORK - FLU SEASON?
Agusto Glynn from [facility] left a voice message stating that they need more clinical information on a medication. Please call them back at [phone] then use option 2. Their fax number is [phone]. Use reference number [id number]. Yes...

## 2020-12-22 NOTE — PROGRESS NOTE ADULT - PROBLEM SELECTOR PLAN 5
- pain control and management per surgery  - s/p course of zosyn
- pain control and management per surgery  - Zosyn per surgery
t4 wnl  Levothyroxine 125mcg PO daily, except Sundays
t4 wnl  Levothyroxine 125mcg PO daily, except Sundays
- pain control and management per surgery  - Zosyn per surgery
- pain control and management per surgery  - s/p course of zosyn

## 2020-12-22 NOTE — PROGRESS NOTE ADULT - ASSESSMENT
80F pmhx HTN, COPD, Hypothyroidism, MI (age 36), lung ca s/p partial right pneumonectomy, 30 pack/yr smoker, open appy (70 yrs ago) now s/p laparoscopic cholecystectomy, POD 4. Patient with increasing total bilirubin but now trending down and LFTs as well. Undergoing MRCP for evaluation of retained stone which is likely to have passed recently explaining improving LFTs and Bilirubin.  Now with rectal bleeding    PLAN:  - cardiac history hgb < 8; no pRBC in past 24 hours  - continue with regular diet   - Pain meds prn  - DVT ppx lovenox   - ASA 81  - Dispo: d/c today to rehab       [facility] [phone]

## 2020-12-22 NOTE — PROGRESS NOTE ADULT - PROBLEM SELECTOR PROBLEM 4
Atrial fibrillation, unspecified type
Cholecystitis
Cholecystitis
Atrial fibrillation, unspecified type

## 2020-12-22 NOTE — PROGRESS NOTE ADULT - PROBLEM SELECTOR PLAN 6
t4 wnl  Levothyroxine 125mcg PO daily, except Sundays
- home Lexapro 10mg PO daily confirmed with PMD.
- home Lexapro 10mg PO daily confirmed with PMD.
t4 wnl  Levothyroxine 125mcg PO daily, except Sundays

## 2020-12-22 NOTE — PROGRESS NOTE ADULT - ATTENDING COMMENTS
Ezequiel Vallecillo MD, FACP, FACG, AGAF  [facility]  [phone]     After hours and weekend coverage GI service : [phone]

## 2020-12-22 NOTE — PROGRESS NOTE ADULT - SUBJECTIVE AND OBJECTIVE BOX
Patient is a 80y old  Female who presented with a chief complaint of Abdominal Pain (21 Dec 2020 14:25)      INTERVAL HPI/OVERNIGHT EVENTS:  awaiting d/c to rehab  brown stools - formed  tolerating PO diet    MEDICATIONS  (STANDING):  albuterol/ipratropium for Nebulization 3 milliLiter(s) Nebulizer every 6 hours  aspirin enteric coated 81 milliGRAM(s) Oral daily  enoxaparin Injectable 40 milliGRAM(s) SubCutaneous daily  escitalopram 10 milliGRAM(s) Oral daily  levothyroxine 125 MICROGram(s) Oral daily  metoprolol tartrate 25 milliGRAM(s) Oral two times a day  pantoprazole    Tablet 40 milliGRAM(s) Oral before breakfast  polyethylene glycol 3350 17 Gram(s) Oral daily  senna 1 Tablet(s) Oral at bedtime  simvastatin 20 milliGRAM(s) Oral at bedtime  sodium chloride 0.9% lock flush 3 milliLiter(s) IV Push every 8 hours    MEDICATIONS  (PRN):  melatonin 3 milliGRAM(s) Oral at bedtime PRN Insomnia  ondansetron Injectable 4 milliGRAM(s) IV Push every 6 hours PRN Nausea and/or Vomiting      Allergies  No Known Allergies      Review of Systems:  General:  No wt loss, fevers, chills, night sweats  CV:  No pain, palpitations, +AF (new, post-op)  Resp:  No dyspnea, cough, tachypnea, wheezing  GI:  see HPI  :  No pain, bleeding, incontinence, nocturia +pessary in place  Muscle:  No pain, focal weakness  Neuro:  No weakness, tingling, memory problems  Psych:  No fatigue, insomnia, mood problems, depression  Endocrine:  No polyuria, polydypsia, cold/heat intolerance  Heme:  No petechiae, ecchymosis, easy bruisability  Skin:  No rash, tattoos, scars, edema    Vital Signs Last 24 Hrs  T(C): 36.8 (22 Dec 2020 09:19), Max: 37.1 (21 Dec 2020 14:10)  T(F): 98.3 (22 Dec 2020 09:19), Max: 98.7 (21 Dec 2020 14:10)  HR: 73 (22 Dec 2020 09:19) (64 - 79)  BP: 122/71 (22 Dec 2020 09:19) (104/63 - 149/67)  BP(mean): --  RR: 18 (22 Dec 2020 09:19) (18 - 18)  SpO2: 93% (22 Dec 2020 09:19) (92% - 94%)    PHYSICAL EXAM:  Constitutional: NAD, well-developed WF non toxic appearing alert and non toxic appearing  Neck: No LAD, supple no JVD  Respiratory: Clear b/l  Cardiovascular: S1 and S2, RRR  Gastrointestinal: BS+, soft, op. port sites clean ND/NT  Extremities: No peripheral edema, neg clubbing, cyanosis  Vascular: 2+ peripheral pulses  Neurological: A/O x 3, no focal deficits  Psychiatric: Normal mood, normal affect  Skin: No rashes      LABS:                        9.7    11.58 )-----------( 464      ( 21 Dec 2020 07:26 )             28.8     12-21    136  |  100  |  14  ----------------------------<  74  3.6   |  24  |  0.88    Ca    8.7      21 Dec 2020 07:24  Phos  3.0     12-21  Mg     2.1     12-21          RADIOLOGY & ADDITIONAL TESTS:

## 2020-12-22 NOTE — PROGRESS NOTE ADULT - ATTENDING COMMENTS
ATTENDING ATTESTATION  I have seen and examined this patient on rounds thismorning with the surgery team. I have reviewed all new labs, imaging and reports. I have participated in formulating the plan for the day, and have read and agree with the history, ROS, exam, assessment and plan as stated above.     POD 9 from [facility] caroline. Doing well. Pending authorization for rehab. medically ready for discharge.     Arminda Veronica M.D., M.S.  Dept of Trauma, Acute and Critical Care

## 2020-12-22 NOTE — DISCHARGE NOTE NURSING/CASE MANAGEMENT/SOCIAL WORK - PATIENT PORTAL LINK FT
You can access the FollowMyHealth Patient Portal offered by [facility] by registering at the following website: [email or web address]. By joining [employer]’s FollowMyHealth portal, you will also be able to view your health information using other applications (apps) compatible with our system.

## 2020-12-22 NOTE — PROGRESS NOTE ADULT - PROBLEM SELECTOR PROBLEM 1
Acute respiratory failure with hypoxia
Acute respiratory failure with hypoxia
Anemia due to acute blood loss

## 2020-12-22 NOTE — PROGRESS NOTE ADULT - PROBLEM SELECTOR PROBLEM 7
Depression
Depression
Need for prophylactic measure
Need for prophylactic measure
Depression
Depression

## 2021-01-19 ENCOUNTER — EMERGENCY (EMERGENCY)
Facility: HOSPITAL | Age: 81
LOS: 1 days | Discharge: ANOTHER HOSPITAL | End: 2021-01-19
Attending: HOSPITALIST | Admitting: HOSPITALIST
Payer: MEDICARE

## 2021-01-19 VITALS
TEMPERATURE: 99 F | HEIGHT: 64 IN | SYSTOLIC BLOOD PRESSURE: 162 MMHG | DIASTOLIC BLOOD PRESSURE: 77 MMHG | RESPIRATION RATE: 18 BRPM | HEART RATE: 75 BPM | OXYGEN SATURATION: 94 %

## 2021-01-19 DIAGNOSIS — Z90.49 ACQUIRED ABSENCE OF OTHER SPECIFIED PARTS OF DIGESTIVE TRACT: Chronic | ICD-10-CM

## 2021-01-19 LAB
ALBUMIN SERPL ELPH-MCNC: 3.8 G/DL — SIGNIFICANT CHANGE UP (ref 3.3–5)
ALP SERPL-CCNC: 386 U/L — HIGH (ref 40–120)
ALT FLD-CCNC: 262 U/L — HIGH (ref 4–33)
ANION GAP SERPL CALC-SCNC: 9 MMOL/L — SIGNIFICANT CHANGE UP (ref 7–14)
APTT BLD: 29 SEC — SIGNIFICANT CHANGE UP (ref 27–36.3)
AST SERPL-CCNC: 432 U/L — HIGH (ref 4–32)
BASE EXCESS BLDV CALC-SCNC: 4.4 MMOL/L — HIGH (ref -3–2)
BASOPHILS # BLD AUTO: 0.02 K/UL — SIGNIFICANT CHANGE UP (ref 0–0.2)
BASOPHILS NFR BLD AUTO: 0.2 % — SIGNIFICANT CHANGE UP (ref 0–2)
BILIRUB SERPL-MCNC: 2 MG/DL — HIGH (ref 0.2–1.2)
BLD GP AB SCN SERPL QL: NEGATIVE — SIGNIFICANT CHANGE UP
BLOOD GAS VENOUS - CREATININE: 0.8 MG/DL — SIGNIFICANT CHANGE UP (ref 0.5–1.3)
BLOOD GAS VENOUS COMPREHENSIVE RESULT: SIGNIFICANT CHANGE UP
BUN SERPL-MCNC: 12 MG/DL — SIGNIFICANT CHANGE UP (ref 7–23)
CALCIUM SERPL-MCNC: 9 MG/DL — SIGNIFICANT CHANGE UP (ref 8.4–10.5)
CHLORIDE BLDV-SCNC: 108 MMOL/L — SIGNIFICANT CHANGE UP (ref 96–108)
CHLORIDE SERPL-SCNC: 102 MMOL/L — SIGNIFICANT CHANGE UP (ref 98–107)
CO2 SERPL-SCNC: 28 MMOL/L — SIGNIFICANT CHANGE UP (ref 22–31)
CREAT SERPL-MCNC: 0.78 MG/DL — SIGNIFICANT CHANGE UP (ref 0.5–1.3)
EOSINOPHIL # BLD AUTO: 0.01 K/UL — SIGNIFICANT CHANGE UP (ref 0–0.5)
EOSINOPHIL NFR BLD AUTO: 0.1 % — SIGNIFICANT CHANGE UP (ref 0–6)
GAS PNL BLDV: 135 MMOL/L — LOW (ref 136–146)
GLUCOSE BLDV-MCNC: 149 MG/DL — HIGH (ref 70–99)
GLUCOSE SERPL-MCNC: 138 MG/DL — HIGH (ref 70–99)
HCO3 BLDV-SCNC: 27 MMOL/L — SIGNIFICANT CHANGE UP (ref 20–27)
HCT VFR BLD CALC: 37.9 % — SIGNIFICANT CHANGE UP (ref 34.5–45)
HCT VFR BLDA CALC: 36.7 % — SIGNIFICANT CHANGE UP (ref 34.5–46.5)
HGB BLD CALC-MCNC: 11.9 G/DL — SIGNIFICANT CHANGE UP (ref 11.5–15.5)
HGB BLD-MCNC: 11.5 G/DL — SIGNIFICANT CHANGE UP (ref 11.5–15.5)
IANC: 10.16 K/UL — HIGH (ref 1.5–8.5)
IMM GRANULOCYTES NFR BLD AUTO: 0.4 % — SIGNIFICANT CHANGE UP (ref 0–1.5)
INR BLD: 1.07 RATIO — SIGNIFICANT CHANGE UP (ref 0.88–1.16)
LACTATE BLDV-MCNC: 1.9 MMOL/L — SIGNIFICANT CHANGE UP (ref 0.5–2)
LIDOCAIN IGE QN: 28 U/L — SIGNIFICANT CHANGE UP (ref 7–60)
LYMPHOCYTES # BLD AUTO: 0.8 K/UL — LOW (ref 1–3.3)
LYMPHOCYTES # BLD AUTO: 6.8 % — LOW (ref 13–44)
MCHC RBC-ENTMCNC: 28.8 PG — SIGNIFICANT CHANGE UP (ref 27–34)
MCHC RBC-ENTMCNC: 30.3 GM/DL — LOW (ref 32–36)
MCV RBC AUTO: 94.8 FL — SIGNIFICANT CHANGE UP (ref 80–100)
MONOCYTES # BLD AUTO: 0.76 K/UL — SIGNIFICANT CHANGE UP (ref 0–0.9)
MONOCYTES NFR BLD AUTO: 6.4 % — SIGNIFICANT CHANGE UP (ref 2–14)
NEUTROPHILS # BLD AUTO: 10.16 K/UL — HIGH (ref 1.8–7.4)
NEUTROPHILS NFR BLD AUTO: 86.1 % — HIGH (ref 43–77)
NRBC # BLD: 0 /100 WBCS — SIGNIFICANT CHANGE UP
NRBC # FLD: 0 K/UL — SIGNIFICANT CHANGE UP
PCO2 BLDV: 48 MMHG — SIGNIFICANT CHANGE UP (ref 41–51)
PH BLDV: 7.4 — SIGNIFICANT CHANGE UP (ref 7.32–7.43)
PLATELET # BLD AUTO: 297 K/UL — SIGNIFICANT CHANGE UP (ref 150–400)
PO2 BLDV: <24 MMHG — LOW (ref 35–40)
POTASSIUM BLDV-SCNC: 3.6 MMOL/L — SIGNIFICANT CHANGE UP (ref 3.4–4.5)
POTASSIUM SERPL-MCNC: 3.9 MMOL/L — SIGNIFICANT CHANGE UP (ref 3.5–5.3)
POTASSIUM SERPL-SCNC: 3.9 MMOL/L — SIGNIFICANT CHANGE UP (ref 3.5–5.3)
PROT SERPL-MCNC: 6.6 G/DL — SIGNIFICANT CHANGE UP (ref 6–8.3)
PROTHROM AB SERPL-ACNC: 12.2 SEC — SIGNIFICANT CHANGE UP (ref 10.6–13.6)
RBC # BLD: 4 M/UL — SIGNIFICANT CHANGE UP (ref 3.8–5.2)
RBC # FLD: 14.4 % — SIGNIFICANT CHANGE UP (ref 10.3–14.5)
RH IG SCN BLD-IMP: POSITIVE — SIGNIFICANT CHANGE UP
SAO2 % BLDV: 32.4 % — LOW (ref 60–85)
SODIUM SERPL-SCNC: 139 MMOL/L — SIGNIFICANT CHANGE UP (ref 135–145)
WBC # BLD: 11.8 K/UL — HIGH (ref 3.8–10.5)
WBC # FLD AUTO: 11.8 K/UL — HIGH (ref 3.8–10.5)

## 2021-01-19 PROCEDURE — 71045 X-RAY EXAM CHEST 1 VIEW: CPT | Mod: 26

## 2021-01-19 PROCEDURE — 99285 EMERGENCY DEPT VISIT HI MDM: CPT

## 2021-01-19 RX ORDER — SODIUM CHLORIDE 9 MG/ML
1000 INJECTION INTRAMUSCULAR; INTRAVENOUS; SUBCUTANEOUS ONCE
Refills: 0 | Status: COMPLETED | OUTPATIENT
Start: 2021-01-19 | End: 2021-01-19

## 2021-01-19 RX ORDER — PIPERACILLIN AND TAZOBACTAM 4; .5 G/20ML; G/20ML
3.38 INJECTION, POWDER, LYOPHILIZED, FOR SOLUTION INTRAVENOUS ONCE
Refills: 0 | Status: COMPLETED | OUTPATIENT
Start: 2021-01-19 | End: 2021-01-19

## 2021-01-19 RX ADMIN — SODIUM CHLORIDE 1000 MILLILITER(S): 9 INJECTION INTRAMUSCULAR; INTRAVENOUS; SUBCUTANEOUS at 21:30

## 2021-01-19 RX ADMIN — SODIUM CHLORIDE 1000 MILLILITER(S): 9 INJECTION INTRAMUSCULAR; INTRAVENOUS; SUBCUTANEOUS at 23:00

## 2021-01-19 RX ADMIN — PIPERACILLIN AND TAZOBACTAM 200 GRAM(S): 4; .5 INJECTION, POWDER, LYOPHILIZED, FOR SOLUTION INTRAVENOUS at 23:23

## 2021-01-19 RX ADMIN — PIPERACILLIN AND TAZOBACTAM 3.38 GRAM(S): 4; .5 INJECTION, POWDER, LYOPHILIZED, FOR SOLUTION INTRAVENOUS at 23:53

## 2021-01-19 NOTE — ED PROVIDER NOTE - ATTENDING CONTRIBUTION TO CARE
80F with hx of hypothyroidism recent cholecystectomy c/b diverticular bleed and resp failure now p.w episode of epigastric pain and n/v/d. emesis x5 episodes as well as diarrhea. reports usual constipation.  no fevers. abdominal pain improved but still with nausea. emesis food filled, stool brown in color.     ***GEN - NAD; well appearing; A+O x3 ***HEAD - NC/AT ***EYES/NOSE - PERRL, EOMI, mucous membranes moist, no discharge ***THROAT: Oral cavity and pharynx normal. No inflammation, swelling, exudate, or lesions.  ***NECK: Neck supple, non-tender without lymphadenopathy, no masses, no thyromegaly.   ***PULMONARY - CTA b/l, symmetric breath sounds. ***CARDIAC -s1s2, RRR, no M,G,R  ***ABDOMEN - +BS, ND, NT, soft, no guarding, no rebound, no masses   ***BACK - no CVA tenderness, Normal  spine ***EXTREMITIES - symmetric pulses, 2+ dp, capillary refill < 2 seconds, no clubbing, no cyanosis, no edema ***SKIN - no rash or bruising   ***NEUROLOGIC - alert, CN 2-12 intact    MDM: 80F with vomiting and diarrhea, improved abdominal pain. labs, ct a/p. 80F with hx of hypothyroidism recent cholecystectomy c/b diverticular bleed and resp failure now p.w episode of epigastric pain and n/v/d. emesis x5 episodes as well as diarrhea. reports usual constipation.  no fevers. abdominal pain improved but still with nausea. emesis food filled, stool brown in color.     ***GEN - NAD; well appearing; A+O x3 ***HEAD - NC/AT ***EYES/NOSE - PERRL, EOMI, mucous membranes moist, no discharge ***THROAT: Oral cavity and pharynx normal. No inflammation, swelling, exudate, or lesions.  ***NECK: Neck supple, non-tender without lymphadenopathy, no masses, no thyromegaly.   ***PULMONARY - CTA b/l, symmetric breath sounds. ***CARDIAC -s1s2, RRR, no M,G,R  ***ABDOMEN - +BS, ND, NT, soft, no guarding, no rebound, no masses   ***BACK - no CVA tenderness, Normal  spine ***EXTREMITIES - symmetric pulses, 2+ dp, capillary refill < 2 seconds, no clubbing, no cyanosis, no edema ***SKIN - no rash or bruising   ***NEUROLOGIC - alert, CN 2-12 intact.    MDM: 80F with vomiting and diarrhea, improved abdominal pain. labs, ct a/p.

## 2021-01-19 NOTE — ED ADULT TRIAGE NOTE - CHIEF COMPLAINT QUOTE
Pt. brought in by EMS from home c/o nausea, vomiting, polyuria, chills, and diarrhea. Pt. has a history of gallbladder removal a month ago and COPD.  taken by EMS, and given 8mg zofran prior to arrival. 18G obtained by EMS on r hand.

## 2021-01-19 NOTE — ED PROVIDER NOTE - OBJECTIVE STATEMENT
80F with hx of hypothyroidism recent cholecystectomy c/b diverticular bleed and resp failure now p/w episode of epigastric pain and n/v/d after eating. Emesis x5 episodes as well as diarrhea. Reports NBNB emesis and NB watery diarrhea. Reports usual constipation. no fevers. abdominal pain improved but still with nausea. emesis food filled, stool brown in color. Given zofran in route by EMS.

## 2021-01-19 NOTE — ED ADULT NURSE NOTE - OBJECTIVE STATEMENT
Facilitating RN- 80 year old female A*0x4, s/p gallbladder removal Dec. 19th coming for N/V diarrhea, epigastric pain and chills today. As per pt she has been okay since the surgery. Pt. endorses eating cereal which she normally has for breakfast, pt didn't eat anything  out of the ordinary or didn't have any take out food. Pt denies blood in stool or vomit. Pt denies fever, chest pain dizziness, headache, urinary symptoms, sick contacts. Pt arrive with 18g in right hand placed by EMS. Abdomen soft non-tender, surgical sites healed. s1, s2 present. Awaiting orders from Pa. primary MISTY Duarte UPDATED,

## 2021-01-19 NOTE — ED PROVIDER NOTE - CLINICAL SUMMARY MEDICAL DECISION MAKING FREE TEXT BOX
80F with hx of hypothyroidism recent cholecystectomy c/b diverticular bleed and resp failure now p/w episode of epigastric pain and n/v/d after eating.  plan  - labs  - ua/cx  - ctap  - ivf  - reassess

## 2021-01-19 NOTE — ED PROVIDER NOTE - PROGRESS NOTE DETAILS
Surgery paged regarding CTAP findings. Surgery resident evaluated pt and advised transfer to [facility] as pt had her surgery there. Pt agrees to transfer. Dr. Hensley (Surgery attg) accepted transfer. Spoke with Dr. Weinstein in [facility] ED, accepted ED to ED transfer. VSS.

## 2021-01-19 NOTE — ED ADULT NURSE REASSESSMENT NOTE - NS ED NURSE REASSESS COMMENT FT1
Report received from facilitator RN. Pt. received awake & alert, with 20G IV in left ac and 18G IV in right hand. No acute distress at present. Respirations even & unlabored on room air. Will continue to monitor.

## 2021-01-20 ENCOUNTER — INPATIENT (INPATIENT)
Facility: HOSPITAL | Age: 81
LOS: 2 days | Discharge: HOME | DRG: 872 | End: 2021-01-23
Attending: SURGERY | Admitting: SURGERY
Payer: MEDICARE

## 2021-01-20 VITALS
WEIGHT: 149.91 LBS | HEART RATE: 76 BPM | HEIGHT: 64 IN | SYSTOLIC BLOOD PRESSURE: 116 MMHG | RESPIRATION RATE: 16 BRPM | DIASTOLIC BLOOD PRESSURE: 71 MMHG | OXYGEN SATURATION: 95 % | TEMPERATURE: 100 F

## 2021-01-20 VITALS
SYSTOLIC BLOOD PRESSURE: 129 MMHG | HEART RATE: 96 BPM | TEMPERATURE: 99 F | DIASTOLIC BLOOD PRESSURE: 67 MMHG | OXYGEN SATURATION: 100 % | RESPIRATION RATE: 18 BRPM

## 2021-01-20 DIAGNOSIS — I10 ESSENTIAL (PRIMARY) HYPERTENSION: ICD-10-CM

## 2021-01-20 DIAGNOSIS — J44.9 CHRONIC OBSTRUCTIVE PULMONARY DISEASE, UNSPECIFIED: ICD-10-CM

## 2021-01-20 DIAGNOSIS — R10.9 UNSPECIFIED ABDOMINAL PAIN: ICD-10-CM

## 2021-01-20 DIAGNOSIS — I48.91 UNSPECIFIED ATRIAL FIBRILLATION: ICD-10-CM

## 2021-01-20 DIAGNOSIS — Z90.49 ACQUIRED ABSENCE OF OTHER SPECIFIED PARTS OF DIGESTIVE TRACT: Chronic | ICD-10-CM

## 2021-01-20 DIAGNOSIS — Z02.9 ENCOUNTER FOR ADMINISTRATIVE EXAMINATIONS, UNSPECIFIED: ICD-10-CM

## 2021-01-20 DIAGNOSIS — R10.84 GENERALIZED ABDOMINAL PAIN: ICD-10-CM

## 2021-01-20 DIAGNOSIS — A41.9 SEPSIS, UNSPECIFIED ORGANISM: ICD-10-CM

## 2021-01-20 LAB
ALBUMIN SERPL ELPH-MCNC: 3.2 G/DL — LOW (ref 3.3–5)
ALBUMIN SERPL ELPH-MCNC: 3.3 G/DL — SIGNIFICANT CHANGE UP (ref 3.3–5)
ALP SERPL-CCNC: 326 U/L — HIGH (ref 40–120)
ALP SERPL-CCNC: 371 U/L — HIGH (ref 40–120)
ALT FLD-CCNC: 175 U/L — HIGH (ref 10–45)
ALT FLD-CCNC: 221 U/L — HIGH (ref 10–45)
ANION GAP SERPL CALC-SCNC: 12 MMOL/L — SIGNIFICANT CHANGE UP (ref 5–17)
ANION GAP SERPL CALC-SCNC: 13 MMOL/L — SIGNIFICANT CHANGE UP (ref 5–17)
APPEARANCE UR: CLEAR — SIGNIFICANT CHANGE UP
AST SERPL-CCNC: 154 U/L — HIGH (ref 10–40)
AST SERPL-CCNC: 242 U/L — HIGH (ref 10–40)
BACTERIA # UR AUTO: SIGNIFICANT CHANGE UP
BASE EXCESS BLDV CALC-SCNC: 3.7 MMOL/L — HIGH (ref -2–2)
BASOPHILS # BLD AUTO: 0.02 K/UL — SIGNIFICANT CHANGE UP (ref 0–0.2)
BASOPHILS NFR BLD AUTO: 0.1 % — SIGNIFICANT CHANGE UP (ref 0–2)
BILIRUB DIRECT SERPL-MCNC: 3.2 MG/DL — HIGH (ref 0–0.2)
BILIRUB INDIRECT FLD-MCNC: 1.1 MG/DL — HIGH (ref 0.2–1)
BILIRUB SERPL-MCNC: 4.3 MG/DL — HIGH (ref 0.2–1.2)
BILIRUB SERPL-MCNC: 4.3 MG/DL — HIGH (ref 0.2–1.2)
BILIRUB SERPL-MCNC: 4.7 MG/DL — HIGH (ref 0.2–1.2)
BILIRUB UR-MCNC: NEGATIVE — SIGNIFICANT CHANGE UP
BUN SERPL-MCNC: 10 MG/DL — SIGNIFICANT CHANGE UP (ref 7–23)
BUN SERPL-MCNC: 10 MG/DL — SIGNIFICANT CHANGE UP (ref 7–23)
CA-I SERPL-SCNC: 1.08 MMOL/L — LOW (ref 1.12–1.3)
CALCIUM SERPL-MCNC: 8.3 MG/DL — LOW (ref 8.4–10.5)
CALCIUM SERPL-MCNC: 8.5 MG/DL — SIGNIFICANT CHANGE UP (ref 8.4–10.5)
CHLORIDE BLDV-SCNC: 103 MMOL/L — SIGNIFICANT CHANGE UP (ref 96–108)
CHLORIDE SERPL-SCNC: 102 MMOL/L — SIGNIFICANT CHANGE UP (ref 96–108)
CHLORIDE SERPL-SCNC: 102 MMOL/L — SIGNIFICANT CHANGE UP (ref 96–108)
CO2 BLDV-SCNC: 29 MMOL/L — SIGNIFICANT CHANGE UP (ref 22–30)
CO2 SERPL-SCNC: 23 MMOL/L — SIGNIFICANT CHANGE UP (ref 22–31)
CO2 SERPL-SCNC: 23 MMOL/L — SIGNIFICANT CHANGE UP (ref 22–31)
COLOR SPEC: YELLOW — SIGNIFICANT CHANGE UP
CREAT SERPL-MCNC: 0.68 MG/DL — SIGNIFICANT CHANGE UP (ref 0.5–1.3)
CREAT SERPL-MCNC: 0.77 MG/DL — SIGNIFICANT CHANGE UP (ref 0.5–1.3)
DIFF PNL FLD: NEGATIVE — SIGNIFICANT CHANGE UP
EOSINOPHIL # BLD AUTO: 0.04 K/UL — SIGNIFICANT CHANGE UP (ref 0–0.5)
EOSINOPHIL NFR BLD AUTO: 0.3 % — SIGNIFICANT CHANGE UP (ref 0–6)
EPI CELLS # UR: SIGNIFICANT CHANGE UP /HPF (ref 0–5)
GAS PNL BLDV: 136 MMOL/L — SIGNIFICANT CHANGE UP (ref 135–145)
GAS PNL BLDV: SIGNIFICANT CHANGE UP
GAS PNL BLDV: SIGNIFICANT CHANGE UP
GLUCOSE BLDV-MCNC: 120 MG/DL — HIGH (ref 70–99)
GLUCOSE SERPL-MCNC: 104 MG/DL — HIGH (ref 70–99)
GLUCOSE SERPL-MCNC: 130 MG/DL — HIGH (ref 70–99)
GLUCOSE UR QL: NEGATIVE — SIGNIFICANT CHANGE UP
HCO3 BLDV-SCNC: 28 MMOL/L — SIGNIFICANT CHANGE UP (ref 21–29)
HCT VFR BLD CALC: 34.7 % — SIGNIFICANT CHANGE UP (ref 34.5–45)
HCT VFR BLD CALC: 35.9 % — SIGNIFICANT CHANGE UP (ref 34.5–45)
HCT VFR BLDA CALC: 38 % — LOW (ref 39–50)
HGB BLD CALC-MCNC: 12.2 G/DL — SIGNIFICANT CHANGE UP (ref 11.5–15.5)
HGB BLD-MCNC: 11.2 G/DL — LOW (ref 11.5–15.5)
HGB BLD-MCNC: 11.5 G/DL — SIGNIFICANT CHANGE UP (ref 11.5–15.5)
HYALINE CASTS # UR AUTO: 2 /LPF — SIGNIFICANT CHANGE UP (ref 0–7)
IMM GRANULOCYTES NFR BLD AUTO: 0.4 % — SIGNIFICANT CHANGE UP (ref 0–1.5)
KETONES UR-MCNC: NEGATIVE — SIGNIFICANT CHANGE UP
LACTATE BLDV-MCNC: 1.7 MMOL/L — SIGNIFICANT CHANGE UP (ref 0.7–2)
LEUKOCYTE ESTERASE UR-ACNC: ABNORMAL
LIDOCAIN IGE QN: 20 U/L — SIGNIFICANT CHANGE UP (ref 7–60)
LYMPHOCYTES # BLD AUTO: 1.27 K/UL — SIGNIFICANT CHANGE UP (ref 1–3.3)
LYMPHOCYTES # BLD AUTO: 9 % — LOW (ref 13–44)
MCHC RBC-ENTMCNC: 29.5 PG — SIGNIFICANT CHANGE UP (ref 27–34)
MCHC RBC-ENTMCNC: 29.6 PG — SIGNIFICANT CHANGE UP (ref 27–34)
MCHC RBC-ENTMCNC: 32 GM/DL — SIGNIFICANT CHANGE UP (ref 32–36)
MCHC RBC-ENTMCNC: 32.3 GM/DL — SIGNIFICANT CHANGE UP (ref 32–36)
MCV RBC AUTO: 91.8 FL — SIGNIFICANT CHANGE UP (ref 80–100)
MCV RBC AUTO: 92.1 FL — SIGNIFICANT CHANGE UP (ref 80–100)
MONOCYTES # BLD AUTO: 0.97 K/UL — HIGH (ref 0–0.9)
MONOCYTES NFR BLD AUTO: 6.9 % — SIGNIFICANT CHANGE UP (ref 2–14)
NEUTROPHILS # BLD AUTO: 11.8 K/UL — HIGH (ref 1.8–7.4)
NEUTROPHILS NFR BLD AUTO: 83.3 % — HIGH (ref 43–77)
NITRITE UR-MCNC: NEGATIVE — SIGNIFICANT CHANGE UP
NRBC # BLD: 0 /100 WBCS — SIGNIFICANT CHANGE UP (ref 0–0)
NRBC # BLD: 0 /100 WBCS — SIGNIFICANT CHANGE UP (ref 0–0)
PCO2 BLDV: 44 MMHG — SIGNIFICANT CHANGE UP (ref 35–50)
PH BLDV: 7.42 — SIGNIFICANT CHANGE UP (ref 7.35–7.45)
PH UR: 7.5 — SIGNIFICANT CHANGE UP (ref 5–8)
PLATELET # BLD AUTO: 242 K/UL — SIGNIFICANT CHANGE UP (ref 150–400)
PLATELET # BLD AUTO: 244 K/UL — SIGNIFICANT CHANGE UP (ref 150–400)
PO2 BLDV: 30 MMHG — SIGNIFICANT CHANGE UP (ref 25–45)
POTASSIUM BLDV-SCNC: 3.2 MMOL/L — LOW (ref 3.5–5.3)
POTASSIUM SERPL-MCNC: 3.3 MMOL/L — LOW (ref 3.5–5.3)
POTASSIUM SERPL-MCNC: 3.3 MMOL/L — LOW (ref 3.5–5.3)
POTASSIUM SERPL-SCNC: 3.3 MMOL/L — LOW (ref 3.5–5.3)
POTASSIUM SERPL-SCNC: 3.3 MMOL/L — LOW (ref 3.5–5.3)
PROCALCITONIN SERPL-MCNC: 2.97 NG/ML — HIGH (ref 0.02–0.1)
PROT SERPL-MCNC: 6 G/DL — SIGNIFICANT CHANGE UP (ref 6–8.3)
PROT SERPL-MCNC: 6.3 G/DL — SIGNIFICANT CHANGE UP (ref 6–8.3)
PROT UR-MCNC: NEGATIVE — SIGNIFICANT CHANGE UP
RBC # BLD: 3.78 M/UL — LOW (ref 3.8–5.2)
RBC # BLD: 3.9 M/UL — SIGNIFICANT CHANGE UP (ref 3.8–5.2)
RBC # FLD: 14.5 % — SIGNIFICANT CHANGE UP (ref 10.3–14.5)
RBC # FLD: 14.6 % — HIGH (ref 10.3–14.5)
RBC CASTS # UR COMP ASSIST: 2 /HPF — SIGNIFICANT CHANGE UP (ref 0–4)
SAO2 % BLDV: 52 % — LOW (ref 67–88)
SARS-COV-2 RNA SPEC QL NAA+PROBE: SIGNIFICANT CHANGE UP
SODIUM SERPL-SCNC: 137 MMOL/L — SIGNIFICANT CHANGE UP (ref 135–145)
SODIUM SERPL-SCNC: 138 MMOL/L — SIGNIFICANT CHANGE UP (ref 135–145)
SP GR SPEC: 1.01 — SIGNIFICANT CHANGE UP (ref 1.01–1.02)
UROBILINOGEN FLD QL: SIGNIFICANT CHANGE UP
WBC # BLD: 12 K/UL — HIGH (ref 3.8–10.5)
WBC # BLD: 14.15 K/UL — HIGH (ref 3.8–10.5)
WBC # FLD AUTO: 12 K/UL — HIGH (ref 3.8–10.5)
WBC # FLD AUTO: 14.15 K/UL — HIGH (ref 3.8–10.5)
WBC UR QL: SIGNIFICANT CHANGE UP /HPF (ref 0–5)

## 2021-01-20 PROCEDURE — 99223 1ST HOSP IP/OBS HIGH 75: CPT

## 2021-01-20 PROCEDURE — 99222 1ST HOSP IP/OBS MODERATE 55: CPT | Mod: GC

## 2021-01-20 PROCEDURE — 74177 CT ABD & PELVIS W/CONTRAST: CPT | Mod: 26

## 2021-01-20 PROCEDURE — 99285 EMERGENCY DEPT VISIT HI MDM: CPT

## 2021-01-20 PROCEDURE — 74183 MRI ABD W/O CNTR FLWD CNTR: CPT | Mod: 26

## 2021-01-20 RX ORDER — PIPERACILLIN AND TAZOBACTAM 4; .5 G/20ML; G/20ML
3.38 INJECTION, POWDER, LYOPHILIZED, FOR SOLUTION INTRAVENOUS ONCE
Refills: 0 | Status: COMPLETED | OUTPATIENT
Start: 2021-01-20 | End: 2021-01-20

## 2021-01-20 RX ORDER — SODIUM CHLORIDE 9 MG/ML
1000 INJECTION, SOLUTION INTRAVENOUS
Refills: 0 | Status: DISCONTINUED | OUTPATIENT
Start: 2021-01-20 | End: 2021-01-22

## 2021-01-20 RX ORDER — METOPROLOL TARTRATE 50 MG
5 TABLET ORAL EVERY 6 HOURS
Refills: 0 | Status: DISCONTINUED | OUTPATIENT
Start: 2021-01-20 | End: 2021-01-21

## 2021-01-20 RX ORDER — ALBUTEROL 90 UG/1
2 AEROSOL, METERED ORAL EVERY 6 HOURS
Refills: 0 | Status: DISCONTINUED | OUTPATIENT
Start: 2021-01-20 | End: 2021-01-23

## 2021-01-20 RX ORDER — POTASSIUM CHLORIDE 20 MEQ
10 PACKET (EA) ORAL
Refills: 0 | Status: COMPLETED | OUTPATIENT
Start: 2021-01-20 | End: 2021-01-20

## 2021-01-20 RX ORDER — PIPERACILLIN AND TAZOBACTAM 4; .5 G/20ML; G/20ML
3.38 INJECTION, POWDER, LYOPHILIZED, FOR SOLUTION INTRAVENOUS EVERY 8 HOURS
Refills: 0 | Status: DISCONTINUED | OUTPATIENT
Start: 2021-01-20 | End: 2021-01-23

## 2021-01-20 RX ORDER — SODIUM CHLORIDE 9 MG/ML
1000 INJECTION INTRAMUSCULAR; INTRAVENOUS; SUBCUTANEOUS ONCE
Refills: 0 | Status: COMPLETED | OUTPATIENT
Start: 2021-01-20 | End: 2021-01-20

## 2021-01-20 RX ORDER — PIPERACILLIN AND TAZOBACTAM 4; .5 G/20ML; G/20ML
3.38 INJECTION, POWDER, LYOPHILIZED, FOR SOLUTION INTRAVENOUS EVERY 8 HOURS
Refills: 0 | Status: DISCONTINUED | OUTPATIENT
Start: 2021-01-20 | End: 2021-01-20

## 2021-01-20 RX ORDER — LANOLIN ALCOHOL/MO/W.PET/CERES
3 CREAM (GRAM) TOPICAL AT BEDTIME
Refills: 0 | Status: DISCONTINUED | OUTPATIENT
Start: 2021-01-20 | End: 2021-01-23

## 2021-01-20 RX ORDER — BUDESONIDE AND FORMOTEROL FUMARATE DIHYDRATE 160; 4.5 UG/1; UG/1
2 AEROSOL RESPIRATORY (INHALATION)
Refills: 0 | Status: DISCONTINUED | OUTPATIENT
Start: 2021-01-20 | End: 2021-01-23

## 2021-01-20 RX ORDER — ACETAMINOPHEN 500 MG
975 TABLET ORAL ONCE
Refills: 0 | Status: DISCONTINUED | OUTPATIENT
Start: 2021-01-20 | End: 2021-01-20

## 2021-01-20 RX ORDER — METOCLOPRAMIDE HCL 10 MG
10 TABLET ORAL ONCE
Refills: 0 | Status: COMPLETED | OUTPATIENT
Start: 2021-01-20 | End: 2021-01-20

## 2021-01-20 RX ORDER — LEVOTHYROXINE SODIUM 125 MCG
94 TABLET ORAL AT BEDTIME
Refills: 0 | Status: DISCONTINUED | OUTPATIENT
Start: 2021-01-20 | End: 2021-01-21

## 2021-01-20 RX ADMIN — BUDESONIDE AND FORMOTEROL FUMARATE DIHYDRATE 2 PUFF(S): 160; 4.5 AEROSOL RESPIRATORY (INHALATION) at 17:01

## 2021-01-20 RX ADMIN — Medication 3 MILLIGRAM(S): at 22:26

## 2021-01-20 RX ADMIN — PIPERACILLIN AND TAZOBACTAM 200 GRAM(S): 4; .5 INJECTION, POWDER, LYOPHILIZED, FOR SOLUTION INTRAVENOUS at 08:52

## 2021-01-20 RX ADMIN — Medication 100 MILLIEQUIVALENT(S): at 17:01

## 2021-01-20 RX ADMIN — Medication 5 MILLIGRAM(S): at 17:05

## 2021-01-20 RX ADMIN — PIPERACILLIN AND TAZOBACTAM 25 GRAM(S): 4; .5 INJECTION, POWDER, LYOPHILIZED, FOR SOLUTION INTRAVENOUS at 13:39

## 2021-01-20 RX ADMIN — SODIUM CHLORIDE 100 MILLILITER(S): 9 INJECTION, SOLUTION INTRAVENOUS at 13:39

## 2021-01-20 RX ADMIN — Medication 94 MICROGRAM(S): at 21:34

## 2021-01-20 RX ADMIN — Medication 100 MILLIEQUIVALENT(S): at 14:38

## 2021-01-20 RX ADMIN — Medication 100 MILLIEQUIVALENT(S): at 19:43

## 2021-01-20 RX ADMIN — Medication 10 MILLIGRAM(S): at 01:09

## 2021-01-20 RX ADMIN — SODIUM CHLORIDE 1000 MILLILITER(S): 9 INJECTION INTRAMUSCULAR; INTRAVENOUS; SUBCUTANEOUS at 08:52

## 2021-01-20 RX ADMIN — Medication 100 MILLIEQUIVALENT(S): at 13:39

## 2021-01-20 RX ADMIN — Medication 100 MILLIEQUIVALENT(S): at 22:26

## 2021-01-20 RX ADMIN — Medication 100 MILLIEQUIVALENT(S): at 20:53

## 2021-01-20 NOTE — CONSULT NOTE ADULT - PROBLEM SELECTOR RECOMMENDATION 9
r/o choledocholiathias vs cholangitis. CT showing extrahepatic duct enhancement.  with transaminitis, elevated bilirubin c/w obstructive picture  - MRCP f/u results  - NPO, GI following, IVF  - trend LFTs, wbc  - as per surgery  - iv zosyn ordered  - hypokalemia - repletion ordered, monitor

## 2021-01-20 NOTE — ED PROVIDER NOTE - ATTENDING CONTRIBUTION TO CARE
Pt transferred from [facility] for elevated LFTs with nausea and vomiting and epigastric pain that has since improved from last night, transferred for continuity of care with her surgeon s/p tom velazquez 12/19/2020.  Pt is pleasant, has minimal epigastric pain that is nontender currently, mildly tachycardic with improvement with fluids, and rectal temp performed showing 101F.

## 2021-01-20 NOTE — ED PROVIDER NOTE - OBJECTIVE STATEMENT
81 y/o female htn, hypothyroid presents as a transfer from [facility] for surg eval and mrcp. patient is s/p recent laparoscopic cholecystectomy (12/12/2020) with Dr. Dasilva at [facility] for acute gangrenous cholecystitis presented yesterday for nausea, vomiting, and abdominal pain. patient possible retained stone. patient pain free at this time with no acute complaints 79 y/o female htn, hypothyroid presents as a transfer from [facility] for surg eval and mrcp. patient is s/p recent laparoscopic cholecystectomy (12/12/2020) with Dr. Dasilva at [facility] for acute gangrenous cholecystitis presented yesterday for nausea, vomiting, and abdominal pain. patient with possible retained stone. patient pain free at this time with no acute complaints.

## 2021-01-20 NOTE — ED ADULT NURSE REASSESSMENT NOTE - NS ED NURSE REASSESS COMMENT FT1
Report received from break coverage MISTY SHIELDS Pt. A&Ox4, in no acute distress. VS as noted. Pt. hypertensive and febrile, TAN Gtz made aware. Pt. denies headache, vision changes, chills, chest pain. Respirations even & unlabored on room air. Will continue to monitor.

## 2021-01-20 NOTE — H&P ADULT - ASSESSMENT
79 y/o female s/p recent laparoscopic cholecystectomy (12/12/20) for gangrenous cholecystitis now presenting with acute onset abdominal pain and nausea x1 day, currently improved since arrival in ED. Laboratory values demonstrating elevated bilirubin and liver enzymes. Hemodynamically stable. Clinical picture concerning for possible retained stone and early cholangitis.     PLAN:  - Admit to Acute Care Surgery under Dr. Hensley  - MRCP to evaluate biliary structures  - Will keep NPO/IVFs  - f/u GI evaluation and recommendations  - f/u repeat labs to trend transaminitis and hyperbilirubinema  - Pain control as needed  - SCDs for VTE ppx    Discussed with Acute Care Surgery attending surgeon Dr. Hensley.    ZAINA Carr, PGY-2  [facility]  Acute Care Surgery  [id number]

## 2021-01-20 NOTE — ED ADULT NURSE REASSESSMENT NOTE - NS ED NURSE REASSESS COMMENT FT1
Pt. A&Ox4, ambulatory. Respirations even & unlabored on room air. Offers no complaints. Denies pain. Pt. being transferred to [facility] at present by EMS.

## 2021-01-20 NOTE — H&P ADULT - ATTENDING COMMENTS
Patient seen and examined and agree with above.   80 year old female with PMH significant for HTN, COPD (30 PYH smoking), Hypothyroidism, diverticulosis, CAD/MI (age 36), lung ca s/p partial right pneumonectomy, s/p recent laparoscopic cholecystectomy (12/12/2020) with Dr. Dasilva at [facility] for acute gangrenous cholecystitis. She presents for abdominal pain.  On exam she is hemodynamically stable and abdomen is soft nontender, nondistended with no rebound or guarding.   LFTs elevated:  TB 2.0  AST//262  Patient plan for MRCP to rule out choledocholithiasis.   GI consultation and repeat LFTs in the AM  Will keep NPO at this time.

## 2021-01-20 NOTE — ED PROVIDER NOTE - CLINICAL SUMMARY MEDICAL DECISION MAKING FREE TEXT BOX
Dr. Quiroga Note: elderly female s/p lap caroline last month with epigastric pain, vomiting, elevated LFTs, and questionable ct reading, transferred from [facility], consider viral infection vs infected retained stone vs ascending cholangitis. Surgical and GI consult, repeat labs, fluids, antibx.

## 2021-01-20 NOTE — CONSULT NOTE ADULT - ASSESSMENT
79 y/o female s/p recent laparoscopic cholecystectomy (12/12/20) for gangrenous cholecystitis now presenting with acute onset abdominal pain and nausea x1 day, currently improved since arrival in ED. Laboratory values demonstrating elevated bilirubin and liver enzymes. Hemodynamically stable. Clinical picture concerning for possible retained stone and early cholangitis.     -Patient will need MRCP to evaluate biliary ductal tree.  -May need biliary decompression pending MRCP findings  -Patient will need IV antibiotics and IV fluid resuscitation.     -Patient expressed wishes to be transferred to [facility] to be cared for by Dr. Dasilva's team ([facility]). The patient had expected to be taken to [facility] by ambulance but was brought to [facility] instead. I discussed with the patient that the surgical team at [facility] could care for her here, but given her complicated post-operative course she stated that she would prefer to transfer her care to [facility] if possible.    -Discussed case with Dr. Oneil, general surgeon on call at [facility]. Patient clinically stable for transfer. Agree with transfer as per patient's request    -Discussed case with Dr. Hensley, [facility] surgeon of the week, who will accept transfer of patient to [facility] ED.     -Discussed with [facility] ED physician team who will facilitate transfer to [facility].

## 2021-01-20 NOTE — H&P ADULT - HISTORY OF PRESENT ILLNESS
80F pmhx HTN, COPD (30 PYH smoking), Hypothyroidism, diverticulosis, CAD/MI (age 36), lung ca s/p partial right pneumonectomy, s/p recent laparoscopic cholecystectomy (12/12/2020) with Dr. Dasilva at [facility] for acute gangrenous cholecystitis presents with 1 day of nausea, vomiting, and abdominal pain. Her pain was also associated with fevers and chills at home. The patient states that she was doing well since discharge, but that acutely yesterday evening she had return of pain that was similar to the pain she experienced when she had cholecystitis. Her hospital course post-cholecystectomy was c/b afib RVR , acute hypoxic respiratory failure 2/2 to sepsis, and transaminitis with MRCP showing postoperative changes without CBD stone, and acute blood loss anemia likely secondary to diverticular bleed. The patient improved and was discharged on 12/22, and had been doing well.    In [facility] ED, the patient was noted to be febrile to 38.2, but hemodynamically stable. Her laboratory values were significant for WBC of 11.8, T. bili of 2 and a transaminitis, that is elevated from her discharge values. While in the ED the patient states her pain improved and was no longer experiencing nausea. A CT A/P was obtained that demonstrated mucosal enhancement of the hepatic ducts, a CBD of 8mm, and expected post-surgical changes in the operative bed. Surgery consulted to evaluate. Patient transferred to [facility] ED due to patient's request.    While at [facility] ED, patient was febrile to 101F, hemodynamically stable. Surgery and GI service consulted for further evaluation.

## 2021-01-20 NOTE — ED ADULT NURSE NOTE - OBJECTIVE STATEMENT
79 y/o female PMHX HTN, LUNG CA, MI, CHOLECYSTECTOMY, BIBA transfer from [facility] for MRCP, pt initially went to [facility] for nausea and vomiting yesterday and RUQ pain, pt denies any nausea vomiting now, denies any abdominal pain, abdomen soft non tender, pt was given sepsis protocol meds for fever at [facility], pt denies any fever chills diarrhea now, denies any CP or SOB, breathing unlabored chest rise symmetrical, pt was swabbed for COVID at [facility], pt denies any urinary symptoms. +PMS FROM in all extremities steady gait noted, call bell in reach safety precautions in place. 81 y/o female PMHX HTN, LUNG CA, MI, CHOLECYSTECTOMY, BIBA transfer from [facility] for MRCP, pt initially went to [facility] for nausea and vomiting yesterday and RUQ pain, pt denies any nausea vomiting now, denies any abdominal pain, abdomen soft non tender, pt was given sepsis protocol meds for fever at [facility], pt denies any fever chills diarrhea now, denies any CP or SOB, breathing unlabored chest rise symmetrical, pt was swabbed for COVID at [facility], pt denies any urinary symptoms. +PMS FROM in all extremities steady gait noted, pt placed on cardiac monitor, call bell in reach safety precautions in place.

## 2021-01-20 NOTE — CONSULT NOTE ADULT - SUBJECTIVE AND OBJECTIVE BOX
GENERAL SURGERY CONSULT NOTE  Attending: Dr. Oneil  Service: B Team  Contact: [phone]    HPI  80F pmhx HTN, COPD (30 PYH smoking), Hypothyroidism, diverticulosis, CAD/MI (age 36), lung ca s/p partial right pneumonectomy, s/p recent laparoscopic cholecystectomy (2020) with Dr. Dasilva at [facility] for acute gangrenous cholecystitis presents with 1 day of nausea, vomiting, and abdominal pain. Her pain was also associated with fevers and chills at home. The patient states that she was doing well since discharge, but that acutely yesterday evening she had return of pain that was similar to the pain she experienced when she had cholecystitis. Her hospital course post-cholecystectomy was c/b afib RVR , acute hypoxic respiratory failure 2/2 to sepsis, and transaminitis with MRCP showing postoperative changes without CBD stone, and acute blood loss anemia likely secondary to diverticular bleed. The patient improved and was discharged on , and had been doing well.    In [facility] ED, the patient was noted to be febrile to 38.2, but hemodynamically stable. Her laboratory values were significant for WBC of 11.8, T. bili of 2 and a transaminitis, that is elevated from her discharge values. While in the ED the patient states her pain improved and was no longer experiencing nausea. A CT A/P was obtained that demonstrated mucosal enhancement of the hepatic ducts, a CBD of 8mm, and expected post-surgical changes in the operative bed. Surgery consulted to evaluate.     PMH/PSH  MI (myocardial infarction)    Hypertension      History of appendectomy        MEDICATIONS      Allergies    No Known Allergies    Intolerances        Social    Physical Exam  T(C): 37.3 (21 @ 05:37), Max: 38.2 (21 @ 01:54)  HR: 64 (21 @ 05:37) (64 - 90)  BP: 137/64 (21 @ 05:37) (137/64 - 186/80)  RR: 18 (21 @ 05:37) (18 - 19)  SpO2: 100% (21 @ 05:37) (94% - 100%)  Wt(kg): --  Tmax: T(C): , Max: 38.2 (01-20-21 @ 01:54)  Wt(kg): --      Gen: NAD  Neuro: AAOx3  HEENT: normocephalic, atraumatic, no scleral icterus  CV: S1, S2, RRR  Pulm: CTA B/L  Abd: Soft, non-tender, non-distended, no rebound, no guarding, no palpable organomegaly/masses, previous laparoscopic incisions are well healed, no erythema or discharge.  Ext: warm, no edema    LABS                        11.5   11.80 )-----------( 297      ( 2021 22:42 )             37.9         139  |  102  |  12  ----------------------------<  138<H>  3.9   |  28  |  0.78    Ca    9.0      2021 22:25    TPro  6.6  /  Alb  3.8  /  TBili  2.0<H>  /  DBili  x   /  AST  432<H>  /  ALT  262<H>  /  AlkPhos  386<H>      PT/INR - ( 2021 22:56 )   PT: 12.2 sec;   INR: 1.07 ratio         PTT - ( 2021 22:56 )  PTT:29.0 sec  Urinalysis Basic - ( 2021 00:38 )    Color: Yellow / Appearance: Clear / S.010 / pH: x  Gluc: x / Ketone: Negative  / Bili: Negative / Urobili: <2 mg/dL   Blood: x / Protein: Negative / Nitrite: Negative   Leuk Esterase: Moderate / RBC: 2 /HPF / WBC 25-50 /HPF   Sq Epi: x / Non Sq Epi: moderate /HPF / Bacteria: small            IMAGING  < from: CT Abdomen and Pelvis w/ IV Cont (21 @ 01:39) >    EXAM:  CT ABDOMEN AND PELVIS IC        PROCEDURE DATE:  2021         INTERPRETATION:  CLINICAL INFORMATION: Right upper quadrant abdominal pain, nausea, vomiting status post cholecystectomy.    COMPARISON: CT abdomen/pelvis 2020.    PROCEDURE:  CT of the Abdomen and Pelvis was performed with intravenous contrast.  Intravenous contrast: 90 ml Omnipaque 350. 10 ml discarded.  Oral contrast: None.  Sagittal and coronal reformats were performed.    FINDINGS:  LOWER CHEST: Coronary artery calcifications. Post surgical changes of the right lung. Linear atelectasis of the lingula.    LIVER: Subcentimeter hypodensities, too small to characterize.  BILE DUCTS: Normal caliber. The common bile duct measures 8 mm. Mucosal enhancement of the extrahepatic ducts.  GALLBLADDER: Cholecystectomy. Resolution of previously seen small fluid collection. Trace amount of fluid and stranding in the gallbladder fossa residual postsurgical change.  SPLEEN: Within normal limits.  PANCREAS: Unchanged peripheral subcentimeter hypodensity..  ADRENALS: Within normal limits.  KIDNEYS/URETERS: No hydronephrosis. Duplicated left renal collecting system. Bilateral renal cysts. Bilateral subcentimeter hypodensities, too small to characterize.    BLADDER: Distended.  REPRODUCTIVE ORGANS: Uterus and adnexa within normal limits. Pessary device in place.    BOWEL: Colonic diverticulosis without diverticulitis. No bowel obstruction. Appendix is not visualized. No evidence of inflammation in the pericecal region. Stable small periampullary duodenal diverticulum.  PERITONEUM: No ascites.  VESSELS: Atherosclerotic changes.  RETROPERITONEUM/LYMPH NODES: No lymphadenopathy.  ABDOMINAL WALL: Small fat-containing umbilical hernia.  BONES: Degenerative changes.    IMPRESSION:  Mucosal enhancement of the extrahepatic ducts, which may be secondary to infection or inflammation. Correlate with labs.    < end of copied text >

## 2021-01-20 NOTE — H&P ADULT - NSHPPHYSICALEXAM_GEN_ALL_CORE
VITAL SIGNS:  Vital Signs Last 24 Hrs  T(C): 38.3 (20 Jan 2021 08:35), Max: 38.3 (20 Jan 2021 08:35)  T(F): 101 (20 Jan 2021 08:35), Max: 101 (20 Jan 2021 08:35)  HR: 86 (20 Jan 2021 09:20) (64 - 106)  BP: 144/75 (20 Jan 2021 09:20) (116/71 - 186/80)  BP(mean): --  RR: 16 (20 Jan 2021 09:20) (16 - 19)  SpO2: 97% (20 Jan 2021 09:20) (94% - 100%)      PHYSICAL EXAM:    General: NAD, Sitting in bed comfortably  HEENT: NC/AT, EOMI  Neck: Soft, supple  Cardio: RRR, nml S1/S2  Resp: Good effort, CTA b/l  Breast: No lesions/masses, no drainage  GI/Abd: Soft, NT/ND, no rebound/guarding, no masses palpated  Skin: Intact, no breakdown  Musculoskeletal: All 4 extremities moving spontaneously, no limitations

## 2021-01-20 NOTE — CONSULT NOTE ADULT - PROBLEM SELECTOR RECOMMENDATION 3
paroxysmal afib, now resolved and nsr. per prior discussion with PCP on 12/14 on prior notes, decision previously was to hold off on a/c and discuss starting as outpt.  chadsvasc score 5, 7% YEARLY risk of stroke  - would suggest starting a/c with patient and will continue to d/w pt r/b/a given high chadsvasc score. on hold currently for possible intervention.

## 2021-01-20 NOTE — H&P ADULT - NSHPLABSRESULTS_GEN_ALL_CORE
LABS:                        11.2   14.15 )-----------( 244      ( 2021 08:21 )             34.7     2021 08:21    138    |  102    |  10     ----------------------------<  130    3.3     |  23     |  0.77     Ca    8.3        2021 08:21    TPro  6.3    /  Alb  3.3    /  TBili  4.3    /  DBili  3.2    /  AST  242    /  ALT  221    /  AlkPhos  371    2021 08:21    PT/INR - ( 2021 22:56 )   PT: 12.2 sec;   INR: 1.07 ratio         PTT - ( 2021 22:56 )  PTT:29.0 sec  CAPILLARY BLOOD GLUCOSE      POCT Blood Glucose.: 120 mg/dL (2021 05:30)        LIVER FUNCTIONS - ( 2021 08:21 )  Alb: 3.3 g/dL / Pro: 6.3 g/dL / ALK PHOS: 371 U/L / ALT: 221 U/L / AST: 242 U/L / GGT: x             Urinalysis Basic - ( 2021 00:38 )    Color: Yellow / Appearance: Clear / S.010 / pH: x  Gluc: x / Ketone: Negative  / Bili: Negative / Urobili: <2 mg/dL   Blood: x / Protein: Negative / Nitrite: Negative   Leuk Esterase: Moderate / RBC: 2 /HPF / WBC 25-50 /HPF   Sq Epi: x / Non Sq Epi: moderate /HPF / Bacteria: small      IMAGING:    CT Abdomen and Pelvis w/ IV Cont (21 @ 01:39)    FINDINGS:  LOWER CHEST: Coronary artery calcifications. Post surgical changes of the right lung. Linear atelectasis of the lingula.    LIVER: Subcentimeter hypodensities, too small to characterize.  BILE DUCTS: Normal caliber. The common bile duct measures 8 mm. Mucosal enhancement of the extrahepatic ducts.  GALLBLADDER: Cholecystectomy. Resolution of previously seen small fluid collection. Trace amount of fluid and stranding in the gallbladder fossa residual postsurgical change.  SPLEEN: Within normal limits.  PANCREAS: Unchanged peripheral subcentimeter hypodensity..  ADRENALS: Within normal limits.  KIDNEYS/URETERS: No hydronephrosis. Duplicated left renal collecting system. Bilateral renal cysts. Bilateral subcentimeter hypodensities, too small to characterize.    BLADDER: Distended.  REPRODUCTIVE ORGANS: Uterus and adnexa within normal limits. Pessary device in place.    BOWEL: Colonic diverticulosis without diverticulitis. No bowel obstruction. Appendix is not visualized. No evidence of inflammation in the pericecal region. Stable small periampullary duodenal diverticulum.  PERITONEUM: No ascites.  VESSELS: Atherosclerotic changes.  RETROPERITONEUM/LYMPH NODES: No lymphadenopathy.  ABDOMINAL WALL: Small fat-containing umbilical hernia.  BONES: Degenerative changes.    IMPRESSION:  Mucosal enhancement of the extrahepatic ducts, which may be secondary to infection or inflammation. Correlate with labs.

## 2021-01-20 NOTE — CONSULT NOTE ADULT - PROBLEM SELECTOR RECOMMENDATION 5
prior hx of acute hypoxia req NC  - resume home advair - symbicort equivalent  - albuterol prn  - encourage use of incentive spirometer

## 2021-01-21 LAB
ALBUMIN SERPL ELPH-MCNC: 3 G/DL — LOW (ref 3.3–5)
ALP SERPL-CCNC: 293 U/L — HIGH (ref 40–120)
ALT FLD-CCNC: 134 U/L — HIGH (ref 10–45)
ANION GAP SERPL CALC-SCNC: 9 MMOL/L — SIGNIFICANT CHANGE UP (ref 5–17)
AST SERPL-CCNC: 99 U/L — HIGH (ref 10–40)
BILIRUB SERPL-MCNC: 3.1 MG/DL — HIGH (ref 0.2–1.2)
BUN SERPL-MCNC: 9 MG/DL — SIGNIFICANT CHANGE UP (ref 7–23)
CALCIUM SERPL-MCNC: 8.8 MG/DL — SIGNIFICANT CHANGE UP (ref 8.4–10.5)
CHLORIDE SERPL-SCNC: 102 MMOL/L — SIGNIFICANT CHANGE UP (ref 96–108)
CO2 SERPL-SCNC: 23 MMOL/L — SIGNIFICANT CHANGE UP (ref 22–31)
CREAT SERPL-MCNC: 0.65 MG/DL — SIGNIFICANT CHANGE UP (ref 0.5–1.3)
CULTURE RESULTS: SIGNIFICANT CHANGE UP
GLUCOSE SERPL-MCNC: 102 MG/DL — HIGH (ref 70–99)
HCT VFR BLD CALC: 34.8 % — SIGNIFICANT CHANGE UP (ref 34.5–45)
HGB BLD-MCNC: 11.3 G/DL — LOW (ref 11.5–15.5)
MAGNESIUM SERPL-MCNC: 1.8 MG/DL — SIGNIFICANT CHANGE UP (ref 1.6–2.6)
MCHC RBC-ENTMCNC: 29.9 PG — SIGNIFICANT CHANGE UP (ref 27–34)
MCHC RBC-ENTMCNC: 32.5 GM/DL — SIGNIFICANT CHANGE UP (ref 32–36)
MCV RBC AUTO: 92.1 FL — SIGNIFICANT CHANGE UP (ref 80–100)
NRBC # BLD: 0 /100 WBCS — SIGNIFICANT CHANGE UP (ref 0–0)
PHOSPHATE SERPL-MCNC: 2.2 MG/DL — LOW (ref 2.5–4.5)
PLATELET # BLD AUTO: 224 K/UL — SIGNIFICANT CHANGE UP (ref 150–400)
POTASSIUM SERPL-MCNC: 3.4 MMOL/L — LOW (ref 3.5–5.3)
POTASSIUM SERPL-SCNC: 3.4 MMOL/L — LOW (ref 3.5–5.3)
PROT SERPL-MCNC: 5.9 G/DL — LOW (ref 6–8.3)
RBC # BLD: 3.78 M/UL — LOW (ref 3.8–5.2)
RBC # FLD: 14.3 % — SIGNIFICANT CHANGE UP (ref 10.3–14.5)
SARS-COV-2 IGG SERPL QL IA: NEGATIVE — SIGNIFICANT CHANGE UP
SARS-COV-2 IGM SERPL IA-ACNC: <0.1 INDEX — SIGNIFICANT CHANGE UP
SODIUM SERPL-SCNC: 134 MMOL/L — LOW (ref 135–145)
SPECIMEN SOURCE: SIGNIFICANT CHANGE UP
WBC # BLD: 11.01 K/UL — HIGH (ref 3.8–10.5)
WBC # FLD AUTO: 11.01 K/UL — HIGH (ref 3.8–10.5)

## 2021-01-21 PROCEDURE — 99232 SBSQ HOSP IP/OBS MODERATE 35: CPT | Mod: GC

## 2021-01-21 PROCEDURE — 99233 SBSQ HOSP IP/OBS HIGH 50: CPT

## 2021-01-21 RX ORDER — POTASSIUM CHLORIDE 20 MEQ
10 PACKET (EA) ORAL
Refills: 0 | Status: COMPLETED | OUTPATIENT
Start: 2021-01-21 | End: 2021-01-21

## 2021-01-21 RX ORDER — SIMVASTATIN 20 MG/1
20 TABLET, FILM COATED ORAL AT BEDTIME
Refills: 0 | Status: DISCONTINUED | OUTPATIENT
Start: 2021-01-21 | End: 2021-01-23

## 2021-01-21 RX ORDER — LEVOTHYROXINE SODIUM 125 MCG
125 TABLET ORAL DAILY
Refills: 0 | Status: DISCONTINUED | OUTPATIENT
Start: 2021-01-21 | End: 2021-01-23

## 2021-01-21 RX ORDER — ENOXAPARIN SODIUM 100 MG/ML
40 INJECTION SUBCUTANEOUS DAILY
Refills: 0 | Status: DISCONTINUED | OUTPATIENT
Start: 2021-01-21 | End: 2021-01-23

## 2021-01-21 RX ORDER — POTASSIUM PHOSPHATE, MONOBASIC POTASSIUM PHOSPHATE, DIBASIC 236; 224 MG/ML; MG/ML
15 INJECTION, SOLUTION INTRAVENOUS ONCE
Refills: 0 | Status: COMPLETED | OUTPATIENT
Start: 2021-01-21 | End: 2021-01-21

## 2021-01-21 RX ORDER — PANTOPRAZOLE SODIUM 20 MG/1
40 TABLET, DELAYED RELEASE ORAL
Refills: 0 | Status: DISCONTINUED | OUTPATIENT
Start: 2021-01-21 | End: 2021-01-23

## 2021-01-21 RX ORDER — METOPROLOL TARTRATE 50 MG
25 TABLET ORAL
Refills: 0 | Status: DISCONTINUED | OUTPATIENT
Start: 2021-01-21 | End: 2021-01-23

## 2021-01-21 RX ORDER — ESCITALOPRAM OXALATE 10 MG/1
10 TABLET, FILM COATED ORAL DAILY
Refills: 0 | Status: DISCONTINUED | OUTPATIENT
Start: 2021-01-21 | End: 2021-01-23

## 2021-01-21 RX ADMIN — PIPERACILLIN AND TAZOBACTAM 25 GRAM(S): 4; .5 INJECTION, POWDER, LYOPHILIZED, FOR SOLUTION INTRAVENOUS at 21:12

## 2021-01-21 RX ADMIN — Medication 5 MILLIGRAM(S): at 06:04

## 2021-01-21 RX ADMIN — Medication 100 MILLIEQUIVALENT(S): at 18:54

## 2021-01-21 RX ADMIN — Medication 25 MILLIGRAM(S): at 18:11

## 2021-01-21 RX ADMIN — SIMVASTATIN 20 MILLIGRAM(S): 20 TABLET, FILM COATED ORAL at 20:59

## 2021-01-21 RX ADMIN — BUDESONIDE AND FORMOTEROL FUMARATE DIHYDRATE 2 PUFF(S): 160; 4.5 AEROSOL RESPIRATORY (INHALATION) at 06:05

## 2021-01-21 RX ADMIN — PIPERACILLIN AND TAZOBACTAM 25 GRAM(S): 4; .5 INJECTION, POWDER, LYOPHILIZED, FOR SOLUTION INTRAVENOUS at 00:15

## 2021-01-21 RX ADMIN — ENOXAPARIN SODIUM 40 MILLIGRAM(S): 100 INJECTION SUBCUTANEOUS at 18:11

## 2021-01-21 RX ADMIN — Medication 5 MILLIGRAM(S): at 00:14

## 2021-01-21 RX ADMIN — SODIUM CHLORIDE 100 MILLILITER(S): 9 INJECTION, SOLUTION INTRAVENOUS at 18:11

## 2021-01-21 RX ADMIN — POTASSIUM PHOSPHATE, MONOBASIC POTASSIUM PHOSPHATE, DIBASIC 62.5 MILLIMOLE(S): 236; 224 INJECTION, SOLUTION INTRAVENOUS at 22:25

## 2021-01-21 RX ADMIN — Medication 100 MILLIEQUIVALENT(S): at 16:07

## 2021-01-21 RX ADMIN — SODIUM CHLORIDE 100 MILLILITER(S): 9 INJECTION, SOLUTION INTRAVENOUS at 16:08

## 2021-01-21 RX ADMIN — PIPERACILLIN AND TAZOBACTAM 25 GRAM(S): 4; .5 INJECTION, POWDER, LYOPHILIZED, FOR SOLUTION INTRAVENOUS at 13:11

## 2021-01-21 RX ADMIN — PIPERACILLIN AND TAZOBACTAM 25 GRAM(S): 4; .5 INJECTION, POWDER, LYOPHILIZED, FOR SOLUTION INTRAVENOUS at 06:04

## 2021-01-21 RX ADMIN — SODIUM CHLORIDE 100 MILLILITER(S): 9 INJECTION, SOLUTION INTRAVENOUS at 06:04

## 2021-01-21 RX ADMIN — SODIUM CHLORIDE 100 MILLILITER(S): 9 INJECTION, SOLUTION INTRAVENOUS at 20:58

## 2021-01-21 RX ADMIN — Medication 5 MILLIGRAM(S): at 13:11

## 2021-01-21 RX ADMIN — Medication 100 MILLIEQUIVALENT(S): at 13:11

## 2021-01-21 RX ADMIN — Medication 3 MILLIGRAM(S): at 20:59

## 2021-01-21 RX ADMIN — PANTOPRAZOLE SODIUM 40 MILLIGRAM(S): 20 TABLET, DELAYED RELEASE ORAL at 20:58

## 2021-01-22 ENCOUNTER — TRANSCRIPTION ENCOUNTER (OUTPATIENT)
Age: 81
End: 2021-01-22

## 2021-01-22 DIAGNOSIS — K83.09 OTHER CHOLANGITIS: ICD-10-CM

## 2021-01-22 LAB
ALBUMIN SERPL ELPH-MCNC: 2.9 G/DL — LOW (ref 3.3–5)
ALP SERPL-CCNC: 241 U/L — HIGH (ref 40–120)
ALT FLD-CCNC: 82 U/L — HIGH (ref 10–45)
ANION GAP SERPL CALC-SCNC: 11 MMOL/L — SIGNIFICANT CHANGE UP (ref 5–17)
AST SERPL-CCNC: 43 U/L — HIGH (ref 10–40)
BILIRUB DIRECT SERPL-MCNC: 0.5 MG/DL — HIGH (ref 0–0.2)
BILIRUB INDIRECT FLD-MCNC: 0.5 MG/DL — SIGNIFICANT CHANGE UP (ref 0.2–1)
BILIRUB SERPL-MCNC: 1 MG/DL — SIGNIFICANT CHANGE UP (ref 0.2–1.2)
BUN SERPL-MCNC: 6 MG/DL — LOW (ref 7–23)
CALCIUM SERPL-MCNC: 8.7 MG/DL — SIGNIFICANT CHANGE UP (ref 8.4–10.5)
CHLORIDE SERPL-SCNC: 103 MMOL/L — SIGNIFICANT CHANGE UP (ref 96–108)
CO2 SERPL-SCNC: 24 MMOL/L — SIGNIFICANT CHANGE UP (ref 22–31)
CREAT SERPL-MCNC: 0.61 MG/DL — SIGNIFICANT CHANGE UP (ref 0.5–1.3)
GLUCOSE SERPL-MCNC: 78 MG/DL — SIGNIFICANT CHANGE UP (ref 70–99)
HCT VFR BLD CALC: 32.8 % — LOW (ref 34.5–45)
HGB BLD-MCNC: 10.5 G/DL — LOW (ref 11.5–15.5)
LIDOCAIN IGE QN: 23 U/L — SIGNIFICANT CHANGE UP (ref 7–60)
MAGNESIUM SERPL-MCNC: 1.8 MG/DL — SIGNIFICANT CHANGE UP (ref 1.6–2.6)
MCHC RBC-ENTMCNC: 29.2 PG — SIGNIFICANT CHANGE UP (ref 27–34)
MCHC RBC-ENTMCNC: 32 GM/DL — SIGNIFICANT CHANGE UP (ref 32–36)
MCV RBC AUTO: 91.4 FL — SIGNIFICANT CHANGE UP (ref 80–100)
NRBC # BLD: 0 /100 WBCS — SIGNIFICANT CHANGE UP (ref 0–0)
PHOSPHATE SERPL-MCNC: 2.6 MG/DL — SIGNIFICANT CHANGE UP (ref 2.5–4.5)
PLATELET # BLD AUTO: 206 K/UL — SIGNIFICANT CHANGE UP (ref 150–400)
POTASSIUM SERPL-MCNC: 3.3 MMOL/L — LOW (ref 3.5–5.3)
POTASSIUM SERPL-SCNC: 3.3 MMOL/L — LOW (ref 3.5–5.3)
PROT SERPL-MCNC: 5.7 G/DL — LOW (ref 6–8.3)
RBC # BLD: 3.59 M/UL — LOW (ref 3.8–5.2)
RBC # FLD: 14 % — SIGNIFICANT CHANGE UP (ref 10.3–14.5)
SODIUM SERPL-SCNC: 138 MMOL/L — SIGNIFICANT CHANGE UP (ref 135–145)
WBC # BLD: 6.31 K/UL — SIGNIFICANT CHANGE UP (ref 3.8–10.5)
WBC # FLD AUTO: 6.31 K/UL — SIGNIFICANT CHANGE UP (ref 3.8–10.5)

## 2021-01-22 PROCEDURE — 99233 SBSQ HOSP IP/OBS HIGH 50: CPT

## 2021-01-22 PROCEDURE — 99231 SBSQ HOSP IP/OBS SF/LOW 25: CPT | Mod: GC

## 2021-01-22 PROCEDURE — 99223 1ST HOSP IP/OBS HIGH 75: CPT | Mod: GC

## 2021-01-22 RX ORDER — PSYLLIUM SEED (WITH DEXTROSE)
1 POWDER (GRAM) ORAL AT BEDTIME
Refills: 0 | Status: DISCONTINUED | OUTPATIENT
Start: 2021-01-22 | End: 2021-01-23

## 2021-01-22 RX ORDER — SODIUM,POTASSIUM PHOSPHATES 278-250MG
1 POWDER IN PACKET (EA) ORAL ONCE
Refills: 0 | Status: COMPLETED | OUTPATIENT
Start: 2021-01-22 | End: 2021-01-22

## 2021-01-22 RX ORDER — POTASSIUM CHLORIDE 20 MEQ
20 PACKET (EA) ORAL
Refills: 0 | Status: COMPLETED | OUTPATIENT
Start: 2021-01-22 | End: 2021-01-22

## 2021-01-22 RX ORDER — MAGNESIUM SULFATE 500 MG/ML
2 VIAL (ML) INJECTION ONCE
Refills: 0 | Status: COMPLETED | OUTPATIENT
Start: 2021-01-22 | End: 2021-01-22

## 2021-01-22 RX ADMIN — PANTOPRAZOLE SODIUM 40 MILLIGRAM(S): 20 TABLET, DELAYED RELEASE ORAL at 06:30

## 2021-01-22 RX ADMIN — PIPERACILLIN AND TAZOBACTAM 25 GRAM(S): 4; .5 INJECTION, POWDER, LYOPHILIZED, FOR SOLUTION INTRAVENOUS at 06:30

## 2021-01-22 RX ADMIN — Medication 20 MILLIEQUIVALENT(S): at 16:34

## 2021-01-22 RX ADMIN — Medication 3 MILLIGRAM(S): at 21:36

## 2021-01-22 RX ADMIN — PIPERACILLIN AND TAZOBACTAM 25 GRAM(S): 4; .5 INJECTION, POWDER, LYOPHILIZED, FOR SOLUTION INTRAVENOUS at 16:33

## 2021-01-22 RX ADMIN — PIPERACILLIN AND TAZOBACTAM 25 GRAM(S): 4; .5 INJECTION, POWDER, LYOPHILIZED, FOR SOLUTION INTRAVENOUS at 21:36

## 2021-01-22 RX ADMIN — SIMVASTATIN 20 MILLIGRAM(S): 20 TABLET, FILM COATED ORAL at 21:36

## 2021-01-22 RX ADMIN — Medication 125 MICROGRAM(S): at 06:30

## 2021-01-22 RX ADMIN — Medication 20 MILLIEQUIVALENT(S): at 12:59

## 2021-01-22 RX ADMIN — Medication 20 MILLIEQUIVALENT(S): at 17:57

## 2021-01-22 RX ADMIN — ENOXAPARIN SODIUM 40 MILLIGRAM(S): 100 INJECTION SUBCUTANEOUS at 12:58

## 2021-01-22 RX ADMIN — ESCITALOPRAM OXALATE 10 MILLIGRAM(S): 10 TABLET, FILM COATED ORAL at 12:59

## 2021-01-22 RX ADMIN — Medication 25 MILLIGRAM(S): at 06:30

## 2021-01-22 RX ADMIN — Medication 1 PACKET(S): at 21:36

## 2021-01-22 RX ADMIN — Medication 50 GRAM(S): at 13:01

## 2021-01-22 RX ADMIN — Medication 25 MILLIGRAM(S): at 17:56

## 2021-01-22 RX ADMIN — Medication 1 PACKET(S): at 12:58

## 2021-01-22 NOTE — DISCHARGE NOTE PROVIDER - HOSPITAL COURSE
80F pmhx HTN, COPD (30 PYH smoking), Hypothyroidism, diverticulosis, CAD/MI (age 36), lung ca s/p partial right pneumonectomy, s/p recent laparoscopic cholecystectomy (12/12/2020) with Dr. Dasilva at [facility] for acute gangrenous cholecystitis presents with 1 day of nausea, vomiting, and abdominal pain. Her pain was also associated with fevers and chills at home. The patient states that she was doing well since discharge, but that acutely yesterday evening she had return of pain that was similar to the pain she experienced when she had cholecystitis. Her hospital course post-cholecystectomy was c/b afib RVR , acute hypoxic respiratory failure 2/2 to sepsis, and transaminitis with MRCP showing postoperative changes without CBD stone, and acute blood loss anemia likely secondary to diverticular bleed. The patient improved and was discharged on 12/22, and had been doing well. In [facility] ED, the patient was noted to be febrile to 38.2, but hemodynamically stable. Her laboratory values were significant for WBC of 11.8, T. bili of 2 and a transaminitis, that is elevated from her discharge values. While in the ED the patient states her pain improved and was no longer experiencing nausea. A CT A/P was obtained that demonstrated mucosal enhancement of the hepatic ducts, a CBD of 8mm, and expected post-surgical changes in the operative bed. Surgery consulted to evaluate. Patient transferred to [facility] ED due to patient's request. While at [facility] ED, patient was febrile to 101F, hemodynamically stable. Surgery and GI service consulted for further evaluation. Pt was admitted to Dr. Hensley, MRCP was ordered to evaluate biliary structures, she was made NPO and started on IVF, GI was consulted, labs were ordered, pain control and SCDs for VTE ppx. GI saw the patient and recommended an ERCP today or tomorrow pending clinical course, continue NPO, consulting pulmonary for pre-procedural risk assessment, continue IV abx and f/u blood cx. Medicine was consulted for medical management and recommended starting on metop 5 q6h iv while npo, suggest starting a/c with patient and will continue to d/w pt r/b/a given high chadsvasc score and resume home advair - symbicort equivalent, albuterol prn and encourage use of incentive spirometer on hold currently for possible intervention for her COPD. 1/21: MRCP negative for choledocholithiasis, pending ERCP, LFT were trended. GI now has no plan for EUS/ERCP at this time given no evidence of choledocholithiasis on MRCP, resolution of symptoms, and improving liver enzymes, can re-consider EUS/ERCP if symptoms recur or liver enzymes worsen. Pt should continue IV antibiotics. Pt is now on on her home meds. Pt diet was advanced as tolerated. Pulmonary was consulted and gave there recs, she has low pulmonary risk for ERCP.  GI recommended transitioning to oral antibiotics upon discharge (Ciprofloxacin 500 mg BID to complete 10 day antibiotic course).   On day of discharge, the patients vitals are stable, was tolerating diet, voiding adequatley, ambulating well and pain controlled. Pt will f/u with her PCP in 1-2 weeks. Pt will f/u with Dr. Hensley in 1-2 weeks.

## 2021-01-22 NOTE — DISCHARGE NOTE PROVIDER - NSDCMRMEDTOKEN_GEN_ALL_CORE_FT
Advair Diskus 100 mcg-50 mcg inhalation powder: 1 puff(s) inhaled 2 times a day  aspirin 81 mg oral tablet, chewable: 1 tab(s) orally once a day  Colace 100 mg oral capsule: 1 cap(s) orally 2 times a day  Lexapro 10 mg oral tablet: 1 tab(s) orally once a day  metoprolol tartrate 25 mg oral tablet: 1 tab(s) orally 2 times a day  pantoprazole 40 mg oral delayed release tablet: 1 tab(s) orally once a day (before a meal)  Please continue for 30 days   Synthroid 125 mcg (0.125 mg) oral tablet: 1 tab(s) orally once a day HOLD ON SUNDAYS  Zocor 20 mg oral tablet: 1 tab(s) orally once a day (at bedtime)   Advair Diskus 100 mcg-50 mcg inhalation powder: 1 puff(s) inhaled 2 times a day  amoxicillin-clavulanate 875 mg-125 mg oral tablet: 1 tab(s) orally 2 times a day   aspirin 81 mg oral tablet, chewable: 1 tab(s) orally once a day  Colace 100 mg oral capsule: 1 cap(s) orally 2 times a day  Lexapro 10 mg oral tablet: 1 tab(s) orally once a day  metoprolol tartrate 25 mg oral tablet: 1 tab(s) orally 2 times a day  pantoprazole 40 mg oral delayed release tablet: 1 tab(s) orally once a day (before a meal)  Please continue for 30 days   Synthroid 125 mcg (0.125 mg) oral tablet: 1 tab(s) orally once a day HOLD ON SUNDAYS  Zocor 20 mg oral tablet: 1 tab(s) orally once a day (at bedtime)

## 2021-01-22 NOTE — DISCHARGE NOTE PROVIDER - NSDCFUADDAPPT_GEN_ALL_CORE_FT
Please make an appointment and follow up outpatient with Dr. Hensley in 2 weeks    You have an appointment on Monday Jan 25th at 12pm with Dr. Liu   Please make an appointment and follow up outpatient with Dr. Hensley in 2 weeks    You have an appointment with Dr. Shari Herrera primary care physician on Monday Jan 25th at 12pm.   Address: [phone] [place] [address], [address] [place] [address].

## 2021-01-22 NOTE — CONSULT NOTE ADULT - ASSESSMENT
79 y/o F w/ hx of CAD w/ MI, COPD, HTN, hypothyroidism, lung cancer s/p partial right pneumonectomy, s/p lap caroline on 12/12/20 with hospital course c/b A-fib w/ RVR, acute hypoxic respiratory failure, acute blood loss anemia thought to represent diverticular bleeding, and abnormal liver enzymes with negative MRCP, initially brought to [facility]-ED and found to be febrile and with abnormal liver enzymes, transferred to [facility] per patient's request.    # C/f CBD stone/cholangitis: Meets criteria for cholangitis with high suspicion for CBD stone given elevated bilirubin and AST/ALT. May represent retained stone vs sludge  # Lung cancer s/p partial right pneumonectomy   # COPD  # Recent episode of hypoxic respiratory failure    Recommendations:  - Plan for ERCP today vs tomorrow pending clinical course  - Continue NPO  - Consult pulmonology for pre-procedural risk assessment  - F/U MRI/MRCP w/ and w/o contrast  - Continue IV antibiotics  - F/U blood cultures  - Rest of care per primary team    Elmer Wagner MD  Gastroenterology Fellow  Please contact via Microsoft Teams    Please call GI ([phone]) or e-mail [email or web address] if there are any additional questions or concerns, during weekdays from 8 am to 5 pm.     Please call answering service for on-call GI fellow ([phone]) after 5pm and before 8am, and on weekends.
79 yo F pmh HTN, COPD (30 PYH smoking), lung ca s/p partial right pneumonectomy, Hypothyroidism, diverticulosis/hx diverticular bleed, CAD/MI (age 36), afib not on a/c, recent admission for acute gangrenous cholecystitis s/p laparoscopic cholecystectomy 12/12/20 with course c/b GIB sepsis d/t acute cholangitis now resolving. Pulmonary consulted for optimization for possible ERCP     COPD  - 2/2 to long standing smoking history   - Patient has been well controlled for many years   - does not utilize supplemental O2 at rest or during activity   - c/w Symbicort BID while inpatient   - Albuterol HFA PRN q6h for SOB/wheezing   - Patient can follow up with her pulmonologist on discharge     Pulmonary risk   - The patient has ARISCAT score of 16   - she has low pulmonary risk for ERCP     Lambert Croft MD   Pulmonary/Critical Care Fellow PGY-5   [facility] Pager #: [phone]  [facility] Pager #: [phone]    
79 yo F pmh HTN, COPD (30 PYH smoking), lung ca s/p partial right pneumonectomy, Hypothyroidism, diverticulosis/hx diverticular bleed, CAD/MI (age 36), afib not on a/c, recent admission for acute gangrenous cholecystitis s/p laparoscopic cholecystectomy 12/12/20 with course c/b GIB now p/w acute onset fever, nausea, ruq pain, transaminitis adm with sepsis 2/2 choledocholithiasis, r/o cholangitis.

## 2021-01-22 NOTE — PROGRESS NOTE ADULT - PROBLEM SELECTOR PLAN 1
2/2 cholangitis. CT and MRCP showing extrahepatic duct enhancement, but no choledocholithasis  with transaminitis, elevated bilirubin c/w obstructive picture  RESOLVING  - GI following, no plan for ERCP now as clinically improving and no gallstone found  - trend LFTs, wbc  - as per surgery  - iv zosyn  - hypokalemia/hypophosphatemia - repletion ordered, monitor  - adv diet as tolerates
2/2 cholangitis. CT and MRCP showing extrahepatic duct enhancement, but no choledocholithasis  with transaminitis, elevated bilirubin c/w obstructive picture, now improving  - GI following, no plan for ERCP now as clinically improving and no gallstone found  - trend LFTs, wbc  - as per surgery  - iv zosyn  - hypokalemia/hypophosphatemia - repletion ordered, monitor  - clear diet and adv as tolerates

## 2021-01-22 NOTE — PROGRESS NOTE ADULT - PROBLEM SELECTOR PLAN 4
elevated  - on metop 25 bid at home, resume now that pt is receiving diet  - monitor bp.
elevated, likely also 2/2 pain  - on metop 25 bid  - monitor bp.

## 2021-01-22 NOTE — PROGRESS NOTE ADULT - PROBLEM SELECTOR PLAN 2
2/2 possibly cholangitis, procal elevated  - iv abx as above  - f/u bld cx - ngtd thus far  - monitor temp.
2/2 possibly cholangitis, procal elevated  - iv abx as above  - f/u bld cx - ngtd  - monitor temp.

## 2021-01-22 NOTE — DISCHARGE NOTE PROVIDER - CARE PROVIDER_API CALL
Tamiko Hensley (MD)  Surgery; Surgical Critical Care  [address], [address] [address]  Phone: [phone]  Fax: [phone]  Follow Up Time: 2 weeks

## 2021-01-22 NOTE — CONSULT NOTE ADULT - ATTENDING COMMENTS
Nelli Hargrove MD  Division of [facility] Medicine  Spectra: [phone]
Patient seen and examined, data and imaging personally reviewed.  Patient reports history of tobacco use, lung cancer status post lobectomy many years ago, presumably stage I.  She uses inhalers at home, has occasional episodes of bronchitis but does not report any dyspnea at baseline, has not used oxygen, feels well here.  On PE her lungs are clear, oxygen saturation at rest at RA is 96^.  No evidence of hypercapnea by lab work.  If it is determined that she requires ERCP there would be no pulmonary contraindication to proceeding with the procedure.  Please reconsult as needed.
Pt seen/examined in the early afternoon    Impression:    #1.  RUQ abd pain, resolved.  #2.  Abnormal LFTs, with rising bilirubin and falling AST/ALT  #3.  CT scan suggests mild dilation of common bile duct with wall enhancement, MRI/MRCP demonstrates nondilated biliary tree and no choledocholithiasis.  #4.  COPD, prior pneumonectomy for lung cancer, had respiratory failure after recent cholecystectomy.  #5.  CAD, on aspirin.  Also, has peripheral vascular disease.    Recommendations:    #1.  Resolution of upper abd pain and Improvement in transaminitis in setting of mildly worsening hyperbilirubinemia suggests possible passed choledocholithiasis.  #2.  Follow CBC/LFTs  #3.  Continue antimicrobials  #4.  Pt prefers to avoid invasive procedures with anesthesia given recent history of respiratory failure.  #5.  Would consult pulmonary for optimization and risk stratification.  #6,  NPO after MN for possible EUS/ERCP.

## 2021-01-22 NOTE — PROGRESS NOTE ADULT - PROBLEM SELECTOR PLAN 3
paroxysmal afib, now resolved and nsr.   chadsvasc score 5, 7% YEARLY risk of stroke. HAS-BLED score 4, 9% per 100 pts  - discussed at length with patient at bedside regarding r/b/a. Pt is ambulatory, no fall risk, functional, but does have a hx of diverticular bleed while on asa. Despite these bleeding risks, she is a high chance of debility from a potential stroke. She would like to discuss with a PCP and family prior to starting eliquis. I have set up a PCP appt for her as pt states she would like to transition to a [facility] physician partner PCP.
paroxysmal afib, now resolved and nsr. per prior discussion with PCP on 12/14 on prior notes, decision previously was to hold off on a/c and discuss starting as outpt.  chadsvasc score 5, 7% YEARLY risk of stroke  - would suggest starting a/c (ie eliquis) with patient and will continue to d/w pt r/b/a given high chadsvasc score. on hold currently for possible intervention.

## 2021-01-22 NOTE — DISCHARGE NOTE PROVIDER - NSDCCPCAREPLAN_GEN_ALL_CORE_FT
PRINCIPAL DISCHARGE DIAGNOSIS  Diagnosis: Abdominal pain  Assessment and Plan of Treatment:       SECONDARY DISCHARGE DIAGNOSES  Diagnosis: Chronic obstructive pulmonary disease, unspecified COPD type  Assessment and Plan of Treatment: Chronic obstructive pulmonary disease, unspecified COPD type    Diagnosis: Atrial fibrillation, unspecified type  Assessment and Plan of Treatment: Atrial fibrillation, unspecified type    Diagnosis: Essential hypertension  Assessment and Plan of Treatment: Essential hypertension    Diagnosis: Fever  Assessment and Plan of Treatment:

## 2021-01-22 NOTE — PROGRESS NOTE ADULT - PROBLEM SELECTOR PLAN 5
prior hx of acute hypoxia req NC  - resume home advair - symbicort equivalent  - albuterol prn  - encourage use of incentive spirometer.
prior hx of acute hypoxia req NC  - resume home advair - symbicort equivalent  - albuterol prn  - encourage use of incentive spirometer.

## 2021-01-22 NOTE — DISCHARGE NOTE PROVIDER - NSDCFUSCHEDAPPT_GEN_ALL_CORE_FT
VANI GONZALEZ ; 01/25/2021 ; NPP Med GenInt [address]  VANI GONZALEZ ; 01/27/2021 ; NPP Surg Trauma [address]

## 2021-01-22 NOTE — PROGRESS NOTE ADULT - PROBLEM SELECTOR PLAN 6
dvt ppx: f/u surgery re: resuming lovenox ppx
dvt ppx: lovenox ppx  PCP appointment set up for patient with   Dr Shari Herrera DO  1/25/2021 at 12PM, Monday  [address]  [place], [place]

## 2021-01-23 ENCOUNTER — TRANSCRIPTION ENCOUNTER (OUTPATIENT)
Age: 81
End: 2021-01-23

## 2021-01-23 VITALS
OXYGEN SATURATION: 94 % | RESPIRATION RATE: 18 BRPM | DIASTOLIC BLOOD PRESSURE: 82 MMHG | TEMPERATURE: 98 F | HEART RATE: 80 BPM | SYSTOLIC BLOOD PRESSURE: 117 MMHG

## 2021-01-23 LAB
ALBUMIN SERPL ELPH-MCNC: 3 G/DL — LOW (ref 3.3–5)
ALP SERPL-CCNC: 223 U/L — HIGH (ref 40–120)
ALT FLD-CCNC: 59 U/L — HIGH (ref 10–45)
ANION GAP SERPL CALC-SCNC: 13 MMOL/L — SIGNIFICANT CHANGE UP (ref 5–17)
AST SERPL-CCNC: 26 U/L — SIGNIFICANT CHANGE UP (ref 10–40)
BILIRUB DIRECT SERPL-MCNC: 0.3 MG/DL — HIGH (ref 0–0.2)
BILIRUB INDIRECT FLD-MCNC: 0.4 MG/DL — SIGNIFICANT CHANGE UP (ref 0.2–1)
BILIRUB SERPL-MCNC: 0.7 MG/DL — SIGNIFICANT CHANGE UP (ref 0.2–1.2)
BUN SERPL-MCNC: 6 MG/DL — LOW (ref 7–23)
CALCIUM SERPL-MCNC: 8.8 MG/DL — SIGNIFICANT CHANGE UP (ref 8.4–10.5)
CHLORIDE SERPL-SCNC: 104 MMOL/L — SIGNIFICANT CHANGE UP (ref 96–108)
CO2 SERPL-SCNC: 21 MMOL/L — LOW (ref 22–31)
CREAT SERPL-MCNC: 0.71 MG/DL — SIGNIFICANT CHANGE UP (ref 0.5–1.3)
GLUCOSE SERPL-MCNC: 98 MG/DL — SIGNIFICANT CHANGE UP (ref 70–99)
HCT VFR BLD CALC: 35.2 % — SIGNIFICANT CHANGE UP (ref 34.5–45)
HGB BLD-MCNC: 11.4 G/DL — LOW (ref 11.5–15.5)
MAGNESIUM SERPL-MCNC: 2.2 MG/DL — SIGNIFICANT CHANGE UP (ref 1.6–2.6)
MCHC RBC-ENTMCNC: 29.5 PG — SIGNIFICANT CHANGE UP (ref 27–34)
MCHC RBC-ENTMCNC: 32.4 GM/DL — SIGNIFICANT CHANGE UP (ref 32–36)
MCV RBC AUTO: 91.2 FL — SIGNIFICANT CHANGE UP (ref 80–100)
NRBC # BLD: 0 /100 WBCS — SIGNIFICANT CHANGE UP (ref 0–0)
PHOSPHATE SERPL-MCNC: 3.2 MG/DL — SIGNIFICANT CHANGE UP (ref 2.5–4.5)
PLATELET # BLD AUTO: 244 K/UL — SIGNIFICANT CHANGE UP (ref 150–400)
POTASSIUM SERPL-MCNC: 3.4 MMOL/L — LOW (ref 3.5–5.3)
POTASSIUM SERPL-SCNC: 3.4 MMOL/L — LOW (ref 3.5–5.3)
PROT SERPL-MCNC: 6.1 G/DL — SIGNIFICANT CHANGE UP (ref 6–8.3)
RBC # BLD: 3.86 M/UL — SIGNIFICANT CHANGE UP (ref 3.8–5.2)
RBC # FLD: 14.1 % — SIGNIFICANT CHANGE UP (ref 10.3–14.5)
SODIUM SERPL-SCNC: 138 MMOL/L — SIGNIFICANT CHANGE UP (ref 135–145)
WBC # BLD: 4.78 K/UL — SIGNIFICANT CHANGE UP (ref 3.8–10.5)
WBC # FLD AUTO: 4.78 K/UL — SIGNIFICANT CHANGE UP (ref 3.8–10.5)

## 2021-01-23 PROCEDURE — 82247 BILIRUBIN TOTAL: CPT

## 2021-01-23 PROCEDURE — 80048 BASIC METABOLIC PNL TOTAL CA: CPT

## 2021-01-23 PROCEDURE — 80053 COMPREHEN METABOLIC PANEL: CPT

## 2021-01-23 PROCEDURE — 85014 HEMATOCRIT: CPT

## 2021-01-23 PROCEDURE — 82803 BLOOD GASES ANY COMBINATION: CPT

## 2021-01-23 PROCEDURE — 74183 MRI ABD W/O CNTR FLWD CNTR: CPT

## 2021-01-23 PROCEDURE — 82947 ASSAY GLUCOSE BLOOD QUANT: CPT

## 2021-01-23 PROCEDURE — 85025 COMPLETE CBC W/AUTO DIFF WBC: CPT

## 2021-01-23 PROCEDURE — 86769 SARS-COV-2 COVID-19 ANTIBODY: CPT

## 2021-01-23 PROCEDURE — 85027 COMPLETE CBC AUTOMATED: CPT

## 2021-01-23 PROCEDURE — 84145 PROCALCITONIN (PCT): CPT

## 2021-01-23 PROCEDURE — 82330 ASSAY OF CALCIUM: CPT

## 2021-01-23 PROCEDURE — 80076 HEPATIC FUNCTION PANEL: CPT

## 2021-01-23 PROCEDURE — 84295 ASSAY OF SERUM SODIUM: CPT

## 2021-01-23 PROCEDURE — 96374 THER/PROPH/DIAG INJ IV PUSH: CPT

## 2021-01-23 PROCEDURE — 85018 HEMOGLOBIN: CPT

## 2021-01-23 PROCEDURE — 84132 ASSAY OF SERUM POTASSIUM: CPT

## 2021-01-23 PROCEDURE — 83605 ASSAY OF LACTIC ACID: CPT

## 2021-01-23 PROCEDURE — 82435 ASSAY OF BLOOD CHLORIDE: CPT

## 2021-01-23 PROCEDURE — 83735 ASSAY OF MAGNESIUM: CPT

## 2021-01-23 PROCEDURE — 84100 ASSAY OF PHOSPHORUS: CPT

## 2021-01-23 PROCEDURE — 87040 BLOOD CULTURE FOR BACTERIA: CPT

## 2021-01-23 PROCEDURE — 94640 AIRWAY INHALATION TREATMENT: CPT

## 2021-01-23 PROCEDURE — 99285 EMERGENCY DEPT VISIT HI MDM: CPT | Mod: 25

## 2021-01-23 PROCEDURE — 82248 BILIRUBIN DIRECT: CPT

## 2021-01-23 PROCEDURE — A9585: CPT

## 2021-01-23 PROCEDURE — 83690 ASSAY OF LIPASE: CPT

## 2021-01-23 RX ADMIN — Medication 125 MICROGRAM(S): at 04:43

## 2021-01-23 RX ADMIN — Medication 25 MILLIGRAM(S): at 04:43

## 2021-01-23 RX ADMIN — ENOXAPARIN SODIUM 40 MILLIGRAM(S): 100 INJECTION SUBCUTANEOUS at 10:21

## 2021-01-23 RX ADMIN — PANTOPRAZOLE SODIUM 40 MILLIGRAM(S): 20 TABLET, DELAYED RELEASE ORAL at 04:43

## 2021-01-23 RX ADMIN — PIPERACILLIN AND TAZOBACTAM 25 GRAM(S): 4; .5 INJECTION, POWDER, LYOPHILIZED, FOR SOLUTION INTRAVENOUS at 04:43

## 2021-01-23 RX ADMIN — ESCITALOPRAM OXALATE 10 MILLIGRAM(S): 10 TABLET, FILM COATED ORAL at 10:11

## 2021-01-23 NOTE — PROGRESS NOTE ADULT - REASON FOR ADMISSION
Abdominal pain
Abdominal ppain
Abdominal pain
Abdominal ppain
Abdominal ppain

## 2021-01-23 NOTE — DISCHARGE NOTE NURSING/CASE MANAGEMENT/SOCIAL WORK - NSDCFUADDAPPT_GEN_ALL_CORE_FT
Please make an appointment and follow up outpatient with Dr. Hensley in 2 weeks    You have an appointment with Dr. Shari Herrera primary care physician on Monday Jan 25th at 12pm.   Address: [phone] [place] [address], [address] [place] [address].

## 2021-01-23 NOTE — PROGRESS NOTE ADULT - ASSESSMENT
80F s/p recent cholecystectomy presented with abdominal pain found to have elevated LFT, MRCP is negative for choledocholithasis but with CBD enhancement concerning for cholangitis, currently with a benign physical exam and downtrending labs.    Plan  - Continue abx   - Monitor Vitals  - F/u AM labs  - Trending LFT, WBC  - GI - No ERCP at this time  - VTE ppx: enoxaparin   - DC to home with no needs, 1 week Augmentin    Trauma Surgery   [id number]  
80F s/p recent cholecystectomy presented with abdominal pain found to have elevated LFT, MRCP is negative for choledocholithasis but with CBD enhancement concerning for cholangitis.    Plan  - Continue abx   - Monitor Vitals  - F/u AM labs  - Trending LFT, WBC  - GI - No ERCP at this time  - Pulm consult  - VTE ppx: enoxaparin   
81 y/o F w/ hx of CAD w/ MI, COPD, HTN, hypothyroidism, lung cancer s/p partial right pneumonectomy, s/p lap caroline on 12/12/20 with hospital course c/b A-fib w/ RVR, acute hypoxic respiratory failure, acute blood loss anemia thought to represent diverticular bleeding, and abnormal liver enzymes with negative MRCP, initially brought to [facility]ED and found to be febrile and with abnormal liver enzymes, transferred to [facility] per patient's request.    # C/f CBD stone/cholangitis: No evidence of CBD stone on MRCP. Likely represents passed stone - now with resolution of fevers and down-trending liver enzymes  # Lung cancer s/p partial right pneumonectomy   # COPD  # Recent episode of hypoxic respiratory failure    Recommendations:  - No plan for EUS/ERCP at this time given no evidence of choledocholithiasis on MRCP, resolution of symptoms, and improving liver enzymes  - Can re-consider EUS/ERCP if symptoms recur or liver enzymes worsen  - Continue IV antibiotics  - F/U blood cultures  - Rest of care per primary team    Elmer Wagner MD  Gastroenterology Fellow  Please contact via Microsoft Teams    Please call GI ([phone]) or e-mail [email or web address] if there are any additional questions or concerns, during weekdays from 8 am to 5 pm.     Please call answering service for on-call GI fellow ([phone]) after 5pm and before 8am, and on weekends.
80F s/p recent cholecystectomy presented with abdominal pain found to have elevated LFT, MRCP is negative for choledocholithasis but with CBD enhancement concerning for cholangitis     - Continue abx in context of possible cholangitis, afebrile for 24 hours and WBC has improved  - trending LFT, pending ERCP with GI  
79 y/o F w/ hx of CAD w/ MI, COPD, HTN, hypothyroidism, lung cancer s/p partial right pneumonectomy, s/p lap caroline on 12/12/20 with hospital course c/b A-fib w/ RVR, acute hypoxic respiratory failure, acute blood loss anemia thought to represent diverticular bleeding, and abnormal liver enzymes with negative MRCP, initially brought to [facility]ED and found to be febrile and with abnormal liver enzymes, transferred to [facility] per patient's request.    # C/f CBD stone/cholangitis: No evidence of CBD stone on MRCP. Likely represents passed stone - now with resolution of fevers and down-trending liver enzymes, now with normal bilirubin  # Lung cancer s/p partial right pneumonectomy   # COPD  # Recent episode of hypoxic respiratory failure    Recommendations:  - Advance diet as tolerated  - No plan for EUS/ERCP at this time given no evidence of choledocholithiasis on MRCP, resolution of symptoms, and improving liver enzymes  - Can re-consider EUS/ERCP if symptoms recur or liver enzymes worsen  - Can transition to oral antibiotics upon discharge (Ciprofloxacin 500 mg BID to complete 10 day antibiotic course)  - Rest of care per primary team    Elmer Wagner MD  Gastroenterology Fellow  Please contact via Microsoft Teams    Please call GI ([phone]) or e-mail [email or web address] if there are any additional questions or concerns, during weekdays from 8 am to 5 pm.     Please call answering service for on-call GI fellow ([phone]) after 5pm and before 8am, and on weekends.
79 yo F pmh HTN, COPD (30 PYH smoking), lung ca s/p partial right pneumonectomy, Hypothyroidism, diverticulosis/hx diverticular bleed, CAD/MI (age 36), afib not on a/c, recent admission for acute gangrenous cholecystitis s/p laparoscopic cholecystectomy 12/12/20 with course c/b GIB now p/w acute onset fever, nausea, ruq pain, transaminitis adm with sepsis d/t acute cholangitis.
79 yo F pmh HTN, COPD (30 PYH smoking), lung ca s/p partial right pneumonectomy, Hypothyroidism, diverticulosis/hx diverticular bleed, CAD/MI (age 36), afib not on a/c, recent admission for acute gangrenous cholecystitis s/p laparoscopic cholecystectomy 12/12/20 with course c/b GIB now p/w acute onset fever, nausea, ruq pain, transaminitis adm with sepsis d/t acute cholangitis, improving.

## 2021-01-23 NOTE — PROGRESS NOTE ADULT - ATTENDING COMMENTS
Patient seen and examined and agree with above.   No events overnight.   Did not go for an ERCP as LFTs improving. Likely patient passed a gallstone.  Leukocytosis has improved.   Tolerating diet.   Discharge planning.
Patient seen and examined and agree with above.   No events overnight. No complaints.   LFTs are normalizing.  Hemodynamically stable.   Tolerating diet.   Patient discharged today.
Patient seen and examined and agree with above.   Patient is s/p laparoscopic cholecystectomy 12/12/20 now presenting with elevated LFTs.  No choledocholithiasis on MRCP.  GI will hold off on ERCP as LFTs are trending down.   Patient advanced to regular diet.   LFTs trending down.   Will monitor closely.   Abdomen soft nontender, nondistended and well healed incisions.
As above  Patient would like to avoid procedures and as fever, white count, and liver tests/TB have come down in setting of negative MRCP without dilatation or stones, will hold off on EUS/ERCP for now given low yield  Continue conservative treatment of presumptive cholangitis with IV antibiotics  Trend liver tests, CBC, INR - if they go up and patient were to need endoscopy would then need pulmonary evaluation and clearance given history    Thank you for this interesting consult.  Please call the advanced GI service with any questions or concerns.
As above.
Nelli Hargrove MD  Division of [facility] Medicine  Spectra: [phone]
Nelli Hargrove MD  Division of [facility] Medicine  Spectra: [phone]

## 2021-01-23 NOTE — DISCHARGE NOTE NURSING/CASE MANAGEMENT/SOCIAL WORK - PATIENT PORTAL LINK FT
You can access the FollowMyHealth Patient Portal offered by [facility] by registering at the following website: [email or web address]. By joining [employer]’s FollowMyHealth portal, you will also be able to view your health information using other applications (apps) compatible with our system.

## 2021-01-25 ENCOUNTER — APPOINTMENT (OUTPATIENT)
Dept: INTERNAL MEDICINE | Facility: CLINIC | Age: 81
End: 2021-01-25

## 2021-01-25 LAB
CULTURE RESULTS: SIGNIFICANT CHANGE UP
CULTURE RESULTS: SIGNIFICANT CHANGE UP
SPECIMEN SOURCE: SIGNIFICANT CHANGE UP
SPECIMEN SOURCE: SIGNIFICANT CHANGE UP

## 2021-01-27 ENCOUNTER — APPOINTMENT (OUTPATIENT)
Dept: TRAUMA SURGERY | Facility: CLINIC | Age: 81
End: 2021-01-27
Payer: MEDICARE

## 2021-01-27 VITALS
HEIGHT: 64 IN | WEIGHT: 150 LBS | DIASTOLIC BLOOD PRESSURE: 83 MMHG | TEMPERATURE: 97.9 F | SYSTOLIC BLOOD PRESSURE: 146 MMHG | BODY MASS INDEX: 25.61 KG/M2 | HEART RATE: 77 BPM

## 2021-01-27 DIAGNOSIS — R10.13 EPIGASTRIC PAIN: ICD-10-CM

## 2021-01-27 LAB
ALBUMIN SERPL ELPH-MCNC: 4.1 G/DL
ALP BLD-CCNC: 210 U/L
ALT SERPL-CCNC: 40 U/L
ANION GAP SERPL CALC-SCNC: 13 MMOL/L
AST SERPL-CCNC: 28 U/L
BILIRUB DIRECT SERPL-MCNC: 0.3 MG/DL
BILIRUB INDIRECT SERPL-MCNC: 0.2 MG/DL
BILIRUB SERPL-MCNC: 0.5 MG/DL
BUN SERPL-MCNC: 14 MG/DL
CALCIUM SERPL-MCNC: 9.7 MG/DL
CHLORIDE SERPL-SCNC: 102 MMOL/L
CO2 SERPL-SCNC: 26 MMOL/L
CREAT SERPL-MCNC: 0.95 MG/DL
GLUCOSE SERPL-MCNC: 111 MG/DL
LPL SERPL-CCNC: 38 U/L
POTASSIUM SERPL-SCNC: 4.8 MMOL/L
PROT SERPL-MCNC: 6.7 G/DL
SODIUM SERPL-SCNC: 140 MMOL/L

## 2021-01-27 PROCEDURE — 99024 POSTOP FOLLOW-UP VISIT: CPT

## 2021-01-27 NOTE — HISTORY OF PRESENT ILLNESS
[de-identified] : Ms. Corrales is an 81y/o woman who underwent lap caroline in Dec 2020 for acute cholecystitis. Initially after surgery she felt well, but in Jan 2021, she started having pain and nausea/vomiting and was readmitted with elevated LFTs concerning for choledocholithiasis. She had MRCP which showed no stones but possible signs of cholangitis, consistent with a passed stone. Her LFTs continued to improve and she was discharged on Augmenin.\par \par She notes that she is not feeling well overall, not like herself. She wakes up with nausea which waxes and wanes during the day. She usually drinks tea and coffee during the day, but since this episode earlier this month, cannot bear even the smell of either (nausea worsens). No pain. Having normal bowel function. \par \par She is also concerned about her potassium. When she was in her 30s she had a heart attack which she was told was due to low potassium and while she was in the hospital, she was treated for hypokalemia. Additionally, her BP has been unusually high for her and she has been very dissatisfied with the response of her PMD to her concerns.\par \par Path reviewed. \par Abd soft, nontender, nondistended. Incisions well-healed.\par \par - It is possible that her ongoing malaise and nausea are related to the antibiotic she's on. She's due to stop next week. For now, will check labs to ensure that there is no sign of pancreatitis or recurrent choledocholithiasis\par - I asked her to call the office after these tests were done so I'll know to look for the result and will call her back\par - After antibiotics are done next week, I asked her to wait a few days and if she's still not feeling better to call me. If she feels worse at any time, I asked her to call sooner.\par - I provided the phone number for Dr. Herrera, [facility]- an appointment was made for Ms. Corrales while she was in the hospital but not communicated effectively, so she didn't know she had an appointment until reviewing the paperwork later. She will call for an appointment regarding her blood pressure and potassium levels. Today's BP was acceptable.

## 2021-02-10 ENCOUNTER — APPOINTMENT (OUTPATIENT)
Dept: INTERNAL MEDICINE | Facility: CLINIC | Age: 81
End: 2021-02-10

## 2021-02-16 LAB
BASOPHILS # BLD AUTO: 0.06 K/UL
BASOPHILS NFR BLD AUTO: 0.6 %
EOSINOPHIL # BLD AUTO: 0.07 K/UL
EOSINOPHIL NFR BLD AUTO: 0.7 %
HCT VFR BLD CALC: 41.4 %
HGB BLD-MCNC: 12.6 G/DL
IMM GRANULOCYTES NFR BLD AUTO: 0.5 %
LYMPHOCYTES # BLD AUTO: 2.91 K/UL
LYMPHOCYTES NFR BLD AUTO: 29.4 %
MAN DIFF?: NORMAL
MCHC RBC-ENTMCNC: 29.2 PG
MCHC RBC-ENTMCNC: 30.4 GM/DL
MCV RBC AUTO: 96.1 FL
MONOCYTES # BLD AUTO: 0.84 K/UL
MONOCYTES NFR BLD AUTO: 8.5 %
NEUTROPHILS # BLD AUTO: 5.98 K/UL
NEUTROPHILS NFR BLD AUTO: 60.3 %
PLATELET # BLD AUTO: 489 K/UL
RBC # BLD: 4.31 M/UL
RBC # FLD: 14.5 %
WBC # FLD AUTO: 9.91 K/UL

## 2021-03-02 ENCOUNTER — APPOINTMENT (OUTPATIENT)
Dept: INTERNAL MEDICINE | Facility: CLINIC | Age: 81
End: 2021-03-02
Payer: MEDICARE

## 2021-03-02 VITALS — SYSTOLIC BLOOD PRESSURE: 164 MMHG | DIASTOLIC BLOOD PRESSURE: 82 MMHG

## 2021-03-02 VITALS
WEIGHT: 155 LBS | BODY MASS INDEX: 26.46 KG/M2 | HEIGHT: 64 IN | TEMPERATURE: 97.1 F | HEART RATE: 70 BPM | OXYGEN SATURATION: 95 % | DIASTOLIC BLOOD PRESSURE: 80 MMHG | SYSTOLIC BLOOD PRESSURE: 181 MMHG

## 2021-03-02 PROCEDURE — 99204 OFFICE O/P NEW MOD 45 MIN: CPT

## 2021-03-02 NOTE — PHYSICAL EXAM
[No Acute Distress] : no acute distress [Well Nourished] : well nourished [Well Developed] : well developed [Well-Appearing] : well-appearing [Normal Sclera/Conjunctiva] : normal sclera/conjunctiva [PERRL] : pupils equal round and reactive to light [EOMI] : extraocular movements intact [Normal Outer Ear/Nose] : the outer ears and nose were normal in appearance [Normal Oropharynx] : the oropharynx was normal [No JVD] : no jugular venous distention [No Lymphadenopathy] : no lymphadenopathy [Supple] : supple [No Respiratory Distress] : no respiratory distress  [No Accessory Muscle Use] : no accessory muscle use [Clear to Auscultation] : lungs were clear to auscultation bilaterally [Normal Rate] : normal rate  [Regular Rhythm] : with a regular rhythm [Normal S1, S2] : normal S1 and S2 [No Murmur] : no murmur heard [Pedal Pulses Present] : the pedal pulses are present [No Edema] : there was no peripheral edema [No Extremity Clubbing/Cyanosis] : no extremity clubbing/cyanosis [Soft] : abdomen soft [Non Tender] : non-tender [Non-distended] : non-distended [Normal Bowel Sounds] : normal bowel sounds [Normal Posterior Cervical Nodes] : no posterior cervical lymphadenopathy [Normal Anterior Cervical Nodes] : no anterior cervical lymphadenopathy [No CVA Tenderness] : no CVA  tenderness [No Spinal Tenderness] : no spinal tenderness [No Joint Swelling] : no joint swelling [No Rash] : no rash [Coordination Grossly Intact] : coordination grossly intact [No Focal Deficits] : no focal deficits [Normal Gait] : normal gait [Speech Grossly Normal] : speech grossly normal [Normal Affect] : the affect was normal [Alert and Oriented x3] : oriented to person, place, and time [Normal Insight/Judgement] : insight and judgment were intact

## 2021-03-02 NOTE — ASSESSMENT
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

## 2021-03-02 NOTE — HISTORY OF PRESENT ILLNESS
[de-identified] : Ms. 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 Her BP has been running a little high at home. Says at times it's 140 but sometimes it can go up to 160\par Denies SOB, chest pain,headache, dizziness,  leg swelling, abdominal pain, N/V/D\par \par

## 2021-04-09 ENCOUNTER — APPOINTMENT (OUTPATIENT)
Dept: CARDIOLOGY | Facility: CLINIC | Age: 81
End: 2021-04-09
Payer: MEDICARE

## 2021-04-09 PROCEDURE — 93306 TTE W/DOPPLER COMPLETE: CPT

## 2021-05-04 ENCOUNTER — APPOINTMENT (OUTPATIENT)
Dept: INTERNAL MEDICINE | Facility: CLINIC | Age: 81
End: 2021-05-04
Payer: MEDICARE

## 2021-05-04 VITALS
SYSTOLIC BLOOD PRESSURE: 158 MMHG | OXYGEN SATURATION: 94 % | BODY MASS INDEX: 26.29 KG/M2 | HEIGHT: 64 IN | WEIGHT: 154 LBS | DIASTOLIC BLOOD PRESSURE: 68 MMHG | HEART RATE: 55 BPM | RESPIRATION RATE: 12 BRPM | TEMPERATURE: 97.7 F

## 2021-05-04 VITALS — DIASTOLIC BLOOD PRESSURE: 84 MMHG | SYSTOLIC BLOOD PRESSURE: 142 MMHG

## 2021-05-04 DIAGNOSIS — L30.9 DERMATITIS, UNSPECIFIED: ICD-10-CM

## 2021-05-04 PROCEDURE — 99214 OFFICE O/P EST MOD 30 MIN: CPT

## 2021-05-04 RX ORDER — FLUTICASONE PROPIONATE AND SALMETEROL 500; 50 UG/1; UG/1
500-50 POWDER RESPIRATORY (INHALATION) TWICE DAILY
Qty: 2 | Refills: 5 | Status: ACTIVE | COMMUNITY
Start: 2021-05-04 | End: 1900-01-01

## 2021-05-10 NOTE — PHYSICAL EXAM
[No Acute Distress] : no acute distress [Well Nourished] : well nourished [Well Developed] : well developed [Well-Appearing] : well-appearing [Normal Sclera/Conjunctiva] : normal sclera/conjunctiva [PERRL] : pupils equal round and reactive to light [EOMI] : extraocular movements intact [Normal Outer Ear/Nose] : the outer ears and nose were normal in appearance [Normal Oropharynx] : the oropharynx was normal [No JVD] : no jugular venous distention [No Lymphadenopathy] : no lymphadenopathy [Supple] : supple [No Respiratory Distress] : no respiratory distress  [No Accessory Muscle Use] : no accessory muscle use [Clear to Auscultation] : lungs were clear to auscultation bilaterally [Normal Rate] : normal rate  [Regular Rhythm] : with a regular rhythm [Normal S1, S2] : normal S1 and S2 [No Murmur] : no murmur heard [Pedal Pulses Present] : the pedal pulses are present [No Edema] : there was no peripheral edema [No Extremity Clubbing/Cyanosis] : no extremity clubbing/cyanosis [Soft] : abdomen soft [Non Tender] : non-tender [Non-distended] : non-distended [Normal Bowel Sounds] : normal bowel sounds [Normal Posterior Cervical Nodes] : no posterior cervical lymphadenopathy [Normal Anterior Cervical Nodes] : no anterior cervical lymphadenopathy [No CVA Tenderness] : no CVA  tenderness [No Spinal Tenderness] : no spinal tenderness [No Joint Swelling] : no joint swelling [Coordination Grossly Intact] : coordination grossly intact [No Focal Deficits] : no focal deficits [Normal Gait] : normal gait [Speech Grossly Normal] : speech grossly normal [Normal Affect] : the affect was normal [Alert and Oriented x3] : oriented to person, place, and time [Normal Insight/Judgement] : insight and judgment were intact [de-identified] : dry itchy areas on back

## 2021-05-10 NOTE — HISTORY OF PRESENT ILLNESS
[de-identified] : Ms. VANI GONZALEZ is a 81 year old female, accompannied by her daughter, with hx of HTN, HLD, hypothyroid, depression, anxiety, MI ( at the age of 36y.o), adenocarcinoma of Lung, s/p rt upper lobe lobectomy May 2007, s/p cholecystectomy Dec 2020,  presents for follow up visit\par Says she has had 3 episodes of feeling nauseous and throwing up the past 2 months. Says these episodes last 2 days. The past 2 weeks she has had no further episodes\par Had her second COVID vaccine April 16th and says 2 weeks later she got an itchy rash on her body\par \par \par \par \par

## 2021-05-10 NOTE — ASSESSMENT
[FreeTextEntry1] : \par dermatitis- ? 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

## 2021-05-11 ENCOUNTER — APPOINTMENT (OUTPATIENT)
Dept: INTERNAL MEDICINE | Facility: CLINIC | Age: 81
End: 2021-05-11
Payer: MEDICARE

## 2021-05-11 PROCEDURE — 99214 OFFICE O/P EST MOD 30 MIN: CPT | Mod: 95

## 2021-05-11 NOTE — PHYSICAL EXAM
[No Respiratory Distress] : no respiratory distress  [Speech Grossly Normal] : speech grossly normal [Alert and Oriented x3] : oriented to person, place, and time [Normal Insight/Judgement] : insight and judgment were intact [Normal] : affect was normal and insight and judgment were intact

## 2021-05-12 LAB
ALBUMIN SERPL ELPH-MCNC: 3.8 G/DL
ALP BLD-CCNC: 302 U/L
ALT SERPL-CCNC: 39 U/L
ANION GAP SERPL CALC-SCNC: 18 MMOL/L
AST SERPL-CCNC: 26 U/L
BILIRUB SERPL-MCNC: 0.5 MG/DL
BUN SERPL-MCNC: 14 MG/DL
CALCIUM SERPL-MCNC: 9.3 MG/DL
CHLORIDE SERPL-SCNC: 103 MMOL/L
CHOLEST SERPL-MCNC: 146 MG/DL
CO2 SERPL-SCNC: 20 MMOL/L
CREAT SERPL-MCNC: 0.71 MG/DL
ESTIMATED AVERAGE GLUCOSE: 120 MG/DL
GLUCOSE SERPL-MCNC: 78 MG/DL
HBA1C MFR BLD HPLC: 5.8 %
HDLC SERPL-MCNC: 43 MG/DL
LDLC SERPL CALC-MCNC: 79 MG/DL
NONHDLC SERPL-MCNC: 103 MG/DL
POTASSIUM SERPL-SCNC: 3.8 MMOL/L
PROT SERPL-MCNC: 6.9 G/DL
SODIUM SERPL-SCNC: 141 MMOL/L
TRIGL SERPL-MCNC: 116 MG/DL
TSH SERPL-ACNC: 0.65 UIU/ML

## 2021-05-24 NOTE — HISTORY OF PRESENT ILLNESS
[Home] : at home, [unfilled] , at the time of the visit. [Medical Office: ([facility])___] : at the medical office located in  [Verbal consent obtained from patient] : the patient, [unfilled] [de-identified] : Ms. VANI GONZALEZ is a 81 year old female seen in telemedicine for follow up visit to discuss lab results\par She is doing well. \par Denies SOB, chest pain, abdominal pain, N/V/D, leg swelling, headache, dizziness \par Offers no complaints\par

## 2021-06-16 ENCOUNTER — INPATIENT (INPATIENT)
Facility: HOSPITAL | Age: 81
LOS: 3 days | Discharge: HOME HEALTH | DRG: 446 | End: 2021-06-20
Attending: STUDENT IN AN ORGANIZED HEALTH CARE EDUCATION/TRAINING PROGRAM | Admitting: HOSPITALIST
Payer: MEDICARE

## 2021-06-16 VITALS
SYSTOLIC BLOOD PRESSURE: 155 MMHG | HEART RATE: 66 BPM | WEIGHT: 145.06 LBS | DIASTOLIC BLOOD PRESSURE: 83 MMHG | TEMPERATURE: 100 F | OXYGEN SATURATION: 97 % | RESPIRATION RATE: 18 BRPM | HEIGHT: 64 IN

## 2021-06-16 DIAGNOSIS — E03.9 HYPOTHYROIDISM, UNSPECIFIED: ICD-10-CM

## 2021-06-16 DIAGNOSIS — F32.9 MAJOR DEPRESSIVE DISORDER, SINGLE EPISODE, UNSPECIFIED: ICD-10-CM

## 2021-06-16 DIAGNOSIS — J44.9 CHRONIC OBSTRUCTIVE PULMONARY DISEASE, UNSPECIFIED: ICD-10-CM

## 2021-06-16 DIAGNOSIS — E78.5 HYPERLIPIDEMIA, UNSPECIFIED: ICD-10-CM

## 2021-06-16 DIAGNOSIS — Z90.49 ACQUIRED ABSENCE OF OTHER SPECIFIED PARTS OF DIGESTIVE TRACT: Chronic | ICD-10-CM

## 2021-06-16 DIAGNOSIS — C34.90 MALIGNANT NEOPLASM OF UNSPECIFIED PART OF UNSPECIFIED BRONCHUS OR LUNG: ICD-10-CM

## 2021-06-16 DIAGNOSIS — Z29.9 ENCOUNTER FOR PROPHYLACTIC MEASURES, UNSPECIFIED: ICD-10-CM

## 2021-06-16 DIAGNOSIS — I10 ESSENTIAL (PRIMARY) HYPERTENSION: ICD-10-CM

## 2021-06-16 DIAGNOSIS — R10.9 UNSPECIFIED ABDOMINAL PAIN: ICD-10-CM

## 2021-06-16 DIAGNOSIS — K80.50 CALCULUS OF BILE DUCT WITHOUT CHOLANGITIS OR CHOLECYSTITIS WITHOUT OBSTRUCTION: ICD-10-CM

## 2021-06-16 LAB
ALBUMIN SERPL ELPH-MCNC: 3.4 G/DL — SIGNIFICANT CHANGE UP (ref 3.3–5)
ALP SERPL-CCNC: 724 U/L — HIGH (ref 40–120)
ALT FLD-CCNC: 239 U/L — HIGH (ref 10–45)
ANION GAP SERPL CALC-SCNC: 12 MMOL/L — SIGNIFICANT CHANGE UP (ref 5–17)
AST SERPL-CCNC: 345 U/L — HIGH (ref 10–40)
BASE EXCESS BLDV CALC-SCNC: 5.1 MMOL/L — HIGH (ref -2–2)
BASOPHILS # BLD AUTO: 0.04 K/UL — SIGNIFICANT CHANGE UP (ref 0–0.2)
BASOPHILS NFR BLD AUTO: 0.5 % — SIGNIFICANT CHANGE UP (ref 0–2)
BILIRUB DIRECT SERPL-MCNC: 1.3 MG/DL — HIGH (ref 0–0.2)
BILIRUB INDIRECT FLD-MCNC: 0.6 MG/DL — SIGNIFICANT CHANGE UP (ref 0.2–1)
BILIRUB SERPL-MCNC: 1.9 MG/DL — HIGH (ref 0.2–1.2)
BILIRUB SERPL-MCNC: 1.9 MG/DL — HIGH (ref 0.2–1.2)
BLD GP AB SCN SERPL QL: NEGATIVE — SIGNIFICANT CHANGE UP
BUN SERPL-MCNC: 10 MG/DL — SIGNIFICANT CHANGE UP (ref 7–23)
CA-I SERPL-SCNC: 1.11 MMOL/L — LOW (ref 1.12–1.3)
CALCIUM SERPL-MCNC: 8.9 MG/DL — SIGNIFICANT CHANGE UP (ref 8.4–10.5)
CHLORIDE BLDV-SCNC: 102 MMOL/L — SIGNIFICANT CHANGE UP (ref 96–108)
CHLORIDE SERPL-SCNC: 101 MMOL/L — SIGNIFICANT CHANGE UP (ref 96–108)
CO2 BLDV-SCNC: 31 MMOL/L — HIGH (ref 22–30)
CO2 SERPL-SCNC: 23 MMOL/L — SIGNIFICANT CHANGE UP (ref 22–31)
CREAT SERPL-MCNC: 0.7 MG/DL — SIGNIFICANT CHANGE UP (ref 0.5–1.3)
EOSINOPHIL # BLD AUTO: 0.09 K/UL — SIGNIFICANT CHANGE UP (ref 0–0.5)
EOSINOPHIL NFR BLD AUTO: 1.2 % — SIGNIFICANT CHANGE UP (ref 0–6)
GAS PNL BLDV: 134 MMOL/L — LOW (ref 135–145)
GAS PNL BLDV: SIGNIFICANT CHANGE UP
GAS PNL BLDV: SIGNIFICANT CHANGE UP
GLUCOSE BLDV-MCNC: 107 MG/DL — HIGH (ref 70–99)
GLUCOSE SERPL-MCNC: 106 MG/DL — HIGH (ref 70–99)
HCO3 BLDV-SCNC: 30 MMOL/L — HIGH (ref 21–29)
HCT VFR BLD CALC: 38.7 % — SIGNIFICANT CHANGE UP (ref 34.5–45)
HCT VFR BLDA CALC: 41 % — SIGNIFICANT CHANGE UP (ref 39–50)
HGB BLD CALC-MCNC: 13.3 G/DL — SIGNIFICANT CHANGE UP (ref 11.5–15.5)
HGB BLD-MCNC: 12.7 G/DL — SIGNIFICANT CHANGE UP (ref 11.5–15.5)
IMM GRANULOCYTES NFR BLD AUTO: 0.4 % — SIGNIFICANT CHANGE UP (ref 0–1.5)
LACTATE BLDV-MCNC: 1.2 MMOL/L — SIGNIFICANT CHANGE UP (ref 0.7–2)
LIDOCAIN IGE QN: 53 U/L — SIGNIFICANT CHANGE UP (ref 7–60)
LYMPHOCYTES # BLD AUTO: 1.7 K/UL — SIGNIFICANT CHANGE UP (ref 1–3.3)
LYMPHOCYTES # BLD AUTO: 22.6 % — SIGNIFICANT CHANGE UP (ref 13–44)
MAGNESIUM SERPL-MCNC: 1.7 MG/DL — SIGNIFICANT CHANGE UP (ref 1.6–2.6)
MCHC RBC-ENTMCNC: 29.7 PG — SIGNIFICANT CHANGE UP (ref 27–34)
MCHC RBC-ENTMCNC: 32.8 GM/DL — SIGNIFICANT CHANGE UP (ref 32–36)
MCV RBC AUTO: 90.4 FL — SIGNIFICANT CHANGE UP (ref 80–100)
MONOCYTES # BLD AUTO: 0.78 K/UL — SIGNIFICANT CHANGE UP (ref 0–0.9)
MONOCYTES NFR BLD AUTO: 10.4 % — SIGNIFICANT CHANGE UP (ref 2–14)
NEUTROPHILS # BLD AUTO: 4.89 K/UL — SIGNIFICANT CHANGE UP (ref 1.8–7.4)
NEUTROPHILS NFR BLD AUTO: 64.9 % — SIGNIFICANT CHANGE UP (ref 43–77)
NRBC # BLD: 0 /100 WBCS — SIGNIFICANT CHANGE UP (ref 0–0)
PCO2 BLDV: 46 MMHG — SIGNIFICANT CHANGE UP (ref 35–50)
PH BLDV: 7.43 — SIGNIFICANT CHANGE UP (ref 7.35–7.45)
PHOSPHATE SERPL-MCNC: 2.9 MG/DL — SIGNIFICANT CHANGE UP (ref 2.5–4.5)
PLATELET # BLD AUTO: 323 K/UL — SIGNIFICANT CHANGE UP (ref 150–400)
PO2 BLDV: 35 MMHG — SIGNIFICANT CHANGE UP (ref 25–45)
POTASSIUM BLDV-SCNC: 3 MMOL/L — LOW (ref 3.5–5.3)
POTASSIUM SERPL-MCNC: 3.2 MMOL/L — LOW (ref 3.5–5.3)
POTASSIUM SERPL-SCNC: 3.2 MMOL/L — LOW (ref 3.5–5.3)
PROT SERPL-MCNC: 6.5 G/DL — SIGNIFICANT CHANGE UP (ref 6–8.3)
RBC # BLD: 4.28 M/UL — SIGNIFICANT CHANGE UP (ref 3.8–5.2)
RBC # FLD: 14.8 % — HIGH (ref 10.3–14.5)
RH IG SCN BLD-IMP: POSITIVE — SIGNIFICANT CHANGE UP
SAO2 % BLDV: 65 % — LOW (ref 67–88)
SARS-COV-2 RNA SPEC QL NAA+PROBE: SIGNIFICANT CHANGE UP
SODIUM SERPL-SCNC: 136 MMOL/L — SIGNIFICANT CHANGE UP (ref 135–145)
WBC # BLD: 7.53 K/UL — SIGNIFICANT CHANGE UP (ref 3.8–10.5)
WBC # FLD AUTO: 7.53 K/UL — SIGNIFICANT CHANGE UP (ref 3.8–10.5)

## 2021-06-16 PROCEDURE — 99221 1ST HOSP IP/OBS SF/LOW 40: CPT

## 2021-06-16 PROCEDURE — 93010 ELECTROCARDIOGRAM REPORT: CPT

## 2021-06-16 PROCEDURE — 99223 1ST HOSP IP/OBS HIGH 75: CPT | Mod: GC

## 2021-06-16 PROCEDURE — 71045 X-RAY EXAM CHEST 1 VIEW: CPT | Mod: 26

## 2021-06-16 PROCEDURE — 99285 EMERGENCY DEPT VISIT HI MDM: CPT

## 2021-06-16 RX ORDER — CHOLESTYRAMINE 4 G/9G
0 POWDER, FOR SUSPENSION ORAL
Qty: 0 | Refills: 0 | DISCHARGE

## 2021-06-16 RX ORDER — METOPROLOL TARTRATE 50 MG
0 TABLET ORAL
Qty: 0 | Refills: 0 | DISCHARGE

## 2021-06-16 RX ORDER — LANOLIN ALCOHOL/MO/W.PET/CERES
3 CREAM (GRAM) TOPICAL AT BEDTIME
Refills: 0 | Status: DISCONTINUED | OUTPATIENT
Start: 2021-06-16 | End: 2021-06-20

## 2021-06-16 RX ORDER — BUDESONIDE AND FORMOTEROL FUMARATE DIHYDRATE 160; 4.5 UG/1; UG/1
2 AEROSOL RESPIRATORY (INHALATION)
Refills: 0 | Status: DISCONTINUED | OUTPATIENT
Start: 2021-06-16 | End: 2021-06-17

## 2021-06-16 RX ORDER — ESCITALOPRAM OXALATE 10 MG/1
1 TABLET, FILM COATED ORAL
Qty: 0 | Refills: 0 | DISCHARGE

## 2021-06-16 RX ORDER — METOPROLOL TARTRATE 50 MG
1 TABLET ORAL
Qty: 0 | Refills: 0 | DISCHARGE

## 2021-06-16 RX ORDER — SODIUM CHLORIDE 9 MG/ML
1000 INJECTION, SOLUTION INTRAVENOUS ONCE
Refills: 0 | Status: COMPLETED | OUTPATIENT
Start: 2021-06-16 | End: 2021-06-16

## 2021-06-16 RX ORDER — LEVOTHYROXINE SODIUM 125 MCG
125 TABLET ORAL DAILY
Refills: 0 | Status: DISCONTINUED | OUTPATIENT
Start: 2021-06-16 | End: 2021-06-20

## 2021-06-16 RX ORDER — ESCITALOPRAM OXALATE 10 MG/1
10 TABLET, FILM COATED ORAL DAILY
Refills: 0 | Status: DISCONTINUED | OUTPATIENT
Start: 2021-06-16 | End: 2021-06-20

## 2021-06-16 RX ORDER — ALBUTEROL 90 UG/1
1.25 AEROSOL, METERED ORAL EVERY 6 HOURS
Refills: 0 | Status: DISCONTINUED | OUTPATIENT
Start: 2021-06-16 | End: 2021-06-20

## 2021-06-16 RX ORDER — LEVOTHYROXINE SODIUM 125 MCG
1 TABLET ORAL
Qty: 0 | Refills: 0 | DISCHARGE

## 2021-06-16 RX ORDER — PANTOPRAZOLE SODIUM 20 MG/1
0 TABLET, DELAYED RELEASE ORAL
Qty: 0 | Refills: 0 | DISCHARGE

## 2021-06-16 RX ORDER — ASPIRIN/CALCIUM CARB/MAGNESIUM 324 MG
81 TABLET ORAL DAILY
Refills: 0 | Status: DISCONTINUED | OUTPATIENT
Start: 2021-06-16 | End: 2021-06-20

## 2021-06-16 RX ORDER — LEVOTHYROXINE SODIUM 125 MCG
0 TABLET ORAL
Qty: 0 | Refills: 0 | DISCHARGE

## 2021-06-16 RX ORDER — CHOLESTYRAMINE 4 G/9G
4 POWDER, FOR SUSPENSION ORAL
Refills: 0 | Status: DISCONTINUED | OUTPATIENT
Start: 2021-06-16 | End: 2021-06-20

## 2021-06-16 RX ORDER — SENNA PLUS 8.6 MG/1
2 TABLET ORAL AT BEDTIME
Refills: 0 | Status: DISCONTINUED | OUTPATIENT
Start: 2021-06-16 | End: 2021-06-17

## 2021-06-16 RX ORDER — ESCITALOPRAM OXALATE 10 MG/1
0 TABLET, FILM COATED ORAL
Qty: 0 | Refills: 1 | DISCHARGE

## 2021-06-16 RX ORDER — DOCUSATE SODIUM 100 MG
1 CAPSULE ORAL
Qty: 0 | Refills: 0 | DISCHARGE

## 2021-06-16 RX ORDER — FAMOTIDINE 10 MG/ML
20 INJECTION INTRAVENOUS ONCE
Refills: 0 | Status: COMPLETED | OUTPATIENT
Start: 2021-06-16 | End: 2021-06-16

## 2021-06-16 RX ORDER — ACETAMINOPHEN 500 MG
650 TABLET ORAL EVERY 6 HOURS
Refills: 0 | Status: DISCONTINUED | OUTPATIENT
Start: 2021-06-16 | End: 2021-06-17

## 2021-06-16 RX ORDER — ASPIRIN/CALCIUM CARB/MAGNESIUM 324 MG
1 TABLET ORAL
Qty: 0 | Refills: 0 | DISCHARGE

## 2021-06-16 RX ORDER — SIMVASTATIN 20 MG/1
1 TABLET, FILM COATED ORAL
Qty: 0 | Refills: 0 | DISCHARGE

## 2021-06-16 RX ORDER — PANTOPRAZOLE SODIUM 20 MG/1
40 TABLET, DELAYED RELEASE ORAL
Refills: 0 | Status: DISCONTINUED | OUTPATIENT
Start: 2021-06-16 | End: 2021-06-20

## 2021-06-16 RX ORDER — FLUTICASONE PROPIONATE AND SALMETEROL 50; 250 UG/1; UG/1
0 POWDER ORAL; RESPIRATORY (INHALATION)
Qty: 0 | Refills: 0 | DISCHARGE

## 2021-06-16 RX ORDER — METOPROLOL TARTRATE 50 MG
25 TABLET ORAL DAILY
Refills: 0 | Status: DISCONTINUED | OUTPATIENT
Start: 2021-06-16 | End: 2021-06-20

## 2021-06-16 RX ORDER — ONDANSETRON 8 MG/1
4 TABLET, FILM COATED ORAL ONCE
Refills: 0 | Status: COMPLETED | OUTPATIENT
Start: 2021-06-16 | End: 2021-06-16

## 2021-06-16 RX ORDER — ACETAMINOPHEN 500 MG
325 TABLET ORAL EVERY 4 HOURS
Refills: 0 | Status: DISCONTINUED | OUTPATIENT
Start: 2021-06-16 | End: 2021-06-17

## 2021-06-16 RX ORDER — POTASSIUM CHLORIDE 20 MEQ
40 PACKET (EA) ORAL ONCE
Refills: 0 | Status: COMPLETED | OUTPATIENT
Start: 2021-06-16 | End: 2021-06-16

## 2021-06-16 RX ORDER — FLUTICASONE PROPIONATE AND SALMETEROL 50; 250 UG/1; UG/1
1 POWDER ORAL; RESPIRATORY (INHALATION)
Qty: 0 | Refills: 0 | DISCHARGE

## 2021-06-16 RX ORDER — HEPARIN SODIUM 5000 [USP'U]/ML
5000 INJECTION INTRAVENOUS; SUBCUTANEOUS EVERY 8 HOURS
Refills: 0 | Status: DISCONTINUED | OUTPATIENT
Start: 2021-06-16 | End: 2021-06-17

## 2021-06-16 RX ADMIN — SODIUM CHLORIDE 1000 MILLILITER(S): 9 INJECTION, SOLUTION INTRAVENOUS at 17:05

## 2021-06-16 RX ADMIN — CHOLESTYRAMINE 4 GRAM(S): 4 POWDER, FOR SUSPENSION ORAL at 22:49

## 2021-06-16 RX ADMIN — FAMOTIDINE 20 MILLIGRAM(S): 10 INJECTION INTRAVENOUS at 17:06

## 2021-06-16 RX ADMIN — Medication 40 MILLIEQUIVALENT(S): at 18:58

## 2021-06-16 RX ADMIN — ONDANSETRON 4 MILLIGRAM(S): 8 TABLET, FILM COATED ORAL at 17:17

## 2021-06-16 RX ADMIN — Medication 3 MILLIGRAM(S): at 23:43

## 2021-06-16 NOTE — ED ADULT NURSE NOTE - OBJECTIVE STATEMENT
82 y/o female coming to the ER with c/o abdominal pain and nausea. A&Ox4. Ambulatory. PMH hypothyroidism, COPD, cholecystectomy (december), gallstones (dx january). Patient reports she began to have mid epigastric pain last night with associated nausea. Patient reports having multiple similar episodes of unknown cause and was told she needs an MRI by her gastroenterologists. Patient afebrile. Abdomen is soft, non distended, tender over the mid epigastric region. Patient endorses no active pain at rest, however endorses nausea. SPO2 100% on room air. Patient denies chest pain, SOB,  fever, chills, n/v/d, urinary symptoms, blood in the urine/stool. Safety measures maintained. Bed in the lowest position. Call bell within reach.  MD at the bedside. No acute distress noted or further complaints at this time.

## 2021-06-16 NOTE — H&P ADULT - NSICDXPASTMEDICALHX_GEN_ALL_CORE_FT
PAST MEDICAL HISTORY:  COPD (chronic obstructive pulmonary disease)     Hypertension     Lung cancer     MI (myocardial infarction)

## 2021-06-16 NOTE — H&P ADULT - ATTENDING COMMENTS
82 yo F w/ hx CAD, COPD, lung CA s/p pneumonectoym, cholecystitis s/p cholecystemctomy 12/2020 c/b post-op sepsis, afib/RVR, with residual pain, nausea, vomiting since surgery, now presents to hospital with RUQ pain, vomiting fatigue,pruritus, weight loss, elevated alk phos/ALT/AST (mixed cholestatic, hepatic pattern), ddx includes autoimmune process (PBC?) vs. late postcholecystectomy symptoms( bile duct injury vs. retained CBD stone-negative MRCP on last admission, ERCP not performed), vs. phincter of Oddi dysfunction/biliary dyskinesia. The latter dx would be characterized by labs that normalize in between attacks, however no ev CT ordered now to r/o pancreatitis or pancreatic lesion causing obstruction. Repeat MRCP ordered. GI consult in AM. Appreciate surgery eval. Cholestyramine prn pruritus, zofran prn nausea/vomiting if normal qtc. Plan to trend liver labs, check IgG4, AMA, LISSET, TSH. Anticipate ERCP this admission. NPO after MN, HSQ for ppx.    Plan discussed with resident MD and medical student.

## 2021-06-16 NOTE — H&P ADULT - PROBLEM SELECTOR PLAN 1
presenting w/ colicky abd pain similar to previous admission with labs suggestive of biliary obstruction.  Non-toxic appearing not meeting SIRS criteria is less concerning for cholangitis.   - tBili 1.9, , , .  Lipase 53.  - pending MRCP  - GI c/s in the AM  - pain control w/ tylenol for mild/moderate and oxycodone for severe; 2G/day tylenol max given transaminitis  - IVF  - regular diet, NPO after MN in case there's plan for ERCP presenting w/ colicky abd pain similar to previous admission with labs suggestive of biliary obstruction.  Non-toxic appearing not meeting SIRS criteria is less concerning for cholangitis.   - tBili 1.9, , , .  Lipase 53.  - Will order a CT scan   - Will send serology (IgG4 and anti LISSET)  - pending MRCP  - GI c/s in the AM  - pain control w/ tylenol for mild/moderate and oxycodone for severe; 2G/day tylenol max given transaminitis  - IVF  - regular diet, NPO after MN in case there's plan for ERCP presenting w/ colicky abd pain similar to previous admission with labs suggestive of biliary obstruction.  Non-toxic appearing not meeting SIRS criteria is less concerning for cholangitis.   - tBili 1.9, , , .  Lipase 53.  - f/u CTAP noncon for assessment of stone or large obstructing mass  - f/u LISSET, AMA and IgG4 antibodies for PBC and PSC  - pending MRCP  - GI c/s in the AM  - pain control w/ tylenol for mild/moderate and oxycodone for severe; 2G/day tylenol max given transaminitis  - IVF  - regular diet, NPO after MN in case there's plan for ERCP

## 2021-06-16 NOTE — H&P ADULT - NSHPSOCIALHISTORY_GEN_ALL_CORE
The patient is  living home alone. Her bedroom is on the second floor and she states this is hard for her. Her daughter lives 10 mins away and visits frequently and helps out. The patient drinks alcohol on occasion. She smoked tobacco up to the age of 30 but quit and hasn't used it since then. She denies and drug use.

## 2021-06-16 NOTE — H&P ADULT - ASSESSMENT
The patient is an 80 yo female with a history of choledocholithiasis complicated by gangranous cholecystisis presents status post laporoscopic cholecystectomy in 12/12/20. Presents for colicky epigastric pain not radiating and non exertional. She has elevated liver enzymes and biliary enzymes suggesting biliary obstruction. Will rule out choledocholithiasis with CT and GI consult for possible ERCP. Cholangitis is unlikely given the patient is afebrile.

## 2021-06-16 NOTE — ED PROVIDER NOTE - OBJECTIVE STATEMENT
82 y/o F with pmhx of COPD and lung cancer tx 15 yrs. ago, s/p cholecystectomy Dec. 2020, presenting with 6/10 constant sharp midline abdominal pain located underneath her breast bone and associated nausea x 1 day. Patient has had similar episodes and nonbloody, nonbilious vomiting every 3-5 days since Jan. 2021. Patient was diagnosed with retained gallstones at the time but stone was not located on further evaluation. Patient is scheduled for an MRI for reevaluation by her GI doctor in 2 weeks but presents to the ED due to this most recent episode starting last night. Patient denies anything making the pain worse but reports vomiting makes it better. Patient reports an associated 40 pound unintentional weight loss due to the pain and constant vomiting. Patient denies any radiation of the pain. Patient also denies food intolerances, changes in bowel habits, melena, hematochezia, urinary symptoms, fever, chills, chest pain or SOB. 80F pmhx HTN, COPD (30 PYH smoking), Hypothyroidism, diverticulosis, CAD/MI (age 36), lung ca s/p partial right pneumonectomy, s/p recent laparoscopic cholecystectomy (12/12/2020) with Dr. Dasilva   acute gangrenous cholecystitis now presenting with worsening epigastric pain.  Pain is 6/10 constant sharp midline abdominal pain located underneath her breast bone and associated nausea. Patient has had similar episodes and nonbloody, nonbilious vomiting every 3-5 days since the procedure and was admitted 1/2021 for fever and abdominal pain. MRCP without choledocholithiasis and no ERCP performed at that time and pt was treated with IV abx.  Patient is now scheduled for an MRI for reevaluation by her GI doctor Dr. Cadena in 2 weeks but presents to the ED due to this most recent episode starting last night. Patient denies anything making the pain worse but reports vomiting makes it better. Patient reports an associated 40 pound unintentional weight loss due to the pain and constant vomiting. Patient denies any radiation of the pain. Patient also denies food intolerances, changes in bowel habits, melena, hematochezia, urinary symptoms, fever, chills, chest pain or SOB.

## 2021-06-16 NOTE — ED PROVIDER NOTE - PMH
COPD (chronic obstructive pulmonary disease)    Hypertension    Lung cancer    MI (myocardial infarction)

## 2021-06-16 NOTE — ED PROVIDER NOTE - ATTENDING CONTRIBUTION TO CARE
Attending MD Skinner:   I personally have seen and examined this patient.  Physician assistant note reviewed and agree on plan of care and except where noted.  See below for details.     Seen in [place], accompanied by daughter, Chari  GI: Dr. Cadena  PMD Dr. Herrera    81F with PMH/PSH including HTN, COPD, hypothyroidism, diverticulosis, CAD s/p MI, lung ca s/p partial R pneumonectomy, s/p appy at age 10, gangrenous cholecystitis s/p lap caroline (12/12/20, Dr. Dasilva), s/p admission 1/2021 s/p MRCP without choledocholithiasis presents to the ED with abdominal pain in RUQ/epigastrium.  Reports has been having similar episodes since cholecystectomy.  Reports has episodes of persistent vomiting, reports most recently yesterday with increased pain.  Reports is scheduled for MRCP in 2 weeks but could not wait.  Reports 30-40 pound unintentional weight loss which she attributes to frequent vomiting.  Denies chest pain, shortness of breath, palpitations. Denies diarrhea, blood in stools, black stools. Denies dysuria, hematuria, change in urinary habits including frequency, urgency. Denies fevers, chills, dizziness.  A ten (10) point review of systems was negative other than as stated in the HPI or elsewhere in the chart.    Exam:   General: NAD  HENT: head NCAT, airway patent  Eyes: PERRL  Lungs: lungs CTAB with good inspiratory effort, no wheezing, no rhonchi, no rales  Cardiac: +S1S2, no m/r/g  GI: abdomen soft with +BS, +epigastric tenderness extending to RUQ, no rebound, no guarding, ND, well healed surgical incisions lap and RLQ  : no CVAT  MSK: FROM at neck, no calf tenderness, swelling, erythema or warmth  Neuro: moving all extremities spontaneously, sensory grossly intact, no gross neuro deficits  Psych: normal mood and affect   Skin: no rash    A/P: 81F with epigastric and RUQ pain, concern for retained stone, likely needing MRCP vs ERCP, will obtain labs, anti emetic, pain control, CXR, EKG (preop), admit

## 2021-06-16 NOTE — H&P ADULT - PROBLEM SELECTOR PLAN 7
VTE PPX: Hep SQ  Diet: DASH  Ethics: full code  Dispo: medically active    This is an overnight admission.  After 7AM, please page the primary team.     Torres Gomes MD  Internal Medicine, PGY2  [facility]  pager: [phone]

## 2021-06-16 NOTE — H&P ADULT - NSHPREVIEWOFSYSTEMS_GEN_ALL_CORE
REVIEW OF SYSTEMS:  CONSTITUTIONAL: No fever, chills, night sweats, or fatigue  EYES: No eye pain, visual disturbances, or discharge  ENMT:  No difficulty hearing; No sinus or throat pain  NECK: No pain or stiffness  RESPIRATORY: No cough, wheezing, or hemoptysis; No shortness of breath  CARDIOVASCULAR: No chest pain, palpitations, dizziness, or leg swelling  GASTROINTESTINAL: No abdominal or epigastric pain. No nausea, vomiting, or hematemesis; No diarrhea or constipation. No melena or hematochezia.  GENITOURINARY: No dysuria, frequency, hematuria, or incontinence  NEUROLOGICAL: No headaches, memory loss, loss of strength, numbness, or tremors  SKIN: No itching, burning, rashes, or lesions   LYMPH NODES: No enlarged glands  ENDOCRINE: No heat or cold intolerance; No hair loss  MUSCULOSKELETAL: No joint pain or swelling; No muscle, back, or extremity pain  HEME/LYMPH: No easy bruising, or bleeding gums  ALLERGY AND IMMUNOLOGIC: No hives or eczema REVIEW OF SYSTEMS:  CONSTITUTIONAL: No fever, chills, night sweats, or fatigue  EYES: No eye pain, mild vision change in both eyes  ENMT:  No difficulty hearing; No sinus or throat pain  NECK: No pain or stiffness  RESPIRATORY: No cough, wheezing, or hemoptysis; No shortness of breath  CARDIOVASCULAR: No chest pain, palpitations, dizziness, or leg swelling  GASTROINTESTINAL: sharp epigastric pain with associated nausea and vomiting; No diarrhea or constipation. No melena or hematochezia.  GENITOURINARY: No dysuria, hematuria, or incontinence but increased urinary frequency last night  NEUROLOGICAL: headache today, no memory loss, loss of strength, numbness, or tremors  SKIN: diffuse itching, no burning, rashes, or lesions   LYMPH NODES: No enlarged glands  ENDOCRINE: No heat or cold intolerance; No hair loss  MUSCULOSKELETAL: No joint pain or swelling; No muscle, back, or extremity pain  HEME/LYMPH: No easy bruising, or bleeding gums  ALLERGY AND IMMUNOLOGIC: No hives or eczema REVIEW OF SYSTEMS:  CONSTITUTIONAL: No fever, chills, night sweats, or fatigue  EYES: No eye pain, mild vision change in both eyes  ENMT:  No difficulty hearing; No sinus or throat pain  NECK: No pain or stiffness  RESPIRATORY: No cough, wheezing, or hemoptysis; No shortness of breath  CARDIOVASCULAR: No chest pain, palpitations, dizziness, or leg swelling  GASTROINTESTINAL: +sharp epigastric pain with associated nausea and vomiting; No diarrhea or constipation. No melena or hematochezia.  GENITOURINARY: No dysuria, hematuria, or incontinence but increased urinary frequency last night  NEUROLOGICAL: headache today, no memory loss, loss of strength, numbness, or tremors  SKIN: diffuse itching, no burning, rashes, or lesions   LYMPH NODES: No enlarged glands  ENDOCRINE: No heat or cold intolerance; No hair loss  MUSCULOSKELETAL: No joint pain or swelling; No muscle, back, or extremity pain  HEME/LYMPH: No easy bruising, or bleeding gums  ALLERGY AND IMMUNOLOGIC: No hives or eczema

## 2021-06-16 NOTE — ED PROVIDER NOTE - INTERNATIONAL TRAVEL
Spoke with patient who states that she never received the 90 day supply that was needed to be sent to [facility] by mail. After review of chart Patient has an appointment on 1/19/2020.     Medication ordered to requested pharmacy     No

## 2021-06-16 NOTE — H&P ADULT - HISTORY OF PRESENT ILLNESS
80F w/ HTN, COPD (30 PYH smoking), hypothyroidism, diverticulosis, CAD/MI (age 36), lung ca s/p partial right pneumonectomy, gangrenous cholecystitis s/p laparoscopic cholecystectomy (12/12/2020) presenting with ... 80F w/ HTN, COPD (30 PYH smoking), hypothyroidism, diverticulosis, CAD/MI (age 36), lung ca s/p partial right pneumonectomy, gangrenous cholecystitis s/p laparoscopic cholecystectomy (12/12/2020) presenting with ...  Of note, pt was admitted in Jan of this year for similar complaints.  MRCP was negative for choledocholithiasis at that time and as her symptoms resolved on antibiotics, GI did not have plans to perform EUS/ERCP.    80F w/ HTN, COPD (30 PYH smoking), hypothyroidism, diverticulosis, CAD/MI (age 36), lung ca s/p partial right pneumonectomy, gangrenous cholecystitis s/p laparoscopic cholecystectomy (12/12/2020) presenting with ...  Of note, pt was admitted in Jan of this year for similar complaints.  MRCP was negative for choledocholithiasis at that time and as her symptoms resolved on antibiotics, GI did not have plans to perform EUS/ERCP.       In the ED, Tmax 37.6; HR 66; /83; RR 18; SpO2 97%RA.  K 3.2.  tBili 1.9, , , . CXR obtained.  Given famotidine, zofran, 1L IVF.  80F w/ HTN, COPD (30 PYH smoking), hypothyroidism, diverticulosis, CAD/MI (age 36), lung ca s/p partial right pneumonectomy, gangrenous cholecystitis s/p laparoscopic cholecystectomy (12/12/2020) presenting with abdominal pain.  Abdominal pain is sharp, intermittent, starts suddenly, ....  It is associated w/ nausea and NBNB vomiting.  She denies fever, chills. Of note, pt was admitted in Jan of this year for similar complaints.  MRCP was negative for choledocholithiasis at that time and as her symptoms resolved on antibiotics, GI did not have plans to perform EUS/ERCP.   Since that time, she's had almost weekly abd pain that self-resolves after a few days.  No recent travel.  No sick contact.      In the ED, Tmax 37.6; HR 66; /83; RR 18; SpO2 97%RA.  K 3.2.  tBili 1.9, , , . CXR obtained.  Given famotidine, zofran, 1L IVF.  80F w/ HTN, COPD (30 PYH smoking), hypothyroidism, diverticulosis, CAD/MI (age 36), lung ca s/p partial right pneumonectomy, gangrenous cholecystitis s/p laparoscopic cholecystectomy (12/12/2020) presenting with abdominal pain.  Abdominal pain is sharp, intermittent, starts suddenly,  ....  It is associated w/ nausea and NBNB vomiting.  She denies fever, chills. Of note, pt was admitted in Jan of this year for similar complaints.  MRCP was negative for choledocholithiasis at that time and as her symptoms resolved on antibiotics, GI did not have plans to perform EUS/ERCP.   Since that time, she's had almost weekly abd pain that self-resolves after a few days.  No recent travel.  No sick contact.     In the ED, Tmax 37.6; HR 66; /83; RR 18; SpO2 97%RA.  K 3.2.  tBili 1.9, , , . CXR obtained.  Given famotidine, zofran, 1L IVF.  80F w/ HTN, COPD (30 PYH smoking), hypothyroidism, diverticulosis, CAD/MI (age 36), lung ca s/p partial right pneumonectomy, gangrenous cholecystitis s/p laparoscopic cholecystectomy (12/12/2020) presenting with abdominal pain.  Abdominal pain is sharp, intermittent, starts suddenly, is not relieved by rest or medication but sometimes goes away on its own several hours later.  It is associated w/ nausea and NBNB vomiting.  She denies fever, chills.  Of note, pt was admitted in Jan of this year for similar complaints.  MRCP was negative for choledocholithiasis at that time and as her symptoms resolved on antibiotics, GI did not have plans to perform EUS/ERCP.   Since that time, she's had almost weekly abd pain that self-resolves after a few days.  No recent travel.  No sick contact.     In the ED, Tmax 37.6; HR 66; /83; RR 18; SpO2 97%RA.  K 3.2.  tBili 1.9, , , . CXR obtained.  Given famotidine, zofran, 1L IVF.

## 2021-06-16 NOTE — H&P ADULT - PROBLEM SELECTOR PLAN 2
comfortable on room air at this time.  Can initiate supplemental O2 PRN for comfort. comfortable on room air at this time.  Can initiate supplemental O2 PRN for comfort.  - c/w therapeutic interchange Symbicort  - albuterol PRN

## 2021-06-16 NOTE — H&P ADULT - NSHPPHYSICALEXAM_GEN_ALL_CORE
Vital Signs Last 24 Hrs  T(C): 37.1 (16 Jun 2021 17:09), Max: 37.6 (16 Jun 2021 15:20)  T(F): 98.7 (16 Jun 2021 17:09), Max: 99.7 (16 Jun 2021 15:20)  HR: 66 (16 Jun 2021 15:20) (66 - 66)  BP: 155/83 (16 Jun 2021 15:20) (155/83 - 155/83)  BP(mean): --  RR: 18 (16 Jun 2021 15:20) (18 - 18)  SpO2: 97% (16 Jun 2021 15:20) (97% - 97%)    GENERAL: NAD, lying in bed comfortably  HEAD:  Atraumatic, Normocephalic  EYES: EOMI, PERRLA, conjunctiva and sclera clear  ENT: Moist mucous membranes  NECK: Supple, No JVD  CHEST/LUNG: Clear to auscultation bilaterally; No rales, rhonchi, wheezing, or rubs. Unlabored respirations  HEART: Regular rate and rhythm; No murmurs, rubs, or gallops  ABDOMEN: Bowel sounds present; Soft, Nontender, Nondistended. No hepatomegaly  EXTREMITIES:  2+ Peripheral Pulses, brisk capillary refill. No clubbing, cyanosis, or edema  NERVOUS SYSTEM:  Alert & Oriented X3, speech clear. No deficits   MSK: FROM all 4 extremities, full and equal strength  PSYCH: normal behavior, affect, speech  SKIN: No rashes or lesions Vital Signs Last 24 Hrs  T(C): 37.1 (16 Jun 2021 17:09), Max: 37.6 (16 Jun 2021 15:20)  T(F): 98.7 (16 Jun 2021 17:09), Max: 99.7 (16 Jun 2021 15:20)  HR: 66 (16 Jun 2021 15:20) (66 - 66)  BP: 155/83 (16 Jun 2021 15:20) (155/83 - 155/83)  BP(mean): --  RR: 18 (16 Jun 2021 15:20) (18 - 18)  SpO2: 97% (16 Jun 2021 15:20) (97% - 97%)    GENERAL: NAD, lying in bed comfortably  HEAD:  Atraumatic, Normocephalic  EYES: EOMI, conjunctiva and sclera clear  ENT: Moist mucous membranes  NECK: Supple, No JVD  CHEST/LUNG: Clear to auscultation bilaterally; No rales, rhonchi, wheezing, or rubs. Unlabored respirations  HEART: Regular rate and rhythm; No murmurs, rubs, or gallops  ABDOMEN: +mild RUQ tenderness, Bowel sounds present; Soft, Nontender, Nondistended. No hepatomegaly  EXTREMITIES:  2+ Peripheral Pulses, brisk capillary refill. No clubbing, cyanosis, or edema  NERVOUS SYSTEM:  Alert & Oriented X3, speech clear. No deficits   MSK: FROM all 4 extremities, full and equal strength  PSYCH: normal behavior, affect, speech  SKIN: No rashes or lesions Vital Signs Last 24 Hrs  T(C): 37.1 (16 Jun 2021 17:09), Max: 37.6 (16 Jun 2021 15:20)  T(F): 98.7 (16 Jun 2021 17:09), Max: 99.7 (16 Jun 2021 15:20)  HR: 66 (16 Jun 2021 15:20) (66 - 66)  BP: 155/83 (16 Jun 2021 15:20) (155/83 - 155/83)  BP(mean): --  RR: 18 (16 Jun 2021 15:20) (18 - 18)  SpO2: 97% (16 Jun 2021 15:20) (97% - 97%)    GENERAL: NAD, lying in bed comfortably  HEAD:  Atraumatic, Normocephalic  EYES: EOMI, conjunctiva and sclera clear  ENT: Moist mucous membranes  NECK: Supple, No JVD  CHEST/LUNG: Diffuse faint wheezing, No rales, rhonchi, or rubs. Unlabored respirations  HEART: Regular rate and rhythm; No murmurs, rubs, or gallops  ABDOMEN: +mild RUQ tenderness, Bowel sounds present; Soft, Nontender, Nondistended. No hepatomegaly  EXTREMITIES:  2+ Peripheral Pulses, brisk capillary refill. No clubbing, cyanosis, 1+ bilateral pitting edema of lower extremities  NERVOUS SYSTEM:  Alert & Oriented X3, speech clear. No deficits   MSK: FROM all 4 extremities, full and equal strength  PSYCH: normal behavior, affect, speech  SKIN: new telangiectasias diffuse on LE, bloody scratches on upper back

## 2021-06-16 NOTE — ED ADULT TRIAGE NOTE - AS TEMP SITE
General FM note    Sierra Payne is a 61 y.o. female who presents today for follow up on her  medical conditions as noted below. 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     Essential hypertension     Past Medical History:   Diagnosis Date    Acid reflux     Anemia     Hypertension     Migraine     Mitral valve prolapse     Osteoarthritis       Past Surgical History:   Procedure Laterality Date     SECTION      COLONOSCOPY      COLONOSCOPY  2018    Screening; with 1 biopsy    COLONOSCOPY N/A 2018    COLONOSCOPY WITH BIOPSY performed by Amy Llamas MD at [address], COLON, DIAGNOSTIC      OTHER SURGICAL HISTORY      uterine ablation    OVARY REMOVAL      TOE SURGERY       Family History   Problem Relation Age of Onset    Neuropathy Mother     Heart Attack Father      Current Outpatient Medications   Medication Sig Dispense Refill    tiZANidine (ZANAFLEX) 2 MG tablet Take 1 tablet by mouth nightly 30 tablet 1    losartan (COZAAR) 100 MG tablet TAKE 1 TABLET BY MOUTH EVERY DAY 90 tablet 1    pravastatin (PRAVACHOL) 40 MG tablet TAKE 1 TABLET BY MOUTH EVERY DAY 90 tablet 1    omeprazole (PRILOSEC) 20 MG delayed release capsule TAKE 1 CAPSULE BY MOUTH 2 TIMES DAILY (BEFORE MEALS) 180 capsule 1    topiramate (TOPAMAX) 25 MG tablet TAKE 1 TABLET BY MOUTH TWICE A  tablet 2    calcium elemental (OYSCO 500) 500 MG TABS tablet Take 1 tablet by mouth daily 90 tablet 1    spironolactone (ALDACTONE) 50 MG tablet TAKE 1 TABLET BY MOUTH EVERY DAY 90 tablet 1    Magnesium Oxide 400 MG CAPS Take 400 mg by mouth      SUMAtriptan (IMITREX) 100 MG tablet Take 1 tablet by mouth once as needed for Migraine 27 tablet 1    Misc.  Devices MISC 1 each by Does not apply route once for 1 dose Recommendation: 2 days oral

## 2021-06-17 DIAGNOSIS — Z02.9 ENCOUNTER FOR ADMINISTRATIVE EXAMINATIONS, UNSPECIFIED: ICD-10-CM

## 2021-06-17 DIAGNOSIS — K59.00 CONSTIPATION, UNSPECIFIED: ICD-10-CM

## 2021-06-17 LAB
ALBUMIN SERPL ELPH-MCNC: 3.2 G/DL — LOW (ref 3.3–5)
ALBUMIN SERPL ELPH-MCNC: 3.2 G/DL — LOW (ref 3.3–5)
ALP SERPL-CCNC: 653 U/L — HIGH (ref 40–120)
ALP SERPL-CCNC: 681 U/L — HIGH (ref 40–120)
ALT FLD-CCNC: 184 U/L — HIGH (ref 10–45)
ALT FLD-CCNC: 199 U/L — HIGH (ref 10–45)
ANION GAP SERPL CALC-SCNC: 12 MMOL/L — SIGNIFICANT CHANGE UP (ref 5–17)
APTT BLD: 29.7 SEC — SIGNIFICANT CHANGE UP (ref 27.5–35.5)
AST SERPL-CCNC: 210 U/L — HIGH (ref 10–40)
AST SERPL-CCNC: 250 U/L — HIGH (ref 10–40)
BASOPHILS # BLD AUTO: 0.04 K/UL — SIGNIFICANT CHANGE UP (ref 0–0.2)
BASOPHILS NFR BLD AUTO: 0.6 % — SIGNIFICANT CHANGE UP (ref 0–2)
BILIRUB DIRECT SERPL-MCNC: 1.2 MG/DL — HIGH (ref 0–0.2)
BILIRUB INDIRECT FLD-MCNC: 0.6 MG/DL — SIGNIFICANT CHANGE UP (ref 0.2–1)
BILIRUB SERPL-MCNC: 1.3 MG/DL — HIGH (ref 0.2–1.2)
BILIRUB SERPL-MCNC: 1.8 MG/DL — HIGH (ref 0.2–1.2)
BUN SERPL-MCNC: 10 MG/DL — SIGNIFICANT CHANGE UP (ref 7–23)
CALCIUM SERPL-MCNC: 8.9 MG/DL — SIGNIFICANT CHANGE UP (ref 8.4–10.5)
CHLORIDE SERPL-SCNC: 102 MMOL/L — SIGNIFICANT CHANGE UP (ref 96–108)
CO2 SERPL-SCNC: 24 MMOL/L — SIGNIFICANT CHANGE UP (ref 22–31)
COVID-19 SPIKE DOMAIN AB INTERP: POSITIVE
COVID-19 SPIKE DOMAIN ANTIBODY RESULT: >250 U/ML — HIGH
CREAT SERPL-MCNC: 0.73 MG/DL — SIGNIFICANT CHANGE UP (ref 0.5–1.3)
EOSINOPHIL # BLD AUTO: 0.09 K/UL — SIGNIFICANT CHANGE UP (ref 0–0.5)
EOSINOPHIL NFR BLD AUTO: 1.4 % — SIGNIFICANT CHANGE UP (ref 0–6)
GLUCOSE SERPL-MCNC: 80 MG/DL — SIGNIFICANT CHANGE UP (ref 70–99)
HCT VFR BLD CALC: 36.9 % — SIGNIFICANT CHANGE UP (ref 34.5–45)
HGB BLD-MCNC: 11.7 G/DL — SIGNIFICANT CHANGE UP (ref 11.5–15.5)
IMM GRANULOCYTES NFR BLD AUTO: 0.3 % — SIGNIFICANT CHANGE UP (ref 0–1.5)
INR BLD: 1.15 RATIO — SIGNIFICANT CHANGE UP (ref 0.88–1.16)
LYMPHOCYTES # BLD AUTO: 1.52 K/UL — SIGNIFICANT CHANGE UP (ref 1–3.3)
LYMPHOCYTES # BLD AUTO: 23.6 % — SIGNIFICANT CHANGE UP (ref 13–44)
MAGNESIUM SERPL-MCNC: 1.7 MG/DL — SIGNIFICANT CHANGE UP (ref 1.6–2.6)
MCHC RBC-ENTMCNC: 29.3 PG — SIGNIFICANT CHANGE UP (ref 27–34)
MCHC RBC-ENTMCNC: 31.7 GM/DL — LOW (ref 32–36)
MCV RBC AUTO: 92.3 FL — SIGNIFICANT CHANGE UP (ref 80–100)
MONOCYTES # BLD AUTO: 0.73 K/UL — SIGNIFICANT CHANGE UP (ref 0–0.9)
MONOCYTES NFR BLD AUTO: 11.4 % — SIGNIFICANT CHANGE UP (ref 2–14)
NEUTROPHILS # BLD AUTO: 4.03 K/UL — SIGNIFICANT CHANGE UP (ref 1.8–7.4)
NEUTROPHILS NFR BLD AUTO: 62.7 % — SIGNIFICANT CHANGE UP (ref 43–77)
NRBC # BLD: 0 /100 WBCS — SIGNIFICANT CHANGE UP (ref 0–0)
PHOSPHATE SERPL-MCNC: 3.3 MG/DL — SIGNIFICANT CHANGE UP (ref 2.5–4.5)
PLATELET # BLD AUTO: 270 K/UL — SIGNIFICANT CHANGE UP (ref 150–400)
POTASSIUM SERPL-MCNC: 3.6 MMOL/L — SIGNIFICANT CHANGE UP (ref 3.5–5.3)
POTASSIUM SERPL-SCNC: 3.6 MMOL/L — SIGNIFICANT CHANGE UP (ref 3.5–5.3)
PROT SERPL-MCNC: 5.5 G/DL — LOW (ref 6–8.3)
PROT SERPL-MCNC: 5.9 G/DL — LOW (ref 6–8.3)
PROTHROM AB SERPL-ACNC: 13.7 SEC — HIGH (ref 10.6–13.6)
RBC # BLD: 4 M/UL — SIGNIFICANT CHANGE UP (ref 3.8–5.2)
RBC # FLD: 15 % — HIGH (ref 10.3–14.5)
SARS-COV-2 IGG+IGM SERPL QL IA: >250 U/ML — HIGH
SARS-COV-2 IGG+IGM SERPL QL IA: POSITIVE
SODIUM SERPL-SCNC: 138 MMOL/L — SIGNIFICANT CHANGE UP (ref 135–145)
WBC # BLD: 6.43 K/UL — SIGNIFICANT CHANGE UP (ref 3.8–10.5)
WBC # FLD AUTO: 6.43 K/UL — SIGNIFICANT CHANGE UP (ref 3.8–10.5)

## 2021-06-17 PROCEDURE — 99233 SBSQ HOSP IP/OBS HIGH 50: CPT | Mod: GC

## 2021-06-17 PROCEDURE — 74176 CT ABD & PELVIS W/O CONTRAST: CPT | Mod: 26

## 2021-06-17 PROCEDURE — 99222 1ST HOSP IP/OBS MODERATE 55: CPT | Mod: GC

## 2021-06-17 PROCEDURE — 74183 MRI ABD W/O CNTR FLWD CNTR: CPT | Mod: 26

## 2021-06-17 RX ORDER — ENOXAPARIN SODIUM 100 MG/ML
40 INJECTION SUBCUTANEOUS DAILY
Refills: 0 | Status: COMPLETED | OUTPATIENT
Start: 2021-06-17 | End: 2021-06-17

## 2021-06-17 RX ORDER — SENNA PLUS 8.6 MG/1
2 TABLET ORAL AT BEDTIME
Refills: 0 | Status: DISCONTINUED | OUTPATIENT
Start: 2021-06-17 | End: 2021-06-20

## 2021-06-17 RX ADMIN — CHOLESTYRAMINE 4 GRAM(S): 4 POWDER, FOR SUSPENSION ORAL at 10:19

## 2021-06-17 RX ADMIN — Medication 5 MILLIGRAM(S): at 12:02

## 2021-06-17 RX ADMIN — Medication 25 MILLIGRAM(S): at 05:52

## 2021-06-17 RX ADMIN — Medication 125 MICROGRAM(S): at 05:53

## 2021-06-17 RX ADMIN — BUDESONIDE AND FORMOTEROL FUMARATE DIHYDRATE 2 PUFF(S): 160; 4.5 AEROSOL RESPIRATORY (INHALATION) at 05:57

## 2021-06-17 RX ADMIN — ENOXAPARIN SODIUM 40 MILLIGRAM(S): 100 INJECTION SUBCUTANEOUS at 15:41

## 2021-06-17 RX ADMIN — HEPARIN SODIUM 5000 UNIT(S): 5000 INJECTION INTRAVENOUS; SUBCUTANEOUS at 05:52

## 2021-06-17 RX ADMIN — SENNA PLUS 2 TABLET(S): 8.6 TABLET ORAL at 21:45

## 2021-06-17 RX ADMIN — PANTOPRAZOLE SODIUM 40 MILLIGRAM(S): 20 TABLET, DELAYED RELEASE ORAL at 05:52

## 2021-06-17 RX ADMIN — CHOLESTYRAMINE 4 GRAM(S): 4 POWDER, FOR SUSPENSION ORAL at 21:45

## 2021-06-17 RX ADMIN — Medication 81 MILLIGRAM(S): at 12:02

## 2021-06-17 RX ADMIN — ESCITALOPRAM OXALATE 10 MILLIGRAM(S): 10 TABLET, FILM COATED ORAL at 05:52

## 2021-06-17 RX ADMIN — Medication 3 MILLIGRAM(S): at 21:46

## 2021-06-17 NOTE — PROGRESS NOTE ADULT - SUBJECTIVE AND OBJECTIVE BOX
PROGRESS NOTE:   Authored by Christina Sousa MD  Pager: [facility] [phone]; [facility] [phone]    Patient is a 81y old  Female who presents with a chief complaint of abd pain (16 Jun 2021 19:35)    SUBJECTIVE / OVERNIGHT EVENTS:  NAEON. Denies CP, SOB, subjective f/c, n/v.       All other review of systems is negative unless indicated above.    MEDICATIONS  (STANDING):  aspirin  chewable 81 milliGRAM(s) Oral daily  budesonide 160 MICROgram(s)/formoterol 4.5 MICROgram(s) Inhaler 2 Puff(s) Inhalation two times a day  cholestyramine Powder (Sugar-Free) 4 Gram(s) Oral two times a day  enoxaparin Injectable 40 milliGRAM(s) SubCutaneous daily  escitalopram 10 milliGRAM(s) Oral daily  levothyroxine 125 MICROGram(s) Oral daily  melatonin 3 milliGRAM(s) Oral at bedtime  metoprolol tartrate 25 milliGRAM(s) Oral daily  pantoprazole    Tablet 40 milliGRAM(s) Oral before breakfast    MEDICATIONS  (PRN):  ALBUTerol   0.042% 1.25 milliGRAM(s) Nebulizer every 6 hours PRN Shortness of Breath and/or Wheezing  bisacodyl 5 milliGRAM(s) Oral daily PRN Constipation  senna 2 Tablet(s) Oral at bedtime PRN Constipation      CAPILLARY BLOOD GLUCOSE        I&O's Summary      PHYSICAL EXAM:  Vital Signs Last 24 Hrs  T(C): 37 (17 Jun 2021 05:46), Max: 37.6 (16 Jun 2021 15:20)  T(F): 98.6 (17 Jun 2021 05:46), Max: 99.7 (16 Jun 2021 15:20)  HR: 63 (17 Jun 2021 05:46) (60 - 67)  BP: 139/55 (17 Jun 2021 05:46) (126/67 - 167/72)  BP(mean): 106 (16 Jun 2021 20:52) (106 - 106)  RR: 18 (17 Jun 2021 05:46) (17 - 18)  SpO2: 98% (17 Jun 2021 05:46) (94% - 98%)    GENERAL: NAD, lying in bed comfortably  HEAD:  Atraumatic, Normocephalic  EYES: EOMI, conjunctiva and sclera clear  ENT: Moist mucous membranes  NECK: Supple, No JVD  CHEST/LUNG: Diffuse faint wheezing, No rales, rhonchi, or rubs. Unlabored respirations  HEART: Regular rate and rhythm; No murmurs, rubs, or gallops  ABDOMEN: +mild RUQ tenderness, Bowel sounds present; Soft, Nontender, Nondistended. No hepatomegaly  EXTREMITIES:  2+ Peripheral Pulses, brisk capillary refill. No clubbing, cyanosis, 1+ bilateral pitting edema of lower extremities  NERVOUS SYSTEM:  Alert & Oriented X3, speech clear. No deficits   MSK: FROM all 4 extremities, full and equal strength  PSYCH: normal behavior, affect, speech  SKIN:  telangiectasias diffuse on LE,  LABS:                        11.7   6.43  )-----------( 270      ( 17 Jun 2021 07:04 )             36.9     06-16    136  |  101  |  10  ----------------------------<  106<H>  3.2<L>   |  23  |  0.70    Ca    8.9      16 Jun 2021 17:14  Phos  2.9     06-16  Mg     1.7     06-16    TPro  5.5<L>  /  Alb  3.2<L>  /  TBili  1.8<H>  /  DBili  1.2<H>  /  AST  250<H>  /  ALT  199<H>  /  AlkPhos  681<H>  06-17                RADIOLOGY & ADDITIONAL TESTS:  Results Reviewed:   Imaging Personally Reviewed:  Electrocardiogram Personally Reviewed:    COORDINATION OF CARE:  Care Discussed with Consultants/Other Providers [Y/N]:  Prior or Outpatient Records Reviewed [Y/N]:   PROGRESS NOTE:   Authored by Christina Sousa MD  Pager: [facility] [phone]; [facility] [phone]    Patient is a 81y old  Female who presents with a chief complaint of abd pain (16 Jun 2021 19:35)    SUBJECTIVE / OVERNIGHT EVENTS:  ARSENIO. Endorses itchiness of her arms and back since January. States she was prescribed cholestyramine, however has taken it seldomly. Denies CP, SOB, subjective f/c, n/v.       All other review of systems is negative unless indicated above.    MEDICATIONS  (STANDING):  aspirin  chewable 81 milliGRAM(s) Oral daily  budesonide 160 MICROgram(s)/formoterol 4.5 MICROgram(s) Inhaler 2 Puff(s) Inhalation two times a day  cholestyramine Powder (Sugar-Free) 4 Gram(s) Oral two times a day  enoxaparin Injectable 40 milliGRAM(s) SubCutaneous daily  escitalopram 10 milliGRAM(s) Oral daily  levothyroxine 125 MICROGram(s) Oral daily  melatonin 3 milliGRAM(s) Oral at bedtime  metoprolol tartrate 25 milliGRAM(s) Oral daily  pantoprazole    Tablet 40 milliGRAM(s) Oral before breakfast    MEDICATIONS  (PRN):  ALBUTerol   0.042% 1.25 milliGRAM(s) Nebulizer every 6 hours PRN Shortness of Breath and/or Wheezing  bisacodyl 5 milliGRAM(s) Oral daily PRN Constipation  senna 2 Tablet(s) Oral at bedtime PRN Constipation      CAPILLARY BLOOD GLUCOSE        I&O's Summary      PHYSICAL EXAM:  Vital Signs Last 24 Hrs  T(C): 37 (17 Jun 2021 05:46), Max: 37.6 (16 Jun 2021 15:20)  T(F): 98.6 (17 Jun 2021 05:46), Max: 99.7 (16 Jun 2021 15:20)  HR: 63 (17 Jun 2021 05:46) (60 - 67)  BP: 139/55 (17 Jun 2021 05:46) (126/67 - 167/72)  BP(mean): 106 (16 Jun 2021 20:52) (106 - 106)  RR: 18 (17 Jun 2021 05:46) (17 - 18)  SpO2: 98% (17 Jun 2021 05:46) (94% - 98%)    GENERAL: NAD, lying in bed comfortably  HEAD:  Atraumatic, Normocephalic  EYES: EOMI, conjunctiva and sclera clear  ENT: Moist mucous membranes  NECK: Supple, No JVD  CHEST/LUNG: Diffuse faint wheezing, No rales, rhonchi, or rubs. Unlabored respirations  HEART: Regular rate and rhythm; No murmurs, rubs, or gallops  ABDOMEN: +mild RUQ tenderness, Bowel sounds present; Soft, Nontender, Nondistended. No hepatomegaly  EXTREMITIES:  2+ Peripheral Pulses, brisk capillary refill. No clubbing, cyanosis, 1+ bilateral pitting edema of lower extremities  NERVOUS SYSTEM:  Alert & Oriented X3, speech clear. No deficits   MSK: FROM all 4 extremities, full and equal strength  PSYCH: normal behavior, affect, speech  SKIN:  telangiectasias diffuse on LE,  LABS:                        11.7   6.43  )-----------( 270      ( 17 Jun 2021 07:04 )             36.9     06-16    136  |  101  |  10  ----------------------------<  106<H>  3.2<L>   |  23  |  0.70    Ca    8.9      16 Jun 2021 17:14  Phos  2.9     06-16  Mg     1.7     06-16    TPro  5.5<L>  /  Alb  3.2<L>  /  TBili  1.8<H>  /  DBili  1.2<H>  /  AST  250<H>  /  ALT  199<H>  /  AlkPhos  681<H>  06-17                RADIOLOGY & ADDITIONAL TESTS:  Results Reviewed:   Imaging Personally Reviewed:  Electrocardiogram Personally Reviewed:    COORDINATION OF CARE:  Care Discussed with Consultants/Other Providers [Y/N]:  Prior or Outpatient Records Reviewed [Y/N]:

## 2021-06-17 NOTE — CONSULT NOTE ADULT - SUBJECTIVE AND OBJECTIVE BOX
HPI:    Patient is a 80 year old female with past medical history of HTN, COPD, hypothyroidism, diverticulosis, MI, and past surgical history of lap caroline in December 2020, and a partial right pneumonectomy for lung cancer presents with persistent abdominal pain and nausea/vomiting. Patient states ever since her cholecystectomy has been having abdominal pain. Pain is not associated with eating. Pain is sharp in nature, and is in epigastrium and radiates to LUQ. Patient states that pain sometimes wakes her out of her sleep and is worse when she is walking. Pain is not constant. Patient was recently here and received and MRCP showing no CBD stones. In the ED patient vitals WNL, WBC WNL, Lipase of 53, Direct tbili of 1.3, and elevated LFT's. Patient is ordered for and MRCP. Patient denies chest pain, SOB, dizziness, lightheadedness and dysuria.    PAST MEDICAL & SURGICAL HISTORY:  Hypertension    MI (myocardial infarction)    COPD (chronic obstructive pulmonary disease)    Lung cancer    History of appendectomy    History of cholecystectomy        MEDICATIONS  (STANDING):    MEDICATIONS  (PRN):      Allergies    No Known Allergies    Intolerances        SOCIAL HISTORY:    FAMILY HISTORY:  Family history of colonic diverticulitis (Mother, Sibling)          PHYSICAL EXAM:  Constitutional: well developed, well nourished, NAD  Eyes: anicteric  ENMT: normal facies, symmetric  Respiratory: Breathing comfortably    Gastrointestinal: abdomen soft, tender to palpation in epigastrium, nondistended.   Extremities: FROM, warm  Neurological: intact, non-focal  Psychiatric: oriented x 3; appropriate      Vital Signs Last 24 Hrs  T(C): 37.1 (16 Jun 2021 17:09), Max: 37.6 (16 Jun 2021 15:20)  T(F): 98.7 (16 Jun 2021 17:09), Max: 99.7 (16 Jun 2021 15:20)  HR: 66 (16 Jun 2021 15:20) (66 - 66)  BP: 155/83 (16 Jun 2021 15:20) (155/83 - 155/83)  BP(mean): --  RR: 18 (16 Jun 2021 15:20) (18 - 18)  SpO2: 97% (16 Jun 2021 15:20) (97% - 97%)    I&O's Summary          LABS:                        12.7   7.53  )-----------( 323      ( 16 Jun 2021 17:14 )             38.7     06-16    136  |  101  |  10  ----------------------------<  106<H>  3.2<L>   |  23  |  0.70    Ca    8.9      16 Jun 2021 17:14  Phos  2.9     06-16  Mg     1.7     06-16    TPro  6.5  /  Alb  3.4  /  TBili  1.9<H>  /  DBili  1.3<H>  /  AST  345<H>  /  ALT  239<H>  /  AlkPhos  724<H>  06-16        CAPILLARY BLOOD GLUCOSE        LIVER FUNCTIONS - ( 16 Jun 2021 17:14 )  Alb: 3.4 g/dL / Pro: 6.5 g/dL / ALK PHOS: 724 U/L / ALT: 239 U/L / AST: 345 U/L / GGT: x             Cultures:      RADIOLOGY & ADDITIONAL STUDIES:      Plan:          
Chief Complaint:  Patient is a 81y old  Female who presents with a chief complaint of abd pain (17 Jun 2021 07:25)    HPI:  80 year old female with hx of HTN, COPD, hypothyroidism, diverticulosis, CAD/MI, lung ca s/p partial right pneumonectomy, gangrenous cholecystitis s/p laparoscopic cholecystectomy (12/12/2020) presents with acute on chronic abdominal pain. Patient was admitted 1/2021 for abdominal pain with hyperbilirubinemia and elevated liver enzymes. At that time underwent an MRCP which was unremarkable and was discharged home as her liver enzymes improved. She continued to have weekly episode soft, intermittent epigastric pain associated with nausea and NBNB emesis prompting her to come back to the ED. She denies fevers, chills, chest pain, shortness of breath, melena, hematochezia, hematemesis, cough and headaches.     Allergies:  No Known Allergies    Home Medications:  Reviewed    Hospital Medications:  ALBUTerol   0.042% 1.25 milliGRAM(s) Nebulizer every 6 hours PRN  aspirin  chewable 81 milliGRAM(s) Oral daily  bisacodyl 5 milliGRAM(s) Oral daily  budesonide 160 MICROgram(s)/formoterol 4.5 MICROgram(s) Inhaler 2 Puff(s) Inhalation two times a day  cholestyramine Powder (Sugar-Free) 4 Gram(s) Oral two times a day  enoxaparin Injectable 40 milliGRAM(s) SubCutaneous daily  escitalopram 10 milliGRAM(s) Oral daily  levothyroxine 125 MICROGram(s) Oral daily  melatonin 3 milliGRAM(s) Oral at bedtime  metoprolol tartrate 25 milliGRAM(s) Oral daily  pantoprazole    Tablet 40 milliGRAM(s) Oral before breakfast  senna 2 Tablet(s) Oral at bedtime      PMHX/PSHX:  Hypertension    MI (myocardial infarction)    COPD (chronic obstructive pulmonary disease)    Lung cancer    History of appendectomy    History of cholecystectomy    Family history:  Family history of colonic diverticulitis (Mother, Sibling)    Social History:   Former Smoker  Social EtOH use  No drug use    ROS:   General:  No fevers, chills or night sweats.  ENT:  No sore throat or dysphagia  CV:  No pain or palpitations  Resp:  No dyspnea, cough, wheezing  GI:  See H&P  Skin:  No rash or edema    PHYSICAL EXAM:   GENERAL:  NAD, Appears stated age  HEENT:  NC/AT,  conjunctivae clear and pink, sclera -anicteric  CHEST:  CTA B/L, Normal effort  HEART:  RRR S1/S2  ABDOMEN:  Soft, non-tender, non-distended, BS+  EXTEREMITIES:  No cyanosis or Edema  SKIN:  Warm & Dry  NEURO:  Alert, oriented    Vital Signs:  Vital Signs Last 24 Hrs  T(C): 37 (17 Jun 2021 05:46), Max: 37.6 (16 Jun 2021 15:20)  T(F): 98.6 (17 Jun 2021 05:46), Max: 99.7 (16 Jun 2021 15:20)  HR: 63 (17 Jun 2021 05:46) (60 - 67)  BP: 139/55 (17 Jun 2021 05:46) (126/67 - 167/72)  BP(mean): 106 (16 Jun 2021 20:52) (106 - 106)  RR: 18 (17 Jun 2021 05:46) (17 - 18)  SpO2: 98% (17 Jun 2021 05:46) (94% - 98%)  Daily Height in cm: 162.56 (16 Jun 2021 15:20)    Daily     LABS:                        11.7   6.43  )-----------( 270      ( 17 Jun 2021 07:04 )             36.9     Mean Cell Volume: 92.3 fl (06-17-21 @ 07:04)    06-17    138  |  102  |  10  ----------------------------<  80  3.6   |  24  |  0.73    Ca    8.9      17 Jun 2021 07:04  Phos  3.3     06-17  Mg     1.7     06-17    TPro  5.9<L>  /  Alb  3.2<L>  /  TBili  1.3<H>  /  DBili  x   /  AST  210<H>  /  ALT  184<H>  /  AlkPhos  653<H>  06-17    LIVER FUNCTIONS - ( 17 Jun 2021 07:04 )  Alb: 3.2 g/dL / Pro: 5.9 g/dL / ALK PHOS: 653 U/L / ALT: 184 U/L / AST: 210 U/L / GGT: x           PT/INR - ( 17 Jun 2021 07:04 )   PT: 13.7 sec;   INR: 1.15 ratio      PTT - ( 17 Jun 2021 07:04 )  PTT:29.7 sec    Amylase Serum--      Lipase serum53       Ammonia--                          11.7   6.43  )-----------( 270      ( 17 Jun 2021 07:04 )             36.9                         12.7   7.53  )-----------( 323      ( 16 Jun 2021 17:14 )             38.7     Imaging:

## 2021-06-17 NOTE — PROGRESS NOTE ADULT - PROBLEM SELECTOR PLAN 1
presenting w/ colicky abd pain similar to previous admission with labs suggestive of biliary obstruction.  Elevated LFTs likely in s/o possible obstruction.   - tBili 1.9, , , .  Lipase 53 on admission  - f/u CTAP noncon 6/17 for assessment of stone or large obstructing mass. Awaiting final read.  - f/u LISSET, AMA and IgG4 antibodies for PBC and PSC  - pending MRCP  - regular diet, NPO after MN in case there's plan for ERCP  - Per surgery, no acute surgical intervention at this time.   - Appreciate GI recs. presenting w/ colicky abd pain similar to previous admission with labs suggestive of biliary obstruction.  Elevated LFTs likely in s/o possible obstruction. Pruritis likely in s/o elevated bili.   - tBili 1.9, , , .  Lipase 53 on admission  - f/u CTAP noncon 6/17 for assessment of stone or large obstructing mass. Awaiting final read.  - f/u LISSET, AMA and IgG4 antibodies for PBC and PSC  - c/w cholestyramine for puritus.   - pending MRCP  - regular diet, NPO after MN in case there's plan for ERCP  - Per surgery, no acute surgical intervention at this time.   - Appreciate GI recs.

## 2021-06-17 NOTE — CONSULT NOTE ADULT - ASSESSMENT
Patient is a 80 year old female with past medical history of HTN, COPD, hypothyroidism, diverticulosis, MI, and past surgical history of lap caroline in December 2020, and a partial right pneumonectomy for lung cancer presents with persistent abdominal pain and nausea/vomiting. In the ED patient vitals WNL, WBC WNL, Lipase of 53, Direct tbili of 1.3, and elevated LFT's. Patient is ordered for and MRCP.    - No acute general surgery intervention at this time  - Recommend MRCP and ERCP to assess CBD  - GI consult   - Pain control, NPO, IVF  - discussed with attending    [id number]
80 year old female with hx of HTN, COPD, hypothyroidism, diverticulosis, CAD/MI, lung ca s/p partial right pneumonectomy, gangrenous cholecystitis s/p laparoscopic cholecystectomy (12/12/2020) presents with acute on chronic abdominal pain in the setting of intermittent elevations in bilirubin and liver enzymes    Impression:   # Abdominal Pain: With elevated liver enzymes concerning for stone given transient elevations with pain and improvement shortly afterwards.   # Gangrenous cholecystitis s/p laparoscopic cholecystectomy   # Lung ca s/p partial right pneumonectomy  # COPD: On RA    Recommendation:  - NPO @ [facility] [place] for EUS/ERCP tomorrow  - Trend CMP  - Supportive care per primary team    Elizabeth Cade MD  Gastroenterology Fellow  [phone]  Available on Microsoft Teams  Please page on call fellow on weekends and after 5pm on weekdays: [phone]

## 2021-06-17 NOTE — PROGRESS NOTE ADULT - PROBLEM SELECTOR PLAN 2
- c/w therapeutic interchange Symbicort  - albuterol PRN - c/w therapeutic interchange Symbicort  - albuterol PRN    #CAD  c/w home ASA - c/w home Wixela (pt home meds)  - albuterol PRN    #CAD  c/w home ASA

## 2021-06-17 NOTE — CONSULT NOTE ADULT - ATTENDING COMMENTS
80 year old female with hx of HTN, COPD, hypothyroidism, diverticulosis, CAD/MI, lung ca s/p partial right pneumonectomy, gangrenous cholecystitis s/p laparoscopic cholecystectomy (12/12/2020) presents with acute on chronic abdominal pain in recurrent intermittent elevations in bilirubin and liver enzymes.  Previously with normal MRCP but likely missed choledocholithiasis or sludge. Plan for EUS/ERCP tomorrow. NPO after midnight.    Thank you for this interesting consult.  Please call the advanced GI service with any questions or concerns.
- 81 yo f with multiple medical history, significant for laparoscopic cholecystectomy on December 2020.   - She is complaining of bouts of abdominal pain in epigastrium and radiation to the RUQ.  - Her abdomen is not tender, particularly on deep palpation on the RUQ. She has no sublingual or conjunctival jaundice.  - Her laboratory values are significant for normal WBC, mild elevation of direct bilirubin and significant elevation of transaminases AST>ALT, alk phos, consistent with cholestatic pattern.  - Recommend MRCP and GI consult for potential ERCP.  - Will follow.

## 2021-06-17 NOTE — PROGRESS NOTE ADULT - SUBJECTIVE AND OBJECTIVE BOX
GENERAL SURGERY DAILY PROGRESS NOTE:    Subjective:  Patient seen and examined. Reports feeling better, denies abdominal pain at this time    Vital Signs Last 24 Hrs  T(C): 36.4 (17 Jun 2021 12:18), Max: 37.6 (16 Jun 2021 15:20)  T(F): 97.6 (17 Jun 2021 12:18), Max: 99.7 (16 Jun 2021 15:20)  HR: 58 (17 Jun 2021 12:18) (58 - 67)  BP: 149/77 (17 Jun 2021 12:18) (126/67 - 167/72)  BP(mean): 106 (16 Jun 2021 20:52) (106 - 106)  RR: 18 (17 Jun 2021 12:18) (17 - 18)  SpO2: 94% (17 Jun 2021 12:18) (94% - 98%)    Exam:  Gen: NAD, resting in bed, alert and responding appropriately  Resp: Airway patent, non-labored respirations  Abd: Soft, ND, NT, no rebound or guarding  Ext: No edema, WWP  Neuro: AAOx3, no focal deficits    I&O's Detail      Daily Height in cm: 162.56 (16 Jun 2021 15:20)    Daily     MEDICATIONS  (STANDING):  aspirin  chewable 81 milliGRAM(s) Oral daily  bisacodyl 5 milliGRAM(s) Oral daily  cholestyramine Powder (Sugar-Free) 4 Gram(s) Oral two times a day  enoxaparin Injectable 40 milliGRAM(s) SubCutaneous daily  escitalopram 10 milliGRAM(s) Oral daily  levothyroxine 125 MICROGram(s) Oral daily  melatonin 3 milliGRAM(s) Oral at bedtime  metoprolol tartrate 25 milliGRAM(s) Oral daily  pantoprazole    Tablet 40 milliGRAM(s) Oral before breakfast  senna 2 Tablet(s) Oral at bedtime  wixela  inhaler 500/50 microgram 1 Inhalation 1 Inhalation Oral two times a day    MEDICATIONS  (PRN):  ALBUTerol   0.042% 1.25 milliGRAM(s) Nebulizer every 6 hours PRN Shortness of Breath and/or Wheezing      LABS:                        11.7   6.43  )-----------( 270      ( 17 Jun 2021 07:04 )             36.9     06-17    138  |  102  |  10  ----------------------------<  80  3.6   |  24  |  0.73    Ca    8.9      17 Jun 2021 07:04  Phos  3.3     06-17  Mg     1.7     06-17    TPro  5.9<L>  /  Alb  3.2<L>  /  TBili  1.3<H>  /  DBili  x   /  AST  210<H>  /  ALT  184<H>  /  AlkPhos  653<H>  06-17    PT/INR - ( 17 Jun 2021 07:04 )   PT: 13.7 sec;   INR: 1.15 ratio    PTT - ( 17 Jun 2021 07:04 )  PTT:29.7 sec

## 2021-06-18 ENCOUNTER — TRANSCRIPTION ENCOUNTER (OUTPATIENT)
Age: 81
End: 2021-06-18

## 2021-06-18 ENCOUNTER — RESULT REVIEW (OUTPATIENT)
Age: 81
End: 2021-06-18

## 2021-06-18 LAB
ALBUMIN SERPL ELPH-MCNC: 3.4 G/DL — SIGNIFICANT CHANGE UP (ref 3.3–5)
ALP SERPL-CCNC: 589 U/L — HIGH (ref 40–120)
ALT FLD-CCNC: 131 U/L — HIGH (ref 10–45)
ANION GAP SERPL CALC-SCNC: 13 MMOL/L — SIGNIFICANT CHANGE UP (ref 5–17)
APTT BLD: 32.6 SEC — SIGNIFICANT CHANGE UP (ref 27.5–35.5)
AST SERPL-CCNC: 106 U/L — HIGH (ref 10–40)
BILIRUB SERPL-MCNC: 0.8 MG/DL — SIGNIFICANT CHANGE UP (ref 0.2–1.2)
BUN SERPL-MCNC: 12 MG/DL — SIGNIFICANT CHANGE UP (ref 7–23)
CALCIUM SERPL-MCNC: 9.2 MG/DL — SIGNIFICANT CHANGE UP (ref 8.4–10.5)
CHLORIDE SERPL-SCNC: 100 MMOL/L — SIGNIFICANT CHANGE UP (ref 96–108)
CO2 SERPL-SCNC: 23 MMOL/L — SIGNIFICANT CHANGE UP (ref 22–31)
CREAT SERPL-MCNC: 0.66 MG/DL — SIGNIFICANT CHANGE UP (ref 0.5–1.3)
GLUCOSE SERPL-MCNC: 74 MG/DL — SIGNIFICANT CHANGE UP (ref 70–99)
HCT VFR BLD CALC: 36.8 % — SIGNIFICANT CHANGE UP (ref 34.5–45)
HGB BLD-MCNC: 12.1 G/DL — SIGNIFICANT CHANGE UP (ref 11.5–15.5)
INR BLD: 1.08 RATIO — SIGNIFICANT CHANGE UP (ref 0.88–1.16)
MAGNESIUM SERPL-MCNC: 1.7 MG/DL — SIGNIFICANT CHANGE UP (ref 1.6–2.6)
MCHC RBC-ENTMCNC: 30.4 PG — SIGNIFICANT CHANGE UP (ref 27–34)
MCHC RBC-ENTMCNC: 32.9 GM/DL — SIGNIFICANT CHANGE UP (ref 32–36)
MCV RBC AUTO: 92.5 FL — SIGNIFICANT CHANGE UP (ref 80–100)
MITOCHONDRIA AB SER-ACNC: SIGNIFICANT CHANGE UP
NRBC # BLD: 0 /100 WBCS — SIGNIFICANT CHANGE UP (ref 0–0)
PHOSPHATE SERPL-MCNC: 3.6 MG/DL — SIGNIFICANT CHANGE UP (ref 2.5–4.5)
PLATELET # BLD AUTO: 270 K/UL — SIGNIFICANT CHANGE UP (ref 150–400)
POTASSIUM SERPL-MCNC: 3.6 MMOL/L — SIGNIFICANT CHANGE UP (ref 3.5–5.3)
POTASSIUM SERPL-SCNC: 3.6 MMOL/L — SIGNIFICANT CHANGE UP (ref 3.5–5.3)
PROT SERPL-MCNC: 6.6 G/DL — SIGNIFICANT CHANGE UP (ref 6–8.3)
PROTHROM AB SERPL-ACNC: 12.9 SEC — SIGNIFICANT CHANGE UP (ref 10.6–13.6)
RBC # BLD: 3.98 M/UL — SIGNIFICANT CHANGE UP (ref 3.8–5.2)
RBC # FLD: 14.5 % — SIGNIFICANT CHANGE UP (ref 10.3–14.5)
SODIUM SERPL-SCNC: 136 MMOL/L — SIGNIFICANT CHANGE UP (ref 135–145)
WBC # BLD: 6.07 K/UL — SIGNIFICANT CHANGE UP (ref 3.8–10.5)
WBC # FLD AUTO: 6.07 K/UL — SIGNIFICANT CHANGE UP (ref 3.8–10.5)

## 2021-06-18 PROCEDURE — 43262 ENDO CHOLANGIOPANCREATOGRAPH: CPT | Mod: GC,59

## 2021-06-18 PROCEDURE — 43264 ERCP REMOVE DUCT CALCULI: CPT | Mod: GC

## 2021-06-18 PROCEDURE — 99233 SBSQ HOSP IP/OBS HIGH 50: CPT | Mod: GC

## 2021-06-18 PROCEDURE — 43239 EGD BIOPSY SINGLE/MULTIPLE: CPT | Mod: GC,59

## 2021-06-18 PROCEDURE — 88305 TISSUE EXAM BY PATHOLOGIST: CPT | Mod: 26

## 2021-06-18 RX ORDER — ONDANSETRON 8 MG/1
4 TABLET, FILM COATED ORAL ONCE
Refills: 0 | Status: DISCONTINUED | OUTPATIENT
Start: 2021-06-18 | End: 2021-06-20

## 2021-06-18 RX ORDER — SODIUM CHLORIDE 9 MG/ML
1500 INJECTION, SOLUTION INTRAVENOUS
Refills: 0 | Status: DISCONTINUED | OUTPATIENT
Start: 2021-06-18 | End: 2021-06-18

## 2021-06-18 RX ORDER — ENOXAPARIN SODIUM 100 MG/ML
40 INJECTION SUBCUTANEOUS DAILY
Refills: 0 | Status: DISCONTINUED | OUTPATIENT
Start: 2021-06-19 | End: 2021-06-20

## 2021-06-18 RX ORDER — SODIUM CHLORIDE 9 MG/ML
1000 INJECTION, SOLUTION INTRAVENOUS
Refills: 0 | Status: DISCONTINUED | OUTPATIENT
Start: 2021-06-18 | End: 2021-06-19

## 2021-06-18 RX ORDER — INDOMETHACIN 50 MG
100 CAPSULE ORAL ONCE
Refills: 0 | Status: COMPLETED | OUTPATIENT
Start: 2021-06-18 | End: 2021-06-18

## 2021-06-18 RX ORDER — SODIUM CHLORIDE 9 MG/ML
500 INJECTION, SOLUTION INTRAVENOUS
Refills: 0 | Status: DISCONTINUED | OUTPATIENT
Start: 2021-06-18 | End: 2021-06-18

## 2021-06-18 RX ADMIN — ESCITALOPRAM OXALATE 10 MILLIGRAM(S): 10 TABLET, FILM COATED ORAL at 17:25

## 2021-06-18 RX ADMIN — Medication 100 MILLIGRAM(S): at 14:23

## 2021-06-18 RX ADMIN — SENNA PLUS 2 TABLET(S): 8.6 TABLET ORAL at 21:24

## 2021-06-18 RX ADMIN — Medication 3 MILLIGRAM(S): at 21:24

## 2021-06-18 RX ADMIN — Medication 125 MICROGRAM(S): at 05:11

## 2021-06-18 RX ADMIN — SODIUM CHLORIDE 150 MILLILITER(S): 9 INJECTION, SOLUTION INTRAVENOUS at 18:26

## 2021-06-18 RX ADMIN — PANTOPRAZOLE SODIUM 40 MILLIGRAM(S): 20 TABLET, DELAYED RELEASE ORAL at 05:08

## 2021-06-18 RX ADMIN — Medication 81 MILLIGRAM(S): at 17:25

## 2021-06-18 RX ADMIN — Medication 25 MILLIGRAM(S): at 05:08

## 2021-06-18 RX ADMIN — CHOLESTYRAMINE 4 GRAM(S): 4 POWDER, FOR SUSPENSION ORAL at 21:24

## 2021-06-18 NOTE — PROGRESS NOTE ADULT - PROBLEM SELECTOR PLAN 1
presenting w/ colicky abd pain similar to previous admission with labs suggestive of biliary obstruction.  Elevated LFTs likely in s/o possible obstruction. Pruritis likely in s/o elevated bili.   - tBili 1.9, , , .  Lipase 53 on admission  -  CTAP noncon 6/17: with choledocholithiasis with 0.7 cm stone and associated CBD dilation.  - MRCP 6/17: with enhancement of the extrahepatic biliary tree.  - f/u LISSET, AMA and IgG4 antibodies for PBC and PSC  - c/w cholestyramine for pruritus.   - Per surgery, no acute surgical intervention at this time.   - Appreciate GI recs. presenting w/ colicky abd pain similar to previous admission with labs suggestive of biliary obstruction.  Elevated LFTs likely in s/o possible obstruction. Pruritis likely in s/o elevated bili.   - tBili 1.9, , , .  Lipase 53 on admission  -  CTAP noncon 6/17: with choledocholithiasis with 0.7 cm stone and associated CBD dilation.  - MRCP 6/17: with enhancement of the extrahepatic biliary tree.  - ERCP 6/18 with choledocholithiasis with stone removal.  - c/w cholestyramine for pruritus.   - Per surgery, no acute surgical intervention at this time.   - Appreciate GI recs.

## 2021-06-18 NOTE — DISCHARGE NOTE PROVIDER - CARE PROVIDER_API CALL
William Cadena  GASTROENTEROLOGY  [address], [address]  [place] [address]  Phone: [phone]  Fax: [phone]  Follow Up Time:

## 2021-06-18 NOTE — PRE PROCEDURE NOTE - PRE PROCEDURE EVALUATION
Attending Physician:   Dr. Yang                         Procedure:   ERCP    Indication for Procedure:   Choledocholithiasis  ________________________________________________________  PAST MEDICAL & SURGICAL HISTORY:    Hypertension  MI (myocardial infarction)  COPD (chronic obstructive pulmonary disease)  Lung cancer    History of appendectomy  History of cholecystectomy      ALLERGIES:  No Known Allergies    HOME MEDICATIONS:  Advair Diskus 100 mcg-50 mcg inhalation powder: 1 puff(s) inhaled 2 times a day  aspirin 81 mg oral tablet, chewable: 1 tab(s) orally once a day  CHOLESTYRAMINE 4GM PACKETS 60S: MIX ONE PACKET IN 8OUNCES OF WATER OR JUICE AND DRINK BY MOUTH 30 MINUTES BEFORE BREAKFAST AND ONCE BEFORE DINNER  ESCITALOPRAM 10MG TABLETS: TAKE 1 TABLET BY MOUTH DAILY  LEVOTHYROXINE SODIUM  125 MCG TABS:   METOPROLOL TARTRATE  25 MG TABS:   PANTOPRAZOLE 40MG TABLETS:   SIMVASTATIN  20 MG TABS:   WIXELA INHUB  500-50 MCG/DOSE AEPB:     AICD/PPM: [ ] yes   [X ] no    PERTINENT LAB DATA:                        12.1   6.07  )-----------( 270      ( 18 Jun 2021 07:11 )             36.8     06-18    136  |  100  |  12  ----------------------------<  74  3.6   |  23  |  0.66    Ca    9.2      18 Jun 2021 07:10  Phos  3.6     06-18  Mg     1.7     06-18  TPro  6.6  /  Alb  3.4  /  TBili  0.8  /  DBili  x   /  AST  106<H>  /  ALT  131<H>  /  AlkPhos  589<H>  06-18  PT/INR - ( 18 Jun 2021 07:10 )   PT: 12.9 sec;   INR: 1.08 ratio    PTT - ( 18 Jun 2021 07:10 )  PTT:32.6 sec    PHYSICAL EXAMINATION:    Height (cm): 162.6  Weight (kg): 65.8  BMI (kg/m2): 24.9  BSA (m2): 1.71T(C): 36.7  HR: 65  BP: 152/75  RR: 18  SpO2: 98%    Constitutional: NAD    Neck:  No JVD  Respiratory: CTAB/L  Cardiovascular: S1 and S2  Gastrointestinal: BS+, soft, NT/ND  Extremities: No peripheral edema  Neurological: A/O x 4      COMMENTS:    The patient is a suitable candidate for the planned procedure unless box checked [ ]  No, explain:

## 2021-06-18 NOTE — DISCHARGE NOTE PROVIDER - NSDCMRMEDTOKEN_GEN_ALL_CORE_FT
Advair Diskus 100 mcg-50 mcg inhalation powder: 1 puff(s) inhaled 2 times a day  aspirin 81 mg oral tablet, chewable: 1 tab(s) orally once a day  CHOLESTYRAMINE 4GM PACKETS 60S: MIX ONE PACKET IN 8OUNCES OF WATER OR JUICE AND DRINK BY MOUTH 30 MINUTES BEFORE BREAKFAST AND ONCE BEFORE DINNER  ESCITALOPRAM 10MG TABLETS: TAKE 1 TABLET BY MOUTH DAILY  LEVOTHYROXINE SODIUM  125 MCG TABS:   METOPROLOL TARTRATE  25 MG TABS:   PANTOPRAZOLE 40MG TABLETS:   SIMVASTATIN  20 MG TABS:   WIXELA INHUB  500-50 MCG/DOSE AEPB:    Advair Diskus 100 mcg-50 mcg inhalation powder: 1 puff(s) inhaled 2 times a day  aspirin 81 mg oral tablet, chewable: 1 tab(s) orally once a day  CHOLESTYRAMINE 4GM PACKETS 60S: MIX ONE PACKET IN 8OUNCES OF WATER OR JUICE AND DRINK BY MOUTH 30 MINUTES BEFORE BREAKFAST AND ONCE BEFORE DINNER  ESCITALOPRAM 10MG TABLETS: TAKE 1 TABLET BY MOUTH DAILY  LEVOTHYROXINE SODIUM  125 MCG TABS:   METOPROLOL TARTRATE  25 MG TABS:   PANTOPRAZOLE 40MG TABLETS:   WIXELA INHUB  500-50 MCG/DOSE AEPB:

## 2021-06-18 NOTE — PROGRESS NOTE ADULT - PROBLEM SELECTOR PLAN 9
VTE PPX: Lovenox   Diet: DASH  Ethics: full code  Dispo: medically active VTE PPX: Lovenox   Diet: CLD; advance as tolerated  Ethics: full code

## 2021-06-18 NOTE — DISCHARGE NOTE PROVIDER - NSDCFUSCHEDAPPT_GEN_ALL_CORE_FT
VANI GONZALEZ ; 06/26/2021 ; NPP Rad MRI [address] [facility]  VANI GONZALEZ ; 06/28/2021 ; NPP Cardio [address] VANI GONZALEZ ; 09/09/2021 ; NPP Cardio [address]  VANI GONZALEZ ; 10/12/2021 ; NPP Cardio [address]

## 2021-06-18 NOTE — PROGRESS NOTE ADULT - PROBLEM SELECTOR PLAN 1
presenting w/ colicky abd pain similar to previous admission with labs suggestive of biliary obstruction.  Elevated LFTs likely in s/o possible obstruction. Pruritis likely in s/o elevated bili.   - tBili 1.9, , , .  Lipase 53 on admission  -  CTAP noncon 6/17: with choledocholithiasis with 0.7 cm stone and associated CBD dilation.  - MRCP 6/17: with enhancement of the extrahepatic biliary tree.  - f/u LISSET, AMA and IgG4 antibodies for PBC and PSC  - c/w cholestyramine for pruritus.   - Per surgery, no acute surgical intervention at this time.   - Appreciate GI recs. presenting w/ colicky abd pain similar to previous admission with labs suggestive of biliary obstruction.  Elevated LFTs likely in s/o possible obstruction. Pruritis likely in s/o elevated bili.   - tBili 1.9, , , .  Lipase 53 on admission  -  CTAP noncon 6/17: with choledocholithiasis with 0.7 cm stone and associated CBD dilation.  - MRCP 6/17: with enhancement of the extrahepatic biliary tree.  - f/u LISSET, AMA and IgG4 antibodies for PBC and PSC  - c/w cholestyramine for pruritus.  - Appreciate GI recs.

## 2021-06-18 NOTE — PROGRESS NOTE ADULT - SUBJECTIVE AND OBJECTIVE BOX
PROGRESS NOTE:   Authored by Christina Sousa MD  Pager: [facility] [phone]; [facility] [phone]    Patient is a 81y old  Female who presents with a chief complaint of abd pain (17 Jun 2021 14:51)      SUBJECTIVE / OVERNIGHT EVENTS:  NAEON. CTAP with choledocholithiasis with 0.7 cm stone and associated CBD dilation. MRCP with enhancement of the extrahepatic biliary tree. This AM, pt continues to endorse epigastric abdominal pain and itchiness particulaly of the arms. Denies CP, SOB, subjective f/c, n/v. Pending potential ERCP today.     All other review of systems is negative unless indicated above.    MEDICATIONS  (STANDING):  aspirin  chewable 81 milliGRAM(s) Oral daily  bisacodyl 5 milliGRAM(s) Oral daily  cholestyramine Powder (Sugar-Free) 4 Gram(s) Oral two times a day  escitalopram 10 milliGRAM(s) Oral daily  levothyroxine 125 MICROGram(s) Oral daily  melatonin 3 milliGRAM(s) Oral at bedtime  metoprolol tartrate 25 milliGRAM(s) Oral daily  pantoprazole    Tablet 40 milliGRAM(s) Oral before breakfast  senna 2 Tablet(s) Oral at bedtime  wixela  inhaler 500/50 microgram 1 Inhalation 1 Inhalation Oral two times a day    MEDICATIONS  (PRN):  ALBUTerol   0.042% 1.25 milliGRAM(s) Nebulizer every 6 hours PRN Shortness of Breath and/or Wheezing      CAPILLARY BLOOD GLUCOSE        I&O's Summary      PHYSICAL EXAM:  Vital Signs Last 24 Hrs  T(C): 36.9 (18 Jun 2021 04:08), Max: 37 (17 Jun 2021 05:46)  T(F): 98.4 (18 Jun 2021 04:08), Max: 98.6 (17 Jun 2021 05:46)  HR: 58 (18 Jun 2021 04:08) (58 - 63)  BP: 148/76 (18 Jun 2021 04:08) (139/55 - 159/75)  BP(mean): --  RR: 18 (18 Jun 2021 04:08) (18 - 18)  SpO2: 97% (18 Jun 2021 04:08) (94% - 98%)    GENERAL: NAD, lying in bed comfortably  HEAD:  Atraumatic, Normocephalic  EYES: EOMI, conjunctiva and sclera clear  ENT: Moist mucous membranes  NECK: Supple, No JVD  CHEST/LUNG: Diffuse faint wheezing, No rales, rhonchi, or rubs. Unlabored respirations  HEART: Regular rate and rhythm; No murmurs, rubs, or gallops  ABDOMEN: +mild RUQ tenderness, Bowel sounds present; Soft, Nontender, Nondistended. No hepatomegaly  EXTREMITIES:  2+ Peripheral Pulses, brisk capillary refill. No clubbing, cyanosis, 1+ bilateral pitting edema of lower extremities  NERVOUS SYSTEM:  Alert & Oriented X3, speech clear. No deficits   MSK: FROM all 4 extremities, full and equal strength  PSYCH: normal behavior, affect, speech  SKIN:  telangiectasias diffuse on LE,    LABS:                        11.7   6.43  )-----------( 270      ( 17 Jun 2021 07:04 )             36.9     06-17    138  |  102  |  10  ----------------------------<  80  3.6   |  24  |  0.73    Ca    8.9      17 Jun 2021 07:04  Phos  3.3     06-17  Mg     1.7     06-17    TPro  5.9<L>  /  Alb  3.2<L>  /  TBili  1.3<H>  /  DBili  x   /  AST  210<H>  /  ALT  184<H>  /  AlkPhos  653<H>  06-17    PT/INR - ( 17 Jun 2021 07:04 )   PT: 13.7 sec;   INR: 1.15 ratio         PTT - ( 17 Jun 2021 07:04 )  PTT:29.7 sec            RADIOLOGY & ADDITIONAL TESTS:  < from: CT Abdomen and Pelvis No Cont (06.17.21 @ 00:53) >  IMPRESSION:  Choledocholithiasis resulting in mild upstream biliary ductal dilatation. 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    < end of copied text >  < from: MR MRCP w/wo IV Cont (06.17.21 @ 15:03) >  MPRESSION: Although the MRCP images are degraded from motion artifacts, noobvious filling defects in the biliary tree, which demonstrates smooth distal tapering. Mild enhancement of the extrahepatic biliary tree is noted and may be infectious/inflammatory, in the right clinical setting.    < end of copied text >

## 2021-06-18 NOTE — DISCHARGE NOTE PROVIDER - NSDCCPCAREPLAN_GEN_ALL_CORE_FT
PRINCIPAL DISCHARGE DIAGNOSIS  Diagnosis: Abdominal pain  Assessment and Plan of Treatment: You presented with epigastric amdominal pain. It was was though that you had a bile stone in the ducts that come out of your liver. This ia condition called choledocholithiasis. CT and MRCP imaging showed possbile stone and duct dilation. The GI team saw you and performed a procedure, ERCP, which successfuly removed the stone.  Prior to discharge you tolerated your diet well. If you experience further worsening abdominal pain, please call your GI doctor or return to the emergency room immediately. Please follow-up with your gastroenterologist, , within one week of discharge.       PRINCIPAL DISCHARGE DIAGNOSIS  Diagnosis: Abdominal pain  Assessment and Plan of Treatment: You presented with epigastric amdominal pain. It was was though that you had a bile stone in the ducts that come out of your liver. This ia condition called choledocholithiasis. CT and MRCP imaging showed possbile stone and duct dilation. The GI team saw you and performed a procedure, ERCP, which successfuly removed the stone.  Prior to discharge you tolerated your diet well.  Due to elevated liver enzyme levels, you simvastatin medication was held. Please follow-up with your primary care provider and/or your gastroenterologist to discuss when to resume this medication. If you experience further worsening abdominal pain, please call your GI doctor or return to the emergency room immediately. Please follow-up with your gastroenterologist, , within one week of discharge.       PRINCIPAL DISCHARGE DIAGNOSIS  Diagnosis: Abdominal pain  Assessment and Plan of Treatment: You presented with epigastric amdominal pain. It was was though that you had a bile stone in the ducts that come out of your liver. This ia condition called choledocholithiasis. CT and MRCP imaging showed possbile stone and duct dilation. The GI team saw you and performed a procedure, ERCP, which successfuly removed the stone.  Prior to discharge you tolerated your diet well.  Due to elevated liver enzyme levels, you simvastatin medication was held. Please follow-up with your primary care provider and/or your gastroenterologist to discuss when to resume this medication. If you experience further worsening abdominal pain, please call your GI doctor or return to the emergency room immediately. Please follow-up with your gastroenterologist, , within one week of discharge.  You were found to have elevated LISSET (anti-nuclear antibody) which may be a non-specific marker for some autoimmune diseases. Please follow up with your primary care doctor for further workup.

## 2021-06-18 NOTE — DISCHARGE NOTE PROVIDER - HOSPITAL COURSE
HPI:  80F w/ HTN, COPD (30 PYH smoking), hypothyroidism, diverticulosis, CAD/MI (age 36), lung ca s/p partial right pneumonectomy, gangrenous cholecystitis s/p laparoscopic cholecystectomy (12/12/2020) presenting with abdominal pain.  Abdominal pain is sharp, intermittent, starts suddenly, is not relieved by rest or medication but sometimes goes away on its own several hours later.  It is associated w/ nausea and NBNB vomiting.  She denies fever, chills.  Of note, pt was admitted in Jan of this year for similar complaints.  MRCP was negative for choledocholithiasis at that time and as her symptoms resolved on antibiotics, GI did not have plans to perform EUS/ERCP.   Since that time, she's had almost weekly abd pain that self-resolves after a few days.  No recent travel.  No sick contact.     In the ED, Tmax 37.6; HR 66; /83; RR 18; SpO2 97%RA.  K 3.2.  tBili 1.9, , , . CXR obtained.  Given famotidine, zofran, 1L IVF.     Hospital Course:  Surgery was consulted, with no acute surgical intervention recommended. Elevated LFTs likely in s/o possible obstruction and pruritis likely in s/o elevated bili. CTAP noncon 6/17 showed choledocholithiasis with 0.7 cm stone and associated CBD dilation. MRCP on 6/17 showed enhancement of the extrahepatic biliary tree. ERCP on 6/18 with choledocholithiasis with stone removal. Patient was placed on clear liquid diet and advanced as tolerated. LFTS continued to downtrend and pruritus was relieved with cholestyramine. Pt was discharged to follow-up with her OP GI specialist.

## 2021-06-18 NOTE — DISCHARGE NOTE PROVIDER - NSDCCPTREATMENT_GEN_ALL_CORE_FT
PRINCIPAL PROCEDURE  Procedure: ERCP  Findings and Treatment: You underwent a procedure called an ERCP.  Choledocholithiasis was found. Complete removal was accomplished by biliary sphincterotomy and balloon extraction

## 2021-06-18 NOTE — DISCHARGE NOTE PROVIDER - NSDCFUADDAPPT_GEN_ALL_CORE_FT
Please schedule an appointment to follow-up with your gastroenterologist, Dr. Cadena, within one week of discharge.  Please schedule an appointment to follow-up with your gastroenterologist, Dr. Cadena, within one week of discharge.     Please follow up with your primary care doctor for the LISSET workup.

## 2021-06-18 NOTE — PROGRESS NOTE ADULT - SUBJECTIVE AND OBJECTIVE BOX
GENERAL SURGERY DAILY PROGRESS NOTE:         24 hr events:  --s/p MRCP. ERCP today     Objective:    Vital Signs Last 24 Hrs  T(C): 36.4 (18 Jun 2021 12:12), Max: 36.9 (18 Jun 2021 04:08)  T(F): 97.6 (18 Jun 2021 12:12), Max: 98.4 (18 Jun 2021 04:08)  HR: 61 (18 Jun 2021 12:12) (58 - 65)  BP: 167/71 (18 Jun 2021 12:12) (148/76 - 167/71)  BP(mean): --  RR: 21 (18 Jun 2021 12:12) (18 - 21)  SpO2: 95% (18 Jun 2021 12:12) (95% - 98%)    I&O's Detail    17 Jun 2021 07:01  -  18 Jun 2021 07:00  --------------------------------------------------------  IN:    Oral Fluid: 50 mL  Total IN: 50 mL    OUT:  Total OUT: 0 mL    Total NET: 50 mL          Physical Exam:    General: NAD, well-nourished  HEENT: Atraumatic, EOMI  Resp: Breathing comfortably on RA  CV: regular rate, no edema.   Abd: soft, NT/ND.   Ext: ROMIx4, motor strength intact x 4  Psych: AOx3  Neuro: No focal deficits       Laboratory Results:                          12.1   6.07  )-----------( 270      ( 18 Jun 2021 07:11 )             36.8     06-18    136  |  100  |  12  ----------------------------<  74  3.6   |  23  |  0.66    Ca    9.2      18 Jun 2021 07:10  Phos  3.6     06-18  Mg     1.7     06-18    TPro  6.6  /  Alb  3.4  /  TBili  0.8  /  DBili  x   /  AST  106<H>  /  ALT  131<H>  /  AlkPhos  589<H>  06-18    PT/INR - ( 18 Jun 2021 07:10 )   PT: 12.9 sec;   INR: 1.08 ratio         PTT - ( 18 Jun 2021 07:10 )  PTT:32.6 sec

## 2021-06-19 LAB
ALBUMIN SERPL ELPH-MCNC: 3.4 G/DL — SIGNIFICANT CHANGE UP (ref 3.3–5)
ALP SERPL-CCNC: 495 U/L — HIGH (ref 40–120)
ALT FLD-CCNC: 92 U/L — HIGH (ref 10–45)
ANA PAT FLD IF-IMP: ABNORMAL
ANA TITR SER: ABNORMAL
ANION GAP SERPL CALC-SCNC: 13 MMOL/L — SIGNIFICANT CHANGE UP (ref 5–17)
AST SERPL-CCNC: 55 U/L — HIGH (ref 10–40)
BILIRUB SERPL-MCNC: 0.5 MG/DL — SIGNIFICANT CHANGE UP (ref 0.2–1.2)
BUN SERPL-MCNC: 11 MG/DL — SIGNIFICANT CHANGE UP (ref 7–23)
CALCIUM SERPL-MCNC: 9 MG/DL — SIGNIFICANT CHANGE UP (ref 8.4–10.5)
CHLORIDE SERPL-SCNC: 100 MMOL/L — SIGNIFICANT CHANGE UP (ref 96–108)
CO2 SERPL-SCNC: 22 MMOL/L — SIGNIFICANT CHANGE UP (ref 22–31)
CREAT SERPL-MCNC: 0.69 MG/DL — SIGNIFICANT CHANGE UP (ref 0.5–1.3)
GLUCOSE SERPL-MCNC: 118 MG/DL — HIGH (ref 70–99)
HCT VFR BLD CALC: 35.3 % — SIGNIFICANT CHANGE UP (ref 34.5–45)
HGB BLD-MCNC: 11.6 G/DL — SIGNIFICANT CHANGE UP (ref 11.5–15.5)
IGG4 SER-MCNC: 19 MG/DL — SIGNIFICANT CHANGE UP (ref 2–96)
MAGNESIUM SERPL-MCNC: 1.6 MG/DL — SIGNIFICANT CHANGE UP (ref 1.6–2.6)
MCHC RBC-ENTMCNC: 29.8 PG — SIGNIFICANT CHANGE UP (ref 27–34)
MCHC RBC-ENTMCNC: 32.9 GM/DL — SIGNIFICANT CHANGE UP (ref 32–36)
MCV RBC AUTO: 90.7 FL — SIGNIFICANT CHANGE UP (ref 80–100)
NRBC # BLD: 0 /100 WBCS — SIGNIFICANT CHANGE UP (ref 0–0)
PHOSPHATE SERPL-MCNC: 3.6 MG/DL — SIGNIFICANT CHANGE UP (ref 2.5–4.5)
PLATELET # BLD AUTO: 265 K/UL — SIGNIFICANT CHANGE UP (ref 150–400)
POTASSIUM SERPL-MCNC: 4.2 MMOL/L — SIGNIFICANT CHANGE UP (ref 3.5–5.3)
POTASSIUM SERPL-SCNC: 4.2 MMOL/L — SIGNIFICANT CHANGE UP (ref 3.5–5.3)
PROT SERPL-MCNC: 6.2 G/DL — SIGNIFICANT CHANGE UP (ref 6–8.3)
RBC # BLD: 3.89 M/UL — SIGNIFICANT CHANGE UP (ref 3.8–5.2)
RBC # FLD: 14.3 % — SIGNIFICANT CHANGE UP (ref 10.3–14.5)
SODIUM SERPL-SCNC: 135 MMOL/L — SIGNIFICANT CHANGE UP (ref 135–145)
WBC # BLD: 6.79 K/UL — SIGNIFICANT CHANGE UP (ref 3.8–10.5)
WBC # FLD AUTO: 6.79 K/UL — SIGNIFICANT CHANGE UP (ref 3.8–10.5)

## 2021-06-19 PROCEDURE — 99232 SBSQ HOSP IP/OBS MODERATE 35: CPT | Mod: GC

## 2021-06-19 RX ORDER — SIMVASTATIN 20 MG/1
0 TABLET, FILM COATED ORAL
Qty: 0 | Refills: 0 | DISCHARGE

## 2021-06-19 RX ADMIN — Medication 25 MILLIGRAM(S): at 05:46

## 2021-06-19 RX ADMIN — Medication 125 MICROGRAM(S): at 05:46

## 2021-06-19 RX ADMIN — CHOLESTYRAMINE 4 GRAM(S): 4 POWDER, FOR SUSPENSION ORAL at 21:53

## 2021-06-19 RX ADMIN — Medication 81 MILLIGRAM(S): at 11:58

## 2021-06-19 RX ADMIN — PANTOPRAZOLE SODIUM 40 MILLIGRAM(S): 20 TABLET, DELAYED RELEASE ORAL at 05:46

## 2021-06-19 RX ADMIN — SENNA PLUS 2 TABLET(S): 8.6 TABLET ORAL at 21:53

## 2021-06-19 RX ADMIN — ESCITALOPRAM OXALATE 10 MILLIGRAM(S): 10 TABLET, FILM COATED ORAL at 11:58

## 2021-06-19 RX ADMIN — ALBUTEROL 1.25 MILLIGRAM(S): 90 AEROSOL, METERED ORAL at 21:53

## 2021-06-19 RX ADMIN — Medication 3 MILLIGRAM(S): at 21:53

## 2021-06-19 RX ADMIN — ENOXAPARIN SODIUM 40 MILLIGRAM(S): 100 INJECTION SUBCUTANEOUS at 11:59

## 2021-06-19 NOTE — PROGRESS NOTE ADULT - SUBJECTIVE AND OBJECTIVE BOX
PROGRESS NOTE:   Authored by Christina Sousa MD  Pager: [facility] [phone]; [facility] [phone]    Patient is a 81y old  Female who presents with a chief complaint of abd pain (18 Jun 2021 20:16)      SUBJECTIVE / OVERNIGHT EVENTS:  Pt s/p ERCP yesterday. This AM, denies abdominal pain, CP, SOB, subjective f/c, n/v.  All other review of systems is negative unless indicated above.    MEDICATIONS  (STANDING):  aspirin  chewable 81 milliGRAM(s) Oral daily  bisacodyl 5 milliGRAM(s) Oral daily  cholestyramine Powder (Sugar-Free) 4 Gram(s) Oral two times a day  enoxaparin Injectable 40 milliGRAM(s) SubCutaneous daily  escitalopram 10 milliGRAM(s) Oral daily  lactated ringers. 1000 milliLiter(s) (150 mL/Hr) IV Continuous <Continuous>  levothyroxine 125 MICROGram(s) Oral daily  melatonin 3 milliGRAM(s) Oral at bedtime  metoprolol tartrate 25 milliGRAM(s) Oral daily  ondansetron    Tablet 4 milliGRAM(s) Oral once  pantoprazole    Tablet 40 milliGRAM(s) Oral before breakfast  senna 2 Tablet(s) Oral at bedtime  wixela  inhaler 500/50 microgram 1 Inhalation 1 Inhalation Oral two times a day    MEDICATIONS  (PRN):  ALBUTerol   0.042% 1.25 milliGRAM(s) Nebulizer every 6 hours PRN Shortness of Breath and/or Wheezing      CAPILLARY BLOOD GLUCOSE        I&O's Summary    17 Jun 2021 07:01  -  18 Jun 2021 07:00  --------------------------------------------------------  IN: 50 mL / OUT: 0 mL / NET: 50 mL    18 Jun 2021 07:01  -  19 Jun 2021 05:08  --------------------------------------------------------  IN: 870 mL / OUT: 0 mL / NET: 870 mL        PHYSICAL EXAM:  Vital Signs Last 24 Hrs  T(C): 36.6 (19 Jun 2021 04:25), Max: 36.7 (18 Jun 2021 10:52)  T(F): 97.9 (19 Jun 2021 04:25), Max: 98 (18 Jun 2021 10:52)  HR: 58 (19 Jun 2021 04:25) (56 - 75)  BP: 135/69 (19 Jun 2021 04:25) (135/69 - 185/69)  BP(mean): --  RR: 18 (19 Jun 2021 04:25) (15 - 24)  SpO2: 95% (19 Jun 2021 04:25) (93% - 100%)    GENERAL: NAD, lying in bed comfortably  HEAD:  Atraumatic, Normocephalic  EYES: EOMI, conjunctiva and sclera clear  ENT: Moist mucous membranes  NECK: Supple, No JVD  CHEST/LUNG: Diffuse faint wheezing, No rales, rhonchi, or rubs. Unlabored respirations  HEART: Regular rate and rhythm; No murmurs, rubs, or gallops  ABDOMEN: +mild RUQ tenderness, Bowel sounds present; Soft, Nontender, Nondistended. No hepatomegaly  EXTREMITIES:  2+ Peripheral Pulses, brisk capillary refill. No clubbing, cyanosis, 1+ bilateral pitting edema of lower extremities  NERVOUS SYSTEM:  Alert & Oriented X3, speech clear. No deficits   MSK: FROM all 4 extremities, full and equal strength  PSYCH: normal behavior, affect, speech  SKIN:  telangiectasias diffuse on LE,    LABS:                        12.1   6.07  )-----------( 270      ( 18 Jun 2021 07:11 )             36.8     06-18    136  |  100  |  12  ----------------------------<  74  3.6   |  23  |  0.66    Ca    9.2      18 Jun 2021 07:10  Phos  3.6     06-18  Mg     1.7     06-18    TPro  6.6  /  Alb  3.4  /  TBili  0.8  /  DBili  x   /  AST  106<H>  /  ALT  131<H>  /  AlkPhos  589<H>  06-18    PT/INR - ( 18 Jun 2021 07:10 )   PT: 12.9 sec;   INR: 1.08 ratio         PTT - ( 18 Jun 2021 07:10 )  PTT:32.6 sec            RADIOLOGY & ADDITIONAL TESTS:  Results Reviewed:   Imaging Personally Reviewed:  Electrocardiogram Personally Reviewed:    COORDINATION OF CARE:  Care Discussed with Consultants/Other Providers [Y/N]:  Prior or Outpatient Records Reviewed [Y/N]:   PROGRESS NOTE:   Authored by Christina Sousa MD  Pager: [facility] [phone]; [facility] [phone]    Patient is a 81y old  Female who presents with a chief complaint of abd pain (18 Jun 2021 20:16)      SUBJECTIVE / OVERNIGHT EVENTS:  Pt s/p ERCP yesterday. This AM, denies abdominal pain, CP, SOB, subjective f/c, n/v. States she would like to try solid diet today. States that she prefers to go home tomorrow as that is when her family can pick her up.   All other review of systems is negative unless indicated above.    MEDICATIONS  (STANDING):  aspirin  chewable 81 milliGRAM(s) Oral daily  bisacodyl 5 milliGRAM(s) Oral daily  cholestyramine Powder (Sugar-Free) 4 Gram(s) Oral two times a day  enoxaparin Injectable 40 milliGRAM(s) SubCutaneous daily  escitalopram 10 milliGRAM(s) Oral daily  lactated ringers. 1000 milliLiter(s) (150 mL/Hr) IV Continuous <Continuous>  levothyroxine 125 MICROGram(s) Oral daily  melatonin 3 milliGRAM(s) Oral at bedtime  metoprolol tartrate 25 milliGRAM(s) Oral daily  ondansetron    Tablet 4 milliGRAM(s) Oral once  pantoprazole    Tablet 40 milliGRAM(s) Oral before breakfast  senna 2 Tablet(s) Oral at bedtime  wixela  inhaler 500/50 microgram 1 Inhalation 1 Inhalation Oral two times a day    MEDICATIONS  (PRN):  ALBUTerol   0.042% 1.25 milliGRAM(s) Nebulizer every 6 hours PRN Shortness of Breath and/or Wheezing      CAPILLARY BLOOD GLUCOSE        I&O's Summary    17 Jun 2021 07:01  -  18 Jun 2021 07:00  --------------------------------------------------------  IN: 50 mL / OUT: 0 mL / NET: 50 mL    18 Jun 2021 07:01  -  19 Jun 2021 05:08  --------------------------------------------------------  IN: 870 mL / OUT: 0 mL / NET: 870 mL        PHYSICAL EXAM:  Vital Signs Last 24 Hrs  T(C): 36.6 (19 Jun 2021 04:25), Max: 36.7 (18 Jun 2021 10:52)  T(F): 97.9 (19 Jun 2021 04:25), Max: 98 (18 Jun 2021 10:52)  HR: 58 (19 Jun 2021 04:25) (56 - 75)  BP: 135/69 (19 Jun 2021 04:25) (135/69 - 185/69)  BP(mean): --  RR: 18 (19 Jun 2021 04:25) (15 - 24)  SpO2: 95% (19 Jun 2021 04:25) (93% - 100%)    GENERAL: NAD, lying in bed comfortably  HEAD:  Atraumatic, Normocephalic  EYES: EOMI, conjunctiva and sclera clear  ENT: Moist mucous membranes  NECK: Supple, No JVD  CHEST/LUNG: Diffuse faint wheezing, No rales, rhonchi, or rubs. Unlabored respirations  HEART: Regular rate and rhythm; No murmurs, rubs, or gallops  ABDOMEN: +mild RUQ tenderness, Bowel sounds present; Soft, Nontender, Nondistended. No hepatomegaly  EXTREMITIES:  2+ Peripheral Pulses, brisk capillary refill. No clubbing, cyanosis, 1+ bilateral pitting edema of lower extremities  NERVOUS SYSTEM:  Alert & Oriented X3, speech clear. No deficits   MSK: FROM all 4 extremities, full and equal strength  PSYCH: normal behavior, affect, speech  SKIN:  telangiectasias diffuse on LE,    LABS:                        12.1   6.07  )-----------( 270      ( 18 Jun 2021 07:11 )             36.8     06-18    136  |  100  |  12  ----------------------------<  74  3.6   |  23  |  0.66    Ca    9.2      18 Jun 2021 07:10  Phos  3.6     06-18  Mg     1.7     06-18    TPro  6.6  /  Alb  3.4  /  TBili  0.8  /  DBili  x   /  AST  106<H>  /  ALT  131<H>  /  AlkPhos  589<H>  06-18    PT/INR - ( 18 Jun 2021 07:10 )   PT: 12.9 sec;   INR: 1.08 ratio         PTT - ( 18 Jun 2021 07:10 )  PTT:32.6 sec            RADIOLOGY & ADDITIONAL TESTS:  Results Reviewed:   Imaging Personally Reviewed:  Electrocardiogram Personally Reviewed:    COORDINATION OF CARE:  Care Discussed with Consultants/Other Providers [Y/N]:  Prior or Outpatient Records Reviewed [Y/N]:

## 2021-06-19 NOTE — PROGRESS NOTE ADULT - PROBLEM SELECTOR PLAN 1
presenting w/ colicky abd pain similar to previous admission with labs suggestive of biliary obstruction.  Elevated LFTs likely in s/o possible obstruction. Pruritis likely in s/o elevated bili.   - tBili 1.9, , , .  Lipase 53 on admission  -  CTAP noncon 6/17: with choledocholithiasis with 0.7 cm stone and associated CBD dilation.  - MRCP 6/17: with enhancement of the extrahepatic biliary tree.  - ERCP 6/18 with choledocholithiasis with stone removal.  - c/w cholestyramine for pruritus.   - Per surgery, no acute surgical intervention at this time.   - Pt follows with GI Dr. Cadena OP.   - Appreciate GI recs.

## 2021-06-19 NOTE — PROGRESS NOTE ADULT - SUBJECTIVE AND OBJECTIVE BOX
GI Progress Note      Interval Events:   - s/p ERCP yesterday, tolerated well  - no new complaints today    Allergies:  No Known Allergies      Hospital Medications:  ALBUTerol   0.042% 1.25 milliGRAM(s) Nebulizer every 6 hours PRN  aspirin  chewable 81 milliGRAM(s) Oral daily  bisacodyl 5 milliGRAM(s) Oral daily  cholestyramine Powder (Sugar-Free) 4 Gram(s) Oral two times a day  enoxaparin Injectable 40 milliGRAM(s) SubCutaneous daily  escitalopram 10 milliGRAM(s) Oral daily  lactated ringers. 1000 milliLiter(s) IV Continuous <Continuous>  levothyroxine 125 MICROGram(s) Oral daily  melatonin 3 milliGRAM(s) Oral at bedtime  metoprolol tartrate 25 milliGRAM(s) Oral daily  ondansetron    Tablet 4 milliGRAM(s) Oral once  pantoprazole    Tablet 40 milliGRAM(s) Oral before breakfast  senna 2 Tablet(s) Oral at bedtime  wixela  inhaler 500/50 microgram 1 Inhalation 1 Inhalation Oral two times a day        PHYSICAL EXAM:   Vital Signs:  Vital Signs Last 24 Hrs  T(C): 36.6 (19 Jun 2021 04:25), Max: 36.7 (18 Jun 2021 10:52)  T(F): 97.9 (19 Jun 2021 04:25), Max: 98 (18 Jun 2021 10:52)  HR: 58 (19 Jun 2021 04:25) (56 - 75)  BP: 135/69 (19 Jun 2021 04:25) (135/69 - 185/69)  BP(mean): --  RR: 18 (19 Jun 2021 04:25) (15 - 24)  SpO2: 95% (19 Jun 2021 04:25) (93% - 100%)  Daily Height in cm: 157.48 (18 Jun 2021 13:53)    Daily       PHYSICAL EXAM:   GENERAL:  NAD, Appears stated age  HEENT:  NC/AT,  conjunctivae clear and pink, sclera -anicteric  CHEST:  CTA B/L, Normal effort  HEART:  RRR S1/S2  ABDOMEN:  Soft, non-tender, non-distended, BS+  EXTEREMITIES:  No cyanosis or Edema  SKIN:  Warm & Dry  NEURO:  Alert, oriented    LABS:                        11.6   6.79  )-----------( 265      ( 19 Jun 2021 07:01 )             35.3     Mean Cell Volume: 90.7 fl (06-19-21 @ 07:01)    06-19    135  |  100  |  11  ----------------------------<  118<H>  4.2   |  22  |  0.69    Ca    9.0      19 Jun 2021 07:00  Phos  3.6     06-19  Mg     1.6     06-19    TPro  6.2  /  Alb  3.4  /  TBili  0.5  /  DBili  x   /  AST  55<H>  /  ALT  92<H>  /  AlkPhos  495<H>  06-19    LIVER FUNCTIONS - ( 19 Jun 2021 07:00 )  Alb: 3.4 g/dL / Pro: 6.2 g/dL / ALK PHOS: 495 U/L / ALT: 92 U/L / AST: 55 U/L / GGT: x           PT/INR - ( 18 Jun 2021 07:10 )   PT: 12.9 sec;   INR: 1.08 ratio         PTT - ( 18 Jun 2021 07:10 )  PTT:32.6 sec          Imaging:  ERCP 6/18/21  Findings:       The  filmwas normal. A standard esophagogastroduodenoscopy scope was used for the        examination of the upper gastrointestinal tract. The scope was passed under direct vision        through the upper GI tract. The examined esophagus was normal. Scattered inflammation        characterized by erythema was found in the gastric antrum. The examined duodenum was normal.        Biopsies were performed in the gastric body and in the gastric antrum through the        esophagogastroduodenoscope with a cold forceps for Helicobacter pylori testing. The major        papilla was adjacent to a diverticulum. The bile duct was deeply cannulated with the        sphincterotome and a 0.035 wire. Contrast was injected. I personally interpreted the bile        duct images. There was brisk flow of contrast through the ducts. The middle third of the main        bile duct contained one stone, which was 9 mm in diameter. The biliary tree was swept with an        11.5 mm balloon starting at the bifurcation. One stone was removed. No stones remained.                                                                                                        Impression:          EGD:                       - Gastritis. - Biopsies were performed in the gastric body and in the gastric                        antrum.                       - Normal examined duodenum.                       ERCP:                       - The major papilla was adjacent to a diverticulum.                       - Choledocholithiasis was found. Complete removal was accomplished by biliary                        sphincterotomy and balloon extraction.

## 2021-06-20 ENCOUNTER — TRANSCRIPTION ENCOUNTER (OUTPATIENT)
Age: 81
End: 2021-06-20

## 2021-06-20 VITALS
OXYGEN SATURATION: 96 % | RESPIRATION RATE: 18 BRPM | HEART RATE: 60 BPM | DIASTOLIC BLOOD PRESSURE: 75 MMHG | TEMPERATURE: 98 F | SYSTOLIC BLOOD PRESSURE: 147 MMHG

## 2021-06-20 LAB
ALBUMIN SERPL ELPH-MCNC: 3.2 G/DL — LOW (ref 3.3–5)
ALP SERPL-CCNC: 401 U/L — HIGH (ref 40–120)
ALT FLD-CCNC: 66 U/L — HIGH (ref 10–45)
ANION GAP SERPL CALC-SCNC: 12 MMOL/L — SIGNIFICANT CHANGE UP (ref 5–17)
AST SERPL-CCNC: 41 U/L — HIGH (ref 10–40)
BILIRUB SERPL-MCNC: 0.4 MG/DL — SIGNIFICANT CHANGE UP (ref 0.2–1.2)
BUN SERPL-MCNC: 18 MG/DL — SIGNIFICANT CHANGE UP (ref 7–23)
CALCIUM SERPL-MCNC: 9.3 MG/DL — SIGNIFICANT CHANGE UP (ref 8.4–10.5)
CHLORIDE SERPL-SCNC: 102 MMOL/L — SIGNIFICANT CHANGE UP (ref 96–108)
CO2 SERPL-SCNC: 24 MMOL/L — SIGNIFICANT CHANGE UP (ref 22–31)
CREAT SERPL-MCNC: 0.91 MG/DL — SIGNIFICANT CHANGE UP (ref 0.5–1.3)
GLUCOSE SERPL-MCNC: 93 MG/DL — SIGNIFICANT CHANGE UP (ref 70–99)
HCT VFR BLD CALC: 34.4 % — LOW (ref 34.5–45)
HGB BLD-MCNC: 11.1 G/DL — LOW (ref 11.5–15.5)
MAGNESIUM SERPL-MCNC: 1.7 MG/DL — SIGNIFICANT CHANGE UP (ref 1.6–2.6)
MCHC RBC-ENTMCNC: 29.8 PG — SIGNIFICANT CHANGE UP (ref 27–34)
MCHC RBC-ENTMCNC: 32.3 GM/DL — SIGNIFICANT CHANGE UP (ref 32–36)
MCV RBC AUTO: 92.5 FL — SIGNIFICANT CHANGE UP (ref 80–100)
NRBC # BLD: 0 /100 WBCS — SIGNIFICANT CHANGE UP (ref 0–0)
PHOSPHATE SERPL-MCNC: 3.4 MG/DL — SIGNIFICANT CHANGE UP (ref 2.5–4.5)
PLATELET # BLD AUTO: 276 K/UL — SIGNIFICANT CHANGE UP (ref 150–400)
POTASSIUM SERPL-MCNC: 3.9 MMOL/L — SIGNIFICANT CHANGE UP (ref 3.5–5.3)
POTASSIUM SERPL-SCNC: 3.9 MMOL/L — SIGNIFICANT CHANGE UP (ref 3.5–5.3)
PROT SERPL-MCNC: 5.7 G/DL — LOW (ref 6–8.3)
RBC # BLD: 3.72 M/UL — LOW (ref 3.8–5.2)
RBC # FLD: 14.6 % — HIGH (ref 10.3–14.5)
SODIUM SERPL-SCNC: 138 MMOL/L — SIGNIFICANT CHANGE UP (ref 135–145)
WBC # BLD: 8.76 K/UL — SIGNIFICANT CHANGE UP (ref 3.8–10.5)
WBC # FLD AUTO: 8.76 K/UL — SIGNIFICANT CHANGE UP (ref 3.8–10.5)

## 2021-06-20 PROCEDURE — 85730 THROMBOPLASTIN TIME PARTIAL: CPT

## 2021-06-20 PROCEDURE — 80053 COMPREHEN METABOLIC PANEL: CPT

## 2021-06-20 PROCEDURE — C1769: CPT

## 2021-06-20 PROCEDURE — 85610 PROTHROMBIN TIME: CPT

## 2021-06-20 PROCEDURE — 86850 RBC ANTIBODY SCREEN: CPT

## 2021-06-20 PROCEDURE — 84132 ASSAY OF SERUM POTASSIUM: CPT

## 2021-06-20 PROCEDURE — 99239 HOSP IP/OBS DSCHRG MGMT >30: CPT

## 2021-06-20 PROCEDURE — U0003: CPT

## 2021-06-20 PROCEDURE — 99285 EMERGENCY DEPT VISIT HI MDM: CPT | Mod: 25

## 2021-06-20 PROCEDURE — 85025 COMPLETE CBC W/AUTO DIFF WBC: CPT

## 2021-06-20 PROCEDURE — 86900 BLOOD TYPING SEROLOGIC ABO: CPT

## 2021-06-20 PROCEDURE — 82803 BLOOD GASES ANY COMBINATION: CPT

## 2021-06-20 PROCEDURE — 71045 X-RAY EXAM CHEST 1 VIEW: CPT

## 2021-06-20 PROCEDURE — A9585: CPT

## 2021-06-20 PROCEDURE — 82247 BILIRUBIN TOTAL: CPT

## 2021-06-20 PROCEDURE — 83735 ASSAY OF MAGNESIUM: CPT

## 2021-06-20 PROCEDURE — 96374 THER/PROPH/DIAG INJ IV PUSH: CPT

## 2021-06-20 PROCEDURE — 84295 ASSAY OF SERUM SODIUM: CPT

## 2021-06-20 PROCEDURE — 85027 COMPLETE CBC AUTOMATED: CPT

## 2021-06-20 PROCEDURE — 82330 ASSAY OF CALCIUM: CPT

## 2021-06-20 PROCEDURE — 82787 IGG 1 2 3 OR 4 EACH: CPT

## 2021-06-20 PROCEDURE — 84100 ASSAY OF PHOSPHORUS: CPT

## 2021-06-20 PROCEDURE — 86901 BLOOD TYPING SEROLOGIC RH(D): CPT

## 2021-06-20 PROCEDURE — U0005: CPT

## 2021-06-20 PROCEDURE — 96375 TX/PRO/DX INJ NEW DRUG ADDON: CPT

## 2021-06-20 PROCEDURE — 74330 X-RAY BILE/PANC ENDOSCOPY: CPT

## 2021-06-20 PROCEDURE — 74183 MRI ABD W/O CNTR FLWD CNTR: CPT

## 2021-06-20 PROCEDURE — 82248 BILIRUBIN DIRECT: CPT

## 2021-06-20 PROCEDURE — 74176 CT ABD & PELVIS W/O CONTRAST: CPT

## 2021-06-20 PROCEDURE — 82435 ASSAY OF BLOOD CHLORIDE: CPT

## 2021-06-20 PROCEDURE — 88305 TISSUE EXAM BY PATHOLOGIST: CPT

## 2021-06-20 PROCEDURE — 80076 HEPATIC FUNCTION PANEL: CPT

## 2021-06-20 PROCEDURE — 86381 MITOCHONDRIAL ANTIBODY EACH: CPT

## 2021-06-20 PROCEDURE — 82947 ASSAY GLUCOSE BLOOD QUANT: CPT

## 2021-06-20 PROCEDURE — 85014 HEMATOCRIT: CPT

## 2021-06-20 PROCEDURE — 83690 ASSAY OF LIPASE: CPT

## 2021-06-20 PROCEDURE — 86038 ANTINUCLEAR ANTIBODIES: CPT

## 2021-06-20 PROCEDURE — 86769 SARS-COV-2 COVID-19 ANTIBODY: CPT

## 2021-06-20 PROCEDURE — C9399: CPT

## 2021-06-20 PROCEDURE — 83605 ASSAY OF LACTIC ACID: CPT

## 2021-06-20 PROCEDURE — 85018 HEMOGLOBIN: CPT

## 2021-06-20 PROCEDURE — 94640 AIRWAY INHALATION TREATMENT: CPT

## 2021-06-20 RX ADMIN — Medication 25 MILLIGRAM(S): at 05:09

## 2021-06-20 RX ADMIN — Medication 125 MICROGRAM(S): at 05:08

## 2021-06-20 RX ADMIN — ESCITALOPRAM OXALATE 10 MILLIGRAM(S): 10 TABLET, FILM COATED ORAL at 12:46

## 2021-06-20 RX ADMIN — Medication 81 MILLIGRAM(S): at 12:46

## 2021-06-20 RX ADMIN — PANTOPRAZOLE SODIUM 40 MILLIGRAM(S): 20 TABLET, DELAYED RELEASE ORAL at 06:16

## 2021-06-20 NOTE — DISCHARGE NOTE NURSING/CASE MANAGEMENT/SOCIAL WORK - PATIENT PORTAL LINK FT
You can access the FollowMyHealth Patient Portal offered by [facility] by registering at the following website: [email or web address]. By joining [employer]’s FollowMyHealth portal, you will also be able to view your health information using other applications (apps) compatible with our system.

## 2021-06-20 NOTE — DISCHARGE NOTE NURSING/CASE MANAGEMENT/SOCIAL WORK - NSDCFUADDAPPT_GEN_ALL_CORE_FT
Please schedule an appointment to follow-up with your gastroenterologist, Dr. Cadena, within one week of discharge.     Please follow up with your primary care doctor for the LISSET workup.

## 2021-06-20 NOTE — PROGRESS NOTE ADULT - SUBJECTIVE AND OBJECTIVE BOX
PROGRESS NOTE:   Authoted by Dr. Desiree Murray MD  Pager [phone] [facility], [id number] [address]     Patient is a 81y old  Female who presents with a chief complaint of abd pain (19 Jun 2021 10:03)      SUBJECTIVE / OVERNIGHT EVENTS: No acute events overnight.     MEDICATIONS  (STANDING):  aspirin  chewable 81 milliGRAM(s) Oral daily  bisacodyl 5 milliGRAM(s) Oral daily  cholestyramine Powder (Sugar-Free) 4 Gram(s) Oral two times a day  enoxaparin Injectable 40 milliGRAM(s) SubCutaneous daily  escitalopram 10 milliGRAM(s) Oral daily  levothyroxine 125 MICROGram(s) Oral daily  melatonin 3 milliGRAM(s) Oral at bedtime  metoprolol tartrate 25 milliGRAM(s) Oral daily  ondansetron    Tablet 4 milliGRAM(s) Oral once  pantoprazole    Tablet 40 milliGRAM(s) Oral before breakfast  senna 2 Tablet(s) Oral at bedtime  wixela  inhaler 500/50 microgram 1 Inhalation 1 Inhalation Oral two times a day    MEDICATIONS  (PRN):  ALBUTerol   0.042% 1.25 milliGRAM(s) Nebulizer every 6 hours PRN Shortness of Breath and/or Wheezing      CAPILLARY BLOOD GLUCOSE        I&O's Summary    18 Jun 2021 07:01  -  19 Jun 2021 07:00  --------------------------------------------------------  IN: 2460 mL / OUT: 0 mL / NET: 2460 mL    19 Jun 2021 07:01  -  20 Jun 2021 06:46  --------------------------------------------------------  IN: 390 mL / OUT: 0 mL / NET: 390 mL        PHYSICAL EXAM:  Vital Signs Last 24 Hrs  T(C): 36.7 (20 Jun 2021 04:28), Max: 36.7 (19 Jun 2021 10:53)  T(F): 98.1 (20 Jun 2021 04:28), Max: 98.1 (20 Jun 2021 04:28)  HR: 70 (20 Jun 2021 04:28) (62 - 70)  BP: 135/67 (20 Jun 2021 04:28) (116/68 - 148/80)  BP(mean): --  RR: 18 (20 Jun 2021 04:28) (18 - 18)  SpO2: 95% (20 Jun 2021 04:28) (92% - 96%)    GENERAL: NAD, lying in bed comfortably  HEAD:  Atraumatic, Normocephalic  EYES: EOMI, conjunctiva and sclera clear  ENT: Moist mucous membranes  NECK: Supple, No JVD  CHEST/LUNG: No rales, rhonchi, or rubs. Unlabored respirations  HEART: Regular rate and rhythm; No murmurs, rubs, or gallops  ABDOMEN: +mild RUQ tenderness, Bowel sounds present; Soft, Nontender, Nondistended. No hepatomegaly  EXTREMITIES:  2+ Peripheral Pulses, brisk capillary refill. No clubbing, cyanosis, 1+ bilateral pitting edema of lower extremities  NERVOUS SYSTEM:  Alert & Oriented X3, speech clear. No deficits   MSK: FROM all 4 extremities, full and equal strength  PSYCH: normal behavior, affect, speech  SKIN:  telangiectasias diffuse on LE,    LABS:                        11.6   6.79  )-----------( 265      ( 19 Jun 2021 07:01 )             35.3     06-19    135  |  100  |  11  ----------------------------<  118<H>  4.2   |  22  |  0.69    Ca    9.0      19 Jun 2021 07:00  Phos  3.6     06-19  Mg     1.6     06-19    TPro  6.2  /  Alb  3.4  /  TBili  0.5  /  DBili  x   /  AST  55<H>  /  ALT  92<H>  /  AlkPhos  495<H>  06-19    PT/INR - ( 18 Jun 2021 07:10 )   PT: 12.9 sec;   INR: 1.08 ratio         PTT - ( 18 Jun 2021 07:10 )  PTT:32.6 sec            RADIOLOGY & ADDITIONAL TESTS:  No new imaging or tests    COORDINATION OF CARE:  Care Discussed with Consultants/Other Providers [Y/N]: GI  Prior or Outpatient Records Reviewed [Y/N]: N   PROGRESS NOTE:   Authoted by Dr. Desiree Murray MD  Pager [phone] [facility], [address] [address]     Patient is a 81y old  Female who presents with a chief complaint of abd pain (19 Jun 2021 10:03)      SUBJECTIVE / OVERNIGHT EVENTS: No acute events overnight. Patient reports eating well, walking around, and no difficulty with bowel movement or urination. Denied chest pain, SOB, fever, abdominal pain, nausea, vomiting or diarrhea.     MEDICATIONS  (STANDING):  aspirin  chewable 81 milliGRAM(s) Oral daily  bisacodyl 5 milliGRAM(s) Oral daily  cholestyramine Powder (Sugar-Free) 4 Gram(s) Oral two times a day  enoxaparin Injectable 40 milliGRAM(s) SubCutaneous daily  escitalopram 10 milliGRAM(s) Oral daily  levothyroxine 125 MICROGram(s) Oral daily  melatonin 3 milliGRAM(s) Oral at bedtime  metoprolol tartrate 25 milliGRAM(s) Oral daily  ondansetron    Tablet 4 milliGRAM(s) Oral once  pantoprazole    Tablet 40 milliGRAM(s) Oral before breakfast  senna 2 Tablet(s) Oral at bedtime  wixela  inhaler 500/50 microgram 1 Inhalation 1 Inhalation Oral two times a day    MEDICATIONS  (PRN):  ALBUTerol   0.042% 1.25 milliGRAM(s) Nebulizer every 6 hours PRN Shortness of Breath and/or Wheezing      CAPILLARY BLOOD GLUCOSE        I&O's Summary    18 Jun 2021 07:01  -  19 Jun 2021 07:00  --------------------------------------------------------  IN: 2460 mL / OUT: 0 mL / NET: 2460 mL    19 Jun 2021 07:01  -  20 Jun 2021 06:46  --------------------------------------------------------  IN: 390 mL / OUT: 0 mL / NET: 390 mL        PHYSICAL EXAM:  Vital Signs Last 24 Hrs  T(C): 36.7 (20 Jun 2021 04:28), Max: 36.7 (19 Jun 2021 10:53)  T(F): 98.1 (20 Jun 2021 04:28), Max: 98.1 (20 Jun 2021 04:28)  HR: 70 (20 Jun 2021 04:28) (62 - 70)  BP: 135/67 (20 Jun 2021 04:28) (116/68 - 148/80)  BP(mean): --  RR: 18 (20 Jun 2021 04:28) (18 - 18)  SpO2: 95% (20 Jun 2021 04:28) (92% - 96%)    GENERAL: NAD, lying in bed comfortably  HEAD:  Atraumatic, Normocephalic  EYES: EOMI, conjunctiva and sclera clear  ENT: Moist mucous membranes  NECK: Supple, No JVD  CHEST/LUNG: No rales, rhonchi, or rubs. Unlabored respirations  HEART: Regular rate and rhythm; No murmurs, rubs, or gallops  ABDOMEN: Bowel sounds present; Soft, Nontender, Nondistended. No hepatomegaly  EXTREMITIES: 2+ Peripheral Pulses. No clubbing, cyanosis, 1+ bilateral pitting edema of lower extremities  NERVOUS SYSTEM:  Alert & Oriented X3, speech clear. No deficits   MSK: FROM all 4 extremities, full and equal strength  PSYCH: normal behavior, affect, speech  SKIN:  telangiectasias diffuse on LE,    LABS:                        11.6   6.79  )-----------( 265      ( 19 Jun 2021 07:01 )             35.3     06-19    135  |  100  |  11  ----------------------------<  118<H>  4.2   |  22  |  0.69    Ca    9.0      19 Jun 2021 07:00  Phos  3.6     06-19  Mg     1.6     06-19    TPro  6.2  /  Alb  3.4  /  TBili  0.5  /  DBili  x   /  AST  55<H>  /  ALT  92<H>  /  AlkPhos  495<H>  06-19    PT/INR - ( 18 Jun 2021 07:10 )   PT: 12.9 sec;   INR: 1.08 ratio         PTT - ( 18 Jun 2021 07:10 )  PTT:32.6 sec      RADIOLOGY & ADDITIONAL TESTS:  No new imaging or tests    COORDINATION OF CARE:  Care Discussed with Consultants/Other Providers [Y/N]: GI  Prior or Outpatient Records Reviewed [Y/N]: N

## 2021-06-20 NOTE — PROGRESS NOTE ADULT - ATTENDING COMMENTS
Agree with above. s/p ERCP 6/18. Clinically stable.
This is a 81 year old female who presented w/ abdominal pain, mid-epigastric that "bands" around her abdomen. With elevated LFTs and bilirubin in obstructive pattern, concern for choledocholithiasis.   MRCP without stone but ERCP significant for choledocholithiasis, stone removed.  GI recs appreciated, IVF, CLD and advance diet as tolerated tomorrow.  Dispo: pending pain, trend LFTs    Rest as above.
82 yo female w/ pmhx of COPD, HTN, hypothyroid, lung cancer in remission, choledocholithiasis complicated by gangranous cholecystisis s/p laporoscopic cholecystectomy (12/2020), presenting with RUQ pain, c/f choledocholithiasis on CTAP s/p ERCP 6/18 with stone removal feels well, AVSS, for d/c home today.    discharge time: 35min.  Reviewed with Dr Murray and with patient at bedside.
Pt seen and examined. No acute events overnight. She presented w/ abdominal pain ; found to have elevated LFTs and bilirubin in obstructive pattern, concern for choledocholithiasis. MRCP without stone but ERCP significant for choledocholithiasis, stone removed.   Pt reports marked improvement in abd pain, she denies n/v, fever/chills.  Advance diet as tolerated ; likely discharge home tomorrow. GI reccs appreciated.
This is a 81 year old female who presented w/ abdominal pain, mid-epigastric that "bands" around her abdomen. With elevated LFTs and bilirubin in obstructive pattern, concern for choledocholithiasis.   Pain overall improved this AM. No fever, N/V.  For MRCP today.   GI c/s appreciated, NPO MN for potential ERCP/EUS.  Rest as above.

## 2021-06-20 NOTE — PROGRESS NOTE ADULT - PROBLEM SELECTOR PLAN 9
VTE PPX: Lovenox   Diet: CLD; advance as tolerated  Ethics: full code    - (+) LISSET in workup, currently no clinical symptoms of autoimmune disease flares; f/u PCP for further workup

## 2021-06-20 NOTE — PROGRESS NOTE ADULT - PROBLEM SELECTOR PLAN 7
-On simvastatin at home.   - Holding in s/o transaminitis.

## 2021-06-20 NOTE — PROGRESS NOTE ADULT - ASSESSMENT
The patient is an 82 yo female w/ pmhx of COPD, HTN, hypothyroid, lung cancer in remission, choledocholithiasis complicated by gangranous cholecystisis s/p laporoscopic cholecystectomy (12/2020), presenting with RUQ pain, c/f choledocholithiasis on CTAP. s/p ERCP 6/18 with stone removal.    
80 year old female with hx of HTN, COPD, hypothyroidism, diverticulosis, CAD/MI, lung ca s/p partial right pneumonectomy, gangrenous cholecystitis s/p laparoscopic cholecystectomy (12/12/2020) presents with acute on chronic abdominal pain in the setting of intermittent elevations in bilirubin and liver enzymes    Impression:   # Abdominal Pain: With elevated liver enzymes concerning for stone given transient elevations with pain and improvement shortly afterwards. S/p ERCP 6/18/21  # Gangrenous cholecystitis s/p laparoscopic cholecystectomy   # Lung ca s/p partial right pneumonectomy  # COPD: On RA    REcommendations:  - advance diet as tolerated  - f/u path results  - trend CMP  - rest of care per primary team      Juan Antonio Savage, PGY4  Gastroenterology Fellow  Available on [employer] Teams  [phone] ([employer] Short Range Pager)  [phone] (Long Range Pager)    After 5pm, please contact the on-call GI fellow. [phone]
The patient is an 80 yo female w/ pmhx of COPD, HTN, hypothyroid, lung cancer in remission,  choledocholithiasis complicated by gangranous cholecystisis s/p laporoscopic cholecystectomy (12/2020), presenting with RUQ pain, c/f choledocholithiasis on CTAP. s/p ERCP 6/18 with stone removal.    
80F with past medical history of HTN, COPD, hypothyroidism, diverticulosis, MI, and past surgical history of lap caroline in December 2020, and a partial right pneumonectomy for lung cancer presents with persistent abdominal pain and nausea/vomiting. In the ED patient vitals WNL, WBC WNL, Lipase of 53, Direct tbili of 1.3, and elevated LFT's. Patient is ordered for and MRCP.    - No acute general surgery intervention at this time  - f/u MRCP  - Pain control, NPO, IVF    [id number]
The patient is an 80 yo female w/ pmhx of COPD, HTN, hypothyroid, lung cancer in remission,  choledocholithiasis complicated by gangranous cholecystisis s/p laporoscopic cholecystectomy (12/2020), presenting with RUQ pain, c/f choledocholithiasis on CTAP, pending ERCP. .     
The patient is an 82 yo female w/ pmhx of COPD, HTN, hypothyroid, lung cancer in remission,  choledocholithiasis complicated by gangranous cholecystisis s/p laporoscopic cholecystectomy (12/2020), presenting with RUQ pain, c/f choledocholithiasis, pending MRCP.     
80F with past medical history of HTN, COPD, hypothyroidism, diverticulosis, MI, and past surgical history of lap caroline in December 2020, and a partial right pneumonectomy for lung cancer presents with persistent abdominal pain and nausea/vomiting. In the ED patient vitals WNL, WBC WNL, Lipase of 53, Direct tbili of 1.3, and elevated LFT's. S/p MRCP yesterday and pending ERCP today. MRCP with enhancement of the extrahepatic biliary tree  --No surgical intervention currently  --F/u ERCP     [id number]

## 2021-06-20 NOTE — PROGRESS NOTE ADULT - PROBLEM SELECTOR PLAN 10
Transition of Care Status:  1. Name of PCP:  2. PCP contacted on admission: [  ] Y     [  ] N  3. PCP contacted at discharge: [  ] Y     [  ] N  4. Post-discharge appointment date and location:  5. Summary of handoff given to PCP:

## 2021-06-20 NOTE — PROGRESS NOTE ADULT - PROBLEM SELECTOR PLAN 4
- BP currently well-controlled.   - c/w home Lopressor 25mg QD

## 2021-06-20 NOTE — PROGRESS NOTE ADULT - PROBLEM SELECTOR PROBLEM 1
R/O Choledocholithiasis

## 2021-06-20 NOTE — PROGRESS NOTE ADULT - PROVIDER SPECIALTY LIST ADULT
Surgery
Gastroenterology
Internal Medicine
Surgery

## 2021-06-20 NOTE — PROGRESS NOTE ADULT - PROBLEM SELECTOR PLAN 3
- CTAP 6/17 with stool burden.   - c/w bowel regimen: dulcolax PRN and senna

## 2021-06-20 NOTE — PROGRESS NOTE ADULT - PROBLEM SELECTOR PLAN 5
- c/w home synthroid 125 mcg

## 2021-06-26 ENCOUNTER — APPOINTMENT (OUTPATIENT)
Dept: MRI IMAGING | Facility: HOSPITAL | Age: 81
End: 2021-06-26

## 2021-06-28 ENCOUNTER — APPOINTMENT (OUTPATIENT)
Dept: CARDIOLOGY | Facility: CLINIC | Age: 81
End: 2021-06-28
Payer: MEDICARE

## 2021-06-28 ENCOUNTER — NON-APPOINTMENT (OUTPATIENT)
Age: 81
End: 2021-06-28

## 2021-06-28 VITALS
OXYGEN SATURATION: 97 % | RESPIRATION RATE: 12 BRPM | TEMPERATURE: 96.9 F | BODY MASS INDEX: 25.1 KG/M2 | HEIGHT: 64 IN | WEIGHT: 147 LBS | HEART RATE: 66 BPM

## 2021-06-28 VITALS — SYSTOLIC BLOOD PRESSURE: 154 MMHG | DIASTOLIC BLOOD PRESSURE: 80 MMHG

## 2021-06-28 DIAGNOSIS — I49.9 CARDIAC ARRHYTHMIA, UNSPECIFIED: ICD-10-CM

## 2021-06-28 PROCEDURE — 99204 OFFICE O/P NEW MOD 45 MIN: CPT | Mod: 25

## 2021-06-28 PROCEDURE — 93000 ELECTROCARDIOGRAM COMPLETE: CPT

## 2021-06-30 NOTE — DISCUSSION/SUMMARY
[FreeTextEntry1] : IMPRESSION: Ms. 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 \par \par PLAN:\par 1. Given her history of atrial fibrillation during recent hospitalizations, she will have a 2 week ZIO patch placed today to evaluate for any atrial fibrillation or cardiac arrhythmias. Her EKG today is Sinus Rhythm with occasional ectopy. She will continue on Aspirin 81mg daily. I have explained to her that she will eventually need an ischemic evaluation given her reported history of a prior MI. It is possible that her prior MI was from an arrhythmia secondary to low potassium.\par 2. Her blood pressure is elevated today. For now, she will continue on Metoprolol 25 mg twice daily and a low sodium diet. If her blood pressure remains elevated, or if she has any ectopy on the ZIO patch we will increase the dose of her Metoprolol.\par 3. She will continue on Simvastatin 20 mg for hyperlipidemia, along with a low fat/ low cholesterol diet. Her recent cholesterol done last month was good.\par 4. She will follow up after her cardiac testing or sooner if she is symptomatic.

## 2021-06-30 NOTE — CARDIOLOGY SUMMARY
[de-identified] : 6/28/2021: Sinus Rhythm at 68 bpm with poor R wave progression and occasional PVC\par  [de-identified] : 4/9/2021: EF 55%. Mild aortic regurgitation. Mildly dilated left atrium. Hypermobile interatrial septum. Endocardium not well visualized, grossly normal LV systolic function. Mild diastolic dysfunction. Mild right atrial enlargement. Mild tricuspid regurgitation. No pulmonary HTN. PASP= 27 mmHg.\par

## 2021-06-30 NOTE — HISTORY OF PRESENT ILLNESS
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  When she was 36 she was put on a diuretic for elevated BP, and as per patient her potassium dropped very low and gave her a "heart attack". About ten years after the heart attack she had possible atrial fibrillation and was placed on Quinidine, which has since been stopped. She had episodes of rapid atrial fibrillation while in the hospital in December, however was not started on anticoagulation at that time given that these were short episodes that were provoked by anemia and stress. She gets shortness of breath with walking quickly and up the stairs. She otherwise denies any chest pain, shortness of breath at rest, palpitations, headaches, or dizziness.

## 2021-06-30 NOTE — PHYSICAL EXAM
[Well Developed] : well developed [Well Nourished] : well nourished [No Acute Distress] : no acute distress [Normal Conjunctiva] : normal conjunctiva [Normal Venous Pressure] : normal venous pressure [No Carotid Bruit] : no carotid bruit [Normal Rate] : normal [Rhythm Regular] : regular [Normal S1] : normal S1 [Normal S2] : normal S2 [No Pitting Edema] : no pitting edema present [Clear Lung Fields] : clear lung fields [Good Air Entry] : good air entry [No Respiratory Distress] : no respiratory distress  [Soft] : abdomen soft [Non Tender] : non-tender [Normal Bowel Sounds] : normal bowel sounds [Normal Gait] : normal gait [No Edema] : no edema [No Cyanosis] : no cyanosis [No Rash] : no rash [Moves all extremities] : moves all extremities [No Focal Deficits] : no focal deficits [Normal Speech] : normal speech [Alert and Oriented] : alert and oriented [Normal memory] : normal memory [I] : a grade 1 [Right Carotid Bruit] : no bruit heard over the right carotid [Left Carotid Bruit] : no bruit heard over the left carotid [Bruit] : no bruit heard [de-identified] : She was wearing a face mask during the examination. [de-identified] : Extraocular muscles intact. Anicteric sclerae.  [de-identified] : No visible skin ulcers.

## 2021-07-30 PROBLEM — C34.90 MALIGNANT NEOPLASM OF UNSPECIFIED PART OF UNSPECIFIED BRONCHUS OR LUNG: Chronic | Status: ACTIVE | Noted: 2021-06-16

## 2021-07-30 PROBLEM — J44.9 CHRONIC OBSTRUCTIVE PULMONARY DISEASE, UNSPECIFIED: Chronic | Status: ACTIVE | Noted: 2021-06-16

## 2021-08-10 ENCOUNTER — APPOINTMENT (OUTPATIENT)
Dept: CARDIOLOGY | Facility: CLINIC | Age: 81
End: 2021-08-10
Payer: MEDICARE

## 2021-08-10 ENCOUNTER — NON-APPOINTMENT (OUTPATIENT)
Age: 81
End: 2021-08-10

## 2021-08-10 VITALS
WEIGHT: 148 LBS | OXYGEN SATURATION: 92 % | HEART RATE: 68 BPM | HEIGHT: 64 IN | SYSTOLIC BLOOD PRESSURE: 164 MMHG | TEMPERATURE: 97.5 F | DIASTOLIC BLOOD PRESSURE: 75 MMHG | BODY MASS INDEX: 25.27 KG/M2 | RESPIRATION RATE: 12 BRPM

## 2021-08-10 VITALS — DIASTOLIC BLOOD PRESSURE: 64 MMHG | SYSTOLIC BLOOD PRESSURE: 136 MMHG

## 2021-08-10 PROCEDURE — 93000 ELECTROCARDIOGRAM COMPLETE: CPT

## 2021-08-10 PROCEDURE — 99214 OFFICE O/P EST MOD 30 MIN: CPT | Mod: 25

## 2021-08-10 RX ORDER — METOPROLOL TARTRATE 25 MG/1
25 TABLET, FILM COATED ORAL
Qty: 270 | Refills: 1 | Status: ACTIVE | COMMUNITY
Start: 1900-01-01 | End: 1900-01-01

## 2021-08-28 ENCOUNTER — NON-APPOINTMENT (OUTPATIENT)
Age: 81
End: 2021-08-28

## 2021-08-28 NOTE — DISCUSSION/SUMMARY
[FreeTextEntry1] : IMPRESSION: Ms. 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 \par \par PLAN:\par 1. Her EKG today shows Sinus Rhythm with no ectopy. Given the ectopy that she had at the time of her ZIO patch, I will be increasing the dose of her Metoprolol to 25 mg three times a day. She will continue on Aspirin 81 mg daily. We have previously discussed that she will eventually need an ischemic evaluation given her reported history of a prior MI. It is possible that her prior MI was from an arrhythmia secondary to low potassium.\par 2. Her blood pressure is adequate today, thus she will continue on Metoprolol and a low sodium diet.\par 3. She will continue on Simvastatin 20 mg for hyperlipidemia, along with a low fat/ low cholesterol diet. Her recent cholesterol from 3 months ago was good.\par 4. She will follow up with me in 2 months or sooner if she is symptomatic.

## 2021-08-28 NOTE — PHYSICAL EXAM
[Well Developed] : well developed [Well Nourished] : well nourished [No Acute Distress] : no acute distress [Normal Conjunctiva] : normal conjunctiva [Normal Venous Pressure] : normal venous pressure [Normal Rate] : normal [Rhythm Regular] : regular [Normal S1] : normal S1 [Normal S2] : normal S2 [I] : a grade 1 [No Pitting Edema] : no pitting edema present [Right Carotid Bruit] : no bruit heard over the right carotid [Left Carotid Bruit] : no bruit heard over the left carotid [Bruit] : no bruit heard [Clear Lung Fields] : clear lung fields [Good Air Entry] : good air entry [No Respiratory Distress] : no respiratory distress  [Soft] : abdomen soft [Non Tender] : non-tender [Normal Bowel Sounds] : normal bowel sounds [Normal Gait] : normal gait [No Edema] : no edema [No Cyanosis] : no cyanosis [No Rash] : no rash [Moves all extremities] : moves all extremities [No Focal Deficits] : no focal deficits [Normal Speech] : normal speech [Alert and Oriented] : alert and oriented [Normal memory] : normal memory [de-identified] : Extraocular muscles intact. Anicteric sclerae.  [de-identified] : She was wearing a face mask during the examination. [de-identified] : No visible skin ulcers.

## 2021-08-28 NOTE — CARDIOLOGY SUMMARY
[de-identified] : 8/10/2021: Sinus Bradycardia at 59 bpm with poor R wave progression. [de-identified] : 4/9/2021: EF 55%. Mild aortic regurgitation. Mildly dilated left atrium. Hypermobile interatrial septum. Endocardium not well visualized, grossly normal LV systolic function. Mild diastolic dysfunction. Mild right atrial enlargement. Mild tricuspid regurgitation. No pulmonary HTN. PASP= 27 mmHg.\par

## 2021-09-09 ENCOUNTER — APPOINTMENT (OUTPATIENT)
Dept: CARDIOLOGY | Facility: CLINIC | Age: 81
End: 2021-09-09
Payer: MEDICARE

## 2021-09-09 ENCOUNTER — NON-APPOINTMENT (OUTPATIENT)
Age: 81
End: 2021-09-09

## 2021-09-09 VITALS
BODY MASS INDEX: 25.44 KG/M2 | RESPIRATION RATE: 12 BRPM | HEIGHT: 64 IN | WEIGHT: 149 LBS | HEART RATE: 63 BPM | DIASTOLIC BLOOD PRESSURE: 82 MMHG | SYSTOLIC BLOOD PRESSURE: 164 MMHG | OXYGEN SATURATION: 95 % | TEMPERATURE: 96.9 F

## 2021-09-09 VITALS — SYSTOLIC BLOOD PRESSURE: 136 MMHG | DIASTOLIC BLOOD PRESSURE: 68 MMHG

## 2021-09-09 PROCEDURE — 99214 OFFICE O/P EST MOD 30 MIN: CPT | Mod: 25

## 2021-09-09 PROCEDURE — 93000 ELECTROCARDIOGRAM COMPLETE: CPT

## 2021-09-13 NOTE — PHYSICAL EXAM
[Well Developed] : well developed [Well Nourished] : well nourished [No Acute Distress] : no acute distress [Normal Conjunctiva] : normal conjunctiva [Normal Venous Pressure] : normal venous pressure [Normal Rate] : normal [Rhythm Regular] : regular [Normal S1] : normal S1 [Normal S2] : normal S2 [I] : a grade 1 [No Pitting Edema] : no pitting edema present [Clear Lung Fields] : clear lung fields [Good Air Entry] : good air entry [Soft] : abdomen soft [No Respiratory Distress] : no respiratory distress  [Non Tender] : non-tender [Normal Bowel Sounds] : normal bowel sounds [Normal Gait] : normal gait [No Edema] : no edema [No Cyanosis] : no cyanosis [No Rash] : no rash [Moves all extremities] : moves all extremities [No Focal Deficits] : no focal deficits [Normal Speech] : normal speech [Alert and Oriented] : alert and oriented [Normal memory] : normal memory [S3] : no S3 [Right Carotid Bruit] : no bruit heard over the right carotid [Left Carotid Bruit] : no bruit heard over the left carotid [Bruit] : no bruit heard [de-identified] : She was wearing a face mask during the examination. [de-identified] : Extraocular muscles intact. Anicteric sclerae.  [de-identified] : No visible skin ulcers.

## 2021-09-13 NOTE — CARDIOLOGY SUMMARY
[de-identified] : 9/9/2021: Sinus Bradycardia at 59 bpm with poor R wave progression. [de-identified] : 4/9/2021: EF 55%. Mild aortic regurgitation. Mildly dilated left atrium. Hypermobile interatrial septum. Endocardium not well visualized, grossly normal LV systolic function. Mild diastolic dysfunction. Mild right atrial enlargement. Mild tricuspid regurgitation. No pulmonary HTN. PASP= 27 mmHg.\par

## 2021-09-13 NOTE — HISTORY OF PRESENT ILLNESS
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  When she was 36 she was put on a diuretic for elevated BP, and as per patient her potassium dropped very low and caused her to suffer a "heart attack". About ten years after the heart attack she had possible atrial fibrillation and was placed on Quinidine, which has since been stopped. She had episodes of rapid atrial fibrillation while in the hospital in December, however was not started on anticoagulation at that time given that these were short episodes that were provoked by anemia and stress. She gets shortness of breath with walking quickly and up the stairs. She states that she has been feeling well on the increased dose of Metoprolol. She otherwise denies any exertional chest pain, shortness of breath at rest, palpitations, headaches, or dizziness.

## 2021-09-13 NOTE — DISCUSSION/SUMMARY
[FreeTextEntry1] : IMPRESSION: Ms. 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 \par \par PLAN:\par 1. Her EKG today shows Sinus Rhythm with no ectopy. She has tolerated the increased dose of metoprolol and will continue on Metoprolol 25 mg three times a day. She will continue on Aspirin 81 mg daily. We have previously discussed that she will eventually need an ischemic evaluation given her reported history of a prior MI. It is possible that her prior MI was from an arrhythmia secondary to low potassium.\par 2. Her blood pressure is adequate today, thus she will continue on Metoprolol and a low sodium diet.\par 3. She will continue on Simvastatin 20 mg for hyperlipidemia, along with a low fat/ low cholesterol diet. Her recent cholesterol from 5 months ago was good.\par 4. She will follow up with me in 3 months or sooner if she is symptomatic.

## 2021-09-20 NOTE — ED ADULT NURSE NOTE - NS ED NURSE LEVEL OF CONSCIOUSNESS MENTAL STATUS
Awake/Alert Complex Repair And Flap Additional Text (Will Appearing After The Standard Complex Repair Text): The complex repair was not sufficient to completely close the primary defect. The remaining additional defect was repaired with the flap mentioned below.

## 2021-10-14 ENCOUNTER — LABORATORY RESULT (OUTPATIENT)
Age: 81
End: 2021-10-14

## 2021-10-14 ENCOUNTER — NON-APPOINTMENT (OUTPATIENT)
Age: 81
End: 2021-10-14

## 2021-10-14 ENCOUNTER — APPOINTMENT (OUTPATIENT)
Dept: CARDIOLOGY | Facility: CLINIC | Age: 81
End: 2021-10-14
Payer: MEDICARE

## 2021-10-14 VITALS
WEIGHT: 152 LBS | HEIGHT: 64 IN | DIASTOLIC BLOOD PRESSURE: 83 MMHG | HEART RATE: 52 BPM | OXYGEN SATURATION: 94 % | RESPIRATION RATE: 12 BRPM | BODY MASS INDEX: 25.95 KG/M2 | SYSTOLIC BLOOD PRESSURE: 167 MMHG | TEMPERATURE: 97.5 F

## 2021-10-14 VITALS — SYSTOLIC BLOOD PRESSURE: 140 MMHG | DIASTOLIC BLOOD PRESSURE: 70 MMHG

## 2021-10-14 VITALS — DIASTOLIC BLOOD PRESSURE: 72 MMHG | SYSTOLIC BLOOD PRESSURE: 150 MMHG

## 2021-10-14 DIAGNOSIS — Z23 ENCOUNTER FOR IMMUNIZATION: ICD-10-CM

## 2021-10-14 PROCEDURE — 90662 IIV NO PRSV INCREASED AG IM: CPT

## 2021-10-14 PROCEDURE — G0008: CPT

## 2021-10-14 PROCEDURE — 99214 OFFICE O/P EST MOD 30 MIN: CPT | Mod: 25

## 2021-10-14 PROCEDURE — 93000 ELECTROCARDIOGRAM COMPLETE: CPT

## 2021-10-14 RX ORDER — ESCITALOPRAM OXALATE 5 MG/1
TABLET, FILM COATED ORAL
Refills: 0 | Status: ACTIVE | COMMUNITY

## 2021-10-17 LAB
25(OH)D3 SERPL-MCNC: 30 NG/ML
ALBUMIN SERPL ELPH-MCNC: 4.1 G/DL
ALP BLD-CCNC: 142 U/L
ALT SERPL-CCNC: 11 U/L
ANION GAP SERPL CALC-SCNC: 12 MMOL/L
AST SERPL-CCNC: 22 U/L
BILIRUB SERPL-MCNC: 0.3 MG/DL
BUN SERPL-MCNC: 15 MG/DL
CALCIUM SERPL-MCNC: 9.7 MG/DL
CHLORIDE SERPL-SCNC: 99 MMOL/L
CHOLEST SERPL-MCNC: 175 MG/DL
CO2 SERPL-SCNC: 28 MMOL/L
CREAT SERPL-MCNC: 0.82 MG/DL
ESTIMATED AVERAGE GLUCOSE: 114 MG/DL
FOLATE SERPL-MCNC: 10.8 NG/ML
GLUCOSE SERPL-MCNC: 94 MG/DL
HBA1C MFR BLD HPLC: 5.6 %
HDLC SERPL-MCNC: 64 MG/DL
LDLC SERPL CALC-MCNC: 90 MG/DL
NONHDLC SERPL-MCNC: 111 MG/DL
POTASSIUM SERPL-SCNC: 4.7 MMOL/L
PROT SERPL-MCNC: 6.5 G/DL
SODIUM SERPL-SCNC: 138 MMOL/L
TRIGL SERPL-MCNC: 106 MG/DL
TSH SERPL-ACNC: 1.78 UIU/ML
VIT B12 SERPL-MCNC: 272 PG/ML

## 2021-10-17 NOTE — CARDIOLOGY SUMMARY
[de-identified] : 4/9/2021: EF 55%. Mild aortic regurgitation. Mildly dilated left atrium. Hypermobile interatrial septum. Endocardium not well visualized, grossly normal LV systolic function. Mild diastolic dysfunction. Mild right atrial enlargement. Mild tricuspid regurgitation. No pulmonary HTN. PASP= 27 mmHg.\par  [de-identified] : 10/14/2021: Sinus Rhythm at 62 bpm with a PVC\par

## 2021-10-17 NOTE — HISTORY OF PRESENT ILLNESS
[FreeTextEntry1] : Patient is an 81 year old female with a history of gangrenous gallbladder status post cholecystectomy,  hypertension, hyperlipidemia, hypothyroidism, COPD, diverticulitis, MI (due to hypokalemia after being placed on a diuretic, at the age of 36), atrial fibrillation, and adenocarcinoma of lung who status post right upper lobectomy, and former tobacco use presents for follow up of hypertension and rhythm disorder.  She had episodes of rapid atrial fibrillation while in the hospital in December 2020, however, was not started on anticoagulation at that time given that these were short episodes that were provoked by anemia and stress.She has been feeling well otherwise denying any chest pain, shortness of breath, palpitations, headaches or dizziness. She is concerned about her recent episodes of memory loss. She walked several blocks to the office today and feels that this is why her blood pressure is elevated.

## 2021-10-17 NOTE — PHYSICAL EXAM
[Well Developed] : well developed [Well Nourished] : well nourished [No Acute Distress] : no acute distress [Normal Conjunctiva] : normal conjunctiva [Normal Venous Pressure] : normal venous pressure [Rhythm Regular] : regular [Normal S1] : normal S1 [Normal S2] : normal S2 [I] : a grade 1 [No Pitting Edema] : no pitting edema present [Clear Lung Fields] : clear lung fields [Good Air Entry] : good air entry [No Respiratory Distress] : no respiratory distress  [Soft] : abdomen soft [Non Tender] : non-tender [Normal Bowel Sounds] : normal bowel sounds [Normal Gait] : normal gait [No Edema] : no edema [No Cyanosis] : no cyanosis [No Rash] : no rash [Moves all extremities] : moves all extremities [No Focal Deficits] : no focal deficits [Normal Speech] : normal speech [Alert and Oriented] : alert and oriented [Bradycardia] : bradycardic [S3] : no S3 [Right Carotid Bruit] : no bruit heard over the right carotid [Left Carotid Bruit] : no bruit heard over the left carotid [Bruit] : no bruit heard [de-identified] : She was wearing a face mask during the examination. [de-identified] : Extraocular muscles intact. Anicteric sclerae.  [de-identified] : No visible skin ulcers.

## 2021-10-17 NOTE — DISCUSSION/SUMMARY
[FreeTextEntry1] : IMPRESSION: Ms. Corrales is an 81 year old female with a history of gangrenous gallbladder status post cholecystectomy,  hypertension, hyperlipidemia, hypothyroidism, COPD, diverticulitis, MI (due to hypokalemia, at the age of 36), atrial fibrillation, and adenocarcinoma of lung who is status post right upper lobectomy, and former tobacco use who presents for follow up of hypertension and rhythm disorder. \par \par PLAN:\par 1. Her EKG today shows Sinus Rhythm with one ectopic beat. She has tolerated the increased dose of metoprolol and will continue on Metoprolol 25 mg three times a day. She will continue on Aspirin 81 mg daily. We have previously discussed that she will eventually need an ischemic evaluation given her reported history of a prior MI. It is possible that her prior MI was from an arrhythmia secondary to low potassium. She has agreed to schedule a nuclear stress test after her next visit.\par 2. Her blood pressure is mildly elevated, which she feels is secondary to walking several blocks today to the office. She will modify her diet and try to follow her blood pressure at home. She will continue on her current dose of Metoprolol.\par 3. She will continue on Simvastatin 20 mg for hyperlipidemia, along with a low fat/ low cholesterol diet. I will be checking a CMP and lipid panel today. I will also be checking a TSH, CBC, and B12 level given her memory lapses. \par 4. She will follow up with me in 2 months or sooner if she is symptomatic. \par 5. She will follow up with you for her memory loss.\par 6. She got the flu shot today.

## 2021-10-18 LAB
BASOPHILS # BLD AUTO: 0.06 K/UL
BASOPHILS NFR BLD AUTO: 0.7 %
EOSINOPHIL # BLD AUTO: 0.08 K/UL
EOSINOPHIL NFR BLD AUTO: 0.9 %
HCT VFR BLD CALC: 42.4 %
HGB BLD-MCNC: 13 G/DL
IMM GRANULOCYTES NFR BLD AUTO: 0.2 %
LYMPHOCYTES # BLD AUTO: 2.27 K/UL
LYMPHOCYTES NFR BLD AUTO: 26.3 %
MAN DIFF?: NORMAL
MCHC RBC-ENTMCNC: 30.2 PG
MCHC RBC-ENTMCNC: 30.7 GM/DL
MCV RBC AUTO: 98.4 FL
MONOCYTES # BLD AUTO: 0.93 K/UL
MONOCYTES NFR BLD AUTO: 10.8 %
NEUTROPHILS # BLD AUTO: 5.26 K/UL
NEUTROPHILS NFR BLD AUTO: 61.1 %
PLATELET # BLD AUTO: 317 K/UL
RBC # BLD: 4.31 M/UL
RBC # FLD: 14.6 %
WBC # FLD AUTO: 8.62 K/UL

## 2021-10-21 ENCOUNTER — APPOINTMENT (OUTPATIENT)
Dept: INTERNAL MEDICINE | Facility: CLINIC | Age: 81
End: 2021-10-21
Payer: MEDICARE

## 2021-10-21 VITALS
BODY MASS INDEX: 26.09 KG/M2 | SYSTOLIC BLOOD PRESSURE: 156 MMHG | DIASTOLIC BLOOD PRESSURE: 77 MMHG | TEMPERATURE: 97.1 F | RESPIRATION RATE: 12 BRPM | WEIGHT: 152 LBS | OXYGEN SATURATION: 97 % | HEART RATE: 63 BPM

## 2021-10-21 DIAGNOSIS — Z00.00 ENCOUNTER FOR GENERAL ADULT MEDICAL EXAMINATION W/OUT ABNORMAL FINDINGS: ICD-10-CM

## 2021-10-21 PROCEDURE — 99214 OFFICE O/P EST MOD 30 MIN: CPT

## 2021-10-21 RX ORDER — ZOSTER VACCINE RECOMBINANT, ADJUVANTED 50 MCG/0.5
50 KIT INTRAMUSCULAR
Qty: 1 | Refills: 0 | Status: ACTIVE | COMMUNITY
Start: 2021-10-21 | End: 1900-01-01

## 2021-10-21 RX ORDER — AMOXICILLIN 500 MG/1
500 TABLET, FILM COATED ORAL
Qty: 9 | Refills: 0 | Status: DISCONTINUED | COMMUNITY
Start: 2020-11-11 | End: 2021-10-21

## 2021-10-21 RX ORDER — CHOLESTYRAMINE 4 G/9G
4 POWDER, FOR SUSPENSION ORAL
Qty: 60 | Refills: 0 | Status: DISCONTINUED | COMMUNITY
Start: 2021-06-11 | End: 2021-10-21

## 2021-10-21 NOTE — PHYSICAL EXAM
[No Acute Distress] : no acute distress [Well Nourished] : well nourished [Well Developed] : well developed [Well-Appearing] : well-appearing [Normal Sclera/Conjunctiva] : normal sclera/conjunctiva [PERRL] : pupils equal round and reactive to light [EOMI] : extraocular movements intact [Normal Outer Ear/Nose] : the outer ears and nose were normal in appearance [Normal Oropharynx] : the oropharynx was normal [No JVD] : no jugular venous distention [No Lymphadenopathy] : no lymphadenopathy [Supple] : supple [Thyroid Normal, No Nodules] : the thyroid was normal and there were no nodules present [No Respiratory Distress] : no respiratory distress  [No Accessory Muscle Use] : no accessory muscle use [Clear to Auscultation] : lungs were clear to auscultation bilaterally [Normal Rate] : normal rate  [Regular Rhythm] : with a regular rhythm [Normal S1, S2] : normal S1 and S2 [No Murmur] : no murmur heard [Pedal Pulses Present] : the pedal pulses are present [No Edema] : there was no peripheral edema [No Extremity Clubbing/Cyanosis] : no extremity clubbing/cyanosis [Soft] : abdomen soft [Non Tender] : non-tender [Non-distended] : non-distended [No Masses] : no abdominal mass palpated [Normal Bowel Sounds] : normal bowel sounds [Normal Supraclavicular Nodes] : no supraclavicular lymphadenopathy [Normal Posterior Cervical Nodes] : no posterior cervical lymphadenopathy [Normal Anterior Cervical Nodes] : no anterior cervical lymphadenopathy [No CVA Tenderness] : no CVA  tenderness [No Spinal Tenderness] : no spinal tenderness [No Joint Swelling] : no joint swelling [Grossly Normal Strength/Tone] : grossly normal strength/tone [No Rash] : no rash [Coordination Grossly Intact] : coordination grossly intact [No Focal Deficits] : no focal deficits [Normal Gait] : normal gait [Speech Grossly Normal] : speech grossly normal [Normal Affect] : the affect was normal [Alert and Oriented x3] : oriented to person, place, and time [Normal Mood] : the mood was normal [Normal Insight/Judgement] : insight and judgment were intact

## 2021-10-23 LAB
APPEARANCE: ABNORMAL
BILIRUBIN URINE: NEGATIVE
BLOOD URINE: NEGATIVE
COLOR: NORMAL
GLUCOSE QUALITATIVE U: NEGATIVE
KETONES URINE: NEGATIVE
LEUKOCYTE ESTERASE URINE: NEGATIVE
NITRITE URINE: NEGATIVE
PH URINE: 7.5
PROTEIN URINE: NEGATIVE
SPECIFIC GRAVITY URINE: 1.01
UROBILINOGEN URINE: NORMAL

## 2021-11-07 NOTE — ASSESSMENT
[FreeTextEntry1] : \par \par Forgetfulness\par Referral for neurology provided today\par \par Hx of High Risk for DM\par Reinforced the importance of low carb/low sugar diet\par Will draw HgbA1C and UA today\par \par Hx of Hypothyroidism\par cont current meds\par Will draw serum TSH/T4 \par \par Hx of HTN, CAD\par cont current medications\par Follows with cardiology, last appointment one week ago\par Reinforced the importance of low sodium, low fat diet and physical activity\par \par Hx of Hypercholesterolemia\par continue current meds\par Reinforced the importance of low fat/low cholesterol diet and physical activity\par \par Hx of COPD\par Continue current meds\par Follows with Pulmonology, last visit was >1 year ago\par Referral for pulmonology provided today\par \par No blood work done today--pt had blood work last week with cardiology-reveiwed and discussed results with pt\par \par shingrix vaccine ordered\par Pneumonia vaccine not doen today-pt not sure if it's 4 or 5 years  that she had the pneumonia vaccine. Says she will check with her pharmacy\par

## 2021-11-07 NOTE — HISTORY OF PRESENT ILLNESS
[de-identified] : Ms. VANI GONZALEZ is a 81 year old female in the office today for follow up of chronic conditions. She states she has "been forgetting a lot lately", states her  passed away a year and a half ago and says she had not issues with her memory at that time, had a cholecystectomy a few months later and says since that time her memory has been progressively declining.  She states she can recall past events, but recent memory has declined.\par She states she is physically "feeling great".\par Denies SOB, chest pain, abdominal pain, N/V/D, leg swelling, headache, dizziness \par Offers no complaints\par

## 2021-11-07 NOTE — HEALTH RISK ASSESSMENT
[No] : In the past 12 months have you used drugs other than those required for medical reasons? No [No falls in past year] : Patient reported no falls in the past year [0] : 2) Feeling down, depressed, or hopeless: Not at all (0) [PHQ-2 Negative - No further assessment needed] : PHQ-2 Negative - No further assessment needed [] : No [de-identified] : hx of tobacco use [YearQuit] : 1964 [de-identified] : states "I do a little walking, my own shopping, carry my groceries" [de-identified] : states she avoids salt and avoids fatty foods [[id number]] : 0

## 2021-11-07 NOTE — REVIEW OF SYSTEMS
[Memory Loss] : memory loss [Negative] : Heme/Lymph [Headache] : no headache [Dizziness] : no dizziness [Fainting] : no fainting [Confusion] : no confusion [Unsteady Walking] : no ataxia [de-identified] : see hpi

## 2021-11-17 ENCOUNTER — RX RENEWAL (OUTPATIENT)
Age: 81
End: 2021-11-17

## 2021-12-09 ENCOUNTER — APPOINTMENT (OUTPATIENT)
Dept: CARDIOLOGY | Facility: CLINIC | Age: 81
End: 2021-12-09

## 2021-12-19 ENCOUNTER — RX RENEWAL (OUTPATIENT)
Age: 81
End: 2021-12-19

## 2022-05-02 ENCOUNTER — APPOINTMENT (OUTPATIENT)
Dept: INTERNAL MEDICINE | Facility: CLINIC | Age: 82
End: 2022-05-02
Payer: MEDICARE

## 2022-05-02 ENCOUNTER — LABORATORY RESULT (OUTPATIENT)
Age: 82
End: 2022-05-02

## 2022-05-02 VITALS — DIASTOLIC BLOOD PRESSURE: 72 MMHG | SYSTOLIC BLOOD PRESSURE: 114 MMHG

## 2022-05-02 VITALS
HEART RATE: 59 BPM | BODY MASS INDEX: 25.78 KG/M2 | OXYGEN SATURATION: 96 % | WEIGHT: 151 LBS | SYSTOLIC BLOOD PRESSURE: 174 MMHG | DIASTOLIC BLOOD PRESSURE: 69 MMHG | TEMPERATURE: 96.9 F | RESPIRATION RATE: 12 BRPM | HEIGHT: 64 IN

## 2022-05-02 DIAGNOSIS — I10 ESSENTIAL (PRIMARY) HYPERTENSION: ICD-10-CM

## 2022-05-02 DIAGNOSIS — R68.89 OTHER GENERAL SYMPTOMS AND SIGNS: ICD-10-CM

## 2022-05-02 PROCEDURE — 99214 OFFICE O/P EST MOD 30 MIN: CPT

## 2022-05-02 RX ORDER — LEVOTHYROXINE SODIUM 0.12 MG/1
125 TABLET ORAL
Qty: 90 | Refills: 0 | Status: ACTIVE | COMMUNITY
Start: 2022-02-01

## 2022-05-08 NOTE — HISTORY OF PRESENT ILLNESS
[de-identified] : Ms. VANI GONZALEZ is a 82 year old female, accompanied by her daughter, presents for follow up visit\par Daughter states that 2 weeks ago pt was sleeping late, feeling like she was in a "fog" As per daughter this kept happening through out the week. She was not able to do anything or "remember anything" Then pt told her that she was not taking her meds " in days"\par She started taking all her meds but she forgot to take her thyroid medicine for 2 weeks and started taking it 3 days ago \par Pt and daughter state that now she is starting to feel better. Pt says " I am feeling fine now" \par Daughter says pt has been forgetful. She was referred to see neurology back in October but she never went\par Denies SOB, chest pain, abdominal pain, N/V/D, leg swelling, headache, dizziness \par \par

## 2022-05-19 LAB
ALBUMIN SERPL ELPH-MCNC: 3.7 G/DL
ALP BLD-CCNC: 102 U/L
ALT SERPL-CCNC: 13 U/L
ANION GAP SERPL CALC-SCNC: 15 MMOL/L
AST SERPL-CCNC: 24 U/L
BASOPHILS # BLD AUTO: 0.05 K/UL
BASOPHILS NFR BLD AUTO: 0.6 %
BILIRUB SERPL-MCNC: 0.3 MG/DL
BUN SERPL-MCNC: 10 MG/DL
CALCIUM SERPL-MCNC: 9 MG/DL
CHLORIDE SERPL-SCNC: 94 MMOL/L
CHOLEST SERPL-MCNC: 156 MG/DL
CO2 SERPL-SCNC: 27 MMOL/L
CREAT SERPL-MCNC: 0.87 MG/DL
EGFR: 66 ML/MIN/1.73M2
EOSINOPHIL # BLD AUTO: 0.08 K/UL
EOSINOPHIL NFR BLD AUTO: 0.9 %
ESTIMATED AVERAGE GLUCOSE: 117 MG/DL
GLUCOSE SERPL-MCNC: 107 MG/DL
HBA1C MFR BLD HPLC: 5.7 %
HCT VFR BLD CALC: 42.6 %
HDLC SERPL-MCNC: 68 MG/DL
HGB BLD-MCNC: 13.7 G/DL
IMM GRANULOCYTES NFR BLD AUTO: 0.2 %
LDLC SERPL CALC-MCNC: 67 MG/DL
LYMPHOCYTES # BLD AUTO: 2.59 K/UL
LYMPHOCYTES NFR BLD AUTO: 30.5 %
MAN DIFF?: NORMAL
MCHC RBC-ENTMCNC: 29.9 PG
MCHC RBC-ENTMCNC: 32.2 GM/DL
MCV RBC AUTO: 93 FL
MONOCYTES # BLD AUTO: 0.84 K/UL
MONOCYTES NFR BLD AUTO: 9.9 %
NEUTROPHILS # BLD AUTO: 4.92 K/UL
NEUTROPHILS NFR BLD AUTO: 57.9 %
NONHDLC SERPL-MCNC: 88 MG/DL
PLATELET # BLD AUTO: 339 K/UL
POTASSIUM SERPL-SCNC: 3.7 MMOL/L
PROT SERPL-MCNC: 6.5 G/DL
RBC # BLD: 4.58 M/UL
RBC # FLD: 13.8 %
SODIUM SERPL-SCNC: 135 MMOL/L
TRIGL SERPL-MCNC: 102 MG/DL
WBC # FLD AUTO: 8.5 K/UL

## 2022-05-20 LAB — TSH SERPL-ACNC: 21 UIU/ML

## 2022-06-02 LAB — TSH SERPL-ACNC: 1.06 UIU/ML

## 2022-06-06 ENCOUNTER — APPOINTMENT (OUTPATIENT)
Dept: INTERNAL MEDICINE | Facility: CLINIC | Age: 82
End: 2022-06-06
Payer: MEDICARE

## 2022-06-06 DIAGNOSIS — Z91.89 OTHER SPECIFIED PERSONAL RISK FACTORS, NOT ELSEWHERE CLASSIFIED: ICD-10-CM

## 2022-06-06 DIAGNOSIS — E03.9 HYPOTHYROIDISM, UNSPECIFIED: ICD-10-CM

## 2022-06-06 DIAGNOSIS — E78.00 PURE HYPERCHOLESTEROLEMIA, UNSPECIFIED: ICD-10-CM

## 2022-06-06 PROCEDURE — 99214 OFFICE O/P EST MOD 30 MIN: CPT | Mod: 95

## 2022-06-06 NOTE — HISTORY OF PRESENT ILLNESS
[Home] : at home, [unfilled] , at the time of the visit. [Medical Office: ([facility])___] : at the medical office located in  [Verbal consent obtained from patient] : the patient, [unfilled] [de-identified] : Ms. VANI GONZALEZ is a 82 year old female, seen in telemedicine for follow up visit\par Pt states that she has been feeling down, anxious, depressed for the past 6 weeks Says her  passed away 2 years ago and his anniversary is coming up and she started thinking about it and iit triggered her symptoms\par She takes Lexapro 10mg since her  passed but feels like the past 6 weeks it has not been working.\par Denies suicidal ideations, intent to hurt herself or others. Says her daughter has been very supportive, helping her through out all this\par Denies SOB, chest pain, abdominal pain, N/V/D, leg swelling, headache, dizziness \par \par Also wants to discuss lab results

## 2022-06-06 NOTE — PHYSICAL EXAM
[Normal] : no acute distress, well nourished, well developed and well-appearing [No Respiratory Distress] : no respiratory distress  [Speech Grossly Normal] : speech grossly normal [Alert and Oriented x3] : oriented to person, place, and time [Normal Insight/Judgement] : insight and judgment were intact

## 2022-06-06 NOTE — ASSESSMENT
[FreeTextEntry1] : \par \par \par Depression\par intructed to increase Lexapro to 15mg daily\par does not want to see psychiatrist-says she knows it's all because of her 's death anniversary coming up\par \par Lab results reviewed and discussed with patient\par .\par TSH is normal--Hx of Hypothyroidism\par pt to cont with Levothyroxine 125mcg daily\par \par HgA1c is 5.7-High Risk for DM\par cont low carb/low sugar diet\par \par Hx of HTN, CAD\par cont current medications\par Follows with cardiology\par Reinforced the importance of low sodium, low fat diet and physical activity\par \par cholesterol/LDL is at goalHx of Hypercholesterolemia\par pt to continue Simvastatin 20mg daily\par cont  low fat/low cholesterol diet\par \par Hx of COPD, Lung Ca\par Continue current meds\par follows with pulmonary\par \par \par \par

## 2022-06-18 NOTE — PATIENT PROFILE ADULT - LIVING ENVIRONMENT
Goal Outcome Evaluation:  Plan of Care Reviewed With: patient        Progress: improving  Outcome Evaluation: Pt was able to perform bed mobility with supervision.  Transfered sit to stand with SBA.  Amb 200' with CGA, no LOB.   no

## 2022-06-20 ENCOUNTER — APPOINTMENT (OUTPATIENT)
Dept: NEUROLOGY | Facility: CLINIC | Age: 82
End: 2022-06-20
Payer: MEDICARE

## 2022-06-20 VITALS
HEART RATE: 87 BPM | HEIGHT: 64 IN | WEIGHT: 143 LBS | DIASTOLIC BLOOD PRESSURE: 81 MMHG | BODY MASS INDEX: 24.41 KG/M2 | SYSTOLIC BLOOD PRESSURE: 143 MMHG

## 2022-06-20 DIAGNOSIS — R41.89 OTHER SYMPTOMS AND SIGNS INVOLVING COGNITIVE FUNCTIONS AND AWARENESS: ICD-10-CM

## 2022-06-20 DIAGNOSIS — R41.3 OTHER AMNESIA: ICD-10-CM

## 2022-06-20 PROCEDURE — 99204 OFFICE O/P NEW MOD 45 MIN: CPT

## 2022-06-20 NOTE — HISTORY OF PRESENT ILLNESS
[FreeTextEntry1] : 81 yo woman, accompanied by her daughter.\par \par Referred by her PCP.  She had been feeling depressed, as her   2 years ago.  Over the past 6 months, her daughter reports that he memory has been impaired, and her attention and concentration feels off.  She has forgotten to take her medication on occasion.  \par \par She feels like her memory is not as good as it used to be, and has some trouble attending to multiple conversations at the same time.  Her daughter says she got lost twice driving home from the grocery store, but was able to find her own way home.  \par \par She lives alone.  Her daughter pays the bills though because she does not use the computer well.  Before her  passed away, she would pay the bills and do all the shopping.  She can do her own groceries but her daughter now helps her.  She prepares her own meals.  She does not have many activities outside the home.\par \par No history of stroke\par \par She does have hypothyroidism, and had elevated TSH earlier this year because she did not take her Synthroid.  B12 level was borderline.

## 2022-06-20 NOTE — ASSESSMENT
[FreeTextEntry1] : 83 yo woman with memory and cognitive decline, which in part is likely related to depression and possibly hypothyroidism, however, should complete routine work out to rule out alternative causes.  Borderline B12 level.\par \par Plan:\par 1. MRI brain\par 2. Labs: MMA, folate, homocysteine\par 3. Depression: recommend psychotherapy in addition to current pharmacotherapy\par 4. Patient agrees with plan.\par 5. Follow up after testing completed.\par \par Mikal Freeman MD\par [facility]\par \par Greater than 50% of the encounter was spent on counseling and coordination of care discussing differential diagnosis, diagnostic testing, and treatment options. We have talked about appropriate follow up, and I have spent 45 minutes of face to face time with the patient.\par

## 2022-06-20 NOTE — PHYSICAL EXAM
[FreeTextEntry1] : MMSE = 30/30\par \par Awake, alert, oriented x 3.  Language fluent.  Comprehension intact.  Naming intact.  Repetition intact.  Affect normal.  Cranial nerves grossly intact.  Motor exam: normal bulk, normal tone, 5/5 in all four extremities.  No tremors or fasciculations.  Sensory exam: intact to LT.  DTRs: 2+ throughout.  Coordination: no dysmetria.  Gait: normal gait, independent.  Romberg - negative\par

## 2022-06-24 NOTE — CONSULT NOTE ADULT - SUBJECTIVE AND OBJECTIVE BOX
Diabetes Is under good control.  Kidney and liver function are normal.  Lipid panel looks very good.  Keep up the good work.  Follow up in 6 months or sooner if concerns.
  CHIEF COMPLAINT:Patient is a 80y old  Female who presents with a chief complaint of Abdominal ppain (22 Jan 2021 07:45)      HPI:  80F pmhx HTN, COPD (30 PYH smoking), Hypothyroidism, diverticulosis, CAD/MI (age 36), lung ca s/p partial right pneumonectomy, s/p recent laparoscopic cholecystectomy (12/12/2020) with Dr. Dasilva at [facility] for acute gangrenous cholecystitis presents with 1 day of nausea, vomiting, and abdominal pain. Her pain was also associated with fevers and chills at home. The patient states that she was doing well since discharge, but that acutely yesterday evening she had return of pain that was similar to the pain she experienced when she had cholecystitis. Her hospital course post-cholecystectomy was c/b afib RVR , acute hypoxic respiratory failure 2/2 to sepsis, and transaminitis with MRCP showing postoperative changes without CBD stone, and acute blood loss anemia likely secondary to diverticular bleed. The patient improved and was discharged on 12/22, and had been doing well.    In [facility] ED, the patient was noted to be febrile to 38.2, but hemodynamically stable. Her laboratory values were significant for WBC of 11.8, T. bili of 2 and a transaminitis, that is elevated from her discharge values. While in the ED the patient states her pain improved and was no longer experiencing nausea. A CT A/P was obtained that demonstrated mucosal enhancement of the hepatic ducts, a CBD of 8mm, and expected post-surgical changes in the operative bed. Surgery consulted to evaluate. Patient transferred to [facility] ED due to patient's request.    While at [facility] ED, patient was febrile to 101F, hemodynamically stable. Surgery and GI service consulted for further evaluation.      Pulmonary consulted for pulmonary optimization. The patient is currently on room air. Had a mild cough, but now resolved but has not had a COPD excerbations for many years. Has been well controlled with Advair and has no issues. The patient is able to ambulate without issue as well.       PAST MEDICAL & SURGICAL HISTORY:  MI (myocardial infarction)    Hypertension    History of appendectomy        FAMILY HISTORY:  Family history of colonic diverticulitis (Mother, Sibling)        SOCIAL HISTORY:  Smoking: [ ] Never Smoked [x ] Former Smoker (1 packs x 30 years) [ ] Current Smoker  (__ packs x ___ years)  Substance Use: [ ] Never Used [ ] Used ____  EtOH Use:  Marital Status: [ ] Single [ ]  [ ]  [ ]   Sexual History:   Occupation:  Recent Travel:  Country of Birth:  Advance Directives:    Allergies    No Known Allergies    Intolerances        HOME MEDICATIONS:  Home Medications:  Advair Diskus 100 mcg-50 mcg inhalation powder: 1 puff(s) inhaled 2 times a day (14 Dec 2020 14:24)  aspirin 81 mg oral tablet, chewable: 1 tab(s) orally once a day (12 Dec 2020 15:34)  Colace 100 mg oral capsule: 1 cap(s) orally 2 times a day (12 Dec 2020 15:34)  Lexapro 10 mg oral tablet: 1 tab(s) orally once a day (14 Dec 2020 14:24)  metoprolol tartrate 25 mg oral tablet: 1 tab(s) orally 2 times a day (12 Dec 2020 15:34)  pantoprazole 40 mg oral delayed release tablet: 1 tab(s) orally once a day (before a meal)  Please continue for 30 days  (21 Dec 2020 11:28)  Synthroid 125 mcg (0.125 mg) oral tablet: 1 tab(s) orally once a day HOLD ON SUNDAYS (14 Dec 2020 14:06)  Zocor 20 mg oral tablet: 1 tab(s) orally once a day (at bedtime) (12 Dec 2020 15:34)      REVIEW OF SYSTEMS:  Constitutional: [ ] negative [ ] fevers [ ] chills [ ] weight loss [ ] weight gain  HEENT: [ ] negative [ ] dry eyes [ ] eye irritation [ ] postnasal drip [ ] nasal congestion  CV: [ ] negative  [ ] chest pain [ ] orthopnea [ ] palpitations [ ] murmur  Resp: [ ] negative [ ] cough [ ] shortness of breath [ ] dyspnea [ ] wheezing [ ] sputum [ ] hemoptysis  GI: [ ] negative [ ] nausea [ ] vomiting [ ] diarrhea [ ] constipation [ ] abd pain [ ] dysphagia   : [ ] negative [ ] dysuria [ ] nocturia [ ] hematuria [ ] increased urinary frequency  Musculoskeletal: [ ] negative [ ] back pain [ ] myalgias [ ] arthralgias [ ] fracture  Skin: [ ] negative [ ] rash [ ] itch  Neurological: [ ] negative [ ] headache [ ] dizziness [ ] syncope [ ] weakness [ ] numbness  Psychiatric: [ ] negative [ ] anxiety [ ] depression  Endocrine: [ ] negative [ ] diabetes [ ] thyroid problem  Hematologic/Lymphatic: [ ] negative [ ] anemia [ ] bleeding problem  Allergic/Immunologic: [ ] negative [ ] itchy eyes [ ] nasal discharge [ ] hives [ ] angioedema  [x ] All other systems negative  [ ] Unable to assess ROS because ________    OBJECTIVE:  ICU Vital Signs Last 24 Hrs  T(C): 36.7 (22 Jan 2021 05:55), Max: 37.3 (21 Jan 2021 17:57)  T(F): 98.1 (22 Jan 2021 05:55), Max: 99.1 (21 Jan 2021 17:57)  HR: 78 (22 Jan 2021 05:55) (66 - 84)  BP: 168/84 (22 Jan 2021 05:55) (145/65 - 178/74)  BP(mean): --  ABP: --  ABP(mean): --  RR: 18 (22 Jan 2021 05:55) (16 - 19)  SpO2: 94% (22 Jan 2021 05:55) (94% - 97%)        01-21 @ 07:01  -  01-22 @ 07:00  --------------------------------------------------------  IN: 2650 mL / OUT: 1800 mL / NET: 850 mL      CAPILLARY BLOOD GLUCOSE          PHYSICAL EXAM:  GENERAL: NAD, well-groomed, well-developed  EYES: EOMI, PERRLA, conjunctiva and sclera clear  ENMT: No tonsillar erythema, exudates, or enlargement; Moist mucous membranes, Good dentition, No lesions  CHEST/LUNG: Clear to auscultation bilaterally; No rales, rhonchi, wheezing, or rubs  HEART: Regular rate and rhythm; No murmurs, rubs, or gallops  ABDOMEN: Soft, Nontender, Nondistended; Bowel sounds present  VASCULAR:  2+ Peripheral Pulses, No clubbing, cyanosis, or edema  LYMPH: No lymphadenopathy noted  SKIN: No rashes or lesions  NERVOUS SYSTEM:  Alert & Oriented X3, Good concentration; Motor Strength 5/5 B/L upper and lower extremities;     HOSPITAL MEDICATIONS:  Standing Meds:  budesonide  80 MICROgram(s)/formoterol 4.5 MICROgram(s) Inhaler 2 Puff(s) Inhalation two times a day  enoxaparin Injectable 40 milliGRAM(s) SubCutaneous daily  escitalopram 10 milliGRAM(s) Oral daily  levothyroxine 125 MICROGram(s) Oral daily  magnesium sulfate  IVPB 2 Gram(s) IV Intermittent once  melatonin 3 milliGRAM(s) Oral at bedtime  metoprolol tartrate 25 milliGRAM(s) Oral two times a day  pantoprazole    Tablet 40 milliGRAM(s) Oral before breakfast  piperacillin/tazobactam IVPB.. 3.375 Gram(s) IV Intermittent every 8 hours  potassium chloride    Tablet ER 20 milliEquivalent(s) Oral every 2 hours  potassium phosphate / sodium phosphate Powder (PHOS-NaK) 1 Packet(s) Oral once  simvastatin 20 milliGRAM(s) Oral at bedtime      PRN Meds:  ALBUTerol    90 MICROgram(s) HFA Inhaler 2 Puff(s) Inhalation every 6 hours PRN      LABS:    The Labs were reviewed by me   The Radiology was reviewed by me    EKG tracing reviewed by me    01-22    138  |  103  |  6<L>  ----------------------------<  78  3.3<L>   |  24  |  0.61  01-21    134<L>  |  102  |  9   ----------------------------<  102<H>  3.4<L>   |  23  |  0.65  01-20    137  |  102  |  10  ----------------------------<  104<H>  3.3<L>   |  23  |  0.68    Ca    8.7      22 Jan 2021 07:47  Ca    8.8      21 Jan 2021 06:23  Ca    8.5      20 Jan 2021 17:40  Phos  2.6     01-22  Mg     1.8     01-22    TPro  5.7<L>  /  Alb  2.9<L>  /  TBili  1.0  /  DBili  0.5<H>  /  AST  43<H>  /  ALT  82<H>  /  AlkPhos  241<H>  01-22  TPro  5.9<L>  /  Alb  3.0<L>  /  TBili  3.1<H>  /  DBili  x   /  AST  99<H>  /  ALT  134<H>  /  AlkPhos  293<H>  01-21  TPro  6.0  /  Alb  3.2<L>  /  TBili  4.7<H>  /  DBili  x   /  AST  154<H>  /  ALT  175<H>  /  AlkPhos  326<H>  01-20    Magnesium, Serum: 1.8 mg/dL (01-22-21 @ 07:47)  Magnesium, Serum: 1.8 mg/dL (01-21-21 @ 06:23)    Phosphorus Level, Serum: 2.6 mg/dL (01-22-21 @ 07:47)  Phosphorus Level, Serum: 2.2 mg/dL (01-21-21 @ 06:23)                                              10.5   6.31  )-----------( 206      ( 22 Jan 2021 07:46 )             32.8                         11.3   11.01 )-----------( 224      ( 21 Jan 2021 06:23 )             34.8                         11.5   12.00 )-----------( 242      ( 20 Jan 2021 17:40 )             35.9     CAPILLARY BLOOD GLUCOSE        Blood Gas Source Venous: Venous (01-20-21 @ 09:00)      MICROBIOLOGY:     Culture - Blood (collected 20 Jan 2021 13:11)  Source: .Blood Blood  Preliminary Report (21 Jan 2021 14:01):    No growth to date.    Culture - Blood (collected 20 Jan 2021 13:11)  Source: .Blood Blood  Preliminary Report (21 Jan 2021 14:01):    No growth to date.    Culture - Urine (collected 20 Jan 2021 07:02)  Source: .Urine Clean Catch (Midstream)  Final Report (21 Jan 2021 08:14):    <10,000 CFU/mL Normal Urogenital Lisa        RADIOLOGY:  [ ] Reviewed and interpreted by me    PULMONARY FUNCTION TESTS:    EKG:
Chief Complaint: Abdominal pain    HPI:    81 y/o F w/ hx of CAD w/ MI, COPD, HTN, hypothyroidism, lung cancer s/p partial right pneumonectomy, s/p lap caroline on 20 with hospital course c/b A-fib w/ RVR, acute hypoxic respiratory failure, acute blood loss anemia thought to represent diverticular bleeding, and abnormal liver enzymes with negative MRCP, initially brought to [facility]ED and found to be febrile and with abnormal liver enzymes, transferred to [facility] per patient's request.    The patient currently feels better. She still feels feverish - was told her temperature was 101 an hour prior to evaluation. Her pain has resolved and she no longer feels nauseous. The patient developed pain yesterday which was similar to the pain she had before her lap caroline. She otherwise denies yellowing of her skins and eyes and dark colored urine.    Allergies:  No Known Allergies    Home Medications:  Advair Diskus 100 mcg-50 mcg inhalation powder: 1 puff(s) inhaled 2 times a day  aspirin 81 mg oral tablet, chewable: 1 tab(s) orally once a day  Colace 100 mg oral capsule: 1 cap(s) orally 2 times a day  Lexapro 10 mg oral tablet: 1 tab(s) orally once a day  metoprolol tartrate 25 mg oral tablet: 1 tab(s) orally 2 times a day  pantoprazole 40 mg oral delayed release tablet: 1 tab(s) orally once a day (before a meal)  Please continue for 30 days   Synthroid 125 mcg (0.125 mg) oral tablet: 1 tab(s) orally once a day HOLD ON SUNDAYS  Zocor 20 mg oral tablet: 1 tab(s) orally once a day (at bedtime)    Hospital Medications:  lactated ringers. 1000 milliLiter(s) IV Continuous <Continuous>  levothyroxine Injectable 94 MICROGram(s) IV Push at bedtime  piperacillin/tazobactam IVPB.. 3.375 Gram(s) IV Intermittent once    Allergies:   No Known Allergies    PMHX/PSHX:  MI (myocardial infarction)    Hypertension    History of appendectomy    Family history:  Family history of colonic diverticulitis (Mother, Sibling)    Denies family history of colon cancer/polyps, stomach cancer/polyps, pancreatic cancer/masses, liver cancer/disease, ovarian cancer and endometrial cancer.    Social History:     Tob: Denies  EtOH: Denies  Illicit Drugs: Denies    ROS:     General:  No wt loss, + fevers, chills, night sweats, fatigue  Eyes:  Good vision, no reported pain  ENT:  No sore throat, pain, runny nose, dysphagia  CV:  No pain, palpitations, hypo/hypertension  Pulm:  No dyspnea, cough, tachypnea, wheezing  GI:  + pain, + nausea, No vomiting, No diarrhea, No constipation, No weight loss, No fever, No pruritis, No bright red blood per rectum, No tarry stools, No dysphagia,  :  No pain, bleeding, incontinence, nocturia  Muscle:  No pain, weakness  Neuro:  No weakness, tingling, memory problems  Psych:  No fatigue, insomnia, mood problems, depression  Endocrine:  No polyuria, polydipsia, cold/heat intolerance  Heme:  No petechiae, ecchymosis, easy bruisability  Skin:  No rash, tattoos, scars, edema    PHYSICAL EXAM:     Vital Signs:  Vital Signs Last 24 Hrs  T(C): 38.3 (2021 08:35), Max: 38.3 (2021 08:35)  T(F): 101 (2021 08:35), Max: 101 (2021 08:35)  HR: 82 (2021 10:12) (64 - 106)  BP: 146/72 (2021 10:12) (116/71 - 186/80)  BP(mean): --  RR: 16 (2021 10:12) (16 - 19)  SpO2: 97% (2021 10:12) (94% - 100%)  Daily Height in cm: 162.56 (2021 07:31)      GENERAL:  No acute distress  HEENT:  Normocephalic/atraumatic, no scleral icterus  CHEST:  Normal effort, no accessory muscle use  HEART:  Regular rate and rhythm  ABDOMEN:  Soft, mild epigastric tenderness, non-distended  EXTREMITIES: No cyanosis, clubbing, or edema  SKIN:  No rash/erythema  NEURO:  Alert and oriented x 3    LABS:                        11.2   14.15 )-----------( 244      ( 2021 08:21 )             34.7     Mean Cell Volume: 91.8 fl (21 @ 08:21)        138  |  102  |  10  ----------------------------<  130<H>  3.3<L>   |  23  |  0.77    Ca    8.3<L>      2021 08:21    TPro  6.3  /  Alb  3.3  /  TBili  4.3<H>  /  DBili  3.2<H>  /  AST  242<H>  /  ALT  221<H>  /  AlkPhos  371<H>      LIVER FUNCTIONS - ( 2021 08:21 )  Alb: 3.3 g/dL / Pro: 6.3 g/dL / ALK PHOS: 371 U/L / ALT: 221 U/L / AST: 242 U/L / GGT: x           PT/INR - ( 2021 22:56 )   PT: 12.2 sec;   INR: 1.07 ratio       PTT - ( 2021 22:56 )  PTT:29.0 sec  Urinalysis Basic - ( 2021 00:38 )    Color: Yellow / Appearance: Clear / S.010 / pH: x  Gluc: x / Ketone: Negative  / Bili: Negative / Urobili: <2 mg/dL   Blood: x / Protein: Negative / Nitrite: Negative   Leuk Esterase: Moderate / RBC: 2 /HPF / WBC 25-50 /HPF   Sq Epi: x / Non Sq Epi: moderate /HPF / Bacteria: small    Amylase Serum--      Lipase serum20       Ammonia--  Amylase Serum--      Lipase serum28       Ammonia--                          11.2   14.15 )-----------( 244      ( 2021 08:21 )             34.7                         11.5   11.80 )-----------( 297      ( 2021 22:42 )             37.9     Imaging:    < from: CT Abdomen and Pelvis w/ IV Cont (21 @ 01:39) >    EXAM:  CT ABDOMEN AND PELVIS IC      PROCEDURE DATE:  2021     INTERPRETATION:  CLINICAL INFORMATION: Right upper quadrant abdominal pain, nausea, vomiting status post cholecystectomy.    COMPARISON: CT abdomen/pelvis 2020.    PROCEDURE:  CT of the Abdomen and Pelvis was performed with intravenous contrast.  Intravenous contrast: 90 ml Omnipaque 350. 10 ml discarded.  Oral contrast: None.  Sagittal and coronal reformats were performed.    FINDINGS:  LOWER CHEST: Coronary artery calcifications. Post surgical changes of the right lung. Linear atelectasis of the lingula.    LIVER: Subcentimeter hypodensities, too small to characterize.  BILE DUCTS: Normal caliber. The common bile duct measures 8 mm. Mucosal enhancement of the extrahepatic ducts.  GALLBLADDER: Cholecystectomy. Resolution of previously seen small fluid collection. Trace amount of fluid and stranding in the gallbladder fossa residual postsurgical change.  SPLEEN: Within normal limits.  PANCREAS: Unchanged peripheral subcentimeter hypodensity..  ADRENALS: Within normal limits.  KIDNEYS/URETERS: No hydronephrosis. Duplicated left renal collecting system. Bilateral renal cysts. Bilateral subcentimeter hypodensities, too small to characterize.    BLADDER: Distended.  REPRODUCTIVE ORGANS: Uterus and adnexa within normal limits. Pessary device in place.    BOWEL: Colonic diverticulosis without diverticulitis. No bowel obstruction. Appendix is not visualized. No evidence of inflammation in the pericecal region. Stable small periampullary duodenal diverticulum.  PERITONEUM: No ascites.  VESSELS: Atherosclerotic changes.  RETROPERITONEUM/LYMPH NODES: No lymphadenopathy.  ABDOMINAL WALL: Small fat-containing umbilical hernia.  BONES: Degenerative changes.    IMPRESSION:  Mucosal enhancement of the extrahepatic ducts, which may be secondary to infection or inflammation. Correlate with labs.              GREYSON ARTIS MD; Resident Radiology  This document has been electronically signed.  JUDI ARNETT MD; Attending Radiologist  This document has been electronically signed. 2021  4:47AM    < end of copied text >    
TRAUMA COMANAGEMENT MEDICINE ATTENDING INITIAL CONSULT NOTE    HPI:  80F pmhx HTN, COPD (30 PYH smoking), Hypothyroidism, diverticulosis, CAD/MI (age 36), lung ca s/p partial right pneumonectomy, s/p recent laparoscopic cholecystectomy (2020) with Dr. Dasilva at [facility] for acute gangrenous cholecystitis presents with 1 day of nausea, vomiting, and abdominal pain. Her pain was also associated with fevers and chills at home. The patient states that she was doing well since discharge, but that acutely yesterday evening she had return of pain that was similar to the pain she experienced when she had cholecystitis. Her hospital course post-cholecystectomy was c/b afib RVR , acute hypoxic respiratory failure 2/2 to sepsis, and transaminitis with MRCP showing postoperative changes without CBD stone, and acute blood loss anemia likely secondary to diverticular bleed. The patient improved and was discharged on , and had been doing well.    In [facility] ED, the patient was noted to be febrile to 38.2, but hemodynamically stable. Her laboratory values were significant for WBC of 11.8, T. bili of 2 and a transaminitis, that is elevated from her discharge values. While in the ED the patient states her pain improved and was no longer experiencing nausea. A CT A/P was obtained that demonstrated mucosal enhancement of the hepatic ducts, a CBD of 8mm, and expected post-surgical changes in the operative bed. Surgery consulted to evaluate. Patient transferred to [facility] ED due to patient's request.    While at [facility] ED, patient was febrile to 101F, hemodynamically stable. Surgery and GI service consulted for further evaluation.    (2021 09:23)    SUBJECTIVE:   overnight no acute events  states had fevers, chills, had nausea yesterday  ruq discomfort.  no sob, cp.    Home Medications:  Advair Diskus 100 mcg-50 mcg inhalation powder: 1 puff(s) inhaled 2 times a day (14 Dec 2020 14:24)  aspirin 81 mg oral tablet, chewable: 1 tab(s) orally once a day (12 Dec 2020 15:34)  Colace 100 mg oral capsule: 1 cap(s) orally 2 times a day (12 Dec 2020 15:34)  Lexapro 10 mg oral tablet: 1 tab(s) orally once a day (14 Dec 2020 14:24)  metoprolol tartrate 25 mg oral tablet: 1 tab(s) orally 2 times a day (12 Dec 2020 15:34)  pantoprazole 40 mg oral delayed release tablet: 1 tab(s) orally once a day (before a meal)  Please continue for 30 days  (21 Dec 2020 11:28)  Synthroid 125 mcg (0.125 mg) oral tablet: 1 tab(s) orally once a day HOLD ON SUNDAYS (14 Dec 2020 14:06)  Zocor 20 mg oral tablet: 1 tab(s) orally once a day (at bedtime) (12 Dec 2020 15:34)    PAST MEDICAL & SURGICAL HISTORY:  MI (myocardial infarction)    Hypertension    History of appendectomy    Review of Systems:   CONSTITUTIONAL:+fever. No  weight loss, or fatigue  EYES: No eye pain, visual disturbances, or discharge  ENMT:  No difficulty hearing, tinnitus, vertigo; No sinus or throat pain  NECK: No pain or stiffness  RESPIRATORY: No cough, wheezing, chills or hemoptysis; No shortness of breath  CARDIOVASCULAR: No chest pain, palpitations, dizziness, or leg swelling  GASTROINTESTINAL: ruq pain, nausea  GENITOURINARY: No dysuria, frequency, hematuria, or incontinence  NEUROLOGICAL: No headaches, memory loss, loss of strength, numbness, or tremors  SKIN: No itching, burning, rashes, or lesions   ENDOCRINE: No heat or cold intolerance; No hair loss  MUSCULOSKELETAL: No joint pain or swelling; No muscle, back, or extremity pain  PSYCHIATRIC: No depression, anxiety, mood swings, or difficulty sleeping  HEME/LYMPH: No easy bruising, or bleeding gums  ALLERY AND IMMUNOLOGIC: No hives or eczema    Allergies    No Known Allergies    Intolerances    Social History:   former smoker  no etoh    FAMILY HISTORY:  Family history of colonic diverticulitis (Mother, Sibling)     Noncontributory    Vital Signs Last 24 Hrs  T(C): 38 (2021 10:45), Max: 38.3 (2021 08:35)  T(F): 100.4 (2021 10:45), Max: 101 (2021 08:35)  HR: 90 (2021 10:45) (64 - 106)  BP: 154/78 (2021 10:45) (116/71 - 186/80)  BP(mean): --  RR: 18 (2021 10:45) (16 - 19)  SpO2: 96% (2021 10:45) (94% - 100%)  CAPILLARY BLOOD GLUCOSE      POCT Blood Glucose.: 120 mg/dL (2021 05:30)      PHYSICAL EXAM:  GENERAL: NAD, well-developed  HEAD:  NCAT  EYES: EOMI  NECK: Supple, No JVD  CHEST/LUNG: Clear to auscultation bilaterally; No wheeze  HEART: Reg rate. No M/R/G.  ABDOMEN: Soft mild ttp ruq, no rebound/guarding  EXTREMITIES:  2+ Peripheral Pulses, No clubbing, cyanosis, or edema  PSYCH: AAOx3  SKIN: No rashes or lesions    LABS:                        11.2   14.15 )-----------( 244      ( 2021 08:21 )             34.7         138  |  102  |  10  ----------------------------<  130<H>  3.3<L>   |  23  |  0.77    Ca    8.3<L>      2021 08:21    TPro  6.3  /  Alb  3.3  /  TBili  4.3<H>  /  DBili  3.2<H>  /  AST  242<H>  /  ALT  221<H>  /  AlkPhos  371<H>      PT/INR - ( 2021 22:56 )   PT: 12.2 sec;   INR: 1.07 ratio         PTT - ( 2021 22:56 )  PTT:29.0 sec      Urinalysis Basic - ( 2021 00:38 )    Color: Yellow / Appearance: Clear / S.010 / pH: x  Gluc: x / Ketone: Negative  / Bili: Negative / Urobili: <2 mg/dL   Blood: x / Protein: Negative / Nitrite: Negative   Leuk Esterase: Moderate / RBC: 2 /HPF / WBC 25-50 /HPF   Sq Epi: x / Non Sq Epi: moderate /HPF / Bacteria: small      MEDICATIONS  (STANDING):  lactated ringers. 1000 milliLiter(s) (100 mL/Hr) IV Continuous <Continuous>  levothyroxine Injectable 94 MICROGram(s) IV Push at bedtime  potassium chloride  10 mEq/100 mL IVPB 10 milliEquivalent(s) IV Intermittent every 1 hour    MEDICATIONS  (PRN):    Home Medications:  Advair Diskus 100 mcg-50 mcg inhalation powder: 1 puff(s) inhaled 2 times a day (14 Dec 2020 14:24)  aspirin 81 mg oral tablet, chewable: 1 tab(s) orally once a day (12 Dec 2020 15:34)  Colace 100 mg oral capsule: 1 cap(s) orally 2 times a day (12 Dec 2020 15:34)  Lexapro 10 mg oral tablet: 1 tab(s) orally once a day (14 Dec 2020 14:24)  metoprolol tartrate 25 mg oral tablet: 1 tab(s) orally 2 times a day (12 Dec 2020 15:34)  pantoprazole 40 mg oral delayed release tablet: 1 tab(s) orally once a day (before a meal)  Please continue for 30 days  (21 Dec 2020 11:28)  Synthroid 125 mcg (0.125 mg) oral tablet: 1 tab(s) orally once a day HOLD ON SUNDAYS (14 Dec 2020 14:06)  Zocor 20 mg oral tablet: 1 tab(s) orally once a day (at bedtime) (12 Dec 2020 15:34)

## 2022-06-29 ENCOUNTER — APPOINTMENT (OUTPATIENT)
Dept: INTERNAL MEDICINE | Facility: CLINIC | Age: 82
End: 2022-06-29

## 2022-06-29 DIAGNOSIS — F41.9 ANXIETY DISORDER, UNSPECIFIED: ICD-10-CM

## 2022-06-29 DIAGNOSIS — F32.A DEPRESSION, UNSPECIFIED: ICD-10-CM

## 2022-06-29 PROCEDURE — 99213 OFFICE O/P EST LOW 20 MIN: CPT | Mod: 95

## 2022-07-04 PROBLEM — F41.9 ANXIETY: Status: ACTIVE | Noted: 2021-10-14

## 2022-07-04 PROBLEM — F32.A DEPRESSION: Status: ACTIVE | Noted: 2022-05-02

## 2022-07-04 NOTE — HISTORY OF PRESENT ILLNESS
[Home] : at home, [unfilled] , at the time of the visit. [Medical Office: ([facility])___] : at the medical office located in  [Verbal consent obtained from patient] : the patient, [unfilled] [de-identified] : \par Ms. VANI GONZALEZ is a 82 year old female, seen in telemedicine for follow up visit \par Pt was seen 2 weeks ago. At that time she had complained of feeling  down, anxious, depressed due to the fact that her 's 2 year death anniversary was coming up She was instructed to increase Lexapro to 15mg \par Pt states she hs been taking 15mg daiy since then. She is doing much better now and does not think she needs to continue taking the higher dose\par Denies suicidal ideations, intent to hurt herself or others. \par Denies SOB, chest pain, abdominal pain, N/V/D, leg swelling, headache, dizziness \par

## 2022-07-04 NOTE — ASSESSMENT
[FreeTextEntry1] : \par \par \par Depression\par pt to resume taking  Lexapro 10mg \par \par Hx of HTN, CAD\par cont current medications\par Follows with cardiology\par cont  low sodium diet \par \par Hx of Hypothyroidism\par cont Levothyroxine 125mcg daily\par \par Hx of Hypercholesterolemia\par continue Simvastatin 20mg daily\par cont  low fat/low cholesterol diet\par \par Hx of COPD, Lung Ca\par Continue current meds\par follows with pulmonary\par \par \par \par

## 2022-10-24 ENCOUNTER — NON-APPOINTMENT (OUTPATIENT)
Age: 82
End: 2022-10-24

## 2022-10-24 ENCOUNTER — APPOINTMENT (OUTPATIENT)
Dept: INTERNAL MEDICINE | Facility: CLINIC | Age: 82
End: 2022-10-24

## 2022-10-24 VITALS
DIASTOLIC BLOOD PRESSURE: 86 MMHG | RESPIRATION RATE: 12 BRPM | WEIGHT: 153 LBS | HEART RATE: 75 BPM | SYSTOLIC BLOOD PRESSURE: 137 MMHG | OXYGEN SATURATION: 97 % | BODY MASS INDEX: 26.12 KG/M2 | TEMPERATURE: 96.9 F | HEIGHT: 64 IN

## 2022-10-24 DIAGNOSIS — Z01.818 ENCOUNTER FOR OTHER PREPROCEDURAL EXAMINATION: ICD-10-CM

## 2022-10-24 PROCEDURE — 93000 ELECTROCARDIOGRAM COMPLETE: CPT

## 2022-10-24 PROCEDURE — G0008: CPT

## 2022-10-24 PROCEDURE — 90662 IIV NO PRSV INCREASED AG IM: CPT

## 2022-10-24 PROCEDURE — 99214 OFFICE O/P EST MOD 30 MIN: CPT | Mod: 25

## 2022-10-24 NOTE — PHYSICAL EXAM
[No Acute Distress] : no acute distress [Well Nourished] : well nourished [Normal Sclera/Conjunctiva] : normal sclera/conjunctiva [EOMI] : extraocular movements intact [Normal Outer Ear/Nose] : the outer ears and nose were normal in appearance [Normal Oropharynx] : the oropharynx was normal [No JVD] : no jugular venous distention [No Lymphadenopathy] : no lymphadenopathy [Supple] : supple [Thyroid Normal, No Nodules] : the thyroid was normal and there were no nodules present [No Respiratory Distress] : no respiratory distress  [No Accessory Muscle Use] : no accessory muscle use [Clear to Auscultation] : lungs were clear to auscultation bilaterally [Normal Rate] : normal rate  [Regular Rhythm] : with a regular rhythm [Normal S1, S2] : normal S1 and S2 [No Murmur] : no murmur heard [No Carotid Bruits] : no carotid bruits [No Varicosities] : no varicosities [Pedal Pulses Present] : the pedal pulses are present [No Edema] : there was no peripheral edema [No Extremity Clubbing/Cyanosis] : no extremity clubbing/cyanosis [Soft] : abdomen soft [Non-distended] : non-distended [Non Tender] : non-tender [No Masses] : no abdominal mass palpated [No HSM] : no HSM [Normal Bowel Sounds] : normal bowel sounds [Normal Posterior Cervical Nodes] : no posterior cervical lymphadenopathy [Normal Anterior Cervical Nodes] : no anterior cervical lymphadenopathy [No CVA Tenderness] : no CVA  tenderness [No Joint Swelling] : no joint swelling [No Spinal Tenderness] : no spinal tenderness [Grossly Normal Strength/Tone] : grossly normal strength/tone [No Rash] : no rash [Coordination Grossly Intact] : coordination grossly intact [No Focal Deficits] : no focal deficits [Normal Gait] : normal gait [Normal Affect] : the affect was normal [Normal Insight/Judgement] : insight and judgment were intact

## 2022-10-31 ENCOUNTER — TRANSCRIPTION ENCOUNTER (OUTPATIENT)
Age: 82
End: 2022-10-31

## 2022-11-06 LAB
ALBUMIN SERPL ELPH-MCNC: 3.9 G/DL
ALP BLD-CCNC: 129 U/L
ALT SERPL-CCNC: 16 U/L
ANION GAP SERPL CALC-SCNC: 13 MMOL/L
AST SERPL-CCNC: 25 U/L
BASOPHILS # BLD AUTO: 0.05 K/UL
BASOPHILS NFR BLD AUTO: 0.6 %
BILIRUB SERPL-MCNC: 0.2 MG/DL
BUN SERPL-MCNC: 21 MG/DL
CALCIUM SERPL-MCNC: 9.5 MG/DL
CHLORIDE SERPL-SCNC: 103 MMOL/L
CO2 SERPL-SCNC: 26 MMOL/L
CREAT SERPL-MCNC: 1.12 MG/DL
EGFR: 49 ML/MIN/1.73M2
EOSINOPHIL # BLD AUTO: 0.19 K/UL
EOSINOPHIL NFR BLD AUTO: 2.4 %
GLUCOSE SERPL-MCNC: 109 MG/DL
HCT VFR BLD CALC: 44.1 %
HGB BLD-MCNC: 14.2 G/DL
IMM GRANULOCYTES NFR BLD AUTO: 0.3 %
LYMPHOCYTES # BLD AUTO: 2.13 K/UL
LYMPHOCYTES NFR BLD AUTO: 27.4 %
MAN DIFF?: NORMAL
MCHC RBC-ENTMCNC: 30.4 PG
MCHC RBC-ENTMCNC: 32.2 GM/DL
MCV RBC AUTO: 94.4 FL
MONOCYTES # BLD AUTO: 0.91 K/UL
MONOCYTES NFR BLD AUTO: 11.7 %
NEUTROPHILS # BLD AUTO: 4.47 K/UL
NEUTROPHILS NFR BLD AUTO: 57.6 %
PLATELET # BLD AUTO: 337 K/UL
POTASSIUM SERPL-SCNC: 4.9 MMOL/L
PROT SERPL-MCNC: 6.5 G/DL
RBC # BLD: 4.67 M/UL
RBC # FLD: 13.6 %
SODIUM SERPL-SCNC: 142 MMOL/L
WBC # FLD AUTO: 7.77 K/UL

## 2022-11-06 NOTE — HISTORY OF PRESENT ILLNESS
[No Pertinent Cardiac History] : no history of aortic stenosis, atrial fibrillation, coronary artery disease, recent myocardial infarction, or implantable device/pacemaker [No Pertinent Pulmonary History] : no history of asthma, COPD, sleep apnea, or smoking [No Adverse Anesthesia Reaction] : no adverse anesthesia reaction in self or family member [Chronic Anticoagulation] : chronic anticoagulation [Atrial Fibrillation] : no atrial fibrillation [Recent Myocardial Infarction] : no recent myocardial infarction [Implantable Device/Pacemaker] : no implantable device/pacemaker [Asthma] : no asthma [COPD] : no COPD [Smoker] : not a smoker [Sleep Apnea] : no sleep apnea [Family Member] : no family member with adverse anesthesia reaction/sudden death [Self] : no previous adverse anesthesia reaction [Chronic Kidney Disease] : no chronic kidney disease [Diabetes] : no diabetes [FreeTextEntry2] : 11/11/2022 [FreeTextEntry1] : Left cataract extraction  [FreeTextEntry3] : Dr. LIU  [FreeTextEntry4] : Ms. 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

## 2022-11-18 NOTE — H&P ADULT - PROBLEM SELECTOR PLAN 8
[facility] Intensive Care [facility]  Urology  Progress Note    Patient Name: Nithin Wagner  MRN: [id number]  Admission Date: 11/15/2022  Hospital Length of Stay: 3 days  Code Status: Full Code   Attending Provider: CARISA Lozano MD   Primary Care Physician: Tushar Drew MD    Subjective:     HPI:  69M h/o DM2, CAD, HTN, HLD presents with scrotal pain and swelling. Started about 4 weeks ago and has been progressively worsening despite a couple courses of antibiotics (unsure which ones). He reports fevers and chills at home. He also reports nausea, decreased PO intake, multiple syncopal episodes, and stool incontinence. He is on continuous blood glucose monitoring and states blood sugars have been in 190s today. He was seen yesterday in urgent care and diagnosed with scrotal abscess with possible kami's; he referred to the ER but waited until today to come in.    He was hypotensive and tachycardic on arrival in the ED. Sepsis protocol was started. Labs and imaging are currently pending.         Interval History: NAEO. AFVSS. Debridement in OR yesterday afternoon with removal of small amount of additional necrotic tissue. No growth on cultures at this time.    Review of Systems  Objective:     Temp:  [97.5 °F (36.4 °C)-98.4 °F (36.9 °C)] 98 °F (36.7 °C)  Pulse:  [] 84  Resp:  [12-43] 22  SpO2:  [93 %-98 %] 98 %  BP: (109-156)/(56-98) 130/78     Body mass index is 35.08 kg/m².           Drains       Drain  Duration                  Urethral Catheter 11/15/22 2145 Non-latex 16 Fr. 2 days                    Physical Exam  Vitals and nursing note reviewed.   Constitutional:       General: He is not in acute distress.     Appearance: Normal appearance.   HENT:      Head: Atraumatic.      Nose: Nose normal.   Eyes:      Extraocular Movements: Extraocular movements intact.      Pupils: Pupils are equal, round, and reactive to light.   Cardiovascular:      Rate and Rhythm: Normal rate.   Pulmonary:      Effort:  Pulmonary effort is normal.   Abdominal:      General: Abdomen is flat. There is no distension.      Tenderness: There is no abdominal tenderness. There is no right CVA tenderness or left CVA tenderness.   Genitourinary:     Comments: Open wound with kerlex and ABD pads. Dressing clean and intact. Approximately 40% of scrotum removed. No active bleeding source.  Musculoskeletal:         General: Normal range of motion.      Cervical back: Normal range of motion.   Neurological:      General: No focal deficit present.      Mental Status: He is alert and oriented to person, place, and time.   Psychiatric:         Mood and Affect: Mood normal.         Behavior: Behavior normal.       Significant Labs:    BMP:  Recent Labs   Lab 11/16/22  0549 11/17/22 0357 11/18/22  0351   * 137 139   K 5.1 4.5 4.3   CL 99 104 105   CO2 26 26 28   BUN 33* 31* 19   CREATININE 1.2 0.9 0.8   CALCIUM 8.4* 7.7* 7.6*       CBC:   Recent Labs   Lab 11/16/22 0549 11/17/22 0357 11/18/22  0351   WBC 24.13* 17.20* 7.90   HGB 14.1 13.8* 14.1   HCT 42.3 42.0 43.5    170 170       Urine Studies: No results for input(s): COLORU, APPEARANCEUA, PHUR, SPECGRAV, PROTEINUA, GLUCUA, KETONESU, BILIRUBINUA, OCCULTUA, NITRITE, UROBILINOGEN, LEUKOCYTESUR, RBCUA, WBCUA, BACTERIA, SQUAMEPITHEL, HYALINECASTS in the last 168 hours.    Invalid input(s): WRIGHTSUR    Significant Imaging:  All pertinent imaging results/findings from the past 24 hours have been reviewed.        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 S/p excision and debridement of scrotum 11/15/22 and 11/17/22.    - Admitted to hospital medicine, appreciate assistance  - Second debridement 11/17 in OR. Clean wound edges  - Diet, insulin and pain control per primary    - Continue broad spectrum antibiotics  - Follow up cultures, NGTD  - Dressing changes BID per wound care. Will attempt to be at bedside during their dressing change  -  Rest of care per primary    Dispo: Okay for stepdown, follow up wound care recs    Scrotal edema  -- No evidence of abscess or Mary's gangrene on physical exam  -- CT scan currently pending - will review once complete and update plan accordingly        VTE Risk Mitigation (From admission, onward)         Ordered     IP VTE HIGH RISK PATIENT  Once         11/15/22 1948     Place sequential compression device  Until discontinued         11/15/22 1948     Reason for No Pharmacological VTE Prophylaxis  Once        Question:  Reasons:  Answer:  Risk of Bleeding    11/15/22 1948     Place sequential compression device  Until discontinued         11/15/22 1936                Ryley Celaya MD  Urology  [facility] ([place])   - Hold statin in the setting of transaminitis

## 2022-12-07 RX ORDER — ESCITALOPRAM OXALATE 10 MG/1
10 TABLET ORAL
Qty: 90 | Refills: 3 | Status: ACTIVE | COMMUNITY
Start: 2021-05-04 | End: 1900-01-01

## 2023-06-15 NOTE — H&P ADULT - NSRESEARCHGRNTASSESS_GEN_ALL_CORE FT
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   75 yo male with pmhx CAD s/p PCI LCx 2013 and mLAD 8/2017, chronic HFeEF EF , class II CHF, HTN, PAD s/p left superficial femoral artery leg stent 2014 and atrial fibrillation now maintaining on Tikosyn and Xarelto  presents with left knee pain . scheduled for left knee replacement
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
## 2023-08-13 ENCOUNTER — EMERGENCY (EMERGENCY)
Facility: HOSPITAL | Age: 83
LOS: 1 days | Discharge: HOME | End: 2023-08-13
Attending: EMERGENCY MEDICINE
Payer: MEDICARE

## 2023-08-13 VITALS
DIASTOLIC BLOOD PRESSURE: 83 MMHG | OXYGEN SATURATION: 95 % | HEART RATE: 93 BPM | SYSTOLIC BLOOD PRESSURE: 174 MMHG | WEIGHT: 139.99 LBS | HEIGHT: 63 IN | RESPIRATION RATE: 18 BRPM | TEMPERATURE: 98 F

## 2023-08-13 DIAGNOSIS — Z90.49 ACQUIRED ABSENCE OF OTHER SPECIFIED PARTS OF DIGESTIVE TRACT: Chronic | ICD-10-CM

## 2023-08-13 PROCEDURE — 73070 X-RAY EXAM OF ELBOW: CPT | Mod: 26,LT

## 2023-08-13 PROCEDURE — 99285 EMERGENCY DEPT VISIT HI MDM: CPT | Mod: FS

## 2023-08-13 PROCEDURE — 73090 X-RAY EXAM OF FOREARM: CPT | Mod: 26,LT

## 2023-08-13 PROCEDURE — 73110 X-RAY EXAM OF WRIST: CPT | Mod: 26,LT

## 2023-08-13 RX ORDER — ACETAMINOPHEN 500 MG
650 TABLET ORAL ONCE
Refills: 0 | Status: COMPLETED | OUTPATIENT
Start: 2023-08-13 | End: 2023-08-13

## 2023-08-13 RX ADMIN — Medication 650 MILLIGRAM(S): at 22:22

## 2023-08-13 NOTE — ED PROVIDER NOTE - PATIENT PORTAL LINK FT
You can access the FollowMyHealth Patient Portal offered by [facility] by registering at the following website: [email or web address]. By joining [facility]’s FollowMyHealth portal, you will also be able to view your health information using other applications (apps) compatible with our system.

## 2023-08-13 NOTE — ED PROVIDER NOTE - CLINICAL SUMMARY MEDICAL DECISION MAKING FREE TEXT BOX
Pt presents with c/o fall this morning. States she is having trouble taking deep breath and ribs are painful. Rates pain 10/10. States she needs something to help the pain. Denies hitting head. Advised ER for further testing and recommendations. Eldelry female sp fall w fx wrist (colles) Seen by ortho w reduction. Ortho had requested CT post reduction. Ct was unable to be accomplished in timely fashion and pt dc for opt fu.  Sheldon Nava MD, Facep

## 2023-08-13 NOTE — ED PROVIDER NOTE - OBJECTIVE STATEMENT
82yo female pt with PMHx of Lung cancer s/p resection (no chemo/rt) ,COPD, HTN, CAD/MI presents to ED with left non dominant wrist pain and swelling s/p mechanical fall this evening. Reports she tripped on ottoman and fell on left hand. Denies LOC, head injury, or other injuries. Denies headache, dizziness, visual changes or N/V. Denies neck or back pain. Denies CP/SOB/ABD pain or pelvic/hip pain. Denies fever, chills, or recent sickness.

## 2023-08-13 NOTE — ED PROVIDER NOTE - CARE PROVIDER_API CALL
Jose Hughes  Orthopaedic Trauma  [address], [address]  [place] [address]  Phone: [phone]  Fax: [phone]  Follow Up Time:

## 2023-08-13 NOTE — ED ADULT NURSE NOTE - NSFALLHARMRISKINTERV_ED_ALL_ED

## 2023-08-13 NOTE — ED PROVIDER NOTE - NS ED ATTENDING STATEMENT MOD
This was a shared visit with the EVI. I reviewed and verified the documentation and independently performed the documented:

## 2023-08-13 NOTE — ED PROVIDER NOTE - PROGRESS NOTE DETAILS
NAD. Awaiting for ortho consult. Ortho at bedside. Closed reduction and STS applied by ortho. Endorsed to Dr SHARI Nava MD, Facep Attending Masom:  spoke w/ ortho earlier, requiring ct scan Attending Sandra:  pt wants to leave, states has been wating four hours for ct scan, does not know why she cannot get this done outpt, spoke ortho again, recommending CT for preop and to ensure no extension into joint. did call to see where they are on line and is fourth on line will update family. Attending Sandra:  pt and family prefers to leave Attending Sandra:  spoke w/ ortho earlier, requiring ct scan, did discuss at the time this will not change disposition

## 2023-08-13 NOTE — ED PROVIDER NOTE - ATTENDING APP SHARED VISIT CONTRIBUTION OF CARE
Private Physician [facility] pcp  83y f pmh  Lung ca sp resection no chemo/rt,COPD, HTN, Cad/mi, Pt comes to ed c/o "I fell over my ottoman. Pt c/o pain left wrist. Pt rt handed. Occurred approx 1.5h pta. no other injury complaints. No c/o

## 2023-08-13 NOTE — ED ADULT NURSE NOTE - CAS ELECT INFOMATION PROVIDED
pt and family at bedside instructed to follow up with outpatient ortho, and verbalized understanding/DC instructions

## 2023-08-13 NOTE — ED PROVIDER NOTE - NSFOLLOWUPINSTRUCTIONS_ED_ALL_ED_FT

## 2023-08-13 NOTE — ED ADULT NURSE NOTE - OBJECTIVE STATEMENT
83y female PMH lung cancer, COPD, HTN, CAD to the ED from home via triage s/p fall. Pt reports that pt tripped on an ottoman in her home and fell on the left hand. Pt reports pain and swelling in the area. Pt did not hit head or lose consciousness. No headache or dizziness or vomiting. Stretcher in lowest position and locked, appropriate side rails in place, room cleared of clutter and safety hazards,  blankets given for comfort

## 2023-08-13 NOTE — ED PROVIDER NOTE - PHYSICAL EXAMINATION
NAD. VSS. Afebrile. No facial or scalp tender. Neck supple. Lungs clear. No spinal tender. No chest wall, rib, or cva tender. ABD soft, non tender. No pelvic or hip tender. +Left wrist; generalized tender, swelling, deform, and diminished ROMs. N/V- intact. No focal neuro deficit with steady gait.

## 2023-08-14 VITALS
SYSTOLIC BLOOD PRESSURE: 158 MMHG | HEART RATE: 91 BPM | OXYGEN SATURATION: 95 % | DIASTOLIC BLOOD PRESSURE: 100 MMHG | RESPIRATION RATE: 18 BRPM

## 2023-08-14 PROCEDURE — 73070 X-RAY EXAM OF ELBOW: CPT

## 2023-08-14 PROCEDURE — 73090 X-RAY EXAM OF FOREARM: CPT | Mod: 26,LT

## 2023-08-14 PROCEDURE — 73110 X-RAY EXAM OF WRIST: CPT

## 2023-08-14 PROCEDURE — 73100 X-RAY EXAM OF WRIST: CPT

## 2023-08-14 PROCEDURE — 99285 EMERGENCY DEPT VISIT HI MDM: CPT | Mod: 25

## 2023-08-14 PROCEDURE — 73100 X-RAY EXAM OF WRIST: CPT | Mod: 26,LT,XU

## 2023-08-14 PROCEDURE — 73090 X-RAY EXAM OF FOREARM: CPT

## 2023-08-14 PROCEDURE — 25605 CLTX DST RDL FX/EPHYS SEP W/: CPT | Mod: LT

## 2023-08-14 PROCEDURE — 73110 X-RAY EXAM OF WRIST: CPT | Mod: 26,LT

## 2023-08-14 RX ORDER — IBUPROFEN 200 MG
400 TABLET ORAL ONCE
Refills: 0 | Status: COMPLETED | OUTPATIENT
Start: 2023-08-14 | End: 2023-08-14

## 2023-08-14 RX ADMIN — Medication 400 MILLIGRAM(S): at 03:02

## 2023-08-14 RX ADMIN — Medication 400 MILLIGRAM(S): at 04:23

## 2023-08-14 NOTE — CONSULT NOTE ADULT - SUBJECTIVE AND OBJECTIVE BOX
83y Female community ambulatory presents c/o L wrist pain sp mechanical fall at home. Pt tripped over Mydeo. Denies HS/LOC. Denies numbness/tingling. No other pain/injuries. Denies fevers/chills.     HEALTH ISSUES - PROBLEM Dx:        MEDICATIONS  (STANDING):    Allergies    No Known Allergies    Intolerances      Imaging: XR demonstrates L wrist fracture            Vital Signs Last 24 Hrs  T(C): 36.9 (08-13-23 @ 21:28), Max: 36.9 (08-13-23 @ 21:28)  T(F): 98.5 (08-13-23 @ 21:28), Max: 98.5 (08-13-23 @ 21:28)  HR: 93 (08-13-23 @ 21:28) (93 - 93)  BP: 174/83 (08-13-23 @ 21:28) (174/83 - 174/83)  BP(mean): --  RR: 18 (08-13-23 @ 21:28) (18 - 18)  SpO2: 95% (08-13-23 @ 21:28) (95% - 95%)    Physical Exam  Gen: NAD, awake and alert.  Head: NCAT, no facial trauma  Neck: Intact AROM, no bony TTP.  RUE/LUE: Wrist swelling noted.  Skin intact, +TTP distal radius, no bony ttp elsewhere, compartments soft/compressive, extremity warm/well perfused. No elbow or shoulder tenderness or swelling. 2+ radial pulse, +AIN/PIN/M/U/R, SILT C5-T1      Procedure: 10 cc 1% lidocaine injected under sterile procedure into L Distal radius fracture siite. Time was allowed to achieve anesthetic effect.  Closed reduction performed. Placed in well padded sugartong splint, molded appropriately. Post procedure imaging obtained. Post procedure exam unchanged, NV intact, able to wiggles all fingers, SILT distally.     A/P: 83y Female with L distal radius closed.  Pain control  NWB L UE in splint, keep c/d/I,   Ice/elevation  Si/sx compartment syndrome discussed with patient, told to return to ED if exhibit any  Possible need for surgical intervention in future discussed, all questions answered  Follow up with Dr. Hughes within 3-5 days , call office for appointment.  Ortho stable for DC

## 2023-08-17 ENCOUNTER — APPOINTMENT (OUTPATIENT)
Dept: ORTHOPEDIC SURGERY | Facility: CLINIC | Age: 83
End: 2023-08-17
Payer: MEDICARE

## 2023-08-17 PROCEDURE — 25600 CLTX DST RDL FX/EPHYS SEP WO: CPT | Mod: LT

## 2023-08-17 PROCEDURE — 99203 OFFICE O/P NEW LOW 30 MIN: CPT | Mod: 57

## 2023-08-17 RX ORDER — TRAMADOL HYDROCHLORIDE 50 MG/1
50 TABLET, COATED ORAL
Qty: 14 | Refills: 0 | Status: ACTIVE | COMMUNITY
Start: 2023-08-17 | End: 1900-01-01

## 2023-08-22 NOTE — PHYSICAL EXAM
[de-identified] : The patient is sitting comfortably in the exam room.  LEFT wrist. -Skin is intact, no swelling, no ecchymosis -Pronation , supination -Able to make a fist -Sensation is intact median, radial, ulnar, axillary nerves -Motor is intact median, radial, ulnar, axillary nerves -Hand is warm and well-perfused, Palpable radial and ulnar pulses  [de-identified] : X-rays of the left wrist were taken in the office today including AP, oblique, lateral.  X-rays show the patient is status post short arm cast.  The overall alignment of the carpus is good.  No significant displacement of the distal radius fracture compared to previous images.  PROCEDURE DATE: 08/13/2023  INTERPRETATION: INDICATION: Fall with left upper extremity pain.  TECHNIQUE: X-ray 4 views of the left wrist; 5 images. X-ray 2 views left forearm; 2 images. X-ray 3 views left elbow; 5 images. 12 images submitted for review.  COMPARISON: None available  FINDINGS: Demineralized bones. There is an acute comminuted and mildly impacted left radial metadiaphyseal fracture with apex anterior angulation of the distal fracture fragment. Marked soft tissue swelling about the left wrist. No additional fractures. Preserved carpal alignment with advanced osteoarthritic changes most prevalent at the left first MCP joint. No elbow fracture or dislocation.  IMPRESSION: Distal left radius fracture as described. No dislocation.  --- End of Report ---  EDA MARTIN MD; Resident Radiologist This document has been electronically signed. ALVARO HERNANDEZ MD; Attending Radiologist This document has been electronically signed. Aug 14 2023 7:18AM

## 2023-08-22 NOTE — HISTORY OF PRESENT ILLNESS
[de-identified] : Ms. VANI GONZALEZ is a 83 year old RHD woman presents today for initial evaluation of a left distal radius fracture sustained on 8/13/23 after a fall onto an outstretched arm. She was seen at [facility] where x-rays confirmed a fracture. She was placed in a splint. She notes pain at the wrist and is taking Tylenol with relief.   PMH: COPD left shoulder pain with rom/PAIN

## 2023-08-22 NOTE — DISCUSSION/SUMMARY
[de-identified] : 83-year-old woman with a left distal radius fracture, approximately 4 days out.  -The risks and benefits of operative versus nonoperative management were discussed at length with the patient. The patient shows a good understanding of the injury and treatment options. They would like to move forward with nonoperative management.  -NWB LUE -Short arm cast applied to LUE -Finger ROM exercises were demonstrated in the office today.  -Tramadol for pain -Follow-up in 6 weeks with x-rays of the left wrist at that time.  -All of the patient's questions and concerns were addressed.

## 2023-08-28 NOTE — ED ADULT NURSE NOTE - CAS EDN DISCHARGE ASSESSMENT
[x] THE [facility] &  Therapy  [address]   [address]   [place]: [phone]  F: [phone]      Physical Therapy Daily Treatment Note    Date:  2023  Patient Name:  Graciela Garza    :  1953  MRN: [id number]  Physician: Dr. Royal Novak: Tristen Menon ([address] after 12 visits)  Medical Diagnosis: Closed displaced fracture of left patella with routine healing, unspecified fracture morphology, subsequent encounter (S82.002D [ICD-10-CM])                                     Rehab Codes: M62.81, R26.89, R60.0, M25.66  Onset date: 23                                     Next 's appt. : --    Visit# / total visits:    Cancels/No Shows: 0    Subjective:    Pain:  [] Yes  [x] No Location: LLE  Pain Rating: (0-10 scale) 0/10  Pain altered Tx:  [x] No  [] Yes  Action:  Comments: Patient arrived noting no pain, just notes most discomfort with stairs and kneeling.      Objective:  Modalities:   Precautions: Standard   Exercise     L Knee Reps/ Time Weight/ Level Comments             Bike x10'   Black bike             Calf Slantboard Stretch  3x30\"       Hamstring Stretch  3x30\"   At step              Supine quadriceps stretch  2x30\"       Supine hip flexor stretch with contralateral knee to chest  1'                 Clamshells   x20 Lime     Bridges on Pball   x20       Sidelying hip abduction   x20 Lime     SLR                Bench hovers 10x5\"     Squats to mat table  x10   Encourage posterior hip hinge   Step-up to march 2x10 6\"    Lateral step up and over 2x10 6\"    Lunges 2x10      Lateral Lunge 2x10     Cones drops  2x   Dropping cones to 4 inch step    Monster walks  2L Lime    Balance board 3' L2    Heel taps 2x10 4\"    TRX Squats 2x10     Total Gym SL Squats 2x10 L20       Specific Instructions for next treatment: focus on ADL mechanics, squats, lateral motions for return to pickleball        Treatment Charges: Mins
Alert and oriented to person, place and time

## 2023-09-26 ENCOUNTER — APPOINTMENT (OUTPATIENT)
Dept: CARDIOLOGY | Facility: CLINIC | Age: 83
End: 2023-09-26

## 2023-09-28 ENCOUNTER — APPOINTMENT (OUTPATIENT)
Dept: ORTHOPEDIC SURGERY | Facility: CLINIC | Age: 83
End: 2023-09-28
Payer: MEDICARE

## 2023-09-28 PROCEDURE — 99024 POSTOP FOLLOW-UP VISIT: CPT

## 2023-10-05 ENCOUNTER — APPOINTMENT (OUTPATIENT)
Dept: ORTHOPEDIC SURGERY | Facility: CLINIC | Age: 83
End: 2023-10-05
Payer: MEDICARE

## 2023-10-05 VITALS — WEIGHT: 142 LBS | BODY MASS INDEX: 24.85 KG/M2 | HEIGHT: 63.5 IN

## 2023-10-05 PROCEDURE — 99024 POSTOP FOLLOW-UP VISIT: CPT

## 2023-10-05 PROCEDURE — 73110 X-RAY EXAM OF WRIST: CPT | Mod: LT

## 2023-10-05 RX ORDER — METHYLPREDNISOLONE 4 MG/1
4 TABLET ORAL
Qty: 1 | Refills: 0 | Status: ACTIVE | COMMUNITY
Start: 2023-10-05 | End: 1900-01-01

## 2023-10-05 RX ORDER — GABAPENTIN 100 MG/1
100 CAPSULE ORAL TWICE DAILY
Qty: 60 | Refills: 0 | Status: ACTIVE | COMMUNITY
Start: 2023-10-05 | End: 1900-01-01

## 2023-11-15 ENCOUNTER — APPOINTMENT (OUTPATIENT)
Dept: ORTHOPEDIC SURGERY | Facility: CLINIC | Age: 83
End: 2023-11-15
Payer: MEDICARE

## 2023-11-15 VITALS — BODY MASS INDEX: 24.85 KG/M2 | HEIGHT: 63.5 IN | WEIGHT: 142 LBS

## 2023-11-15 PROCEDURE — 73110 X-RAY EXAM OF WRIST: CPT | Mod: LT

## 2023-11-15 PROCEDURE — 99024 POSTOP FOLLOW-UP VISIT: CPT

## 2024-01-17 ENCOUNTER — APPOINTMENT (OUTPATIENT)
Dept: ORTHOPEDIC SURGERY | Facility: CLINIC | Age: 84
End: 2024-01-17
Payer: MEDICARE

## 2024-01-17 DIAGNOSIS — S52.502A UNSPECIFIED FRACTURE OF THE LOWER END OF LEFT RADIUS, INITIAL ENCOUNTER FOR CLOSED FRACTURE: ICD-10-CM

## 2024-01-17 PROCEDURE — 99213 OFFICE O/P EST LOW 20 MIN: CPT

## 2024-01-17 PROCEDURE — 73110 X-RAY EXAM OF WRIST: CPT | Mod: LT

## 2024-01-17 NOTE — DISCUSSION/SUMMARY
[de-identified] : 83-year-old woman with a left distal radius fracture, approximately 5 months out, treated nonoperatively in a short arm cast.   -X-ray and physical exam findings were discussed with the patient -Weightbearing as tolerated left UE -Physical Therapy: work on ROM of fingers and wrist -Follow-up in 3 months with x-rays of the left wrist at that time.  -All of the patient's questions and concerns were addressed.

## 2024-01-17 NOTE — PHYSICAL EXAM
[de-identified] : The patient is sitting comfortably in the exam room.  LEFT wrist. -Skin is intact, no swelling, no ecchymosis -Pronation 90, supination 90 -Able to make a full fist  -No pain with palpation over the fracture site -Sensation is intact median, radial, ulnar, axillary nerves -Motor is intact median, radial, ulnar, axillary nerves -Hand is warm and well-perfused, Palpable radial and ulnar pulses  [de-identified] : X-rays of the wrist were taken in the office today including AP, oblique, lateral.  X-rays show no significant change from the previous x-rays.  There is some loss of height and loss of inclination and volar tilt compared to the anatomic position.  The patient is slightly ulnar positive.  There is remodeling around the fracture site compared to previous imaging.

## 2024-01-17 NOTE — HISTORY OF PRESENT ILLNESS
[de-identified] : Ms. VANI GONZALEZ is a 83 year old RHD woman presents today for follow up evaluation of a left distal radius fracture sustained on 8/13/23, treated nonoperatively in a short arm cast. Since her last visit she states she is feeling better. She has been working on the range of motion of her wrist and fingers.

## 2024-02-12 NOTE — DIETITIAN INITIAL EVALUATION ADULT. - NUTRITION CONSULT
GASTROENTEROLOGY [phone]    Call to schedule Mammogram at [phone] -- 6/2024    Fasting labs ASA    [facility] Hours and Locations                                                                                      Phone: [phone] Fax [phone]    -[facility] [address] [place] [address]  Monday 7 a.m. to 7 p.m.   Tuesday - Friday 7 a.m. to 5 p.m.   Saturday 7 a.m. to 12 p.m    -[facility] [address] [place] [address]  [address] [place] [address] Monday - Friday 7 a.m. to 5 p.m.   Saturday 7 a.m. to 12 p.m.    -[facility] [address] [place] [place] [address]  Monday - Friday 7 a.m. to 5 p.m.    -[facility] [address] [place] [address]  Monday 6:30 a.m. to 5:30 p.m.   Tuesday - Friday 6:30 a.m. to 5 p.m.   Saturday 6:30 a.m. to 12 p.m    - [facility] [address] [place] [address] (Closed daily for lunch 12:30 p.m. to 1 p.m.)  Monday - Friday 8 a.m. to 5 p.m.     - [facility] [address] [place] [address]  Monday - Thursday 6:30 a.m. to 6 p.m.   Friday 6:30 to 5 p.m.   Saturday 6:30 a.m. to 12 p.m.    - [facility] [address] [place] [address] (Closed daily for lunch 12:30 p.m. to 1 p.m.)  Monday 8 a.m. to 6 p.m.   Tuesday - Friday 8 a.m. to 5 p.m.   Saturday 8 a.m. to 12 p.m.     -[facility] [address] [place] [address]  Monday and Wednesday 7:30 a.m. to 5 p.m.   Tuesday and Friday 8 a.m. to 5 p.m.   Thursday 8 a.m. to 6 p.m.   Saturday 7:30 a.m. to 12 p.m.                If you are doing FASTING labs                                                                                     -Please fast for a minimum of 8-10 hours.                 -Please drink plenty of water prior.                -You CAN brush your teeth even if you are fasting.                                                                         -You CAN take any prescribed or routine medicine with water prior.                               no

## 2024-06-03 ENCOUNTER — APPOINTMENT (OUTPATIENT)
Dept: ORTHOPEDIC SURGERY | Facility: CLINIC | Age: 84
End: 2024-06-03
Payer: MEDICARE

## 2024-06-03 DIAGNOSIS — M25.562 PAIN IN RIGHT KNEE: ICD-10-CM

## 2024-06-03 DIAGNOSIS — M25.462 EFFUSION, RIGHT KNEE: ICD-10-CM

## 2024-06-03 DIAGNOSIS — M25.461 EFFUSION, RIGHT KNEE: ICD-10-CM

## 2024-06-03 DIAGNOSIS — M17.10 UNILATERAL PRIMARY OSTEOARTHRITIS, UNSPECIFIED KNEE: ICD-10-CM

## 2024-06-03 DIAGNOSIS — M25.561 PAIN IN RIGHT KNEE: ICD-10-CM

## 2024-06-03 PROCEDURE — 73564 X-RAY EXAM KNEE 4 OR MORE: CPT | Mod: 50

## 2024-06-03 PROCEDURE — 20610 DRAIN/INJ JOINT/BURSA W/O US: CPT | Mod: RT

## 2024-06-03 PROCEDURE — 99215 OFFICE O/P EST HI 40 MIN: CPT | Mod: 25

## 2024-06-03 RX ORDER — DICLOFENAC POTASSIUM 50 MG/1
50 TABLET, COATED ORAL 3 TIMES DAILY
Qty: 60 | Refills: 2 | Status: ACTIVE | COMMUNITY
Start: 2024-06-03 | End: 1900-01-01

## 2024-06-03 RX ADMIN — LIDOCAINE HYDROCHLORIDE 4 %: 10 INJECTION, SOLUTION INFILTRATION; PERINEURAL at 00:00

## 2024-06-03 RX ADMIN — METHYLPREDNISOLONE ACETATE 1 MG/ML: 40 INJECTION, SUSPENSION INTRA-ARTICULAR; INTRALESIONAL; INTRAMUSCULAR; SOFT TISSUE at 00:00

## 2024-06-04 PROBLEM — M17.10 PRIMARY LOCALIZED OSTEOARTHROSIS OF THE KNEE: Status: ACTIVE | Noted: 2024-06-04

## 2024-06-04 RX ORDER — METHYLPRED ACET/NACL,ISO-OS/PF 40 MG/ML
40 VIAL (ML) INJECTION
Refills: 0 | Status: COMPLETED | OUTPATIENT
Start: 2024-06-03

## 2024-06-04 RX ORDER — LIDOCAINE HYDROCHLORIDE 10 MG/ML
1 INJECTION, SOLUTION INFILTRATION; PERINEURAL
Refills: 0 | Status: COMPLETED | OUTPATIENT
Start: 2024-06-03

## 2024-06-04 NOTE — DISCUSSION/SUMMARY
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  Wants to avoid surgery. The patient was extensively counseled on treatment options including but not limited to observation, rest/activity modification, bracing, anti-inflammatory medications, physical therapy, injections, and surgery.  The natural history of the disease was thoroughly explained.  We discussed that the majority of the time, this condition can be initially treated conservatively. The risks, benefits, and alternatives to an injection were reviewed with the patient.  Risks outlined include but are not limited to infection, sepsis, bleeding, scarring, temporary increase in pain, syncope, failure to resolve symptoms, symptom recurrence, allergic reaction and a flare.  The patient understood these risks and wished to proceed with an injection, providing verbal consent. Under sterile conditions using chlorhexidine and an ethyl chloride spray for topic analgesia, an injection of 4cc 1% lidocaine without epinephrine and 1cc 40mg/ml depomedrol was placed in the R knee. The patient tolerated the procedure well without complication.  The patient was advised to call if redness, pain or fever occur and to apply ice for 15 minutes every hour for the next day as tolerated.  The patient will proceed with: -PT -diclofenac rx provided - pt instructed to take with meals and not with other nsaid's including advil, aleve, and toradol.  The pt understands to stop taking the medication if any stomach sx develop or they develop any type of bleeding or bruising, at which point they will present to their PMD -pt was instructed on the importance of resting, icing and elevating to minimize swelling -RTC 6w   I have personally obtained the history, reviewed the ROS as noted, and performed the physical examination today.  The patient and I discussed the assessment and options and developed the plan.  All questions were answered and the patient stated their understanding of the treatment plan and appreciation of the visit.   My cumulative time spent on this patient's visit included: Preparation for the visit, review of the medical records, review of pertinent diagnostic studies, examination and counseling of the patient on the above diagnosis, treatment plan and prognosis, orders of diagnostic tests, medications and/or appropriate procedures and documentation in the medical records of today's visit.   Curtis English MD

## 2024-06-04 NOTE — PHYSICAL EXAM
[de-identified] : Gen: NAD Resp: Nonlabored respirations, no SOB Gait: Nl  B/l Knee: Skin intact Small effusion 5-120 AROM tender mjl 5/5 IP Q EHL TA GS SILT L3-S1 2+ dp pt wwp bcr  1A lachman and (-) pivot shift Grade 1 posterior drawer Stable to v/v at 0 and 30 degrees (-) Dial test at 30, 90 degrees  (+) Whitney, Thebarbie  1+ patellar translation (-) pain with patellar compression/grind (-) J sign (-) apprehension  [de-identified] : The following radiographs were ordered and read by me during this patient's visit. I reviewed each radiograph in detail with the patient and discussed the findings as highlighted below.   4 views of the L + R knees were obtained today that show no fracture, or dislocation. There is moderate tricompartmental OA. There is no malalignment. No obvious osseous abnormality. Otherwise unremarkable.

## 2024-06-04 NOTE — HISTORY OF PRESENT ILLNESS
[de-identified] : Isabel is a 85yo female presenting with bilateral knee pain, right > left. Hx of knee pain, previously tried physical therapy but reports did not help improve symptoms and discontinued treatment. Continued difficulty with stairs, pain with prolonged standing. walking and sit to stand. pain described as constant, dull ache. pain wakes her up at night, takes Tylenol as needed.  Recently moved to assisted living. Having trouble navigating stairs, family feels she is unstable.

## 2024-06-27 ENCOUNTER — APPOINTMENT (OUTPATIENT)
Dept: ORTHOPEDIC SURGERY | Facility: CLINIC | Age: 84
End: 2024-06-27

## 2024-12-07 ENCOUNTER — INPATIENT (INPATIENT)
Facility: HOSPITAL | Age: 84
LOS: 4 days | Discharge: HOME HEALTH | DRG: 872 | End: 2024-12-12
Attending: INTERNAL MEDICINE | Admitting: HOSPITALIST
Payer: MEDICARE

## 2024-12-07 VITALS
HEART RATE: 90 BPM | DIASTOLIC BLOOD PRESSURE: 76 MMHG | SYSTOLIC BLOOD PRESSURE: 156 MMHG | HEIGHT: 60 IN | OXYGEN SATURATION: 96 % | RESPIRATION RATE: 18 BRPM | TEMPERATURE: 100 F | WEIGHT: 149.91 LBS

## 2024-12-07 DIAGNOSIS — D72.829 ELEVATED WHITE BLOOD CELL COUNT, UNSPECIFIED: ICD-10-CM

## 2024-12-07 DIAGNOSIS — R65.10 SYSTEMIC INFLAMMATORY RESPONSE SYNDROME (SIRS) OF NON-INFECTIOUS ORIGIN WITHOUT ACUTE ORGAN DYSFUNCTION: ICD-10-CM

## 2024-12-07 DIAGNOSIS — Z87.09 PERSONAL HISTORY OF OTHER DISEASES OF THE RESPIRATORY SYSTEM: ICD-10-CM

## 2024-12-07 DIAGNOSIS — E87.8 OTHER DISORDERS OF ELECTROLYTE AND FLUID BALANCE, NOT ELSEWHERE CLASSIFIED: ICD-10-CM

## 2024-12-07 DIAGNOSIS — Z90.49 ACQUIRED ABSENCE OF OTHER SPECIFIED PARTS OF DIGESTIVE TRACT: Chronic | ICD-10-CM

## 2024-12-07 DIAGNOSIS — E87.6 HYPOKALEMIA: ICD-10-CM

## 2024-12-07 DIAGNOSIS — Z29.9 ENCOUNTER FOR PROPHYLACTIC MEASURES, UNSPECIFIED: ICD-10-CM

## 2024-12-07 DIAGNOSIS — I25.10 ATHEROSCLEROTIC HEART DISEASE OF NATIVE CORONARY ARTERY WITHOUT ANGINA PECTORIS: ICD-10-CM

## 2024-12-07 DIAGNOSIS — E78.5 HYPERLIPIDEMIA, UNSPECIFIED: ICD-10-CM

## 2024-12-07 DIAGNOSIS — R94.31 ABNORMAL ELECTROCARDIOGRAM [ECG] [EKG]: ICD-10-CM

## 2024-12-07 DIAGNOSIS — I10 ESSENTIAL (PRIMARY) HYPERTENSION: ICD-10-CM

## 2024-12-07 DIAGNOSIS — A41.9 SEPSIS, UNSPECIFIED ORGANISM: ICD-10-CM

## 2024-12-07 DIAGNOSIS — E87.5 HYPERKALEMIA: ICD-10-CM

## 2024-12-07 LAB
ADD ON TEST-SPECIMEN IN LAB: SIGNIFICANT CHANGE UP
ALBUMIN SERPL ELPH-MCNC: 3.4 G/DL — SIGNIFICANT CHANGE UP (ref 3.3–5)
ALBUMIN SERPL ELPH-MCNC: 3.7 G/DL — SIGNIFICANT CHANGE UP (ref 3.3–5)
ALP SERPL-CCNC: 92 U/L — SIGNIFICANT CHANGE UP (ref 40–120)
ALP SERPL-CCNC: 96 U/L — SIGNIFICANT CHANGE UP (ref 40–120)
ALT FLD-CCNC: 25 U/L — SIGNIFICANT CHANGE UP (ref 10–45)
ALT FLD-CCNC: <38 U/L — SIGNIFICANT CHANGE UP (ref 10–45)
ANION GAP SERPL CALC-SCNC: 11 MMOL/L — SIGNIFICANT CHANGE UP (ref 5–17)
ANION GAP SERPL CALC-SCNC: 13 MMOL/L — SIGNIFICANT CHANGE UP (ref 5–17)
ANION GAP SERPL CALC-SCNC: 15 MMOL/L — SIGNIFICANT CHANGE UP (ref 5–17)
APPEARANCE UR: CLEAR — SIGNIFICANT CHANGE UP
AST SERPL-CCNC: 47 U/L — HIGH (ref 10–40)
AST SERPL-CCNC: 65 U/L — HIGH (ref 10–40)
BACTERIA # UR AUTO: NEGATIVE /HPF — SIGNIFICANT CHANGE UP
BASOPHILS # BLD AUTO: 0.04 K/UL — SIGNIFICANT CHANGE UP (ref 0–0.2)
BASOPHILS NFR BLD AUTO: 0.3 % — SIGNIFICANT CHANGE UP (ref 0–2)
BILIRUB SERPL-MCNC: 0.6 MG/DL — SIGNIFICANT CHANGE UP (ref 0.2–1.2)
BILIRUB SERPL-MCNC: 0.7 MG/DL — SIGNIFICANT CHANGE UP (ref 0.2–1.2)
BILIRUB UR-MCNC: NEGATIVE — SIGNIFICANT CHANGE UP
BUN SERPL-MCNC: 10 MG/DL — SIGNIFICANT CHANGE UP (ref 7–23)
BUN SERPL-MCNC: 11 MG/DL — SIGNIFICANT CHANGE UP (ref 7–23)
BUN SERPL-MCNC: 12 MG/DL — SIGNIFICANT CHANGE UP (ref 7–23)
CALCIUM SERPL-MCNC: 8.5 MG/DL — SIGNIFICANT CHANGE UP (ref 8.4–10.5)
CALCIUM SERPL-MCNC: 8.5 MG/DL — SIGNIFICANT CHANGE UP (ref 8.4–10.5)
CALCIUM SERPL-MCNC: 9.1 MG/DL — SIGNIFICANT CHANGE UP (ref 8.4–10.5)
CAST: 0 /LPF — SIGNIFICANT CHANGE UP (ref 0–4)
CHLORIDE SERPL-SCNC: 96 MMOL/L — SIGNIFICANT CHANGE UP (ref 96–108)
CHLORIDE SERPL-SCNC: 98 MMOL/L — SIGNIFICANT CHANGE UP (ref 96–108)
CHLORIDE SERPL-SCNC: 99 MMOL/L — SIGNIFICANT CHANGE UP (ref 96–108)
CO2 SERPL-SCNC: 20 MMOL/L — LOW (ref 22–31)
CO2 SERPL-SCNC: 22 MMOL/L — SIGNIFICANT CHANGE UP (ref 22–31)
CO2 SERPL-SCNC: 24 MMOL/L — SIGNIFICANT CHANGE UP (ref 22–31)
COLOR SPEC: YELLOW — SIGNIFICANT CHANGE UP
CREAT SERPL-MCNC: 0.63 MG/DL — SIGNIFICANT CHANGE UP (ref 0.5–1.3)
CREAT SERPL-MCNC: 0.65 MG/DL — SIGNIFICANT CHANGE UP (ref 0.5–1.3)
CREAT SERPL-MCNC: 0.81 MG/DL — SIGNIFICANT CHANGE UP (ref 0.5–1.3)
DIFF PNL FLD: NEGATIVE — SIGNIFICANT CHANGE UP
EGFR: 72 ML/MIN/1.73M2 — SIGNIFICANT CHANGE UP
EGFR: 87 ML/MIN/1.73M2 — SIGNIFICANT CHANGE UP
EGFR: 87 ML/MIN/1.73M2 — SIGNIFICANT CHANGE UP
EOSINOPHIL # BLD AUTO: 0.01 K/UL — SIGNIFICANT CHANGE UP (ref 0–0.5)
EOSINOPHIL NFR BLD AUTO: 0.1 % — SIGNIFICANT CHANGE UP (ref 0–6)
FLUAV AG NPH QL: SIGNIFICANT CHANGE UP
FLUBV AG NPH QL: SIGNIFICANT CHANGE UP
GAS PNL BLDV: SIGNIFICANT CHANGE UP
GLUCOSE SERPL-MCNC: 101 MG/DL — HIGH (ref 70–99)
GLUCOSE SERPL-MCNC: 106 MG/DL — HIGH (ref 70–99)
GLUCOSE SERPL-MCNC: 109 MG/DL — HIGH (ref 70–99)
GLUCOSE UR QL: NEGATIVE MG/DL — SIGNIFICANT CHANGE UP
HCT VFR BLD CALC: 38.7 % — SIGNIFICANT CHANGE UP (ref 34.5–45)
HGB BLD-MCNC: 13.2 G/DL — SIGNIFICANT CHANGE UP (ref 11.5–15.5)
IMM GRANULOCYTES NFR BLD AUTO: 0.6 % — SIGNIFICANT CHANGE UP (ref 0–0.9)
KETONES UR-MCNC: NEGATIVE MG/DL — SIGNIFICANT CHANGE UP
LEUKOCYTE ESTERASE UR-ACNC: ABNORMAL
LIDOCAIN IGE QN: 37 U/L — SIGNIFICANT CHANGE UP (ref 7–60)
LYMPHOCYTES # BLD AUTO: 0.44 K/UL — LOW (ref 1–3.3)
LYMPHOCYTES # BLD AUTO: 3.4 % — LOW (ref 13–44)
MAGNESIUM SERPL-MCNC: 1.6 MG/DL — SIGNIFICANT CHANGE UP (ref 1.6–2.6)
MCHC RBC-ENTMCNC: 30.7 PG — SIGNIFICANT CHANGE UP (ref 27–34)
MCHC RBC-ENTMCNC: 34.1 G/DL — SIGNIFICANT CHANGE UP (ref 32–36)
MCV RBC AUTO: 90 FL — SIGNIFICANT CHANGE UP (ref 80–100)
MONOCYTES # BLD AUTO: 0.39 K/UL — SIGNIFICANT CHANGE UP (ref 0–0.9)
MONOCYTES NFR BLD AUTO: 3 % — SIGNIFICANT CHANGE UP (ref 2–14)
NEUTROPHILS # BLD AUTO: 11.87 K/UL — HIGH (ref 1.8–7.4)
NEUTROPHILS NFR BLD AUTO: 92.6 % — HIGH (ref 43–77)
NITRITE UR-MCNC: NEGATIVE — SIGNIFICANT CHANGE UP
NRBC # BLD: 0 /100 WBCS — SIGNIFICANT CHANGE UP (ref 0–0)
PH UR: 7.5 — SIGNIFICANT CHANGE UP (ref 5–8)
PHOSPHATE SERPL-MCNC: 1.3 MG/DL — LOW (ref 2.5–4.5)
PLATELET # BLD AUTO: 237 K/UL — SIGNIFICANT CHANGE UP (ref 150–400)
POTASSIUM SERPL-MCNC: 2.9 MMOL/L — CRITICAL LOW (ref 3.5–5.3)
POTASSIUM SERPL-MCNC: 3.7 MMOL/L — SIGNIFICANT CHANGE UP (ref 3.5–5.3)
POTASSIUM SERPL-MCNC: 5.6 MMOL/L — HIGH (ref 3.5–5.3)
POTASSIUM SERPL-SCNC: 2.9 MMOL/L — CRITICAL LOW (ref 3.5–5.3)
POTASSIUM SERPL-SCNC: 3.7 MMOL/L — SIGNIFICANT CHANGE UP (ref 3.5–5.3)
POTASSIUM SERPL-SCNC: 5.6 MMOL/L — HIGH (ref 3.5–5.3)
PROCALCITONIN SERPL-MCNC: 3.67 NG/ML — HIGH (ref 0.02–0.1)
PROT SERPL-MCNC: 5.9 G/DL — LOW (ref 6–8.3)
PROT SERPL-MCNC: 7 G/DL — SIGNIFICANT CHANGE UP (ref 6–8.3)
PROT UR-MCNC: SIGNIFICANT CHANGE UP MG/DL
RBC # BLD: 4.3 M/UL — SIGNIFICANT CHANGE UP (ref 3.8–5.2)
RBC # FLD: 13.3 % — SIGNIFICANT CHANGE UP (ref 10.3–14.5)
RBC CASTS # UR COMP ASSIST: 1 /HPF — SIGNIFICANT CHANGE UP (ref 0–4)
RSV RNA NPH QL NAA+NON-PROBE: SIGNIFICANT CHANGE UP
SARS-COV-2 RNA SPEC QL NAA+PROBE: SIGNIFICANT CHANGE UP
SODIUM SERPL-SCNC: 131 MMOL/L — LOW (ref 135–145)
SODIUM SERPL-SCNC: 133 MMOL/L — LOW (ref 135–145)
SODIUM SERPL-SCNC: 134 MMOL/L — LOW (ref 135–145)
SP GR SPEC: 1.01 — SIGNIFICANT CHANGE UP (ref 1–1.03)
SQUAMOUS # UR AUTO: 3 /HPF — SIGNIFICANT CHANGE UP (ref 0–5)
UROBILINOGEN FLD QL: 0.2 MG/DL — SIGNIFICANT CHANGE UP (ref 0.2–1)
WBC # BLD: 12.83 K/UL — HIGH (ref 3.8–10.5)
WBC # FLD AUTO: 12.83 K/UL — HIGH (ref 3.8–10.5)
WBC UR QL: 9 /HPF — HIGH (ref 0–5)

## 2024-12-07 PROCEDURE — 93010 ELECTROCARDIOGRAM REPORT: CPT | Mod: 76

## 2024-12-07 PROCEDURE — 76705 ECHO EXAM OF ABDOMEN: CPT | Mod: 26

## 2024-12-07 PROCEDURE — 99285 EMERGENCY DEPT VISIT HI MDM: CPT | Mod: GC

## 2024-12-07 PROCEDURE — 74177 CT ABD & PELVIS W/CONTRAST: CPT | Mod: 26,MC

## 2024-12-07 PROCEDURE — 99223 1ST HOSP IP/OBS HIGH 75: CPT | Mod: GC

## 2024-12-07 RX ORDER — POTASSIUM CHLORIDE 600 MG/1
40 TABLET, EXTENDED RELEASE ORAL EVERY 4 HOURS
Refills: 0 | Status: DISCONTINUED | OUTPATIENT
Start: 2024-12-07 | End: 2024-12-07

## 2024-12-07 RX ORDER — ENOXAPARIN SODIUM 30 MG/.3ML
40 INJECTION SUBCUTANEOUS EVERY 24 HOURS
Refills: 0 | Status: DISCONTINUED | OUTPATIENT
Start: 2024-12-07 | End: 2024-12-07

## 2024-12-07 RX ORDER — HEPARIN SODIUM,PORCINE 1000/ML
5000 VIAL (ML) INJECTION EVERY 8 HOURS
Refills: 0 | Status: DISCONTINUED | OUTPATIENT
Start: 2024-12-07 | End: 2024-12-12

## 2024-12-07 RX ORDER — FLUTICASONE PROPIONATE AND SALMETEROL XINAFOATE 45; 21 UG/1; UG/1
1 AEROSOL, METERED RESPIRATORY (INHALATION)
Refills: 0 | DISCHARGE

## 2024-12-07 RX ORDER — SIMVASTATIN 10 MG/1
1 TABLET, FILM COATED ORAL
Refills: 0 | DISCHARGE

## 2024-12-07 RX ORDER — INFLUENZA VIRUS VACCINE 15; 15; 15; 15 UG/.5ML; UG/.5ML; UG/.5ML; UG/.5ML
0.5 SUSPENSION INTRAMUSCULAR ONCE
Refills: 0 | Status: DISCONTINUED | OUTPATIENT
Start: 2024-12-07 | End: 2024-12-12

## 2024-12-07 RX ORDER — 0.9 % SODIUM CHLORIDE 0.9 %
500 INTRAVENOUS SOLUTION INTRAVENOUS
Refills: 0 | Status: COMPLETED | OUTPATIENT
Start: 2024-12-07 | End: 2024-12-08

## 2024-12-07 RX ORDER — ACETAMINOPHEN 500MG 500 MG/1
1000 TABLET, COATED ORAL ONCE
Refills: 0 | Status: COMPLETED | OUTPATIENT
Start: 2024-12-07 | End: 2024-12-07

## 2024-12-07 RX ORDER — 0.9 % SODIUM CHLORIDE 0.9 %
500 INTRAVENOUS SOLUTION INTRAVENOUS ONCE
Refills: 0 | Status: COMPLETED | OUTPATIENT
Start: 2024-12-07 | End: 2024-12-07

## 2024-12-07 RX ORDER — PIPERACILLIN SODIUM AND TAZOBACTAM SODIUM 4; .5 G/20ML; G/20ML
3.38 INJECTION, POWDER, LYOPHILIZED, FOR SOLUTION INTRAVENOUS ONCE
Refills: 0 | Status: COMPLETED | OUTPATIENT
Start: 2024-12-07 | End: 2024-12-07

## 2024-12-07 RX ORDER — ESCITALOPRAM OXALATE 10 MG/1
10 TABLET, FILM COATED ORAL DAILY
Refills: 0 | Status: DISCONTINUED | OUTPATIENT
Start: 2024-12-07 | End: 2024-12-12

## 2024-12-07 RX ORDER — METOPROLOL TARTRATE 100 MG/1
25 TABLET, FILM COATED ORAL
Refills: 0 | Status: DISCONTINUED | OUTPATIENT
Start: 2024-12-07 | End: 2024-12-07

## 2024-12-07 RX ORDER — ONDANSETRON HYDROCHLORIDE 4 MG/1
4 TABLET, FILM COATED ORAL ONCE
Refills: 0 | Status: COMPLETED | OUTPATIENT
Start: 2024-12-07 | End: 2024-12-07

## 2024-12-07 RX ORDER — FLUTICASONE PROPIONATE AND SALMETEROL XINAFOATE 45; 21 UG/1; UG/1
1 AEROSOL, METERED RESPIRATORY (INHALATION)
Refills: 0 | Status: DISCONTINUED | OUTPATIENT
Start: 2024-12-07 | End: 2024-12-07

## 2024-12-07 RX ORDER — PANTOPRAZOLE SODIUM 40 MG/1
40 TABLET, DELAYED RELEASE ORAL
Refills: 0 | Status: DISCONTINUED | OUTPATIENT
Start: 2024-12-07 | End: 2024-12-12

## 2024-12-07 RX ORDER — FLUTICASONE PROPIONATE AND SALMETEROL XINAFOATE 45; 21 UG/1; UG/1
1 AEROSOL, METERED RESPIRATORY (INHALATION)
Refills: 0 | Status: DISCONTINUED | OUTPATIENT
Start: 2024-12-07 | End: 2024-12-12

## 2024-12-07 RX ORDER — HEPARIN SODIUM 5000 [USP'U]/.5ML
30 INJECTION, SOLUTION INTRAVENOUS; SUBCUTANEOUS ONCE
Refills: 0 | Status: COMPLETED | OUTPATIENT
Start: 2024-12-07 | End: 2024-12-07

## 2024-12-07 RX ORDER — SODIUM CHLORIDE 9 MG/ML
1000 INJECTION, SOLUTION INTRAMUSCULAR; INTRAVENOUS; SUBCUTANEOUS ONCE
Refills: 0 | Status: COMPLETED | OUTPATIENT
Start: 2024-12-07 | End: 2024-12-07

## 2024-12-07 RX ORDER — ACETAMINOPHEN 500MG 500 MG/1
650 TABLET, COATED ORAL ONCE
Refills: 0 | Status: COMPLETED | OUTPATIENT
Start: 2024-12-07 | End: 2024-12-07

## 2024-12-07 RX ORDER — PIPERACILLIN SODIUM AND TAZOBACTAM SODIUM 4; .5 G/20ML; G/20ML
3.38 INJECTION, POWDER, LYOPHILIZED, FOR SOLUTION INTRAVENOUS EVERY 8 HOURS
Refills: 0 | Status: DISCONTINUED | OUTPATIENT
Start: 2024-12-07 | End: 2024-12-08

## 2024-12-07 RX ORDER — LEVOTHYROXINE SODIUM 150 MCG
125 TABLET ORAL DAILY
Refills: 0 | Status: DISCONTINUED | OUTPATIENT
Start: 2024-12-07 | End: 2024-12-08

## 2024-12-07 RX ADMIN — PIPERACILLIN SODIUM AND TAZOBACTAM SODIUM 200 GRAM(S): 4; .5 INJECTION, POWDER, LYOPHILIZED, FOR SOLUTION INTRAVENOUS at 15:56

## 2024-12-07 RX ADMIN — Medication 125 MICROGRAM(S): at 21:57

## 2024-12-07 RX ADMIN — POTASSIUM CHLORIDE 40 MILLIEQUIVALENT(S): 600 TABLET, EXTENDED RELEASE ORAL at 18:06

## 2024-12-07 RX ADMIN — SODIUM CHLORIDE 1000 MILLILITER(S): 9 INJECTION, SOLUTION INTRAMUSCULAR; INTRAVENOUS; SUBCUTANEOUS at 12:48

## 2024-12-07 RX ADMIN — ACETAMINOPHEN 500MG 650 MILLIGRAM(S): 500 TABLET, COATED ORAL at 21:55

## 2024-12-07 RX ADMIN — Medication 5000 UNIT(S): at 22:02

## 2024-12-07 RX ADMIN — SODIUM CHLORIDE 1000 MILLILITER(S): 9 INJECTION, SOLUTION INTRAMUSCULAR; INTRAVENOUS; SUBCUTANEOUS at 13:50

## 2024-12-07 RX ADMIN — Medication 500 MILLILITER(S): at 16:39

## 2024-12-07 RX ADMIN — PIPERACILLIN SODIUM AND TAZOBACTAM SODIUM 25 GRAM(S): 4; .5 INJECTION, POWDER, LYOPHILIZED, FOR SOLUTION INTRAVENOUS at 23:47

## 2024-12-07 RX ADMIN — ACETAMINOPHEN 500MG 400 MILLIGRAM(S): 500 TABLET, COATED ORAL at 12:50

## 2024-12-07 RX ADMIN — ONDANSETRON HYDROCHLORIDE 4 MILLIGRAM(S): 4 TABLET, FILM COATED ORAL at 12:48

## 2024-12-07 RX ADMIN — FLUTICASONE PROPIONATE AND SALMETEROL XINAFOATE 1 DOSE(S): 45; 21 AEROSOL, METERED RESPIRATORY (INHALATION) at 21:57

## 2024-12-07 RX ADMIN — ACETAMINOPHEN 500MG 1000 MILLIGRAM(S): 500 TABLET, COATED ORAL at 13:05

## 2024-12-07 RX ADMIN — HEPARIN SODIUM 85 MILLIMOLE(S): 5000 INJECTION, SOLUTION INTRAVENOUS; SUBCUTANEOUS at 20:22

## 2024-12-07 RX ADMIN — ACETAMINOPHEN 500MG 650 MILLIGRAM(S): 500 TABLET, COATED ORAL at 20:55

## 2024-12-07 RX ADMIN — PIPERACILLIN SODIUM AND TAZOBACTAM SODIUM 3.38 GRAM(S): 4; .5 INJECTION, POWDER, LYOPHILIZED, FOR SOLUTION INTRAVENOUS at 16:30

## 2024-12-07 NOTE — ED PROVIDER NOTE - OBJECTIVE STATEMENT
Attending Zulma Medley: 84-year-old female with history of prior appendectomy as well as cholecystectomy, presenting with concern for abdominal pain with associated nausea, vomiting and diarrhea.  Patient states felt well yesterday.  Today this morning started having some abdominal pain with associated nausea vomiting and diarrhea.  1 episode of diarrhea.  Denies any blood in the stool.  Reports multiple episodes of nonbloody vomit.  Reports pain to the lower part of her abdomen.  No fevers or chills.  No sick contacts.  No new antibiotics in the last week or so. also reports pain to bl knees which has been going on. pt is due for a cortisone shot. denies any new pain or swelling

## 2024-12-07 NOTE — ED ADULT TRIAGE NOTE - TEMP AT ED ARRIVAL (C)
Price (Use Numbers Only, No Special Characters Or $): 384 Price (Use Numbers Only, No Special Characters Or $): 618 37.5

## 2024-12-07 NOTE — H&P ADULT - PROBLEM SELECTOR PLAN 7
Diet: full   DVT: lovenox   Dispo: pending clinical status   Code status:   Communication: - hold home lopressor iso sepsis - hold home lopressor iso SIRS+, monitor BP to reintroduce when safe

## 2024-12-07 NOTE — ED PROVIDER NOTE - PROGRESS NOTE DETAILS
Attending Zulma Medley: pt found to have a fever with elevated procalcitonin. on re-assessment pt is feeling better and abdominal pain improved. will add cultures and cover with abx pending ct scan

## 2024-12-07 NOTE — H&P ADULT - HISTORY OF PRESENT ILLNESS
84F w/ HTN, COPD (30 PYH smoking), hypothyroidism, diverticulosis, CAD/MI (age 36), lung ca s/p partial right pneumonectomy, gangrenous cholecystitis s/p laparoscopic cholecystectomy (12/12/2020) presenting with abdominal pain x 1 day.  84F w/ HTN, COPD (30 PYH smoking, not on home O2), hypothyroidism, diverticulosis, MI (age 36), lung ca s/p partial right pneumonectomy, gangrenous cholecystitis s/p laparoscopic cholecystectomy (12/12/2020) presenting with abdominal pain x 1 day. Patient lives in an assisted living community and had salmon for dinner last night without issue overnight. This morning after showering, the patient stated she acutely developed RUQ/ epigastric 5/10, sharp, non-radiating, pain similar to gallbladder pain she had before. She then had 2 episodes of NBNB vomiting in which her abd pain improved. Her last BM was last night with normal color but loose. Currently states she feels weak and wants to eat. Denies current HA, fever, chills, dizziness, CP, SOB, abd pain, N/V, constipation, diarrhea, leg swelling.

## 2024-12-07 NOTE — H&P ADULT - NSHPREVIEWOFSYSTEMS_GEN_ALL_CORE
REVIEW OF SYSTEMS:    CONSTITUTIONAL: No weakness, fevers or chills  EYES: No vision changes  ENT: No vertigo or throat pain   NECK: No pain or stiffness  RESPIRATORY: No cough, wheezing, hemoptysis; No shortness of breath  CARDIOVASCULAR: No chest pain or palpitations  GASTROINTESTINAL: No abdominal or epigastric pain. No nausea, vomiting, or hematemesis; No diarrhea or constipation. No melena or hematochezia.  GENITOURINARY: No dysuria, frequency or hematuria  NEUROLOGICAL: No numbness or weakness  PSYCH: No anxiety, depression, or behavior changes  All other review of systems is negative unless indicated above. REVIEW OF SYSTEMS:    CONSTITUTIONAL: +weakness. No fevers or chills  EYES: No vision changes  ENT: No vertigo or throat pain   NECK: No pain or stiffness  RESPIRATORY: No cough, wheezing, hemoptysis; No shortness of breath  CARDIOVASCULAR: No chest pain or palpitations  GASTROINTESTINAL: No abdominal or epigastric pain. No nausea, vomiting, or hematemesis; No diarrhea or constipation. No melena or hematochezia.  GENITOURINARY: No dysuria, frequency or hematuria  NEUROLOGICAL: No numbness or weakness  PSYCH: No anxiety, depression, or behavior changes  All other review of systems is negative unless indicated above.

## 2024-12-07 NOTE — H&P ADULT - ASSESSMENT
84F w/ HTN, COPD (30 PYH smoking), hypothyroidism, diverticulosis, CAD/MI (age 36), lung ca s/p partial right pneumonectomy, gangrenous cholecystitis s/p laparoscopic cholecystectomy (12/12/2020) presenting with abdominal pain x 1 day.  84F w/ HTN, COPD (30 PYH smoking), hypothyroidism, diverticulosis, CAD/MI (age 36), lung ca s/p partial right pneumonectomy, gangrenous cholecystitis s/p laparoscopic cholecystectomy (12/12/2020) a/w SIRS+ i/s/o n/v, abd pain now resolved

## 2024-12-07 NOTE — ED PROVIDER NOTE - PHYSICAL EXAMINATION
Attending Zulma Medley: Gen: NAD, heent: atrauamtic, eomi, perrla, mmm, op pink, uvula midline, neck; nttp, no nuchal rigidity, chest: nttp, no crepitus, cv: rrr, +murmur, lungs: ctab, abd: soft, ttp llq and suprapubic area, no peritoneal signs, , no guarding, ext: wwp, neg homans, skin: no rash, neuro: awake and alert, following commands, speech clear, sensation and strength intact, no focal deficits

## 2024-12-07 NOTE — H&P ADULT - PROBLEM SELECTOR PLAN 6
- continue home med pending med rec  - pt is not aware of home meds, daughter did not pick phone  - f/u pharmacy in AM - c/w home statin - h.o of MI at 35 yo  -  c/w home ASA - h.o of MI at 37 yo  -  c/w home ASA, statin, holding BB i/so/ SIRS

## 2024-12-07 NOTE — H&P ADULT - PROBLEM SELECTOR PLAN 9
Diet: full   DVT: hep subq   Dispo: pending clinical status   Code status:   Communication: - HSQ VTE PPx  - DASH/TLC diet  - aspiration precautions   - total protein 5.9, nutrition c/s   - disposition pending course

## 2024-12-07 NOTE — PATIENT PROFILE ADULT - FALL HARM RISK - HARM RISK INTERVENTIONS

## 2024-12-07 NOTE — H&P ADULT - PROBLEM SELECTOR PLAN 2
- Tmax 100.8 on admission  - U/A on admission s/f sterile pyuria   - not toxic appearing  - f/u bcx x2   - monitor off abx - Tmax 100.8 on admission  - U/A on admission s/f sterile pyuria   - not toxic appearing  - f/u bcx x2   - monitor off abx  - monitor fever curve   - trend WBC - ddx: gastritis vs gastroenteritis vs cholangitis vs pancreatitis   - less likely to be cholangitis given no charcot's triad  - less likely to be pancreatitis give lipase is low   - abd resolved with zofran   - last BM: last night, loose without blood     Plan  - CT a/p: no acute intrab pathology, extensive diverticulosis   - likely gastroenteritis  - advance diet as tolerated  - serial abd exams

## 2024-12-07 NOTE — PATIENT PROFILE ADULT - NSFALLSECTIONLABEL_GEN_A_CORE
See standing lab orders and please draw A1C - in 3 months    Information about cholesterol, high blood pressure and healthy diet and activity recommendations can be found at the following links on the Internet:    http://www.nhlbi.nih.gov/health/health-topics/topics/hbc  http://www.nhlbi.nih.gov/health/educational/lose_wt/index.htm  Http://www.nhlbi.nih.gov/files/docs/public/heart/hbp_low.pdf  http://www.heart.org/HEARTORG/  http://diabetes.org/  https://www.cdc.gov/  Https://healthfinder.gov/         .

## 2024-12-07 NOTE — H&P ADULT - PROBLEM SELECTOR PLAN 1
- ddx:    Plan  - s/p zosyn, 1L NS bolus, and 500cc LR bolus in the ED - ddx: gastritis vs gastroenteritis vs cholangitis vs pancreatitis   - abd resolved with zofran   - last BM: last night, loose without blood     Plan  - s/p zosyn, 1L NS bolus, and 500cc LR bolus in the ED  - CT a/p: no acute intrab pathology, extensive diverticulosis   - likely gastroenteritis  - advance diet as tolerated - Tmax 100.8 on admission, wbc 12.83  - U/A on admission s/f sterile pyuria   - not toxic appearing    Plan  - s/p zosyn, 1L NS bolus, and 500cc LR bolus in the ED  - f/u bcx x2   - c/w empiric zosyn  - LR 500cc bolus 50cc/hr ordered   - f/u repeat LFTs in AM   - monitor fever curve   - trend WBC - Tmax 100.8 on admission, wbc 12.83  - U/A on admission s/f sterile pyuria   - not toxic appearing    Plan  - s/p zosyn, 1L NS bolus, and 500cc LR bolus in the ED  - f/u bcx x2   - c/w empiric zosyn  - LR 500cc bolus 50cc/hr ordered   - f/u repeat LFTs in AM   - monitor fever curve   - trend WBC  - monitor stool count, if diarrhea persists obtain studies

## 2024-12-07 NOTE — H&P ADULT - NSICDXPASTMEDICALHX_GEN_ALL_CORE_FT
PAST MEDICAL HISTORY:  COPD (chronic obstructive pulmonary disease)     Hypertension     Lung cancer     MI (myocardial infarction)      PAST MEDICAL HISTORY:  COPD (chronic obstructive pulmonary disease)     Hypertension     Hypothyroidism     Lung cancer     MI (myocardial infarction)      PAST MEDICAL HISTORY:  COPD (chronic obstructive pulmonary disease)     Diverticulosis     Hypertension     Hypothyroidism     Lung cancer     MI (myocardial infarction)

## 2024-12-07 NOTE — ED PROVIDER NOTE - CLINICAL SUMMARY MEDICAL DECISION MAKING FREE TEXT BOX
84-year-old female with history of prior appendectomy as well as cholecystectomy, presenting with concern for abdominal pain with associated nausea, vomiting and diarrhea. Pt tired but nontoxic appearing. No point abd TTP. + rectally febrile. Differentials include but not limited to viral illness, PNA, UTI, pancreatitis, choledocho. Plan for labs, CTAP, UA/UC, blood cultures. Dispo likely admission. 84-year-old female with history of prior appendectomy as well as cholecystectomy, presenting with concern for abdominal pain with associated nausea, vomiting and diarrhea. Pt tired but nontoxic appearing. No point abd TTP. + rectally febrile. Differentials include but not limited to viral illness, PNA, UTI, pancreatitis, choledocho. Plan for labs, CTAP, UA/UC, blood cultures. Dispo likely admission.  Attending Zulma Medley: 84-year-old female with history of prior appendectomy and cholecystectomy presenting with lower abdominal pain with associated nausea vomiting diarrhea.  Upon arrival patient hemodynamically stable.  On exam patient tenderness to palpation to the lower abdomen.  Patient denies any urinary symptoms.  No shortness of breath or difficulty breathing.  Point-of-care ultrasound performed showing no evidence of any hydronephrosis making ureteral stone less likely.  No sonographic evidence of acute diverticulitis.  Concern for possible colitis versus enterocolitis.  Will obtain labs, CT scan of the abdomen pelvis, give hydration and antiemetic and reevaluate.

## 2024-12-07 NOTE — ED ADULT NURSE NOTE - NSFALLRISKINTERV_ED_ALL_ED

## 2024-12-07 NOTE — H&P ADULT - NSHPLABSRESULTS_GEN_ALL_CORE
LABS:                           13.2   12.83 )-----------( 237      ( 07 Dec 2024 13:19 )             38.7     12-07    133[L]  |  98  |  11  ----------------------------<  101[H]  2.9[LL]   |  22  |  0.65    Ca    8.5      07 Dec 2024 15:07  Phos  1.3     12-07  Mg     1.6     12-07    TPro  5.9[L]  /  Alb  3.4  /  TBili  0.6  /  DBili  x   /  AST  47[H]  /  ALT  25  /  AlkPhos  96  12-07              Urinalysis Basic - ( 07 Dec 2024 15:07 )    Color: x / Appearance: x / SG: x / pH: x  Gluc: 101 mg/dL / Ketone: x  / Bili: x / Urobili: x   Blood: x / Protein: x / Nitrite: x   Leuk Esterase: x / RBC: x / WBC x   Sq Epi: x / Non Sq Epi: x / Bacteria: x        LIVER FUNCTIONS - ( 07 Dec 2024 15:07 )  Alb: 3.4 g/dL / Pro: 5.9 g/dL / ALK PHOS: 96 U/L / ALT: 25 U/L / AST: 47 U/L / GGT: x             Blood Gas Profile w/Lytes - Venous: Performed In Lab (12-07-24 @ 15:15)    Blood Gas Profile w/Lytes - Venous: Performed In Lab (12-07-24 @ 15:15)    I&O's Summary         /     CAPILLARY BLOOD GLUCOSE                MICRO:      < from: CT Abdomen and Pelvis w/ IV Cont (12.07.24 @ 15:54) >    IMPRESSION:  No acute intra-abdominal or pelvic pathology.    < end of copied text > < from: POCUS ED Abdomen Limited (12.07.24 @ 14:53) >    INTERPRETATION:  A Focused Assessment with Sonography for Trauma was   performed (FAST).  Gaspar's Pouch contained     no free fluid  The Splenorenal Recess contained  no free fluid  The Pelvis contained      no free fluid    IMPRESSION: no free fluid seen    < end of copied text < from: CT Abdomen and Pelvis w/ IV Cont (12.07.24 @ 15:54) >    LOWER CHEST: Coronary artery calcifications. Postsurgical changes within   the right middle lobe, partially visualized.    LIVER: Within normal limits.  BILE DUCTS: Normal caliber.  GALLBLADDER: Cholecystectomy.  SPLEEN: Within normal limits.  PANCREAS: Within normal limits.  ADRENALS: Within normal limits.  KIDNEYS/URETERS: Right renal cyst and additional subcentimeter hypodense   foci too small to characterize. Partially duplicated left renal   collecting system. No hydronephrosis. No renal cortical.    BLADDER: Within normal limits.  REPRODUCTIVE ORGANS: Uterus and adnexa within normal limits. Pessary.    BOWEL: No bowel obstruction. Extensive colonic diverticulosis. Appendix   is not visualized.  PERITONEUM/RETROPERITONEUM: No ascites  VESSELS: Atherosclerotic changes. The right hepatic artery is replaced   off of the superior mesenteric artery. No aortic aneurysm.  LYMPH NODES: No lymphadenopathy.  ABDOMINAL WALL: Small fat-containing periumbilical hernia.  BONES: Degenerative changes of the spine.    IMPRESSION:  No acute intra-abdominal or pelvic pathology.    < end of copied text >        EKG: see attending attestation

## 2024-12-07 NOTE — H&P ADULT - PROBLEM SELECTOR PLAN 5
- continue home med pending med rec  - pt is not aware of home meds, daughter did not pick phone  - f/u pharmacy in AM - c/w home lopressor #Hypophophatemia  - repleted   - repeat in AM     #Hyponatremia  - likely 2/2 poor PO intake  - repeat in AM c/f ectopy vs AFib, QTc prolongation poor study  lyte abn on admit, now repleted  no palpitations  - obtain repeat EKG proceed pending result  - avoid QT prolonging meds

## 2024-12-07 NOTE — PATIENT PROFILE ADULT - HOW PATIENT ADDRESSED, PROFILE
KPA/PULM/CC PROGRESS NOTE       SUBJECTIVE       This is a 80-year-old male with a history of COPD, former smoker, atrial fibrillation on chronic anticoagulation who lives all by himself presented to the hospital for fatigue, tiredness and shortness of breath.  Patient has been feeling puny for the few days with poor appetite.  He has noticed extreme fatigue with malaise.  He denies any fever or chills.  He has noted increased shortness of breath however cough has been mild.  He did cough clear sputum and only one time he had a maroonish sputum.  He denies any wheezing or chest pain or pleurisy.  Denies any sick contacts.  He states that he is visited by a home health nurse only.  He denies any other contacts.     CT scan of the chest was done that showed bilateral groundglass airspace disease with interlobular septal thickening upper lobe predominant.  Patient denies any active tobacco use or vaping.     He has a history of chronic sinus issues.  He is not on any maintenance inhalers.  7/3: No new fevers, SOA stable, O2 requirement worsened over night and now on 5L, Remains fully awake and responsive, On oral diet  7/4: afebrile. soa at baseline. On 5 L. No n.v/d. Feels weak and fatigued.   7/6: Awake.  Currently on 2 L nasal cannula.  Remains on Cardizem and amiodarone infusions.  No complaints of cough or productive phlegm.  No chest pains.  No vomiting  7/7: Awake.  Currently on 2 L nasal cannula.  Complains of generalized weakness.  Complains of decreased appetite.  Currently on amiodarone infusion.  IV Cardizem has been stopped.  Remains in atrial fibrillation on the monitor.  No cough or productive phlegm.  No chest pains.  Some shortness of breath with activity  7/8: Awake.  Currently on 2 L nasal cannula.  Remains short of breath with activity.  Off amiodarone infusion.  Complains of generalized weakness.  No nausea or vomiting  7/9:  Transferred to ICU last night.  PICC placed and on BRIANA for a few hours.   Medications adjusted and blood pressure is improved.  Lewis is now off. On 2 liters nasal cannula   7/10: no acute events overnight. No gtts. Tolerating O2 2L. No chest pain. Plan for cath lab today for ablation and PPM  7/11: Awake, S/P PPM placement. Remains off pressors. Currently on RA, Tolerating PO diet. No C/O CP, No N/V  7/12: No acute events overnight.  Tolerating room air.  Tolerating p.o. diet.  No complaints of chest pain or shortness of air.      OBJECTIVE    Vitals:    07/12/20 0504 07/12/20 0534 07/12/20 0604 07/12/20 0634   BP: 157/79 160/87 152/74 166/84   Pulse: 71 70 70 70   Resp:       Temp:       TempSrc:       SpO2: 95% 95% 95% 96%   Weight:       Height:          Intake/Output last 3 shifts:  I/O last 3 completed shifts:  In: 1000 [P.O.:1000]  Out: 1175 [Urine:1175]  Intake/Output this shift:  No intake/output data recorded.    General Appearance:  Alert, cooperative, no distress, appears stated age  Head:  Normocephalic, without obvious abnormality, atraumatic  Eyes:  conjunctivae/corneas clear, EOM's intact     Neck:  Supple,  no JVD      Lungs: Diminished bases bilaterally, clear but diminished bases  Chest wall:  No tenderness.  Left upper chest PPM site  Heart: Irregularly irregular rhythm, afib, S1 and S2 normal, no murmur, rub or gallop  Abdomen:  Soft, non-tender, bowel sounds active all four quadrants,  no rebound or guarding  Extremities:  Extremities normal, no cyanosis or edema  Skin:  No rashes or lesions  Neurologic:   Alert and oriented, no focal deficits    Scheduled Meds:    aspirin 81 mg Oral Daily   atorvastatin 20 mg Oral Daily   clopidogrel 75 mg Oral Daily   digoxin 125 mcg Oral Daily   fludrocortisone 0.1 mg Oral Q PM   insulin lispro 0-7 Units Subcutaneous 4x Daily With Meals & Nightly   metoclopramide 5 mg Oral TID AC   midodrine 5 mg Oral Q8H   ondansetron 4 mg Intravenous Q6H   pantoprazole 40 mg Oral Q AM   sodium chloride 250 mL Intravenous Once   sodium chloride 10  mL Intravenous Q12H   spironolactone 25 mg Oral Q24H   sucralfate 1 g Oral 4x Daily AC & at Bedtime   Thera 1 tablet Oral Daily   vitamin B-12 1,000 mcg Oral Daily       Continuous Infusions:       PRN Meds:•  acetaminophen **OR** acetaminophen **OR** acetaminophen  •  aluminum-magnesium hydroxide-simethicone  •  atropine  •  bisacodyl  •  dextrose  •  dextrose  •  glucagon (human recombinant)  •  HYDROcodone-acetaminophen  •  insulin lispro **AND** insulin lispro  •  ipratropium-albuterol  •  magnesium hydroxide  •  magnesium sulfate **OR** magnesium sulfate **OR** magnesium sulfate  •  melatonin  •  Morphine  •  ondansetron  •  potassium chloride **OR** potassium chloride **OR** potassium chloride  •  potassium phosphate infusion greater than 15 mMoles **OR** potassium phosphate infusion greater than 15 mMoles **OR** potassium phosphate **OR** sodium phosphate IVPB **OR** sodium phosphate IVPB **OR** sodium phosphate IVPB  •  promethazine  •  [COMPLETED] Insert peripheral IV **AND** sodium chloride     LABS    CBC  Results from last 7 days   Lab Units 07/12/20  0337 07/11/20  0405 07/10/20  0308 07/09/20  0345 07/08/20  0242 07/07/20  0255 07/06/20  0225   WBC 10*3/mm3 10.00 12.00* 9.60 12.40* 14.20* 14.50* 16.10*   RBC 10*6/mm3 3.48* 3.68* 4.03* 4.27 4.88 4.62 4.84   HEMOGLOBIN g/dL 10.4* 11.2* 12.1* 12.7* 14.3 13.9 14.3   HEMATOCRIT % 31.5* 33.4* 35.8* 38.2 43.5 41.7 44.1   MCV fL 90.4 90.7 88.9 89.4 89.1 90.2 91.1   PLATELETS 10*3/mm3 181 212 194 174 238 263 302       CMP   Results from last 7 days   Lab Units 07/12/20  0337 07/11/20  0405 07/10/20  1509 07/10/20  0308 07/09/20  0345 07/08/20  0242 07/07/20  0255 07/06/20 0225   SODIUM mmol/L 141 141  --  141 142 140 142 143   POTASSIUM mmol/L 3.5 4.0 3.9 3.6 3.8 3.2* 3.3* 3.5   CHLORIDE mmol/L 105 103  --  102 101 97* 100 98   CO2 mmol/L 27.0 27.0  --  31.0* 31.0* 30.0* 30.0* 33.0*   BUN  11 13  --  18 19 14 15 19   CREATININE mg/dL 0.56* 0.66*  --  0.62*  0.62* 0.53* 0.53* 0.65*   GLUCOSE mg/dL 111* 177*  --  128* 97 103* 109* 151*   ALBUMIN g/dL 2.50*  --   --   --   --   --  3.60 3.80   BILIRUBIN mg/dL 0.4  --   --   --   --   --  1.5* 1.5*   ALK PHOS U/L 44  --   --   --   --   --  51 59   AST (SGOT) U/L 26  --   --   --   --   --  24 41*   ALT (SGPT) U/L 25  --   --   --   --   --  52* 59*       TROPONIN        CoAg  Results from last 7 days   Lab Units 07/12/20  0337   INR  1.05   APTT seconds 19.6*       ABG        Microbiology  Microbiology Results (last 10 days)     Procedure Component Value - Date/Time    Blood Culture - Blood, Arm, Left [[id number]]  (Abnormal) Collected:  07/08/20 2202    Lab Status:  Final result Specimen:  Blood from Arm, Left Updated:  07/10/20 0658     Blood Culture Staphylococcus, coagulase negative     Comment: Probable contaminant requires clinical correlation, susceptibility not performed unless requested by physician.          Isolated from Anaerobic Bottle     Gram Stain Gram positive cocci in clusters    Blood Culture ID, PCR - Blood, Arm, Left [[id number]]  (Abnormal) Collected:  07/08/20 2202    Lab Status:  Edited Result - FINAL Specimen:  Blood from Arm, Left Updated:  07/10/20 0635     BCID, PCR Staphylococcus spp, not aureus. Identification by BCID PCR.     Comment: Corrected result. Previous result was Staphylococcus aureus. Identification by BCID PCR. on 7/9/2020 at 2147 EDT.        BOTTLE TYPE Anaerobic Bottle    Blood Culture - Blood, Blood, PICC Line [[id number]] Collected:  07/08/20 2147    Lab Status:  Preliminary result Specimen:  Blood, PICC Line Updated:  07/11/20 2245     Blood Culture No growth at 3 days    Gastrointestinal Panel, PCR - Stool, Per Rectum [[id number]]  (Normal) Collected:  07/03/20 0856    Lab Status:  Final result Specimen:  Stool from Per Rectum Updated:  07/03/20 1535     Campylobacter Not Detected     Plesiomonas shigelloides Not Detected     Salmonella Not Detected     Vibrio Not Detected      Vibrio cholerae Not Detected     Yersinia enterocolitica Not Detected     Enteroaggregative E. coli (EAEC) Not Detected     Enteropathogenic E. coli (EPEC) Not Detected     Enterotoxigenic E. coli (ETEC) lt/st Not Detected     Shiga-like toxin-producing E. coli (STEC) stx1/stx2 Not Detected     E. coli O157 Not Detected     Shigella/Enteroinvasive E. coli (EIEC) Not Detected     Cryptosporidium Not Detected     Cyclospora cayetanensis Not Detected     Entamoeba histolytica Not Detected     Giardia lamblia Not Detected     Adenovirus F40/41 Not Detected     Astrovirus Not Detected     Norovirus GI/GII Not Detected     Rotavirus A Not Detected     Sapovirus (I, II, IV or V) Not Detected    Narrative:       If Aeromonas, Staphylococcus aureus or Bacillus cereus are suspected, please order [id number]: Stool Culture, Aeromonas, S aureus, B Cereus.    Clostridium Difficile EIA - Stool, Per Rectum [[id number]]  (Normal) Collected:  07/03/20 0856    Lab Status:  Final result Specimen:  Stool from Per Rectum Updated:  07/04/20 0715     C Diff GDH / Toxin Negative    COVID-19,CEPHEID,COR/MILENA/PAD IN-HOUSE(OR EMERGENT/ADD-ON),NP SWAB IN TRANSPORT MEDIA 3-4 HR TAT - Swab, Nasopharynx [[id number]]  (Normal) Collected:  07/02/20 1659    Lab Status:  Final result Specimen:  Swab from Nasopharynx Updated:  07/02/20 2003     COVID19 Not Detected    Narrative:       Fact sheet for providers: https://www.fda.gov/media/563606/download     Fact sheet for patients: https://www.fda.gov/media/642077/download          IMAGING & OTHER STUDIES    Imaging Results (Last 72 Hours)     Procedure Component Value Units Date/Time    XR Chest 1 View [[id number]] Collected:  07/10/20 2104     Updated:  07/10/20 2108    Narrative:       XR CHEST 1 VW-     Date of Exam: 7/10/2020 8:45 PM     Indication: Status post pacemaker placement.     Comparison Exams: 07/08/2020     Technique: Single AP chest radiograph     FINDINGS:  A left subclavian pacemaker is in  place. No pneumothorax is identified.  Mild hazy opacities bilaterally appear similar to prior exam. The heart  and mediastinal contours appear normal. The osseous structures appear  intact.       Impression:       1.Left subclavian pacemaker in place. No pneumothorax identified.  2.Mild bilateral hazy opacities appear unchanged, which could represent  mild pneumonia or pulmonary edema.     Electronically Signed By-DR. Clint Dotson MD On:7/10/2020 9:06 PM  This report was finalized on [id number] by DR. Clint Dotson MD.        Results for orders placed during the hospital encounter of 07/01/20   Adult Transthoracic Echo Complete W/ Cont if Necessary Per Protocol    Narrative · Left ventricular systolic function is severely decreased.  · Left ventricular wall thickness is consistent with mild concentric   hypertrophy.  · Left atrial cavity size is moderately dilated.  · Mild-to-moderate mitral valve regurgitation is present  · Mild tricuspid valve regurgitation is present.  · Mild aortic valve regurgitation is present.             ASSESSMENT/PLAN:     Atrial fibrillation (CMS/HCC)    CAD (coronary artery disease)    Chronic anticoagulation    Hyperlipidemia    Presence of stent in right coronary artery    Chronic pain    Sepsis (CMS/HCC)    Congestive heart failure (CHF) (CMS/HCC)    Nausea vomiting and diarrhea    Ischemic cardiomyopathy    Persistent atrial fibrillation (CMS/HCC)    Chronic systolic congestive heart failure (CMS/HCC)    Permanent atrial fibrillation (CMS/HCC)    Coronary artery disease involving native coronary artery of native heart without angina pectoris        1.Bilateral PNA (viral vs bacterial) other differentials include Pulmonary edema and possible hemorrhage  2.  Chronic A. fib  3. H/o RML lobectomy  4. CAD  5. Acute hypoxia due to above  6.  Nausea  7. Chronic Hypotension  8. Chronic Anticoagulation with Xarelto  9. Lactic acidosis  10. Hypokalemia  11.  Diarrhea  12.   Gastritis noted on EGD  13.  Acute on chronic systolic CHF exacerbation       PLAN    -Reviewed CT scan of the chest. Covid test is negative X2.  Afebrile.    Status post antibiotic therapy with Zosyn. WBC count improving. On Vancomycin for Coag negative staph.Likley contaminant. Will repeat blood cx and if negative will D/C abx  -Patient is a lifelong non-smoker.  No previous history of COPD.  Off steroids and Pulmicort.  Changed nebs to PRN.  -Management of A. fib per cardiology.  Off amiodarone and Cardizem infusions. Now on digoxin  - 2D echo results reviewed.  EF 30 to 35%.  Mild to moderate MR noted.  RVSP 35.7  -GI work-up reviewed.  Status post EGD 7/5 with changes of gastritis noted.  Remains on Reglan Carafate and Protonix.  Switched Protonix to PO stool for C. difficile analysis is negative.  Changed Reglan to PO  -Patient with issues with chronic hypotension.  Remains on Florinef which is being continued.  -On RA.   -off therapeutic anticoagulation due to AICD placement.  Per Cardiology ok to restart anticoagulation with Xarelto tomorrow (7/13)  -Increase activity.  -Tolerating PO Diet   -PICC discontinued.   -midodrine weaned off     Will add bowel regimen    Will keep in the ICU tonight, continues to have soft BP. Monitor closely with medication adjustments and additions per cardiology      This document has been electronically signed by  TERRANCE Francisco  12:22 PM         Isabel

## 2024-12-07 NOTE — H&P ADULT - PROBLEM SELECTOR PLAN 3
- initially elevated to 5.6   - repeat was low  - trend until resolution - not on home O2  - no SOB or signs of respiratory distress   - no productive cough  - monitor O2 sats  - maintain O2 stats >88 - not on home O2  - no SOB, productive cough, or signs of respiratory distress  - monitor O2 sats  - maintain O2 stats >88  - c/w home inhalers - not on home O2  - no SOB, productive cough, or signs of respiratory distress  - monitor O2 sats  - maintain O2 stats >88 (weaned to RA)   - c/w home inhalers - not on home O2  - no SOB, productive cough, or signs of respiratory distress  - weaned to RA   - maintain O2 stats 88-92  - c/w home inhalers

## 2024-12-07 NOTE — ED PROVIDER NOTE - ATTENDING CONTRIBUTION TO CARE
Attending MD Zulma Medley:  I personally have seen and examined this patient.  Resident note reviewed and agree on plan of care and except where noted.  See HPI, PE, and MDM for details.

## 2024-12-07 NOTE — H&P ADULT - PROBLEM SELECTOR PLAN 4
Diet:   DVT: lovenox   Dispo: pending clinical status   Code status:   Communication: - initially elevated to 5.6   - now resolved  - f/u ECG - initially elevated to 5.6   - no ECG changes   - now resolved - initially elevated to 5.6 with hemolyzed labs   - no ECG changes   - now resolved  - ctm - hyponatremia improved w/ IVF, suspect hypotonic, hypovolemic 2/2 GI loss. Avoid overcorrect > 6-8meq/24h  - K, Phos repleted, f/u repeat BMP

## 2024-12-07 NOTE — ED ADULT NURSE NOTE - OBJECTIVE STATEMENT
patient received alert & oriented x3, BIBA, accompanied by daughter. c/o abdominal pain, nausea & vomiting  this morning "it feels like when I have my gallbladder pain" but not anymore now" no recurrence of vomiting but still nauseous. Denies worsening pain. "feels thirsty" All meds given.

## 2024-12-07 NOTE — H&P ADULT - NSHPSOCIALHISTORY_GEN_ALL_CORE
- lives in an assisted living community - lives in an assisted living community  - former smoker, 30 pack year history  - not a current smoker

## 2024-12-07 NOTE — H&P ADULT - ATTENDING COMMENTS
PCP: Lowenthal  GI: Markoyer    84F former 30 pack-year smoker, PMH HTN, HLD, CAD (MI age 36), hypothyroidism, depression, COPD, lung cancer (s/p partial R pneumonectomy), gangrenous cholecystitis (s/p lap caroline 2020), choledocholithiasis (s/p ERCP 2021 with stone removal), appendectomy, diverticulosis, p/w n/v/d, abd pain.    tmax 38.2C, HR 60s-90, BP 110s-160/70s, SpO2% 96-98 placed on 2L NC    WBC 12.83 (neutrophil predominant), procalcitonin 3.67; Na 131 -> 134, K 2.9 -> 3.7, Phos 1.3, total protein 5.9, AST 47 (initial 65 hemolyzed), lipase 37; lactate 1.3    UA mod LE, 9 WBC; COV/FluAB/RSV not det    ED POCUS abd limited: no free fluid seen    CT A/P IC: No acute intra-abdominal or pelvic pathology    s/p zofran 4mg IV, tylenol 650mg PO, ofirmev 1g IV, KCl 40meq PO, Na Phos 30mm IV, zosyn 3.375g IV,  1.5L IVR (1L NS, 500cc LR)    EKG: automated report stating AFib however I am able to visualize p-waves most prominently in V1 rhythm strip, therefore presume SR with ectopy (PACs) @ 83BPM, QTc prolonged 488ms, EKG otherwise poor quality difficult to assess ST/TW abn PCP: Lowenthal  GI: Tammi    84F former 30 pack-year smoker, PMH HTN, HLD, CAD (MI age 36), hypothyroidism, depression, COPD, lung cancer (s/p partial R pneumonectomy), gangrenous cholecystitis (s/p lap caroline 2020), choledocholithiasis (s/p ERCP 2021 with stone removal), appendectomy, diverticulosis, p/w n/v/d, abd pain.    - tmax 38.2C, HR 60s-90, BP 110s-160/70s, SpO2% 96-98 placed on 2L NC  - WBC 12.83 (neutrophil predominant), procalcitonin 3.67; Na 131 -> 134, K 2.9 -> 3.7, Phos 1.3, total protein 5.9, AST 47 (initial 65 hemolyzed), lipase 37; lactate 1.3  - UA mod LE, 9 WBC; COV/FluAB/RSV not det  - - ED POCUS abd limited: no free fluid seen  - - CT A/P IC: No acute intra-abdominal or pelvic pathology  - s/p zofran 4mg IV, tylenol 650mg PO, ofirmev 1g IV, KCl 40meq PO, Na Phos 30mm IV, zosyn 3.375g IV,  1.5L IVR (1L NS, 500cc LR)    EKG: automated report stating AFib however I am able to visualize p-waves most prominently in V1 rhythm strip, therefore presume SR with ectopy (PACs) @ 83BPM, QTc prolonged 488ms, EKG otherwise poor quality difficult to assess ST/TW abn    #SIRS  +temp, WBC (neutrophil predominant)  procal elevation, lactate wnl  c/f intra-abd source, though CT A/P IC w/o overt abn. UA weak+   - c/w Zosyn, mIVF pending Cx  - monitor stool count, if persistent diarrhea obtain studies  - monitor temp/WBC curve  - serial abd exam, monitor sx    #abdominal pain  - plan as above  - AST 47, repeat in AM if persists/worsens consider RUQ US    #electrolyte disturbance  - hyponatremia improved w/ IVF, suspect hypotonic, hypovolemic 2/2 GI loss. Avoid overcorrect > 6-8meq/24h  - K, Phos repleted, f/u repeat BMP    #EKG abnormality  c/f ectopy vs AFib, QTc prolongation poor study  lyte abn on admit, now repleted  - obtain repeat EKG proceed pending result  - avoid QT prolonging meds    #COPD  - c/w home advair and prn duoneb, O2 initiated in ED w/o documented hypoxemia, will wean    #HTN  - hold BB d/t sepsis and BP intermittently soft, monitor BP to reintroduce when safe    #HLD  - not on home statin, obtain lipid panel in AM to risk stratify     #CAD  - c/w ASA, holding BB ON as above and obtaining lipid panel re lipid lowering tx    #hypothyroidism  - c/w levothyroxine    #depression  - c/w escitalopram    #PPx  - HSQ VTE PPx  - DASH/TLC diet  - aspiration precautions   - total protein 5.9, nutrition c/s   - disposition pending course     case and plan d/w PGY-2 Dr. Wray PCP: Monster  GI: Tammi  Pulm: Se Longoria; Vavasis    84F former 30 pack-year smoker, PMH HTN, HLD, CAD (MI age 36), hypothyroidism, OA (b/l knees), macular degeneration, depression, COPD, lung cancer (s/p partial R pneumonectomy), gangrenous cholecystitis (s/p lap caroline 2020), choledocholithiasis (s/p ERCP 2021 with stone removal), appendectomy, diverticulosis, presents from [facility] [place] for n/v/d, abd pain. AOx4, reports baseline health, ate salmon last night, when this AM experienced epigastric abdominal pain of acute onset, w/o clear provoking factor, of sharp nature, w/o radiation, lasting until she vomited (NBNB) x 2 after which abd pain relieved. Additionally had 1 loose BM (light-brown colored), has not had sx since. Feels cold, denies HA. lightheadedness, CP, palpitations, SOB, cough, melena, hematochezia, dysuria, frequency, hematuria, malodorous urine, or other complaint.    - tmax 38.2C, HR 60s-90, BP 110s-160/70s, SpO2% 96-98 placed on 2L NC  - WBC 12.83 (neutrophil predominant), procalcitonin 3.67; Na 131 -> 134, K 2.9 -> 3.7, Phos 1.3, total protein 5.9, AST 47 (initial 65 hemolyzed), lipase 37; lactate 1.3  - UA mod LE, 9 WBC; COV/FluAB/RSV not det  - - ED POCUS abd limited: no free fluid seen  - - CT A/P IC: No acute intra-abdominal or pelvic pathology  - s/p zofran 4mg IV, tylenol 650mg PO, ofirmev 1g IV, KCl 40meq PO, Na Phos 30mm IV, zosyn 3.375g IV,  1.5L IVR (1L NS, 500cc LR)    EKG: automated report stating AFib however I am able to visualize p-waves most prominently in V1 rhythm strip, therefore presume SR with ectopy (PACs) @ 83BPM, QTc prolonged 488ms, EKG otherwise poor quality difficult to assess ST/TW abn    PHYSICAL EXAM:  GENERAL: NAD, well-groomed, well-developed, pleasant to interview  HEAD: Atraumatic, Normocephalic  EYES: PERRL, conjunctiva and sclera clear  ENMT: No tonsillar erythema, exudates, or enlargement; Moist mucous membranes  NECK: Supple w/o JVD  NERVOUS SYSTEM: Alert & Oriented X4, Good concentration; Motor Strength 5/5 B/L upper and lower extremities  CHEST/LUNG: Clear to auscultation bilaterally; No rales, rhonchi, wheezing, or rubs  HEART: Regular rate and rhythm; No murmurs, rubs, or gallops  ABDOMEN: Soft, Nontender, Nondistended; Bowel sounds present. No guarding, rebound tenderness, or rigidity. No Steele's/McBurney's sign.   EXTREMITIES: 2+ Peripheral Pulses, No edema/warmth/erythema/edema of LE, mild tenderness medial shin to palpation b/l.     #SIRS  +temp, WBC (neutrophil predominant)  procal elevation, lactate wnl  c/f intra-abd source, though CT A/P IC w/o overt abn. UA weak+   - c/w Zosyn, mIVF pending Cx  - monitor stool count, if persistent diarrhea obtain studies  - monitor temp/WBC curve  - serial abd exam, monitor sx    #abdominal pain  - plan as above  - AST 47, repeat in AM if persists/worsens consider RUQ US    #electrolyte disturbance  - hyponatremia improved w/ IVF, suspect hypotonic, hypovolemic 2/2 GI loss. Avoid overcorrect > 6-8meq/24h  - K, Phos repleted, f/u repeat BMP    #EKG abnormality  c/f ectopy vs AFib, QTc prolongation poor study  lyte abn on admit, now repleted  denies palpitations  - obtain repeat EKG proceed pending result  - avoid QT prolonging meds    #COPD  - c/w home advair and prn duoneb, O2 initiated in ED w/o documented hypoxemia, weaned to RA by resident     #HTN  - hold BB d/t sepsis and BP intermittently soft, monitor BP to reintroduce when safe    #HLD  - c/w home statin     #CAD  - c/w ASA, statin, holding BB ON as above    #hypothyroidism  - c/w levothyroxine    #depression  - c/w escitalopram    #PPx  - HSQ VTE PPx  - DASH/TLC diet  - aspiration precautions   - total protein 5.9, nutrition c/s   - monitor medial shin ttp b/l   - disposition pending course, PT evaluation (pt states chronic OA makes ambulation difficult)     case and plan d/w PGY-2 Dr. Wray PCP: Monster  GI: Tammi  Pulm: Se Longoria; Vavasis    84F former 30 pack-year smoker, PMH HTN, HLD, CAD (MI age 36), hypothyroidism, OA (b/l knees), macular degeneration, depression, COPD, lung cancer (s/p partial R pneumonectomy), gangrenous cholecystitis (s/p lap caroline 2020), choledocholithiasis (s/p ERCP 2021 with stone removal), appendectomy, diverticulosis, presents from [facility] [place] [facility] for n/v/d, abd pain. AOx4, reports baseline health, ate salmon last night, when this AM experienced epigastric abdominal pain of acute onset, w/o clear provoking factor, of sharp nature, w/o radiation, lasting until she vomited (NBNB) x 2 after which abd pain relieved. Additionally had 1 loose BM (light-brown colored), has not had sx since. Feels cold, denies HA. lightheadedness, CP, palpitations, SOB, cough, melena, hematochezia, dysuria, frequency, hematuria, malodorous urine, or other complaint.    - tmax 38.2C, HR 60s-90, BP 110s-160/70s, SpO2% 96-98 placed on 2L NC  - WBC 12.83 (neutrophil predominant), procalcitonin 3.67; Na 131 -> 134, K 2.9 -> 3.7, Phos 1.3, total protein 5.9, AST 47 (initial 65 hemolyzed), lipase 37; lactate 1.3  - UA mod LE, 9 WBC; COV/FluAB/RSV not det  - - ED POCUS abd limited: no free fluid seen  - - CT A/P IC: No acute intra-abdominal or pelvic pathology  - s/p zofran 4mg IV, tylenol 650mg PO, ofirmev 1g IV, KCl 40meq PO, Na Phos 30mm IV, zosyn 3.375g IV,  1.5L IVR (1L NS, 500cc LR)    EKG: automated report stating AFib however I am able to visualize p-waves most prominently in V1 rhythm strip with p-p/r-r intervals seemingly regular, therefore presume SR with ectopy (PACs) @ 83BPM, QTc prolonged 488ms, EKG otherwise poor quality difficult to assess ST/TW abn    PHYSICAL EXAM:  GENERAL: NAD, well-groomed, well-developed, pleasant to interview  HEAD: Atraumatic, Normocephalic  EYES: PERRL, conjunctiva and sclera clear  ENMT: No tonsillar erythema, exudates, or enlargement; Moist mucous membranes  NECK: Supple w/o JVD  NERVOUS SYSTEM: Alert & Oriented X4, Good concentration; Motor Strength 5/5 B/L upper and lower extremities  CHEST/LUNG: Clear to auscultation bilaterally; No rales, rhonchi, wheezing, or rubs  HEART: Regular rate and rhythm; No murmurs, rubs, or gallops  ABDOMEN: Soft, Nontender, Nondistended; Bowel sounds present. No guarding, rebound tenderness, or rigidity. No Steele's/McBurney's sign.   EXTREMITIES: 2+ Peripheral Pulses, No edema/warmth/erythema/edema of LE, mild tenderness medial shin to palpation b/l.     #SIRS  +temp, WBC (neutrophil predominant)  procal elevation, lactate wnl  c/f intra-abd source, though CT A/P IC w/o overt abn. UA weak+   - c/w Zosyn, mIVF pending Cx  - monitor stool count, if persistent diarrhea obtain studies  - monitor temp/WBC curve  - serial abd exam, monitor sx    #abdominal pain  - plan as above  - AST 47, repeat in AM if persists/worsens consider RUQ US    #electrolyte disturbance  - hyponatremia improved w/ IVF, suspect hypotonic, hypovolemic 2/2 GI loss. Avoid overcorrect > 6-8meq/24h  - K, Phos repleted, f/u repeat BMP    #EKG abnormality  c/f ectopy vs AFib, QTc prolongation poor study  lyte abn on admit, now repleted  denies palpitations  - obtain repeat EKG proceed pending result  - avoid QT prolonging meds    #COPD  - c/w home advair and prn duoneb, O2 initiated in ED w/o documented hypoxemia, weaned to RA by resident     #HTN  - hold BB d/t sepsis and BP intermittently soft, monitor BP to reintroduce when safe    #HLD  - c/w home statin     #CAD  - c/w ASA, statin, holding BB ON as above    #hypothyroidism  - c/w levothyroxine    #depression  - c/w escitalopram    #PPx  - HSQ VTE PPx  - DASH/TLC diet  - aspiration precautions   - total protein 5.9, nutrition c/s   - monitor medial shin ttp b/l   - disposition pending course, PT evaluation (pt states chronic OA makes ambulation difficult)     case and plan d/w PGY-2 Dr. Wray

## 2024-12-07 NOTE — H&P ADULT - NSHPPHYSICALEXAM_GEN_ALL_CORE
VITALS:   T(C): 37.2 (12-07-24 @ 19:05), Max: 38.2 (12-07-24 @ 14:45)  HR: 66 (12-07-24 @ 19:05) (66 - 90)  BP: 160/72 (12-07-24 @ 19:05) (119/72 - 160/72)  RR: 18 (12-07-24 @ 19:05) (18 - 20)  SpO2: 98% (12-07-24 @ 19:05) (96% - 98%)    GENERAL: NAD, lying in bed comfortably  HEAD:  Atraumatic, normocephalic  EYES: EOMI, PERRLA, conjunctiva and sclera clear  ENT: Moist mucous membranes  NECK: Supple, no JVD  HEART: Regular rate and rhythm, no murmurs, rubs, or gallops  LUNGS: Unlabored respirations.  Clear to auscultation bilaterally, no crackles, wheezing, or rhonchi  ABDOMEN: Soft, nontender, nondistended, +BS  EXTREMITIES: 2+ peripheral pulses bilaterally. No clubbing, cyanosis, or edema  NERVOUS SYSTEM:  A&Ox3, no focal deficits   SKIN: No rashes or lesions

## 2024-12-08 LAB
CHOLEST SERPL-MCNC: 127 MG/DL — SIGNIFICANT CHANGE UP
GRAM STN FLD: ABNORMAL
HCT VFR BLD CALC: 36.7 % — SIGNIFICANT CHANGE UP (ref 34.5–45)
HDLC SERPL-MCNC: 62 MG/DL — SIGNIFICANT CHANGE UP
HGB BLD-MCNC: 12.3 G/DL — SIGNIFICANT CHANGE UP (ref 11.5–15.5)
LIPID PNL WITH DIRECT LDL SERPL: 54 MG/DL — SIGNIFICANT CHANGE UP
MCHC RBC-ENTMCNC: 30.5 PG — SIGNIFICANT CHANGE UP (ref 27–34)
MCHC RBC-ENTMCNC: 33.5 G/DL — SIGNIFICANT CHANGE UP (ref 32–36)
MCV RBC AUTO: 91.1 FL — SIGNIFICANT CHANGE UP (ref 80–100)
METHOD TYPE: SIGNIFICANT CHANGE UP
NON HDL CHOLESTEROL: 66 MG/DL — SIGNIFICANT CHANGE UP
NRBC # BLD: 0 /100 WBCS — SIGNIFICANT CHANGE UP (ref 0–0)
PLATELET # BLD AUTO: 208 K/UL — SIGNIFICANT CHANGE UP (ref 150–400)
RBC # BLD: 4.03 M/UL — SIGNIFICANT CHANGE UP (ref 3.8–5.2)
RBC # FLD: 13.4 % — SIGNIFICANT CHANGE UP (ref 10.3–14.5)
SPECIMEN SOURCE: SIGNIFICANT CHANGE UP
TRIGL SERPL-MCNC: 53 MG/DL — SIGNIFICANT CHANGE UP
WBC # BLD: 11.25 K/UL — HIGH (ref 3.8–10.5)
WBC # FLD AUTO: 11.25 K/UL — HIGH (ref 3.8–10.5)

## 2024-12-08 PROCEDURE — 99232 SBSQ HOSP IP/OBS MODERATE 35: CPT | Mod: GC

## 2024-12-08 RX ORDER — ACETAMINOPHEN 500MG 500 MG/1
650 TABLET, COATED ORAL ONCE
Refills: 0 | Status: COMPLETED | OUTPATIENT
Start: 2024-12-08 | End: 2024-12-08

## 2024-12-08 RX ORDER — PIPERACILLIN SODIUM AND TAZOBACTAM SODIUM 4; .5 G/20ML; G/20ML
3.38 INJECTION, POWDER, LYOPHILIZED, FOR SOLUTION INTRAVENOUS ONCE
Refills: 0 | Status: COMPLETED | OUTPATIENT
Start: 2024-12-09 | End: 2024-12-09

## 2024-12-08 RX ORDER — SODIUM CHLORIDE 9 MG/ML
1000 INJECTION, SOLUTION INTRAMUSCULAR; INTRAVENOUS; SUBCUTANEOUS
Refills: 0 | Status: DISCONTINUED | OUTPATIENT
Start: 2024-12-08 | End: 2024-12-11

## 2024-12-08 RX ORDER — PIPERACILLIN SODIUM AND TAZOBACTAM SODIUM 4; .5 G/20ML; G/20ML
3.38 INJECTION, POWDER, LYOPHILIZED, FOR SOLUTION INTRAVENOUS EVERY 8 HOURS
Refills: 0 | Status: DISCONTINUED | OUTPATIENT
Start: 2024-12-09 | End: 2024-12-12

## 2024-12-08 RX ORDER — METOPROLOL TARTRATE 100 MG/1
25 TABLET, FILM COATED ORAL
Refills: 0 | Status: DISCONTINUED | OUTPATIENT
Start: 2024-12-08 | End: 2024-12-12

## 2024-12-08 RX ORDER — PIPERACILLIN SODIUM AND TAZOBACTAM SODIUM 4; .5 G/20ML; G/20ML
3.38 INJECTION, POWDER, LYOPHILIZED, FOR SOLUTION INTRAVENOUS ONCE
Refills: 0 | Status: COMPLETED | OUTPATIENT
Start: 2024-12-08 | End: 2024-12-08

## 2024-12-08 RX ORDER — LEVOTHYROXINE SODIUM 150 MCG
125 TABLET ORAL DAILY
Refills: 0 | Status: DISCONTINUED | OUTPATIENT
Start: 2024-12-08 | End: 2024-12-12

## 2024-12-08 RX ORDER — ONDANSETRON HYDROCHLORIDE 4 MG/1
4 TABLET, FILM COATED ORAL EVERY 6 HOURS
Refills: 0 | Status: DISCONTINUED | OUTPATIENT
Start: 2024-12-08 | End: 2024-12-12

## 2024-12-08 RX ORDER — ONDANSETRON HYDROCHLORIDE 4 MG/1
4 TABLET, FILM COATED ORAL ONCE
Refills: 0 | Status: COMPLETED | OUTPATIENT
Start: 2024-12-08 | End: 2024-12-08

## 2024-12-08 RX ADMIN — Medication 125 MICROGRAM(S): at 05:39

## 2024-12-08 RX ADMIN — ONDANSETRON HYDROCHLORIDE 4 MILLIGRAM(S): 4 TABLET, FILM COATED ORAL at 08:16

## 2024-12-08 RX ADMIN — ACETAMINOPHEN 500MG 650 MILLIGRAM(S): 500 TABLET, COATED ORAL at 10:50

## 2024-12-08 RX ADMIN — Medication 5000 UNIT(S): at 05:39

## 2024-12-08 RX ADMIN — PANTOPRAZOLE SODIUM 40 MILLIGRAM(S): 40 TABLET, DELAYED RELEASE ORAL at 05:39

## 2024-12-08 RX ADMIN — Medication 5000 UNIT(S): at 21:15

## 2024-12-08 RX ADMIN — PIPERACILLIN SODIUM AND TAZOBACTAM SODIUM 25 GRAM(S): 4; .5 INJECTION, POWDER, LYOPHILIZED, FOR SOLUTION INTRAVENOUS at 05:39

## 2024-12-08 RX ADMIN — ESCITALOPRAM OXALATE 10 MILLIGRAM(S): 10 TABLET, FILM COATED ORAL at 11:57

## 2024-12-08 RX ADMIN — ACETAMINOPHEN 500MG 650 MILLIGRAM(S): 500 TABLET, COATED ORAL at 11:50

## 2024-12-08 RX ADMIN — Medication 5000 UNIT(S): at 13:58

## 2024-12-08 RX ADMIN — FLUTICASONE PROPIONATE AND SALMETEROL XINAFOATE 1 DOSE(S): 45; 21 AEROSOL, METERED RESPIRATORY (INHALATION) at 05:39

## 2024-12-08 RX ADMIN — PIPERACILLIN SODIUM AND TAZOBACTAM SODIUM 200 GRAM(S): 4; .5 INJECTION, POWDER, LYOPHILIZED, FOR SOLUTION INTRAVENOUS at 22:24

## 2024-12-08 RX ADMIN — METOPROLOL TARTRATE 25 MILLIGRAM(S): 100 TABLET, FILM COATED ORAL at 14:25

## 2024-12-08 RX ADMIN — FLUTICASONE PROPIONATE AND SALMETEROL XINAFOATE 1 DOSE(S): 45; 21 AEROSOL, METERED RESPIRATORY (INHALATION) at 17:19

## 2024-12-08 RX ADMIN — Medication 81 MILLIGRAM(S): at 11:57

## 2024-12-08 RX ADMIN — Medication 50 MILLILITER(S): at 08:21

## 2024-12-08 NOTE — PROGRESS NOTE ADULT - PROBLEM SELECTOR PLAN 5
c/f ectopy vs AFib, QTc prolongation poor study  lyte abn on admit, now repleted  no palpitations  - obtain repeat EKG proceed pending result  - avoid QT prolonging meds - h.o of MI at 35 yo  -  c/w home ASA, statin, lopressor

## 2024-12-08 NOTE — PHYSICAL THERAPY INITIAL EVALUATION ADULT - PERTINENT HX OF CURRENT PROBLEM, REHAB EVAL
84F w/ HTN, COPD (30 PYH smoking, not on home O2), hypothyroidism, diverticulosis, MI (age 36), lung ca s/p partial right pneumonectomy, gangrenous cholecystitis s/p laparoscopic cholecystectomy (12/12/2020) presenting with abdominal pain x 1 day. Patient lives in an assisted living community and had salmon for dinner last night without issue overnight. This morning after showering, the patient stated she acutely developed RUQ/ epigastric 5/10, sharp, non-radiating, pain similar to gallbladder pain she had before. She then had 2 episodes of NBNB vomiting in which her abd pain improved. Her last BM was last night with normal color but loose. Currently states she feels weak and wants to eat. Denies current HA, fever, chills, dizziness, CP, SOB, abd pain, N/V, constipation, diarrhea, leg swelling.

## 2024-12-08 NOTE — PROGRESS NOTE ADULT - PROBLEM SELECTOR PLAN 3
- not on home O2  - no SOB, productive cough, or signs of respiratory distress  - weaned to RA   - maintain O2 stats 88-92  - c/w home inhalers - not on home O2  - no SOB, productive cough, or signs of respiratory distress    Plan  - on RA, goal spO2 88-92  - c/w home inhalers hypotonic hypovolemic hyponatremia  likely 2/2 GI loss. Avoid overcorrect > 6-8meq/24h    Plan  - hyponatremia improved w/ IVF - c/w NS 75 cc/hr for 12h  - K, Phos replete, f/u repeat BMP

## 2024-12-08 NOTE — PROGRESS NOTE ADULT - PROBLEM SELECTOR PLAN 9
- HSQ VTE PPx  - DASH/TLC diet  - aspiration precautions   - total protein 5.9, nutrition c/s   - disposition pending course - HSQ VTE PPx  - DASH/TLC diet  - aspiration precautions   - total protein 5.9, nutrition c/s   - disposition pending course, PT eval

## 2024-12-08 NOTE — PHYSICAL THERAPY INITIAL EVALUATION ADULT - NSPTDISCHREC_GEN_A_CORE
if home, home PT, assist with ALL mobility/ADLs, rolling walker, 3:1 commode, transport w/c for long distances/Sub-acute Rehab

## 2024-12-08 NOTE — PROGRESS NOTE ADULT - PROBLEM SELECTOR PLAN 4
- hyponatremia improved w/ IVF, suspect hypotonic, hypovolemic 2/2 GI loss. Avoid overcorrect > 6-8meq/24h  - K, Phos repleted, f/u repeat BMP hypotonic hypovolemic hyponatremia  likely 2/2 GI loss. Avoid overcorrect > 6-8meq/24h    Plan  - hyponatremia improved w/ IVF - c/w NS 75 cc/hr for 12h  - K, Phos replete, f/u repeat BMP c/f ectopy vs AFib, QTc prolongation poor study  lyte abn on admit, now repleted  no palpitations  - obtain repeat EKG proceed pending result  - avoid QT prolonging meds

## 2024-12-08 NOTE — PROGRESS NOTE ADULT - ATTENDING COMMENTS
84F former 30 pack-year smoker, PMH HTN, HLD, CAD (MI age 36), hypothyroidism, OA (b/l knees), macular degeneration, depression, COPD, lung cancer (s/p partial R pneumonectomy), gangrenous cholecystitis (s/p lap caroline 2020), choledocholithiasis (s/p ERCP 2021 with stone removal), appendectomy, diverticulosis, presents from [facility] for N/V, abdominal pain, diarrhea.    Met SIRS on initial presentation with fever, leukocytosis. Likely sepsis 2/2 gastroenteritis.   -CT A/P without acute findings.   -Monitor off Abx.   -Supportive care- Zofran PRN.   -Continue fluids.   -GI PCR if able to collect (diarrhea resolving)  -Monitor vitals/WBC/BMP    AST mildly elevated. Continue to monitor     Discussed with resident.

## 2024-12-08 NOTE — PHYSICAL THERAPY INITIAL EVALUATION ADULT - ADDITIONAL COMMENTS
Pt lives alone at [facility]. Prior to admission, pt performed ADLs and ambulated independently without use of AD. Denies DME.

## 2024-12-08 NOTE — PROVIDER CONTACT NOTE (SEPSIS SCREENING) - RECOMMENDATIONS:
Orders Placed This Encounter   Procedures    Wound cleansing and dressings     Wound location: Left and right abdomen     Wash your hands with soap and water. Remove old dressing, discard into plastic bag and place in trash. Cleanse the wound with normal saline prior to applying a clean dressing. Do not use tissue or cotton balls. Do not scrub the wound. Pat dry using gauze. Shower no     Apply calcium alginate to the wounds.     Cover with ABD  Secure with paper tape  Change dressing daily for right abd wound; every shift for the left wound      Increase protein intake to 3-4 servings per day  Increase protein supplements at least 2-3 per day    Continue to follow with . [address] [place] [address] at [facility] from [address] today; may return as needed    Today's wound treatment note:  Cleansed with normal saline  Redressed as ordered above     Standing Status:   Future     Standing Expiration Date:   8/7/2024
MD Doe Wray aware

## 2024-12-08 NOTE — PHYSICAL THERAPY INITIAL EVALUATION ADULT - DIAGNOSIS, PT EVAL
Pt presents w/ functional deficits that include balance, deconditioning, and strength, that impact pt's ability to perform functional mobility

## 2024-12-08 NOTE — PROGRESS NOTE ADULT - PROBLEM SELECTOR PLAN 6
- h.o of MI at 37 yo  -  c/w home ASA, statin, holding BB i/so/ SIRS - h.o of MI at 35 yo  -  c/w home ASA, statin, lopressor - c/w home lopressor with hold parameter

## 2024-12-08 NOTE — PROGRESS NOTE ADULT - PROBLEM SELECTOR PLAN 7
- hold home lopressor iso SIRS+, monitor BP to reintroduce when safe - c/w home lopressor with hold parameter lipid panel unremarkable  AST mildly elevated  Patient unsure if she had adverse reaction with statin in the past    Plan  - hold statin for now  - trend cmp

## 2024-12-08 NOTE — PROGRESS NOTE ADULT - PROBLEM SELECTOR PLAN 8
- c/w home statin lipid panel unremarkable  AST mildly elevated  Patient unsure if she had adverse reaction with statin in the past    Plan  - hold statin for now  - trend cmp - HSQ VTE PPx  - DASH/TLC diet  - aspiration precautions   - total protein 5.9, nutrition c/s   - disposition pending course, PT eval

## 2024-12-08 NOTE — PROGRESS NOTE ADULT - SUBJECTIVE AND OBJECTIVE BOX
Patient is a 84y old  Female who presents with a chief complaint of SIRS+ i/s/o n/v/d, abd pain (07 Dec 2024 19:26)      SUBJECTIVE / OVERNIGHT EVENTS:  No acute event overnight    Pt seen and examined at bedside. Had no complaints. Denied cp, sob, d/n/v    MEDICATIONS  (STANDING):  aspirin  chewable 81 milliGRAM(s) Oral daily  atorvastatin 10 milliGRAM(s) Oral at bedtime  escitalopram 10 milliGRAM(s) Oral daily  fluticasone propionate/ salmeterol 500-50 MICROgram(s) Diskus 1 Dose(s) Inhalation two times a day  heparin   Injectable 5000 Unit(s) SubCutaneous every 8 hours  influenza  Vaccine (HIGH DOSE) 0.5 milliLiter(s) IntraMuscular once  lactated ringers. 500 milliLiter(s) (50 mL/Hr) IV Continuous <Continuous>  levothyroxine 125 MICROGram(s) Oral daily  pantoprazole    Tablet 40 milliGRAM(s) Oral before breakfast  piperacillin/tazobactam IVPB.. 3.375 Gram(s) IV Intermittent every 8 hours    MEDICATIONS  (PRN):      CAPILLARY BLOOD GLUCOSE        I&O's Summary    07 Dec 2024 07:01  -  08 Dec 2024 07:00  --------------------------------------------------------  IN: 0 mL / OUT: 1000 mL / NET: -1000 mL        Vital Signs Last 24 Hrs  T(C): 37.5 (08 Dec 2024 04:08), Max: 38.2 (07 Dec 2024 14:45)  T(F): 99.5 (08 Dec 2024 04:08), Max: 100.8 (07 Dec 2024 14:45)  HR: 79 (08 Dec 2024 04:08) (66 - 90)  BP: 154/76 (08 Dec 2024 04:08) (113/66 - 160/72)  BP(mean): --  RR: 18 (08 Dec 2024 04:08) (18 - 20)  SpO2: 91% (08 Dec 2024 04:08) (91% - 98%)    Parameters below as of 08 Dec 2024 04:08  Patient On (Oxygen Delivery Method): room air        Physical Exam  CONSTITUTIONAL: NAD  EYES: EOMI, conjunctiva and sclera clear  ENMT: Moist oral mucosa  NECK: Supple  RESPIRATORY: Breathing unlabored, CTAB  CARDIOVASCULAR: S1S2 no MRG  ABDOMEN: Nontender to palpation, normoactive bowel sounds, no rebound/guarding  MUSCULOSKELETAL: no clubbing or cyanosis of digits  NEUROLOGY: No focal deficits   SKIN: No rashes or lesions    LABS:                        12.3   11.25 )-----------( 208      ( 08 Dec 2024 06:44 )             36.7      12-07    134[L]  |  99  |  10  ----------------------------<  106[H]  3.7   |  24  |  0.81    Ca    8.5      07 Dec 2024 20:02  Phos  1.3     12-07  Mg     1.6     12-07    TPro  5.9[L]  /  Alb  3.4  /  TBili  0.6  /  DBili  x   /  AST  47[H]  /  ALT  25  /  AlkPhos  96  12-07          Urinalysis Basic - ( 07 Dec 2024 20:02 )    Color: x / Appearance: x / SG: x / pH: x  Gluc: 106 mg/dL / Ketone: x  / Bili: x / Urobili: x   Blood: x / Protein: x / Nitrite: x   Leuk Esterase: x / RBC: x / WBC x   Sq Epi: x / Non Sq Epi: x / Bacteria: x        RADIOLOGY & ADDITIONAL TESTS:    Imaging Personally Reviewed:    Consultant(s) Notes Reviewed:      Care Discussed with Consultants/Other Providers:   Patient is a 84y old  Female who presents with a chief complaint of SIRS+ i/s/o n/v/d, abd pain (07 Dec 2024 19:26)      SUBJECTIVE / OVERNIGHT EVENTS:  No acute event overnight    Pt seen and examined at bedside. Reported headache x3 days, not associated with dizziness or changes in vision. Reported still feeling nauseous, however no vomiting. Reported mild epigastric discomfort. Denied cp, sob, d/n/v    MEDICATIONS  (STANDING):  aspirin  chewable 81 milliGRAM(s) Oral daily  atorvastatin 10 milliGRAM(s) Oral at bedtime  escitalopram 10 milliGRAM(s) Oral daily  fluticasone propionate/ salmeterol 500-50 MICROgram(s) Diskus 1 Dose(s) Inhalation two times a day  heparin   Injectable 5000 Unit(s) SubCutaneous every 8 hours  influenza  Vaccine (HIGH DOSE) 0.5 milliLiter(s) IntraMuscular once  lactated ringers. 500 milliLiter(s) (50 mL/Hr) IV Continuous <Continuous>  levothyroxine 125 MICROGram(s) Oral daily  pantoprazole    Tablet 40 milliGRAM(s) Oral before breakfast  piperacillin/tazobactam IVPB.. 3.375 Gram(s) IV Intermittent every 8 hours    MEDICATIONS  (PRN):      CAPILLARY BLOOD GLUCOSE        I&O's Summary    07 Dec 2024 07:01  -  08 Dec 2024 07:00  --------------------------------------------------------  IN: 0 mL / OUT: 1000 mL / NET: -1000 mL        Vital Signs Last 24 Hrs  T(C): 37.5 (08 Dec 2024 04:08), Max: 38.2 (07 Dec 2024 14:45)  T(F): 99.5 (08 Dec 2024 04:08), Max: 100.8 (07 Dec 2024 14:45)  HR: 79 (08 Dec 2024 04:08) (66 - 90)  BP: 154/76 (08 Dec 2024 04:08) (113/66 - 160/72)  BP(mean): --  RR: 18 (08 Dec 2024 04:08) (18 - 20)  SpO2: 91% (08 Dec 2024 04:08) (91% - 98%)    Parameters below as of 08 Dec 2024 04:08  Patient On (Oxygen Delivery Method): room air        Physical Exam  CONSTITUTIONAL: NAD  EYES: EOMI, conjunctiva and sclera clear  ENMT: Moist oral mucosa  NECK: Supple  RESPIRATORY: Breathing unlabored, CTAB  CARDIOVASCULAR: Maxime, irregular rhythm. no MRG  ABDOMEN: Mild tenderness to palpation in epigastric area, normoactive bowel sounds, no rebound/guarding  MUSCULOSKELETAL: no clubbing or cyanosis of digits  NEUROLOGY: No focal deficits   SKIN: No rashes or lesions    LABS:                        12.3   11.25 )-----------( 208      ( 08 Dec 2024 06:44 )             36.7      12-07    134[L]  |  99  |  10  ----------------------------<  106[H]  3.7   |  24  |  0.81    Ca    8.5      07 Dec 2024 20:02  Phos  1.3     12-07  Mg     1.6     12-07    TPro  5.9[L]  /  Alb  3.4  /  TBili  0.6  /  DBili  x   /  AST  47[H]  /  ALT  25  /  AlkPhos  96  12-07          Urinalysis Basic - ( 07 Dec 2024 20:02 )    Color: x / Appearance: x / SG: x / pH: x  Gluc: 106 mg/dL / Ketone: x  / Bili: x / Urobili: x   Blood: x / Protein: x / Nitrite: x   Leuk Esterase: x / RBC: x / WBC x   Sq Epi: x / Non Sq Epi: x / Bacteria: x        RADIOLOGY & ADDITIONAL TESTS:    Imaging Personally Reviewed:    Consultant(s) Notes Reviewed:      Care Discussed with Consultants/Other Providers:

## 2024-12-08 NOTE — PROGRESS NOTE ADULT - PROBLEM SELECTOR PLAN 2
- ddx: gastritis vs gastroenteritis vs cholangitis vs pancreatitis   - less likely to be cholangitis given no charcot's triad  - less likely to be pancreatitis give lipase is low   - abd resolved with zofran   - last BM: last night, loose without blood     Plan  - CT a/p: no acute intrab pathology, extensive diverticulosis   - likely gastroenteritis  - advance diet as tolerated  - serial abd exams presented with epigastric pain, fever, nausea, vomiting  CT a/p: no acute intrab pathology, extensive diverticulosis   - symptoms improved. Still endorsed nausea, however no emesis. Soft bm with no blood  - likely gastroenteritis   EKG (12/7) - QTc 483    Plan  - Zofran prn for nausea   - f/u GI PCR  - NS 75cc/hr for 12h iso low PO intake and emesis presented with epigastric pain, fever, nausea, vomiting  CT a/p: no acute intrab pathology, extensive diverticulosis   - symptoms improved. Still endorsed nausea, however no emesis. Soft bm with no blood  - likely gastroenteritis   EKG (12/7) - QTc 483    Plan  - dc Zosyn, patient no longer met SIRS criteria and leukocytosis downtrending  - Zofran prn for nausea   - f/u GI PCR  - NS 75cc/hr for 12h iso low PO intake and emesis - reassess tomorrow - not on home O2  - no SOB, productive cough, or signs of respiratory distress    Plan  - on RA, goal spO2 88-92  - c/w home inhalers

## 2024-12-08 NOTE — PROGRESS NOTE ADULT - PROBLEM SELECTOR PLAN 1
- Tmax 100.8 on admission, wbc 12.83  - U/A on admission s/f sterile pyuria   - not toxic appearing    Plan  - s/p zosyn, 1L NS bolus, and 500cc LR bolus in the ED  - f/u bcx x2   - c/w empiric zosyn  - LR 500cc bolus 50cc/hr ordered   - f/u repeat LFTs in AM   - monitor fever curve   - trend WBC  - monitor stool count, if diarrhea persists obtain studies presented with epigastric pain, fever, nausea, vomiting  CT a/p: no acute intrab pathology, extensive diverticulosis   - symptoms improved. Still endorsed nausea, however no emesis. Soft bm with no blood  - likely gastroenteritis   EKG (12/7) - QTc 483    Plan  - dc Zosyn, patient no longer met SIRS criteria and leukocytosis downtrending. Monitor off abx for now  - Zofran prn for nausea   - f/u GI PCR  - NS 75cc/hr for 12h iso low PO intake and emesis - reassess tomorrow

## 2024-12-08 NOTE — PROGRESS NOTE ADULT - ASSESSMENT
84F w/ HTN, COPD (30 PYH smoking), hypothyroidism, diverticulosis, CAD/MI (age 36), lung ca s/p partial right pneumonectomy, gangrenous cholecystitis s/p laparoscopic cholecystectomy (12/12/2020) a/w SIRS+ i/s/o n/v, abd pain now resolved 84F w/ HTN, COPD (30 PYH smoking), hypothyroidism, diverticulosis, CAD/MI (age 36), lung ca s/p partial right pneumonectomy, gangrenous cholecystitis s/p laparoscopic cholecystectomy (12/12/2020) came in with abdominal pain, fever, n/v. On a brief course of Zosyn. Met SIRS criteria initially, however patient is now afebrile and leukocytosis downtrending, s/p Zosyn x3 doses. Symptoms management and monitor off antibiotics.

## 2024-12-09 ENCOUNTER — TRANSCRIPTION ENCOUNTER (OUTPATIENT)
Age: 84
End: 2024-12-09

## 2024-12-09 DIAGNOSIS — R78.81 BACTEREMIA: ICD-10-CM

## 2024-12-09 LAB
ALBUMIN SERPL ELPH-MCNC: 3.3 G/DL — SIGNIFICANT CHANGE UP (ref 3.3–5)
ALBUMIN SERPL ELPH-MCNC: 3.4 G/DL — SIGNIFICANT CHANGE UP (ref 3.3–5)
ALP SERPL-CCNC: 112 U/L — SIGNIFICANT CHANGE UP (ref 40–120)
ALP SERPL-CCNC: 113 U/L — SIGNIFICANT CHANGE UP (ref 40–120)
ALT FLD-CCNC: 62 U/L — HIGH (ref 10–45)
ALT FLD-CCNC: 64 U/L — HIGH (ref 10–45)
ANION GAP SERPL CALC-SCNC: 12 MMOL/L — SIGNIFICANT CHANGE UP (ref 5–17)
ANION GAP SERPL CALC-SCNC: 14 MMOL/L — SIGNIFICANT CHANGE UP (ref 5–17)
AST SERPL-CCNC: 59 U/L — HIGH (ref 10–40)
AST SERPL-CCNC: 64 U/L — HIGH (ref 10–40)
BASOPHILS # BLD AUTO: 0.03 K/UL — SIGNIFICANT CHANGE UP (ref 0–0.2)
BASOPHILS NFR BLD AUTO: 0.5 % — SIGNIFICANT CHANGE UP (ref 0–2)
BILIRUB SERPL-MCNC: 0.5 MG/DL — SIGNIFICANT CHANGE UP (ref 0.2–1.2)
BILIRUB SERPL-MCNC: 0.5 MG/DL — SIGNIFICANT CHANGE UP (ref 0.2–1.2)
BUN SERPL-MCNC: 11 MG/DL — SIGNIFICANT CHANGE UP (ref 7–23)
BUN SERPL-MCNC: 12 MG/DL — SIGNIFICANT CHANGE UP (ref 7–23)
CALCIUM SERPL-MCNC: 8.8 MG/DL — SIGNIFICANT CHANGE UP (ref 8.4–10.5)
CALCIUM SERPL-MCNC: 8.9 MG/DL — SIGNIFICANT CHANGE UP (ref 8.4–10.5)
CHLORIDE SERPL-SCNC: 98 MMOL/L — SIGNIFICANT CHANGE UP (ref 96–108)
CHLORIDE SERPL-SCNC: 99 MMOL/L — SIGNIFICANT CHANGE UP (ref 96–108)
CO2 SERPL-SCNC: 23 MMOL/L — SIGNIFICANT CHANGE UP (ref 22–31)
CO2 SERPL-SCNC: 23 MMOL/L — SIGNIFICANT CHANGE UP (ref 22–31)
CREAT SERPL-MCNC: 0.84 MG/DL — SIGNIFICANT CHANGE UP (ref 0.5–1.3)
CREAT SERPL-MCNC: 1 MG/DL — SIGNIFICANT CHANGE UP (ref 0.5–1.3)
EGFR: 56 ML/MIN/1.73M2 — LOW
EGFR: 68 ML/MIN/1.73M2 — SIGNIFICANT CHANGE UP
EOSINOPHIL # BLD AUTO: 0.06 K/UL — SIGNIFICANT CHANGE UP (ref 0–0.5)
EOSINOPHIL NFR BLD AUTO: 1 % — SIGNIFICANT CHANGE UP (ref 0–6)
GLUCOSE SERPL-MCNC: 106 MG/DL — HIGH (ref 70–99)
GLUCOSE SERPL-MCNC: 98 MG/DL — SIGNIFICANT CHANGE UP (ref 70–99)
HCT VFR BLD CALC: 38.6 % — SIGNIFICANT CHANGE UP (ref 34.5–45)
HGB BLD-MCNC: 13.1 G/DL — SIGNIFICANT CHANGE UP (ref 11.5–15.5)
IMM GRANULOCYTES NFR BLD AUTO: 0.5 % — SIGNIFICANT CHANGE UP (ref 0–0.9)
LYMPHOCYTES # BLD AUTO: 1.02 K/UL — SIGNIFICANT CHANGE UP (ref 1–3.3)
LYMPHOCYTES # BLD AUTO: 16.5 % — SIGNIFICANT CHANGE UP (ref 13–44)
MAGNESIUM SERPL-MCNC: 1.8 MG/DL — SIGNIFICANT CHANGE UP (ref 1.6–2.6)
MAGNESIUM SERPL-MCNC: 1.9 MG/DL — SIGNIFICANT CHANGE UP (ref 1.6–2.6)
MCHC RBC-ENTMCNC: 30.8 PG — SIGNIFICANT CHANGE UP (ref 27–34)
MCHC RBC-ENTMCNC: 33.9 G/DL — SIGNIFICANT CHANGE UP (ref 32–36)
MCV RBC AUTO: 90.8 FL — SIGNIFICANT CHANGE UP (ref 80–100)
MONOCYTES # BLD AUTO: 0.54 K/UL — SIGNIFICANT CHANGE UP (ref 0–0.9)
MONOCYTES NFR BLD AUTO: 8.8 % — SIGNIFICANT CHANGE UP (ref 2–14)
NEUTROPHILS # BLD AUTO: 4.49 K/UL — SIGNIFICANT CHANGE UP (ref 1.8–7.4)
NEUTROPHILS NFR BLD AUTO: 72.7 % — SIGNIFICANT CHANGE UP (ref 43–77)
NRBC # BLD: 0 /100 WBCS — SIGNIFICANT CHANGE UP (ref 0–0)
P AERUGINOSA DNA BLD POS NAA+NON-PROBE: SIGNIFICANT CHANGE UP
PHOSPHATE SERPL-MCNC: 2.9 MG/DL — SIGNIFICANT CHANGE UP (ref 2.5–4.5)
PHOSPHATE SERPL-MCNC: 3.4 MG/DL — SIGNIFICANT CHANGE UP (ref 2.5–4.5)
PLATELET # BLD AUTO: 197 K/UL — SIGNIFICANT CHANGE UP (ref 150–400)
POTASSIUM SERPL-MCNC: 3.3 MMOL/L — LOW (ref 3.5–5.3)
POTASSIUM SERPL-MCNC: 3.5 MMOL/L — SIGNIFICANT CHANGE UP (ref 3.5–5.3)
POTASSIUM SERPL-SCNC: 3.3 MMOL/L — LOW (ref 3.5–5.3)
POTASSIUM SERPL-SCNC: 3.5 MMOL/L — SIGNIFICANT CHANGE UP (ref 3.5–5.3)
PROT SERPL-MCNC: 6.1 G/DL — SIGNIFICANT CHANGE UP (ref 6–8.3)
PROT SERPL-MCNC: 6.2 G/DL — SIGNIFICANT CHANGE UP (ref 6–8.3)
RBC # BLD: 4.25 M/UL — SIGNIFICANT CHANGE UP (ref 3.8–5.2)
RBC # FLD: 13.2 % — SIGNIFICANT CHANGE UP (ref 10.3–14.5)
SODIUM SERPL-SCNC: 134 MMOL/L — LOW (ref 135–145)
SODIUM SERPL-SCNC: 135 MMOL/L — SIGNIFICANT CHANGE UP (ref 135–145)
WBC # BLD: 6.17 K/UL — SIGNIFICANT CHANGE UP (ref 3.8–10.5)
WBC # FLD AUTO: 6.17 K/UL — SIGNIFICANT CHANGE UP (ref 3.8–10.5)

## 2024-12-09 PROCEDURE — 99233 SBSQ HOSP IP/OBS HIGH 50: CPT | Mod: GC

## 2024-12-09 RX ORDER — HYDRALAZINE HYDROCHLORIDE 10 MG/1
5 TABLET ORAL ONCE
Refills: 0 | Status: COMPLETED | OUTPATIENT
Start: 2024-12-09 | End: 2024-12-09

## 2024-12-09 RX ORDER — LIDOCAINE 40 MG/G
1 CREAM TOPICAL DAILY
Refills: 0 | Status: DISCONTINUED | OUTPATIENT
Start: 2024-12-09 | End: 2024-12-12

## 2024-12-09 RX ORDER — POTASSIUM CHLORIDE 600 MG/1
40 TABLET, EXTENDED RELEASE ORAL EVERY 4 HOURS
Refills: 0 | Status: COMPLETED | OUTPATIENT
Start: 2024-12-09 | End: 2024-12-09

## 2024-12-09 RX ADMIN — ESCITALOPRAM OXALATE 10 MILLIGRAM(S): 10 TABLET, FILM COATED ORAL at 11:38

## 2024-12-09 RX ADMIN — HYDRALAZINE HYDROCHLORIDE 5 MILLIGRAM(S): 10 TABLET ORAL at 05:29

## 2024-12-09 RX ADMIN — POTASSIUM CHLORIDE 40 MILLIEQUIVALENT(S): 600 TABLET, EXTENDED RELEASE ORAL at 13:33

## 2024-12-09 RX ADMIN — PIPERACILLIN SODIUM AND TAZOBACTAM SODIUM 25 GRAM(S): 4; .5 INJECTION, POWDER, LYOPHILIZED, FOR SOLUTION INTRAVENOUS at 03:30

## 2024-12-09 RX ADMIN — Medication 5000 UNIT(S): at 05:08

## 2024-12-09 RX ADMIN — FLUTICASONE PROPIONATE AND SALMETEROL XINAFOATE 1 DOSE(S): 45; 21 AEROSOL, METERED RESPIRATORY (INHALATION) at 17:45

## 2024-12-09 RX ADMIN — PIPERACILLIN SODIUM AND TAZOBACTAM SODIUM 25 GRAM(S): 4; .5 INJECTION, POWDER, LYOPHILIZED, FOR SOLUTION INTRAVENOUS at 08:50

## 2024-12-09 RX ADMIN — METOPROLOL TARTRATE 25 MILLIGRAM(S): 100 TABLET, FILM COATED ORAL at 17:43

## 2024-12-09 RX ADMIN — METOPROLOL TARTRATE 25 MILLIGRAM(S): 100 TABLET, FILM COATED ORAL at 05:07

## 2024-12-09 RX ADMIN — Medication 5000 UNIT(S): at 22:21

## 2024-12-09 RX ADMIN — Medication 81 MILLIGRAM(S): at 11:39

## 2024-12-09 RX ADMIN — Medication 5000 UNIT(S): at 13:33

## 2024-12-09 RX ADMIN — POTASSIUM CHLORIDE 40 MILLIEQUIVALENT(S): 600 TABLET, EXTENDED RELEASE ORAL at 10:00

## 2024-12-09 RX ADMIN — Medication 25 GRAM(S): at 10:01

## 2024-12-09 RX ADMIN — PIPERACILLIN SODIUM AND TAZOBACTAM SODIUM 25 GRAM(S): 4; .5 INJECTION, POWDER, LYOPHILIZED, FOR SOLUTION INTRAVENOUS at 22:17

## 2024-12-09 RX ADMIN — Medication 125 MICROGRAM(S): at 05:07

## 2024-12-09 RX ADMIN — PIPERACILLIN SODIUM AND TAZOBACTAM SODIUM 25 GRAM(S): 4; .5 INJECTION, POWDER, LYOPHILIZED, FOR SOLUTION INTRAVENOUS at 13:33

## 2024-12-09 RX ADMIN — PANTOPRAZOLE SODIUM 40 MILLIGRAM(S): 40 TABLET, DELAYED RELEASE ORAL at 05:07

## 2024-12-09 NOTE — DIETITIAN INITIAL EVALUATION ADULT - PROBLEM SELECTOR PLAN 1
- Tmax 100.8 on admission, wbc 12.83  - U/A on admission s/f sterile pyuria   - not toxic appearing    Plan  - s/p zosyn, 1L NS bolus, and 500cc LR bolus in the ED  - f/u bcx x2   - c/w empiric zosyn  - LR 500cc bolus 50cc/hr ordered   - f/u repeat LFTs in AM   - monitor fever curve   - trend WBC  - monitor stool count, if diarrhea persists obtain studies

## 2024-12-09 NOTE — DIETITIAN INITIAL EVALUATION ADULT - ORAL INTAKE PTA/DIET HISTORY
Pt reported having a typical good appetite and PO intake PTA; however Pt with N/V on 12/7 and limited PO intake since. She reported she follows regular diet PTA at assisted living facility. Pt denies any known food allergies or intolerances. Pt denies any micronutrient supplementation. Denies any difficulty chewing/swallowing at this time.  Pt reported having a typical good appetite and PO intake PTA; however Pt with N/V on 12/7 and limited PO intake since. She reported she follows a regular diet PTA at assisted living facility. Pt denies any known food allergies or intolerances. Pt denies any micronutrient supplementation. Denies any difficulty chewing/swallowing at this time.

## 2024-12-09 NOTE — DIETITIAN INITIAL EVALUATION ADULT - PROBLEM SELECTOR PLAN 9
- HSQ VTE PPx  - DASH/TLC diet  - aspiration precautions   - total protein 5.9, nutrition c/s   - disposition pending course

## 2024-12-09 NOTE — PROGRESS NOTE ADULT - PROBLEM SELECTOR PLAN 2
- not on home O2  - no SOB, productive cough, or signs of respiratory distress    Plan  - on RA, goal spO2 88-92  - c/w home inhalers

## 2024-12-09 NOTE — DIETITIAN INITIAL EVALUATION ADULT - ADD RECOMMEND
1. Continue current DASH/TLC diet as ordered, as tolerated  2. Bowel regimen per medical team discretion   3. Monitor PO intake, PO diet tolerance, skin, weight, nutrition related labs, goals of care   4. Pt reported improving appetite, continue to monitor PO intake and need for oral nutrition supplement   - food preferences obtained from Pt and communicated to dietary technician to promote PO intake

## 2024-12-09 NOTE — PROGRESS NOTE ADULT - PROBLEM SELECTOR PLAN 7
lipid panel unremarkable  AST mildly elevated  Patient unsure if she had adverse reaction with statin in the past    Plan  - hold statin for now  - trend cmp

## 2024-12-09 NOTE — DISCHARGE NOTE PROVIDER - NSDCFUADDAPPT_GEN_ALL_CORE_FT
APPTS ARE READY TO BE MADE: [ ] YES    Best Family or Patient Contact (if needed):    Additional Information about above appointments (if needed):    1: PCP  2:   3:     Other comments or requests:    APPTS ARE READY TO BE MADE: [x] YES    Best Family or Patient Contact (if needed):    Additional Information about above appointments (if needed):    1: PCP  2:   3:     Other comments or requests:    APPTS ARE READY TO BE MADE: [x] YES    Best Family or Patient Contact (if needed):    Additional Information about above appointments (if needed):    1: [facility] PCP (patient needs new)  2:   3:     Other comments or requests:    APPTS ARE READY TO BE MADE: [x] YES    Best Family or Patient Contact (if needed):    Additional Information about above appointments (if needed):    1: Anita PCP (patient needs new)  2:   3:     Other comments or requests:   Patient informed us they already have secured a follow up appointment which is not visible on [facility] and declined to provide appointment details. Patients daughter advised patient was scheduled with her PCP Dr. Jordan Hook on 12/26.

## 2024-12-09 NOTE — PROGRESS NOTE ADULT - ASSESSMENT
84F w/ HTN, COPD (30 PYH smoking), hypothyroidism, diverticulosis, CAD/MI (age 36), lung ca s/p partial right pneumonectomy, gangrenous cholecystitis s/p laparoscopic cholecystectomy (12/12/2020) came in with abdominal pain, fever, n/v. On a brief course of Zosyn. Met SIRS criteria initially, however patient is now afebrile and leukocytosis downtrending, s/p Zosyn x3 doses. Symptoms management and monitor off antibiotics.  84F w/ HTN, COPD (30 PYH smoking), hypothyroidism, diverticulosis, CAD/MI (age 36), lung ca s/p partial right pneumonectomy, gangrenous cholecystitis s/p laparoscopic cholecystectomy (12/12/2020) came in with abdominal pain, fever, n/v. On a brief course of Zosyn. Met SIRS criteria initially, however patient is now afebrile and leukocytosis downtrending. Bcx growing pseudomonas. Pending Xray spine for source. Continuing with Zosyn.

## 2024-12-09 NOTE — DISCHARGE NOTE PROVIDER - CARE PROVIDER_API CALL
Jordan Hook  Internal Medicine  [address]  [place] [address]  Phone: [phone]  Fax: [phone]  Follow Up Time: 2 weeks

## 2024-12-09 NOTE — DIETITIAN INITIAL EVALUATION ADULT - NSFNSGIIOFT_GEN_A_CORE
Pt denied N/V at time of visit today, bowel movement recorded 12/8 per nursing flows sheets   Pt reported she typically has constipation

## 2024-12-09 NOTE — PROVIDER CONTACT NOTE (OTHER) - BACKGROUND
adm for abd pain, N/V, hypokalemia. Hx COPD. HTN, diverticulosis. (+) blood cx result-Gram (-) rods in aerobic bottle

## 2024-12-09 NOTE — DISCHARGE NOTE PROVIDER - NSDCCPCAREPLAN_GEN_ALL_CORE_FT
PRINCIPAL DISCHARGE DIAGNOSIS  Diagnosis: Abdominal pain  Assessment and Plan of Treatment: You came in with abdominal pain, fever, nausea, and vomiting. CT scans of your abdomen did not show evidence of infection. However, you were found to have bacteria grew in your blood, and you were started on antibiotics. Your symptoms subsequently improved. You were evaluated by Physical Therapy, who recommended for subacute rehab. Please have close follow up with your PCP outpatient to further management. If you deveop any concerning symptoms (chest pain, shortness of breath, bloody stool, fever/nausea/vomiting, etc), please promptly seek medical attention.     PRINCIPAL DISCHARGE DIAGNOSIS  Diagnosis: Abdominal pain  Assessment and Plan of Treatment: You came in with abdominal pain, fever, nausea, and vomiting. CT scans of your abdomen did not show evidence of infection. However, you were found to have bacteria grew in your blood, and you were started on antibiotics. Your symptoms subsequently improved. You were evaluated by Physical Therapy, who recommended for your assisted living facility with PT. Please have close follow up with your PCP outpatient to further management. If you deveop any concerning symptoms (chest pain, shortness of breath, bloody stool, fever/nausea/vomiting, etc), please promptly seek medical attention.

## 2024-12-09 NOTE — PROGRESS NOTE ADULT - PROBLEM SELECTOR PLAN 8
- HSQ VTE PPx  - DASH/TLC diet  - aspiration precautions   - total protein 5.9, nutrition c/s   - disposition pending course, PT eval - DVT ppx: HSQ  - DASH/TLC diet  - aspiration precautions   - total protein 5.9, nutrition c/s   - disposition pending course, PT eval

## 2024-12-09 NOTE — DIETITIAN INITIAL EVALUATION ADULT - PERSON TAUGHT/METHOD
granddaughter present during education/Provided diet education for tips to help manage constipation. Discussed increasing fiber rich foods as tolerated and increasing fluid intake. Estimated good understanding./verbal instruction/teach back - (Patient repeats in own words)/patient instructed

## 2024-12-09 NOTE — DIETITIAN INITIAL EVALUATION ADULT - SIGNS/SYMPTOMS
N/V and related limited PO intake prior to today's visit  as evidenced by N/V and related limited PO intake prior to today's visit

## 2024-12-09 NOTE — DIETITIAN INITIAL EVALUATION ADULT - PHYSCIAL ASSESSMENT
Weights:  - UBW (per patient): 145 pounds   Pt reported recent weight gain PTA, she attributed her weight gain to change in activity levels PTA.   - Dosing Weight (per chart): 68 kg   - Per [facility] HIE: 64.4 kg (11/15/2023)  -  pounds +/- 10%   RD to continue to monitor weights and trends as able. Weights:  - UBW (per patient): 145 pounds / 66kg  Pt reported recent weight gain PTA, she attributed her weight gain to change in activity levels PTA.   - weight per transfer sheets: 149 pounds / 67.7 kg  - Dosing Weight (per chart): 68 kg   - Per [facility] HIE: 64.4 kg (11/15/2023)  -  pounds +/- 10%   RD to continue to monitor weights and trends as able.

## 2024-12-09 NOTE — DISCHARGE NOTE PROVIDER - NSDCCPTREATMENT_GEN_ALL_CORE_FT
PRINCIPAL PROCEDURE  Procedure: CT abdomen  Findings and Treatment: FINDINGS:  LOWER CHEST: Coronary artery calcifications. Postsurgical changes within   the right middle lobe, partially visualized.  LIVER: Within normal limits.  BILE DUCTS: Normal caliber.  GALLBLADDER: Cholecystectomy.  SPLEEN: Within normal limits.  PANCREAS: Within normal limits.  ADRENALS: Within normal limits.  KIDNEYS/URETERS: Right renal cyst and additional subcentimeter hypodense   foci too small to characterize. Partially duplicated left renal   collecting system. No hydronephrosis. No renal cortical.  BLADDER: Within normal limits.  REPRODUCTIVE ORGANS: Uterus and adnexa within normal limits. Pessary.  BOWEL: No bowel obstruction. Extensive colonic diverticulosis. Appendix   is not visualized.  PERITONEUM/RETROPERITONEUM: No ascites  VESSELS: Atherosclerotic changes. The right hepatic artery is replaced   off of the superior mesenteric artery. No aortic aneurysm.  LYMPH NODES: No lymphadenopathy.  ABDOMINAL WALL: Small fat-containing periumbilical hernia.  BONES: Degenerative changes of the spine.  IMPRESSION:  No acute intra-abdominal or pelvic pathology.       PRINCIPAL PROCEDURE  Procedure: CT abdomen  Findings and Treatment: FINDINGS:  LOWER CHEST: Coronary artery calcifications. Postsurgical changes within   the right middle lobe, partially visualized.  LIVER: Within normal limits.  BILE DUCTS: Normal caliber.  GALLBLADDER: Cholecystectomy.  SPLEEN: Within normal limits.  PANCREAS: Within normal limits.  ADRENALS: Within normal limits.  KIDNEYS/URETERS: Right renal cyst and additional subcentimeter hypodense   foci too small to characterize. Partially duplicated left renal   collecting system. No hydronephrosis. No renal cortical.  BLADDER: Within normal limits.  REPRODUCTIVE ORGANS: Uterus and adnexa within normal limits. Pessary.  BOWEL: No bowel obstruction. Extensive colonic diverticulosis. Appendix   is not visualized.  PERITONEUM/RETROPERITONEUM: No ascites  VESSELS: Atherosclerotic changes. The right hepatic artery is replaced   off of the superior mesenteric artery. No aortic aneurysm.  LYMPH NODES: No lymphadenopathy.  ABDOMINAL WALL: Small fat-containing periumbilical hernia.  BONES: Degenerative changes of the spine.  IMPRESSION:  No acute intra-abdominal or pelvic pathology.        SECONDARY PROCEDURE  Procedure: XR lumbar spine, 1 view  Findings and Treatment:   < end of copied text >  No compression fractures.  Grade 1 anterolisthesis of L4 on L5 and slight T12 on L1 and L1 on L2   retrolistheses without spondylolysis defects.  Variable disc narrowing with small disc margin osteophytes. Multilevel   posterior facet arthrosis.  Unremarkable SI joints pubic symphysis and partially visualized hips.  Generalized osteopenia otherwise no discrete lytic or blastic lesions.  Lower mid pelvic pessary ring shadow noted.  Atherosclerotic abdominal aortic calcifications.< from: Xray Lumbar Spine AP + Lateral (12.10.24 @ 09:03) >

## 2024-12-09 NOTE — DISCHARGE NOTE PROVIDER - NSFOLLOWUPCLINICS_GEN_ALL_ED_FT
[facility] Primary [facility]  Primary Care  [address] [place] [address]  Phone: [phone]  Fax:

## 2024-12-09 NOTE — DIETITIAN INITIAL EVALUATION ADULT - NSICDXPASTMEDICALHX_GEN_ALL_CORE_FT
PAST MEDICAL HISTORY:  COPD (chronic obstructive pulmonary disease)     Diverticulosis     Hypertension     Hypothyroidism     Lung cancer     MI (myocardial infarction)

## 2024-12-09 NOTE — PROGRESS NOTE ADULT - PROBLEM SELECTOR PLAN 1
presented with epigastric pain, fever, nausea, vomiting  CT a/p: no acute intrab pathology, extensive diverticulosis   - symptoms improved. Still endorsed nausea, however no emesis. Soft bm with no blood  - likely gastroenteritis   EKG (12/7) - QTc 483    Plan  - dc Zosyn, patient no longer met SIRS criteria and leukocytosis downtrending. Monitor off abx for now  - Zofran prn for nausea   - f/u GI PCR  - NS 75cc/hr for 12h iso low PO intake and emesis - reassess tomorrow presented with epigastric pain, fever, nausea, vomiting  CT a/p: no acute intrab pathology, extensive diverticulosis   - symptoms improved. Still endorsed nausea, however no emesis. Soft bm with no blood  - likely gastroenteritis   EKG (12/7) - QTc 483  Bcx with pseudomonas    Plan  - cw zosyn   - Zofran prn for nausea   - f/u GI PCR  - f/u lumbar spine xray

## 2024-12-09 NOTE — PROGRESS NOTE ADULT - SUBJECTIVE AND OBJECTIVE BOX
***************************************************************  Adamaris Schmidt, PGY-1  Internal Medicine   Available on [employer] TEAMS  ***************************************************************    VANI GONZALEZ  84y  MRN: [id number]    Patient is a 84y old  Female who presents with a chief complaint of SIRS+ i/s/o n/v/d, abd pain (09 Dec 2024 01:26)      Interval/Overnight Events: no events ON.     Subjective: Pt seen and examined at bedside. Denies fever, CP, SOB, abn pain, N/V, dysuria. Tolerating diet.      MEDICATIONS  (STANDING):  aspirin  chewable 81 milliGRAM(s) Oral daily  escitalopram 10 milliGRAM(s) Oral daily  fluticasone propionate/ salmeterol 500-50 MICROgram(s) Diskus 1 Dose(s) Inhalation two times a day  heparin   Injectable 5000 Unit(s) SubCutaneous every 8 hours  influenza  Vaccine (HIGH DOSE) 0.5 milliLiter(s) IntraMuscular once  levothyroxine 125 MICROGram(s) Oral daily  metoprolol tartrate 25 milliGRAM(s) Oral two times a day  pantoprazole    Tablet 40 milliGRAM(s) Oral before breakfast  piperacillin/tazobactam IVPB.- 3.375 Gram(s) IV Intermittent once  piperacillin/tazobactam IVPB.. 3.375 Gram(s) IV Intermittent every 8 hours  sodium chloride 0.9%. 1000 milliLiter(s) (75 mL/Hr) IV Continuous <Continuous>    MEDICATIONS  (PRN):  ondansetron Injectable 4 milliGRAM(s) IV Push every 6 hours PRN Nausea and/or Vomiting      Objective:    Vitals: Vital Signs Last 24 Hrs  T(C): 37.1 (12-08-24 @ 23:19), Max: 37.3 (12-08-24 @ 08:20)  T(F): 98.8 (12-08-24 @ 23:19), Max: 99.1 (12-08-24 @ 08:20)  HR: 81 (12-08-24 @ 23:19) (66 - 81)  BP: 167/66 (12-08-24 @ 23:19) (129/74 - 167/66)  BP(mean): --  RR: 18 (12-08-24 @ 23:19) (18 - 18)  SpO2: 94% (12-08-24 @ 23:19) (92% - 94%)                I&O's Summary    07 Dec 2024 07:01  -  08 Dec 2024 07:00  --------------------------------------------------------  IN: 0 mL / OUT: 1000 mL / NET: -1000 mL    08 Dec 2024 07:01  -  09 Dec 2024 06:02  --------------------------------------------------------  IN: 470 mL / OUT: 250 mL / NET: 220 mL        PHYSICAL EXAM:  GENERAL: NAD  HEAD:  Atraumatic, Normocephalic  EYES: EOMI, conjunctiva and sclera clear  CHEST/LUNG: Clear to auscultation bilaterally; No rales, rhonchi, wheezing, or rubs  HEART: Regular rate and rhythm; No murmurs, rubs, or gallops  ABDOMEN: Soft, Nontender, Nondistended;   SKIN: No rashes or lesions  NERVOUS SYSTEM:  Alert & Oriented X3, no focal deficits    LABS:                        12.3   11.25 )-----------( 208      ( 08 Dec 2024 06:44 )             36.7                         13.2   12.83 )-----------( 237      ( 07 Dec 2024 13:19 )             38.7     12-09    134[L]  |  99  |  12  ----------------------------<  106[H]  3.5   |  23  |  1.00  12-07    134[L]  |  99  |  10  ----------------------------<  106[H]  3.7   |  24  |  0.81  12-07    133[L]  |  98  |  11  ----------------------------<  101[H]  2.9[LL]   |  22  |  0.65    Ca    8.8      09 Dec 2024 01:06  Ca    8.5      07 Dec 2024 20:02  Ca    8.5      07 Dec 2024 15:07  Phos  3.4     12-09  Mg     1.9     12-09    TPro  6.1  /  Alb  3.3  /  TBili  0.5  /  DBili  x   /  AST  64[H]  /  ALT  64[H]  /  AlkPhos  113  12-09  TPro  5.9[L]  /  Alb  3.4  /  TBili  0.6  /  DBili  x   /  AST  47[H]  /  ALT  25  /  AlkPhos  96  12-07  TPro  7.0  /  Alb  3.7  /  TBili  0.7  /  DBili  x   /  AST  65[H]  /  ALT  <38  /  AlkPhos  92  12-07    CAPILLARY BLOOD GLUCOSE            Urinalysis Basic - ( 09 Dec 2024 01:06 )    Color: x / Appearance: x / SG: x / pH: x  Gluc: 106 mg/dL / Ketone: x  / Bili: x / Urobili: x   Blood: x / Protein: x / Nitrite: x   Leuk Esterase: x / RBC: x / WBC x   Sq Epi: x / Non Sq Epi: x / Bacteria: x          RADIOLOGY & ADDITIONAL TESTS:         ***************************************************************  Adamaris Schmidt, PGY-1  Internal Medicine   Available on [employer] TEAMS  ***************************************************************    VANI GONZALEZ  84y  MRN: [id number]    Patient is a 84y old  Female who presents with a chief complaint of SIRS+ i/s/o n/v/d, abd pain (09 Dec 2024 01:26)      Interval/Overnight Events: no events ON.     Subjective: Pt seen and examined at bedside. Reports worsening back pain and mild constant headache in the back of her head. Reports a pain in her left eye concurrent with her eye pain due to macular degeneration. No cough, no dysuria, tolerating diet, no n/v/d.     MEDICATIONS  (STANDING):  aspirin  chewable 81 milliGRAM(s) Oral daily  escitalopram 10 milliGRAM(s) Oral daily  fluticasone propionate/ salmeterol 500-50 MICROgram(s) Diskus 1 Dose(s) Inhalation two times a day  heparin   Injectable 5000 Unit(s) SubCutaneous every 8 hours  influenza  Vaccine (HIGH DOSE) 0.5 milliLiter(s) IntraMuscular once  levothyroxine 125 MICROGram(s) Oral daily  metoprolol tartrate 25 milliGRAM(s) Oral two times a day  pantoprazole    Tablet 40 milliGRAM(s) Oral before breakfast  piperacillin/tazobactam IVPB.- 3.375 Gram(s) IV Intermittent once  piperacillin/tazobactam IVPB.. 3.375 Gram(s) IV Intermittent every 8 hours  sodium chloride 0.9%. 1000 milliLiter(s) (75 mL/Hr) IV Continuous <Continuous>    MEDICATIONS  (PRN):  ondansetron Injectable 4 milliGRAM(s) IV Push every 6 hours PRN Nausea and/or Vomiting      Objective:    Vitals: Vital Signs Last 24 Hrs  T(C): 37.1 (12-08-24 @ 23:19), Max: 37.3 (12-08-24 @ 08:20)  T(F): 98.8 (12-08-24 @ 23:19), Max: 99.1 (12-08-24 @ 08:20)  HR: 81 (12-08-24 @ 23:19) (66 - 81)  BP: 167/66 (12-08-24 @ 23:19) (129/74 - 167/66)  BP(mean): --  RR: 18 (12-08-24 @ 23:19) (18 - 18)  SpO2: 94% (12-08-24 @ 23:19) (92% - 94%)                I&O's Summary    07 Dec 2024 07:01  -  08 Dec 2024 07:00  --------------------------------------------------------  IN: 0 mL / OUT: 1000 mL / NET: -1000 mL    08 Dec 2024 07:01  -  09 Dec 2024 06:02  --------------------------------------------------------  IN: 470 mL / OUT: 250 mL / NET: 220 mL        PHYSICAL EXAM:  GENERAL: NAD  HEAD:  Atraumatic, Normocephalic  EYES: EOMI, conjunctiva and sclera clear  CHEST/LUNG: Clear to auscultation bilaterally; No rales, rhonchi, wheezing, or rubs  HEART: Regular rate and rhythm; No murmurs, rubs, or gallops  ABDOMEN: Soft, Nontender, Nondistended;   SKIN: No rashes or lesions  MUSCULOSKELETAL: mild tenderness to palpation on spine at L4, mild paraspinal muscle tenderness  NERVOUS SYSTEM:  Alert & Oriented X3, no focal deficits    LABS:                        12.3   11.25 )-----------( 208      ( 08 Dec 2024 06:44 )             36.7                         13.2   12.83 )-----------( 237      ( 07 Dec 2024 13:19 )             38.7     12-09    134[L]  |  99  |  12  ----------------------------<  106[H]  3.5   |  23  |  1.00  12-07    134[L]  |  99  |  10  ----------------------------<  106[H]  3.7   |  24  |  0.81  12-07    133[L]  |  98  |  11  ----------------------------<  101[H]  2.9[LL]   |  22  |  0.65    Ca    8.8      09 Dec 2024 01:06  Ca    8.5      07 Dec 2024 20:02  Ca    8.5      07 Dec 2024 15:07  Phos  3.4     12-09  Mg     1.9     12-09    TPro  6.1  /  Alb  3.3  /  TBili  0.5  /  DBili  x   /  AST  64[H]  /  ALT  64[H]  /  AlkPhos  113  12-09  TPro  5.9[L]  /  Alb  3.4  /  TBili  0.6  /  DBili  x   /  AST  47[H]  /  ALT  25  /  AlkPhos  96  12-07  TPro  7.0  /  Alb  3.7  /  TBili  0.7  /  DBili  x   /  AST  65[H]  /  ALT  <38  /  AlkPhos  92  12-07    CAPILLARY BLOOD GLUCOSE            Urinalysis Basic - ( 09 Dec 2024 01:06 )    Color: x / Appearance: x / SG: x / pH: x  Gluc: 106 mg/dL / Ketone: x  / Bili: x / Urobili: x   Blood: x / Protein: x / Nitrite: x   Leuk Esterase: x / RBC: x / WBC x   Sq Epi: x / Non Sq Epi: x / Bacteria: x          RADIOLOGY & ADDITIONAL TESTS:

## 2024-12-09 NOTE — PHARMACOTHERAPY INTERVENTION NOTE - COMMENTS
DRAKE Corrales is a 85yo F w HTN, COPD (30 PYH smoking), hypothyroidism, diverticulosis, CAD/MI (age 36), lung ca s/p partial right pneumonectomy, gangrenous cholecystitis s/p laparoscopic cholecystectomy (12/12/2020) came in with abdominal pain, fever, n/v. Bcx growing pseudomonas, on Zosyn.    Spoke to team - recommended repeat blood cultures and consider ID consult since source of bacteremia appears uncertain.    Thank you,  Sonia Macario, PharmD, BCIDP  Clinical Pharmacist, Infectious Diseases  Available via Teams   Cell: [phone]

## 2024-12-09 NOTE — DIETITIAN INITIAL EVALUATION ADULT - PROBLEM SELECTOR PLAN 2
- ddx: gastritis vs gastroenteritis vs cholangitis vs pancreatitis   - less likely to be cholangitis given no charcot's triad  - less likely to be pancreatitis give lipase is low   - abd resolved with zofran   - last BM: last night, loose without blood     Plan  - CT a/p: no acute intrab pathology, extensive diverticulosis   - likely gastroenteritis  - advance diet as tolerated  - serial abd exams

## 2024-12-09 NOTE — PROGRESS NOTE ADULT - PROBLEM SELECTOR PLAN 4
c/f ectopy vs AFib, QTc prolongation poor study  lyte abn on admit, now repleted  no palpitations  - obtain repeat EKG proceed pending result  - avoid QT prolonging meds

## 2024-12-09 NOTE — DIETITIAN INITIAL EVALUATION ADULT - ENERGY INTAKE
at time of visit today/Adequate (%) Pt with good intake of breakfast tray observed at visit, greater than 75%. She reported improved appetite at time of visit with limited PO intake prior to today's visit due to N/V (noted ondansetron ordered).

## 2024-12-09 NOTE — DIETITIAN INITIAL EVALUATION ADULT - PROBLEM SELECTOR PLAN 3
- not on home O2  - no SOB, productive cough, or signs of respiratory distress  - weaned to RA   - maintain O2 stats 88-92  - c/w home inhalers

## 2024-12-09 NOTE — DISCHARGE NOTE PROVIDER - HOSPITAL COURSE
For full details, please see H&P, progress notes, consult notes and ancillary notes.  84F w/ HTN, COPD (30 PYH smoking, not on home O2), hypothyroidism, diverticulosis, MI (age 36), lung ca s/p partial right pneumonectomy, gangrenous cholecystitis s/p laparoscopic cholecystectomy (12/12/2020) presenting with abdominal pain x 1 day. Patient lives in an assisted living community and had salmon for dinner last night without issue overnight. This morning after showering, the patient stated she acutely developed RUQ/ epigastric 5/10, sharp, non-radiating, pain similar to gallbladder pain she had before. She then had 2 episodes of NBNB vomiting in which her abd pain improved. Her last BM was last night with normal color but loose. Currently states she feels weak and wants to eat. Denies current HA, fever, chills, dizziness, CP, SOB, abd pain, N/V, constipation, diarrhea, leg swelling.     Patient admitted to Internal Medicine for further management.    Hospital Course:  Patient found to meet SIRS criteria initially, however patient is now afebrile and leukocytosis downtrending, s/p Zosyn x3 doses. Symptoms managed well with Zofran and patient was monitored off antibiotics as patient was clinically and hemodynamically stable. Course complicated by bacteremia with gram negative rods, subsequently showed _______. Patient was restarted on Zosyn, and transitioned to _______ according to BCx sensitivity.  Patient was evaluated by Physical Therapy, who recommended subacute rehab. Patient is to be discharged to ________ and have close follow up with her PCP outpatient for further management.      On day of discharge, patient is clinically stable with no new exam findings or acute symptoms compared to prior. The patient was seen by the attending physician on the date of discharge and deemed stable and acceptable for discharge. The patient's chronic medical conditions were treated accordingly per the patient's home medication regimen. The patient's medication reconciliation (with changes made to chronic medications), follow up appointments, discharge orders, instructions, and significant lab and diagnostic studies are as noted.     Discharge follow up action items:     1. Follow up with PCP in 1-2 weeks. Follow up with ____subspecialists in 1-2 weeks.  2. Follow up labs: ***  3. Medication changes: ***  4. On hold medications: ***  5. Incidental findings: ***  6. Diagnoses gastroenteritis, bacteremia with _____    Patient's ordered code status: [x] Full code  [ ] DNR  [ ] Hospice  Patient disposition: [facility]    Patient will be discharged to [facility] with close follow up.   For full details, please see H&P, progress notes, consult notes and ancillary notes.  84F w/ HTN, COPD (30 PYH smoking, not on home O2), hypothyroidism, diverticulosis, MI (age 36), lung ca s/p partial right pneumonectomy, gangrenous cholecystitis s/p laparoscopic cholecystectomy (12/12/2020) presenting with abdominal pain x 1 day. Patient lives in an assisted living community and had salmon for dinner last night without issue overnight. This morning after showering, the patient stated she acutely developed RUQ/ epigastric 5/10, sharp, non-radiating, pain similar to gallbladder pain she had before. She then had 2 episodes of NBNB vomiting in which her abd pain improved. Her last BM was last night with normal color but loose. Currently states she feels weak and wants to eat. Denies current HA, fever, chills, dizziness, CP, SOB, abd pain, N/V, constipation, diarrhea, leg swelling.     Patient admitted to Internal Medicine for further management.    Hospital Course:  Patient found to meet SIRS criteria initially, however patient became afebrile and leukocytosis downtrended while on zosyn. Patient's blood culture grew pseudomonas, with no clear source aside from abdominal translocation per ID. GI PCR was negative. Given back pain, patient got an xray of her lumbar spine which showed some disc narrowing, osteopenia, and arthrosis but no acute fracture. Repeat blood cultures had no growth on the day of discharge and the patient was symptomatically at her baseline.  Patient was evaluated by Physical Therapy, who recommended subacute rehab versus home PT. Patient is to be discharged to her assisted living facility with home PT and have close follow up with her PCP outpatient for further management.      On day of discharge, patient is clinically stable with no new exam findings or acute symptoms compared to prior. The patient was seen by the attending physician on the date of discharge and deemed stable and acceptable for discharge. The patient's chronic medical conditions were treated accordingly per the patient's home medication regimen. The patient's medication reconciliation (with changes made to chronic medications), follow up appointments, discharge orders, instructions, and significant lab and diagnostic studies are as noted.     Discharge follow up action items:     1. Follow up with PCP in 1-2 weeks  2. Follow up labs: blood cultures final result  3. Medication changes: Cipro 500 q12 x 1 day  4. Diagnoses gastroenteritis, bacteremia with pseudomonas    Patient's ordered code status: [x] Full code  [ ] DNR  [ ] Hospice  Patient disposition: Assisted living facility    Patient will be discharged to assisted living facility with close follow up.   For full details, please see H&P, progress notes, consult notes and ancillary notes.  84F w/ HTN, COPD (30 PYH smoking, not on home O2), hypothyroidism, diverticulosis, MI (age 36), lung ca s/p partial right pneumonectomy, gangrenous cholecystitis s/p laparoscopic cholecystectomy (12/12/2020) presenting with abdominal pain x 1 day. Patient lives in an assisted living community and had salmon for dinner last night without issue overnight. This morning after showering, the patient stated she acutely developed RUQ/ epigastric 5/10, sharp, non-radiating, pain similar to gallbladder pain she had before. She then had 2 episodes of NBNB vomiting in which her abd pain improved. Her last BM was last night with normal color but loose. Currently states she feels weak and wants to eat. Denies current HA, fever, chills, dizziness, CP, SOB, abd pain, N/V, constipation, diarrhea, leg swelling.     Patient admitted to Internal Medicine for further management.    Hospital Course:  Patient found to meet SIRS criteria initially, however patient became afebrile and leukocytosis downtrended while on zosyn. Patient's blood culture grew pseudomonas, with no clear source aside from abdominal translocation per ID. GI PCR was negative. Given back pain, patient got an xray of her lumbar spine which showed some disc narrowing, osteopenia, and arthrosis but no acute fracture. Repeat blood cultures had no growth on the day of discharge and the patient was symptomatically at her baseline.  Patient was evaluated by Physical Therapy, who recommended subacute rehab versus home PT. Patient is to be discharged to her assisted living facility with home PT and have close follow up with her PCP outpatient for further management.      On day of discharge, patient is clinically stable with no new exam findings or acute symptoms compared to prior. The patient was seen by the attending physician on the date of discharge and deemed stable and acceptable for discharge. The patient's chronic medical conditions were treated accordingly per the patient's home medication regimen. The patient's medication reconciliation (with changes made to chronic medications), follow up appointments, discharge orders, instructions, and significant lab and diagnostic studies are as noted.     Discharge follow up action items:     1. Follow up with PCP in 1-2 weeks  2. Follow up labs: blood cultures final result  3. Medication changes: Cipro 500 q12 x 2 day  4. Diagnoses gastroenteritis, bacteremia with pseudomonas    Patient's ordered code status: [x] Full code  [ ] DNR  [ ] Hospice  Patient disposition: Assisted living facility    Patient will be discharged to assisted living facility with close follow up.

## 2024-12-09 NOTE — PROGRESS NOTE ADULT - PROBLEM SELECTOR PLAN 5
- h.o of MI at 37 yo  -  c/w home ASA, statin, lopressor - h.o of MI at 37 yo  - c/w home ASA, statin, lopressor

## 2024-12-09 NOTE — DISCHARGE NOTE PROVIDER - NSDCFUSCHEDAPPT_GEN_ALL_CORE_FT
Curtis English  [facility]  ORTHOSURG [address] [facility]  Scheduled Appointment: 12/16/2024

## 2024-12-09 NOTE — DIETITIAN INITIAL EVALUATION ADULT - OTHER INFO
low potassium 12/9 - noted potassium chloride ordered.   elevated AST/ALT.   lipid panel rom 12/8 WDL

## 2024-12-09 NOTE — DISCHARGE NOTE PROVIDER - NSDCMRMEDTOKEN_GEN_ALL_CORE_FT
aspirin 81 mg oral tablet, chewable: 1 tab(s) orally once a day  ESCITALOPRAM 10MG TABLETS: TAKE 1 TABLET BY MOUTH DAILY  LEVOTHYROXINE SODIUM  125 MCG TABS:   METOPROLOL TARTRATE  25 MG TABS:   PANTOPRAZOLE 40MG TABLETS:   simvastatin 20 mg oral tablet: 1 tab(s) orally once a day  Wixela Inhub 500 mcg-50 mcg inhalation powder: 1 inhaled 2 times a day   aspirin 81 mg oral tablet, chewable: 1 tab(s) orally once a day  ESCITALOPRAM 10MG TABLETS: TAKE 1 TABLET BY MOUTH DAILY  LEVOTHYROXINE SODIUM  125 MCG TABS:   METOPROLOL TARTRATE  25 MG TABS:   PANTOPRAZOLE 40MG TABLETS:   rolling walker: Use as instructed  simvastatin 20 mg oral tablet: 1 tab(s) orally once a day  Wixela Inhub 500 mcg-50 mcg inhalation powder: 1 inhaled 2 times a day   aspirin 81 mg oral tablet, chewable: 1 tab(s) orally once a day  escitalopram 10 mg oral tablet: 1 tab(s) orally once a day  levothyroxine 125 mcg (0.125 mg) oral tablet: 1 tab(s) orally once a day  metoprolol tartrate 25 mg oral tablet: 1 tab(s) orally 2 times a day  PANTOPRAZOLE 40MG TABLETS: 40 milligram(s) orally once a day  rolling walker: Use as instructed  simvastatin 20 mg oral tablet: 1 tab(s) orally once a day  Wixela Inhub 500 mcg-50 mcg inhalation powder: 1 inhaled 2 times a day   aspirin 81 mg oral tablet, chewable: 1 tab(s) orally once a day  Cipro 500 mg oral tablet: 1 tab(s) orally 2 times a day Last dose 12/14  escitalopram 10 mg oral tablet: 1 tab(s) orally once a day  levothyroxine 125 mcg (0.125 mg) oral tablet: 1 tab(s) orally once a day  metoprolol tartrate 25 mg oral tablet: 1 tab(s) orally 2 times a day  PANTOPRAZOLE 40MG TABLETS: 40 milligram(s) orally once a day  rolling walker: Use as instructed  simvastatin 20 mg oral tablet: 1 tab(s) orally once a day  Wixela Inhub 500 mcg-50 mcg inhalation powder: 1 inhaled 2 times a day

## 2024-12-09 NOTE — PROGRESS NOTE ADULT - ATTENDING COMMENTS
PAtient seen and evaluated at bedside. Patient complaining of lower back pain, per patient has had last 6-9 months more acute now. Initially unable to lift legs, when returned, 4/5 strengt b/l LE. No incontinence.     On exam:[  CardS: +S1S2 rrr. No m/r/g  RESP: CTA b/l. No r/r/w  ABD:soft. NT/ND. +BS.  EXT: PAtient seen and evaluated at bedside. Patient complaining of lower back pain, per patient has had last 6-9 months more acute now. Initially unable to lift legs, when returned, 4/5 strengt b/l LE. No incontinence.     On exam:[  CardS: +S1S2 rrr. No m/r/g  RESP: CTA b/l. No r/r/w  ABD:soft. NT/ND. +BS.  EXT:no rash.  MSK+ spinal tenderness L3-L4. No paraspinal tenderness. 4/5 strength b/l LE hip flexors.     #Pseudomonas bacteremia  -continue zosyn  -unclear etiology, not elucidated on CT a/p,  -UCx not pseudomonas.  -repeat BCx.  -given back pain, though acute on chronic, will image (start Xray), if neuro exam deteriorates, check MRI to r/o abscess.  -?pessary as source. If unable to find source, ID consult.    rest as above

## 2024-12-09 NOTE — DIETITIAN INITIAL EVALUATION ADULT - PERTINENT MEDS FT
MEDICATIONS  (STANDING):  aspirin  chewable 81 milliGRAM(s) Oral daily  escitalopram 10 milliGRAM(s) Oral daily  fluticasone propionate/ salmeterol 500-50 MICROgram(s) Diskus 1 Dose(s) Inhalation two times a day  heparin   Injectable 5000 Unit(s) SubCutaneous every 8 hours  influenza  Vaccine (HIGH DOSE) 0.5 milliLiter(s) IntraMuscular once  levothyroxine 125 MICROGram(s) Oral daily  metoprolol tartrate 25 milliGRAM(s) Oral two times a day  pantoprazole    Tablet 40 milliGRAM(s) Oral before breakfast  piperacillin/tazobactam IVPB.. 3.375 Gram(s) IV Intermittent every 8 hours  potassium chloride   Powder 40 milliEquivalent(s) Oral every 4 hours  sodium chloride 0.9%. 1000 milliLiter(s) (75 mL/Hr) IV Continuous <Continuous>    MEDICATIONS  (PRN):  ondansetron Injectable 4 milliGRAM(s) IV Push every 6 hours PRN Nausea and/or Vomiting

## 2024-12-09 NOTE — DIETITIAN INITIAL EVALUATION ADULT - PERTINENT LABORATORY DATA
12-09    135  |  98  |  11  ----------------------------<  98  3.3[L]   |  23  |  0.84    Ca    8.9      09 Dec 2024 06:52  Phos  2.9     12-09  Mg     1.8     12-09    TPro  6.2  /  Alb  3.4  /  TBili  0.5  /  DBili  x   /  AST  59[H]  /  ALT  62[H]  /  AlkPhos  112  12-09

## 2024-12-09 NOTE — DIETITIAN INITIAL EVALUATION ADULT - PROBLEM SELECTOR PLAN 4
- hyponatremia improved w/ IVF, suspect hypotonic, hypovolemic 2/2 GI loss. Avoid overcorrect > 6-8meq/24h  - K, Phos repleted, f/u repeat BMP

## 2024-12-09 NOTE — PROGRESS NOTE ADULT - PROBLEM SELECTOR PLAN 3
hypotonic hypovolemic hyponatremia  likely 2/2 GI loss. Avoid overcorrect > 6-8meq/24h    Plan  - hyponatremia improved w/ IVF - c/w NS 75 cc/hr for 12h  - K, Phos replete, f/u repeat BMP

## 2024-12-10 LAB
-  AMOXICILLIN/CLAVULANIC ACID: SIGNIFICANT CHANGE UP
-  AMPICILLIN/SULBACTAM: SIGNIFICANT CHANGE UP
-  AMPICILLIN: SIGNIFICANT CHANGE UP
-  AZTREONAM: SIGNIFICANT CHANGE UP
-  AZTREONAM: SIGNIFICANT CHANGE UP
-  CEFAZOLIN: SIGNIFICANT CHANGE UP
-  CEFEPIME: SIGNIFICANT CHANGE UP
-  CEFEPIME: SIGNIFICANT CHANGE UP
-  CEFOXITIN: SIGNIFICANT CHANGE UP
-  CEFTAZIDIME: SIGNIFICANT CHANGE UP
-  CEFTRIAXONE: SIGNIFICANT CHANGE UP
-  CEFUROXIME: SIGNIFICANT CHANGE UP
-  CIPROFLOXACIN: SIGNIFICANT CHANGE UP
-  CIPROFLOXACIN: SIGNIFICANT CHANGE UP
-  ERTAPENEM: SIGNIFICANT CHANGE UP
-  GENTAMICIN: SIGNIFICANT CHANGE UP
-  IMIPENEM: SIGNIFICANT CHANGE UP
-  IMIPENEM: SIGNIFICANT CHANGE UP
-  LEVOFLOXACIN: SIGNIFICANT CHANGE UP
-  LEVOFLOXACIN: SIGNIFICANT CHANGE UP
-  MEROPENEM: SIGNIFICANT CHANGE UP
-  MEROPENEM: SIGNIFICANT CHANGE UP
-  NITROFURANTOIN: SIGNIFICANT CHANGE UP
-  PIPERACILLIN/TAZOBACTAM: SIGNIFICANT CHANGE UP
-  PIPERACILLIN/TAZOBACTAM: SIGNIFICANT CHANGE UP
-  TOBRAMYCIN: SIGNIFICANT CHANGE UP
-  TRIMETHOPRIM/SULFAMETHOXAZOLE: SIGNIFICANT CHANGE UP
ALBUMIN SERPL ELPH-MCNC: 3.3 G/DL — SIGNIFICANT CHANGE UP (ref 3.3–5)
ALP SERPL-CCNC: 104 U/L — SIGNIFICANT CHANGE UP (ref 40–120)
ALT FLD-CCNC: 42 U/L — SIGNIFICANT CHANGE UP (ref 10–45)
ANION GAP SERPL CALC-SCNC: 13 MMOL/L — SIGNIFICANT CHANGE UP (ref 5–17)
AST SERPL-CCNC: 36 U/L — SIGNIFICANT CHANGE UP (ref 10–40)
BASOPHILS # BLD AUTO: 0.04 K/UL — SIGNIFICANT CHANGE UP (ref 0–0.2)
BASOPHILS NFR BLD AUTO: 0.7 % — SIGNIFICANT CHANGE UP (ref 0–2)
BILIRUB SERPL-MCNC: 0.4 MG/DL — SIGNIFICANT CHANGE UP (ref 0.2–1.2)
BUN SERPL-MCNC: 13 MG/DL — SIGNIFICANT CHANGE UP (ref 7–23)
CALCIUM SERPL-MCNC: 8.7 MG/DL — SIGNIFICANT CHANGE UP (ref 8.4–10.5)
CHLORIDE SERPL-SCNC: 100 MMOL/L — SIGNIFICANT CHANGE UP (ref 96–108)
CO2 SERPL-SCNC: 22 MMOL/L — SIGNIFICANT CHANGE UP (ref 22–31)
CREAT SERPL-MCNC: 0.99 MG/DL — SIGNIFICANT CHANGE UP (ref 0.5–1.3)
CRP SERPL-MCNC: 41 MG/L — HIGH (ref 0–4)
CULTURE RESULTS: ABNORMAL
EGFR: 56 ML/MIN/1.73M2 — LOW
EOSINOPHIL # BLD AUTO: 0.14 K/UL — SIGNIFICANT CHANGE UP (ref 0–0.5)
EOSINOPHIL NFR BLD AUTO: 2.4 % — SIGNIFICANT CHANGE UP (ref 0–6)
ERYTHROCYTE [SEDIMENTATION RATE] IN BLOOD: 14 MM/HR — SIGNIFICANT CHANGE UP (ref 0–20)
GI PCR PANEL: SIGNIFICANT CHANGE UP
GLUCOSE SERPL-MCNC: 88 MG/DL — SIGNIFICANT CHANGE UP (ref 70–99)
HCT VFR BLD CALC: 37.5 % — SIGNIFICANT CHANGE UP (ref 34.5–45)
HGB BLD-MCNC: 12.5 G/DL — SIGNIFICANT CHANGE UP (ref 11.5–15.5)
IMM GRANULOCYTES NFR BLD AUTO: 0.3 % — SIGNIFICANT CHANGE UP (ref 0–0.9)
LYMPHOCYTES # BLD AUTO: 1.63 K/UL — SIGNIFICANT CHANGE UP (ref 1–3.3)
LYMPHOCYTES # BLD AUTO: 28.4 % — SIGNIFICANT CHANGE UP (ref 13–44)
MAGNESIUM SERPL-MCNC: 2.2 MG/DL — SIGNIFICANT CHANGE UP (ref 1.6–2.6)
MCHC RBC-ENTMCNC: 30.3 PG — SIGNIFICANT CHANGE UP (ref 27–34)
MCHC RBC-ENTMCNC: 33.3 G/DL — SIGNIFICANT CHANGE UP (ref 32–36)
MCV RBC AUTO: 91 FL — SIGNIFICANT CHANGE UP (ref 80–100)
METHOD TYPE: SIGNIFICANT CHANGE UP
METHOD TYPE: SIGNIFICANT CHANGE UP
MONOCYTES # BLD AUTO: 0.7 K/UL — SIGNIFICANT CHANGE UP (ref 0–0.9)
MONOCYTES NFR BLD AUTO: 12.2 % — SIGNIFICANT CHANGE UP (ref 2–14)
NEUTROPHILS # BLD AUTO: 3.21 K/UL — SIGNIFICANT CHANGE UP (ref 1.8–7.4)
NEUTROPHILS NFR BLD AUTO: 56 % — SIGNIFICANT CHANGE UP (ref 43–77)
NRBC # BLD: 0 /100 WBCS — SIGNIFICANT CHANGE UP (ref 0–0)
ORGANISM # SPEC MICROSCOPIC CNT: ABNORMAL
PHOSPHATE SERPL-MCNC: 3.4 MG/DL — SIGNIFICANT CHANGE UP (ref 2.5–4.5)
PLATELET # BLD AUTO: 199 K/UL — SIGNIFICANT CHANGE UP (ref 150–400)
POTASSIUM SERPL-MCNC: 4.1 MMOL/L — SIGNIFICANT CHANGE UP (ref 3.5–5.3)
POTASSIUM SERPL-SCNC: 4.1 MMOL/L — SIGNIFICANT CHANGE UP (ref 3.5–5.3)
PROT SERPL-MCNC: 6 G/DL — SIGNIFICANT CHANGE UP (ref 6–8.3)
RBC # BLD: 4.12 M/UL — SIGNIFICANT CHANGE UP (ref 3.8–5.2)
RBC # FLD: 13.3 % — SIGNIFICANT CHANGE UP (ref 10.3–14.5)
SARS-COV-2 RNA SPEC QL NAA+PROBE: SIGNIFICANT CHANGE UP
SODIUM SERPL-SCNC: 135 MMOL/L — SIGNIFICANT CHANGE UP (ref 135–145)
SPECIMEN SOURCE: SIGNIFICANT CHANGE UP
SPECIMEN SOURCE: SIGNIFICANT CHANGE UP
WBC # BLD: 5.74 K/UL — SIGNIFICANT CHANGE UP (ref 3.8–10.5)
WBC # FLD AUTO: 5.74 K/UL — SIGNIFICANT CHANGE UP (ref 3.8–10.5)

## 2024-12-10 PROCEDURE — 99232 SBSQ HOSP IP/OBS MODERATE 35: CPT | Mod: GC

## 2024-12-10 PROCEDURE — 99223 1ST HOSP IP/OBS HIGH 75: CPT

## 2024-12-10 PROCEDURE — 72100 X-RAY EXAM L-S SPINE 2/3 VWS: CPT | Mod: 26

## 2024-12-10 RX ORDER — POLYETHYLENE GLYCOL 3350 17 G/17G
17 POWDER, FOR SOLUTION ORAL DAILY
Refills: 0 | Status: DISCONTINUED | OUTPATIENT
Start: 2024-12-10 | End: 2024-12-12

## 2024-12-10 RX ADMIN — PIPERACILLIN SODIUM AND TAZOBACTAM SODIUM 25 GRAM(S): 4; .5 INJECTION, POWDER, LYOPHILIZED, FOR SOLUTION INTRAVENOUS at 06:23

## 2024-12-10 RX ADMIN — PANTOPRAZOLE SODIUM 40 MILLIGRAM(S): 40 TABLET, DELAYED RELEASE ORAL at 06:22

## 2024-12-10 RX ADMIN — FLUTICASONE PROPIONATE AND SALMETEROL XINAFOATE 1 DOSE(S): 45; 21 AEROSOL, METERED RESPIRATORY (INHALATION) at 17:17

## 2024-12-10 RX ADMIN — PIPERACILLIN SODIUM AND TAZOBACTAM SODIUM 25 GRAM(S): 4; .5 INJECTION, POWDER, LYOPHILIZED, FOR SOLUTION INTRAVENOUS at 13:47

## 2024-12-10 RX ADMIN — FLUTICASONE PROPIONATE AND SALMETEROL XINAFOATE 1 DOSE(S): 45; 21 AEROSOL, METERED RESPIRATORY (INHALATION) at 06:24

## 2024-12-10 RX ADMIN — Medication 5000 UNIT(S): at 13:47

## 2024-12-10 RX ADMIN — Medication 5000 UNIT(S): at 06:22

## 2024-12-10 RX ADMIN — ESCITALOPRAM OXALATE 10 MILLIGRAM(S): 10 TABLET, FILM COATED ORAL at 11:46

## 2024-12-10 RX ADMIN — METOPROLOL TARTRATE 25 MILLIGRAM(S): 100 TABLET, FILM COATED ORAL at 06:22

## 2024-12-10 RX ADMIN — Medication 5000 UNIT(S): at 21:00

## 2024-12-10 RX ADMIN — PIPERACILLIN SODIUM AND TAZOBACTAM SODIUM 25 GRAM(S): 4; .5 INJECTION, POWDER, LYOPHILIZED, FOR SOLUTION INTRAVENOUS at 21:01

## 2024-12-10 RX ADMIN — Medication 81 MILLIGRAM(S): at 11:46

## 2024-12-10 RX ADMIN — METOPROLOL TARTRATE 25 MILLIGRAM(S): 100 TABLET, FILM COATED ORAL at 17:14

## 2024-12-10 RX ADMIN — Medication 125 MICROGRAM(S): at 06:22

## 2024-12-10 NOTE — CONSULT NOTE ADULT - ASSESSMENT
84-year-old female with a past medical history of hypertension, COPD, hypothyroidism, diverticulosis, myocardial infarction, lung cancer status post partial right pneumonectomy, gangrenous cholecystitis status post lap caroline in 2020 who was admitted to the hospital due to abdominal pain.    #Pseudomonas bacteremia  #Leukocytosis, resolved  #Positive urine culture, E. coli    Recommendations  Continue piperacillin/tazobactam at this time  Unclear source for bacteremia - CT imaging with no evidence for infection however patient with history of abdominal surgeries in the past  Possible GI translocation as well  Follow pending blood cultures  Follow GI PCR  Plan for 7-10 day course  Follow fever curve and WBC count    Robert Tihbodeaux MD  Division of Infectious Diseases

## 2024-12-10 NOTE — PROGRESS NOTE ADULT - PROBLEM SELECTOR PLAN 1
presented with epigastric pain, fever, nausea, vomiting  CT a/p: no acute intrab pathology, extensive diverticulosis   - symptoms improved. Still endorsed nausea, however no emesis. Soft bm with no blood  - likely gastroenteritis   EKG (12/7) - QTc 483  Bcx with pseudomonas    Plan  - cw zosyn   - Zofran prn for nausea   - f/u GI PCR  - f/u lumbar spine xray presented with epigastric pain, fever, nausea, vomiting  CT a/p: no acute intrab pathology, extensive diverticulosis   - symptoms improved. Still endorsed nausea, however no emesis. Soft bm with no blood  - likely gastroenteritis   EKG (12/7) - QTc 483  Bcx with pseudomonas  Spine xray with chronic age-related changes    Plan  - cw zosyn   - Zofran prn for nausea   - f/u GI PCR  - ID consulted for advice on whether to remove pessary as possible source of infection

## 2024-12-10 NOTE — PROGRESS NOTE ADULT - SUBJECTIVE AND OBJECTIVE BOX
***************************************************************  Adamaris Schmidt, PGY-1  Internal Medicine   Available on [employer] TEAMS  ***************************************************************    VANI GONZALEZ  84y  MRN: [id number]    Patient is a 84y old  Female who presents with a chief complaint of 84 year old female admitted with Hypokalemia, SIRS, abdominal pain      (09 Dec 2024 10:56)      Interval/Overnight Events: no events ON.     Subjective: Pt seen and examined at bedside. Denies fever, CP, SOB, abn pain, N/V, dysuria. Tolerating diet.      MEDICATIONS  (STANDING):  aspirin  chewable 81 milliGRAM(s) Oral daily  escitalopram 10 milliGRAM(s) Oral daily  fluticasone propionate/ salmeterol 500-50 MICROgram(s) Diskus 1 Dose(s) Inhalation two times a day  heparin   Injectable 5000 Unit(s) SubCutaneous every 8 hours  influenza  Vaccine (HIGH DOSE) 0.5 milliLiter(s) IntraMuscular once  levothyroxine 125 MICROGram(s) Oral daily  metoprolol tartrate 25 milliGRAM(s) Oral two times a day  pantoprazole    Tablet 40 milliGRAM(s) Oral before breakfast  piperacillin/tazobactam IVPB.. 3.375 Gram(s) IV Intermittent every 8 hours  sodium chloride 0.9%. 1000 milliLiter(s) (75 mL/Hr) IV Continuous <Continuous>    MEDICATIONS  (PRN):  lidocaine   4% Patch 1 Patch Transdermal daily PRN back pain  ondansetron Injectable 4 milliGRAM(s) IV Push every 6 hours PRN Nausea and/or Vomiting      Objective:    Vitals: Vital Signs Last 24 Hrs  T(C): 36.8 (12-10-24 @ 04:33), Max: 37 (12-09-24 @ 13:37)  T(F): 98.3 (12-10-24 @ 04:33), Max: 98.6 (12-09-24 @ 13:37)  HR: 62 (12-10-24 @ 04:33) (60 - 74)  BP: 154/71 (12-10-24 @ 04:33) (111/73 - 169/70)  BP(mean): --  RR: 18 (12-10-24 @ 04:33) (18 - 18)  SpO2: 95% (12-10-24 @ 04:33) (92% - 95%)                I&O's Summary    08 Dec 2024 07:01  -  09 Dec 2024 07:00  --------------------------------------------------------  IN: 470 mL / OUT: 850 mL / NET: -380 mL    09 Dec 2024 07:01  -  10 Dec 2024 06:10  --------------------------------------------------------  IN: 700 mL / OUT: 1200 mL / NET: -500 mL        PHYSICAL EXAM:  GENERAL: NAD  HEAD:  Atraumatic, Normocephalic  EYES: EOMI, conjunctiva and sclera clear  CHEST/LUNG: Clear to auscultation bilaterally; No rales, rhonchi, wheezing, or rubs  HEART: Regular rate and rhythm; No murmurs, rubs, or gallops  ABDOMEN: Soft, Nontender, Nondistended;   SKIN: No rashes or lesions  NERVOUS SYSTEM:  Alert & Oriented X3, no focal deficits    LABS:                        13.1   6.17  )-----------( 197      ( 09 Dec 2024 06:44 )             38.6                         12.3   11.25 )-----------( 208      ( 08 Dec 2024 06:44 )             36.7                         13.2   12.83 )-----------( 237      ( 07 Dec 2024 13:19 )             38.7     12-09    135  |  98  |  11  ----------------------------<  98  3.3[L]   |  23  |  0.84  12-09    134[L]  |  99  |  12  ----------------------------<  106[H]  3.5   |  23  |  1.00  12-07    134[L]  |  99  |  10  ----------------------------<  106[H]  3.7   |  24  |  0.81    Ca    8.9      09 Dec 2024 06:52  Ca    8.8      09 Dec 2024 01:06  Ca    8.5      07 Dec 2024 20:02  Phos  2.9     12-09  Mg     1.8     12-09    TPro  6.2  /  Alb  3.4  /  TBili  0.5  /  DBili  x   /  AST  59[H]  /  ALT  62[H]  /  AlkPhos  112  12-09  TPro  6.1  /  Alb  3.3  /  TBili  0.5  /  DBili  x   /  AST  64[H]  /  ALT  64[H]  /  AlkPhos  113  12-09  TPro  5.9[L]  /  Alb  3.4  /  TBili  0.6  /  DBili  x   /  AST  47[H]  /  ALT  25  /  AlkPhos  96  12-07    CAPILLARY BLOOD GLUCOSE            Urinalysis Basic - ( 09 Dec 2024 06:52 )    Color: x / Appearance: x / SG: x / pH: x  Gluc: 98 mg/dL / Ketone: x  / Bili: x / Urobili: x   Blood: x / Protein: x / Nitrite: x   Leuk Esterase: x / RBC: x / WBC x   Sq Epi: x / Non Sq Epi: x / Bacteria: x          RADIOLOGY & ADDITIONAL TESTS:         ***************************************************************  Adamaris Schmidt, PGY-1  Internal Medicine   Available on [employer] TEAMS  ***************************************************************    VANI GONZALEZ  84y  MRN: [id number]    Patient is a 84y old  Female who presents with a chief complaint of 84 year old female admitted with Hypokalemia, SIRS, abdominal pain      (09 Dec 2024 10:56)      Interval/Overnight Events: no events ON.     Subjective: Pt seen and examined at bedside. Reports that her back pain is much better today. Tolerating diet. No cough, dysuria, myalgias, fevers, chest pain.    MEDICATIONS  (STANDING):  aspirin  chewable 81 milliGRAM(s) Oral daily  escitalopram 10 milliGRAM(s) Oral daily  fluticasone propionate/ salmeterol 500-50 MICROgram(s) Diskus 1 Dose(s) Inhalation two times a day  heparin   Injectable 5000 Unit(s) SubCutaneous every 8 hours  influenza  Vaccine (HIGH DOSE) 0.5 milliLiter(s) IntraMuscular once  levothyroxine 125 MICROGram(s) Oral daily  metoprolol tartrate 25 milliGRAM(s) Oral two times a day  pantoprazole    Tablet 40 milliGRAM(s) Oral before breakfast  piperacillin/tazobactam IVPB.. 3.375 Gram(s) IV Intermittent every 8 hours  sodium chloride 0.9%. 1000 milliLiter(s) (75 mL/Hr) IV Continuous <Continuous>    MEDICATIONS  (PRN):  lidocaine   4% Patch 1 Patch Transdermal daily PRN back pain  ondansetron Injectable 4 milliGRAM(s) IV Push every 6 hours PRN Nausea and/or Vomiting      Objective:    Vitals: Vital Signs Last 24 Hrs  T(C): 36.8 (12-10-24 @ 04:33), Max: 37 (12-09-24 @ 13:37)  T(F): 98.3 (12-10-24 @ 04:33), Max: 98.6 (12-09-24 @ 13:37)  HR: 62 (12-10-24 @ 04:33) (60 - 74)  BP: 154/71 (12-10-24 @ 04:33) (111/73 - 169/70)  BP(mean): --  RR: 18 (12-10-24 @ 04:33) (18 - 18)  SpO2: 95% (12-10-24 @ 04:33) (92% - 95%)                I&O's Summary    08 Dec 2024 07:01  -  09 Dec 2024 07:00  --------------------------------------------------------  IN: 470 mL / OUT: 850 mL / NET: -380 mL    09 Dec 2024 07:01  -  10 Dec 2024 06:10  --------------------------------------------------------  IN: 700 mL / OUT: 1200 mL / NET: -500 mL        PHYSICAL EXAM:  GENERAL: NAD  HEAD:  Atraumatic, Normocephalic  EYES: EOMI, conjunctiva and sclera clear  CHEST/LUNG: Clear to auscultation bilaterally; No rales, rhonchi, wheezing, or rubs  HEART: Regular rate and rhythm; No murmurs, rubs, or gallops  ABDOMEN: Soft, Nontender, Nondistended;   SKIN: No rashes or lesions  MUSCULOSKELETAL: No tenderness to palpation in paraspinal muscles or on spine.   NERVOUS SYSTEM:  Alert & Oriented X3, no focal deficits    LABS:                        13.1   6.17  )-----------( 197      ( 09 Dec 2024 06:44 )             38.6                         12.3   11.25 )-----------( 208      ( 08 Dec 2024 06:44 )             36.7                         13.2   12.83 )-----------( 237      ( 07 Dec 2024 13:19 )             38.7     12-09    135  |  98  |  11  ----------------------------<  98  3.3[L]   |  23  |  0.84  12-09    134[L]  |  99  |  12  ----------------------------<  106[H]  3.5   |  23  |  1.00  12-07    134[L]  |  99  |  10  ----------------------------<  106[H]  3.7   |  24  |  0.81    Ca    8.9      09 Dec 2024 06:52  Ca    8.8      09 Dec 2024 01:06  Ca    8.5      07 Dec 2024 20:02  Phos  2.9     12-09  Mg     1.8     12-09    TPro  6.2  /  Alb  3.4  /  TBili  0.5  /  DBili  x   /  AST  59[H]  /  ALT  62[H]  /  AlkPhos  112  12-09  TPro  6.1  /  Alb  3.3  /  TBili  0.5  /  DBili  x   /  AST  64[H]  /  ALT  64[H]  /  AlkPhos  113  12-09  TPro  5.9[L]  /  Alb  3.4  /  TBili  0.6  /  DBili  x   /  AST  47[H]  /  ALT  25  /  AlkPhos  96  12-07    CAPILLARY BLOOD GLUCOSE            Urinalysis Basic - ( 09 Dec 2024 06:52 )    Color: x / Appearance: x / SG: x / pH: x  Gluc: 98 mg/dL / Ketone: x  / Bili: x / Urobili: x   Blood: x / Protein: x / Nitrite: x   Leuk Esterase: x / RBC: x / WBC x   Sq Epi: x / Non Sq Epi: x / Bacteria: x          RADIOLOGY & ADDITIONAL TESTS:

## 2024-12-10 NOTE — PROGRESS NOTE ADULT - PROBLEM SELECTOR PLAN 8
- DVT ppx: HSQ  - DASH/TLC diet  - aspiration precautions   - total protein 5.9, nutrition c/s   - disposition pending course, PT eval

## 2024-12-10 NOTE — CONSULT NOTE ADULT - SUBJECTIVE AND OBJECTIVE BOX
Patient is a 84y old  Female who presents with a chief complaint of SIRS+ i/s/o n/v/d, abd pain (10 Dec 2024 06:10)    HPI:  84-year-old female with a past medical history of hypertension, COPD, hypothyroidism, diverticulosis, myocardial infarction, lung cancer status post partial right pneumonectomy, gangrenous cholecystitis status post lap caroline in 2020 who was admitted to the hospital due to abdominal pain.    Patient lives in assisted living facility, reports that on the day prior to presentation developed severe abdominal pain which reportedly developed in the right upper quadrant and epigastric region.  Patient describes the pain as sharp, nonradiating.  Patient also then reports 2 episodes of vomiting, no reported blood and improvement to her abdominal pain following this.  Patient denied any systemic symptoms such as fever, chills.  Denied any headaches, dizziness, chest pain, shortness of breath, diarrhea.  Patient does report eating salmon for dinner prior to abdominal pain.    In the ED, patient is febrile with a Tmax of 100.8.  Patient admitted for further management.  Since admission, patient has been afebrile.  Otherwise hemodynamically stable on room air.  Labs show resolved leukocytosis from 12.8 on admission, BMP with renal function within normal limits, hepatic function within normal limits.  CRP is 41, ESR is 14.  Procalcitonin is 3.67.  Urinalysis with minimal pyuria at 9, negative bacteria.  RSV/flu/COVID panel is negative.  Urine culture obtained on 12/7/2024 is growing low-level CFU E. coli, pansensitive.  Blood cultures from 12/7/2024 with Pseudomonas aeruginosa, pansensitive.  CT abdomen/pelvis obtained on 12/7/2024 shows no evidence for acute intra-abdominal pathology indicative of infection.  X-ray of the lumbar spine obtained on 12/10/2024 shows no evidence for infection.          prior hospital charts reviewed [  x]  primary team notes reviewed [ x ]  other consultant notes reviewed [x  ]    PAST MEDICAL & SURGICAL HISTORY:  Hypertension      MI (myocardial infarction)      COPD (chronic obstructive pulmonary disease)      Lung cancer      Hypothyroidism      Diverticulosis      History of appendectomy      History of cholecystectomy          Allergies  No Known Allergies    ANTIMICROBIALS (past 90 days)  MEDICATIONS  (STANDING):  piperacillin/tazobactam IVPB.   200 mL/Hr IV Intermittent (12-08-24 @ 22:24)    piperacillin/tazobactam IVPB.-   25 mL/Hr IV Intermittent (12-09-24 @ 03:30)    piperacillin/tazobactam IVPB.-   25 mL/Hr IV Intermittent (12-09-24 @ 08:50)    piperacillin/tazobactam IVPB..   25 mL/Hr IV Intermittent (12-10-24 @ 13:47)   25 mL/Hr IV Intermittent (12-10-24 @ 06:23)   25 mL/Hr IV Intermittent (12-09-24 @ 22:17)   25 mL/Hr IV Intermittent (12-09-24 @ 13:33)    piperacillin/tazobactam IVPB..   25 mL/Hr IV Intermittent (12-08-24 @ 05:39)   25 mL/Hr IV Intermittent (12-07-24 @ 23:47)    piperacillin/tazobactam IVPB...   200 mL/Hr IV Intermittent (12-07-24 @ 15:56)        piperacillin/tazobactam IVPB.. 3.375 every 8 hours    MEDICATIONS  (STANDING):  aspirin  chewable 81 daily  escitalopram 10 daily  fluticasone propionate/ salmeterol 500-50 MICROgram(s) Diskus 1 two times a day  heparin   Injectable 5000 every 8 hours  influenza  Vaccine (HIGH DOSE) 0.5 once  levothyroxine 125 daily  metoprolol tartrate 25 two times a day  ondansetron Injectable 4 every 6 hours PRN  pantoprazole    Tablet 40 before breakfast  polyethylene glycol 3350 17 daily    SOCIAL HISTORY:     Lives at assistant living. No reported tobacco, alcohol, illicidt drug use     FAMILY HISTORY:  Family history of colonic diverticulitis (Mother, Sibling)      REVIEW OF SYSTEMS  [  ] ROS unobtainable because:    [ x ] All other systems negative except as noted below:	    Constitutional:  [ ] fever [ ] chills  [ ] weight loss  [ ] weakness  Skin:  [ ] rash [ ] phlebitis	  Eyes: [ ] icterus [ ] pain  [ ] discharge	  ENMT: [ ] sore throat  [ ] thrush [ ] ulcers [ ] exudates  Respiratory: [ ] dyspnea [ ] hemoptysis [ ] cough [ ] sputum	  Cardiovascular:  [ ] chest pain [ ] palpitations [ ] edema	  Gastrointestinal:  [ ] nausea [ ] vomiting [ ] diarrhea [ ] constipation [ ] pain	  Genitourinary:  [ ] dysuria [ ] frequency [ ] hematuria [ ] discharge [ ] flank pain  [ ] incontinence  Musculoskeletal:  [ ] myalgias [ ] arthralgias [ ] arthritis  [ ] back pain  Neurological:  [ ] headache [ ] seizures  [ ] confusion/altered mental status  Psychiatric:  [ ] anxiety [ ] depression	  Hematology/Lymphatics:  [ ] lymphadenopathy  Endocrine:  [ ] adrenal [ ] thyroid  Allergic/Immunologic:	 [ ] transplant [ ] seasonal    Vital Signs Last 24 Hrs  T(F): 98.3 (12-10-24 @ 04:33), Max: 100.8 (12-07-24 @ 14:45)  Vital Signs Last 24 Hrs  HR: 62 (12-10-24 @ 04:33) (60 - 74)  BP: 154/71 (12-10-24 @ 04:33) (111/73 - 154/75)  RR: 18 (12-10-24 @ 04:33)  SpO2: 95% (12-10-24 @ 04:33) (93% - 95%)  Wt(kg): --    PHYSICAL EXAM:  Constitutional: non-toxic, no distress  HEAD/EYES: anicteric, no conjunctival injection  ENT:  supple, no thrush  Cardiovascular:   normal S1, S2, no murmur, no edema  Respiratory:  clear BS bilaterally, no wheezes, no rales  GI:  soft, non-tender, normal bowel sounds  :  no huggins, no CVA tenderness  Musculoskeletal:  no synovitis, normal ROM  Neurologic: awake and alert, normal strength, no focal findings  Skin:  no rash, no erythema, no phlebitis  Heme/Onc: no lymphadenopathy   Psychiatric:  awake, alert, appropriate mood                            12.5   5.74  )-----------( 199      ( 10 Dec 2024 06:35 )             37.5   12-10    135  |  100  |  13  ----------------------------<  88  4.1   |  22  |  0.99    Ca    8.7      10 Dec 2024 06:36  Phos  3.4     12-10  Mg     2.2     12-10    TPro  6.0  /  Alb  3.3  /  TBili  0.4  /  DBili  x   /  AST  36  /  ALT  42  /  AlkPhos  104  12-10    Urinalysis Basic - ( 10 Dec 2024 06:36 )    Color: x / Appearance: x / SG: x / pH: x  Gluc: 88 mg/dL / Ketone: x  / Bili: x / Urobili: x   Blood: x / Protein: x / Nitrite: x   Leuk Esterase: x / RBC: x / WBC x   Sq Epi: x / Non Sq Epi: x / Bacteria: x    MICROBIOLOGY:  Culture - Blood (collected 07 Dec 2024 15:15)  Source: .Blood BLOOD  Gram Stain (08 Dec 2024 17:50):    Growth in aerobic bottle: Gram Negative Rods  Final Report (10 Dec 2024 08:05):    Growth in aerobic bottle: Pseudomonas aeruginosa    Direct identification is available within approximately 3-5    hours either by Blood Panel Multiplexed PCR or Direct    MALDI-TOF. Details: [email or web address]  Organism: Blood Culture PCR  Pseudomonas aeruginosa (10 Dec 2024 08:05)  Organism: Pseudomonas aeruginosa (10 Dec 2024 08:05)      Method Type: AN      -  Aztreonam: S <=4      -  Cefepime: S <=2      -  Ceftazidime: S <=1      -  Ciprofloxacin: S <=0.25      -  Imipenem: S 2      -  Levofloxacin: S <=0.5      -  Meropenem: S <=1      -  Piperacillin/Tazobactam: S <=8      -  Tobramycin: I <=2  Organism: Blood Culture PCR (10 Dec 2024 08:05)      Method Type: PCR      -  Pseudomonas aeruginosa: Detec    Culture - Blood (collected 07 Dec 2024 15:05)  Source: .Blood BLOOD  Preliminary Report (09 Dec 2024 19:01):    No growth at 48 Hours    Culture - Urine (collected 07 Dec 2024 14:18)  Source: Clean Catch Clean Catch (Midstream)  Final Report (10 Dec 2024 08:39):    10,000 - 49,000 CFU/mL Escherichia coli    Normal Urogenital gagan present  Organism: Escherichia coli (10 Dec 2024 08:39)  Organism: Escherichia coli (10 Dec 2024 08:39)      Method Type: AN      -  Amoxicillin/Clavulanic Acid: S <=8/4      -  Ampicillin: S <=8 These ampicillin results predict results for amoxicillin      -  Ampicillin/Sulbactam: S <=4/2      -  Aztreonam: S <=4      -  Cefazolin: S <=2 For uncomplicated UTI with K. pneumoniae, E. coli, or P. mirablis: AN <=16 is sensitive and AN >=32 is resistant. This also predicts results for oral agents cefaclor, cefdinir, cefpodoxime, cefprozil, cefuroxime axetil, cephalexin and locarbef for uncomplicated UTI. Note that some isolates may be susceptible to these agents while testing resistant to cefazolin.      -  Cefepime: S <=2      -  Cefoxitin: S <=8      -  Ceftriaxone: S <=1      -  Cefuroxime: S <=4      -  Ciprofloxacin: S <=0.25      -  Ertapenem: S <=0.5      -  Gentamicin: S <=2      -  Imipenem: S <=1      -  Levofloxacin: S <=0.5      -  Meropenem: S <=1      -  Nitrofurantoin: S <=32 Should not be used to treat pyelonephritis      -  Piperacillin/Tazobactam: S <=8      -  Tobramycin: S <=2      -  Trimethoprim/Sulfamethoxazole: S <=0.5/9.5                  RADIOLOGY:  imaging below personally reviewed and agree with findings

## 2024-12-10 NOTE — PROGRESS NOTE ADULT - ASSESSMENT
84F w/ HTN, COPD (30 PYH smoking), hypothyroidism, diverticulosis, CAD/MI (age 36), lung ca s/p partial right pneumonectomy, gangrenous cholecystitis s/p laparoscopic cholecystectomy (12/12/2020) came in with abdominal pain, fever, n/v. On a brief course of Zosyn. Met SIRS criteria initially, however patient is now afebrile and leukocytosis downtrending. Bcx growing pseudomonas. Pending Xray spine for source. Continuing with Zosyn.  84F w/ HTN, COPD (30 PYH smoking), hypothyroidism, diverticulosis, CAD/MI (age 36), lung ca s/p partial right pneumonectomy, gangrenous cholecystitis s/p laparoscopic cholecystectomy (12/12/2020) came in with abdominal pain, fever, n/v. On a brief course of Zosyn. Met SIRS criteria initially, however patient is now afebrile and leukocytosis downtrending. Bcx growing pseudomonas. Xray spine with chronic changes. Continuing with Zosyn. Pessary possible source.

## 2024-12-10 NOTE — PROGRESS NOTE ADULT - ATTENDING COMMENTS
PAtient seen and evaluated at bedside. back pain improved today, no other complaints.         #Pseudomonas bacteremia  -continue zosyn  -unclear etiology, not elucidated on CT a/p, though ? Pessary as cause?  -UCx not pseudomonas, however appears taken after 1st dose of Abx  -repeat BCx pending  -ID consult given lack of source to discuss if need to remove or exchange pessary    rest as above

## 2024-12-11 LAB
-  TOBRAMYCIN: SIGNIFICANT CHANGE UP
ANION GAP SERPL CALC-SCNC: 12 MMOL/L — SIGNIFICANT CHANGE UP (ref 5–17)
BASOPHILS # BLD AUTO: 0.04 K/UL — SIGNIFICANT CHANGE UP (ref 0–0.2)
BASOPHILS NFR BLD AUTO: 0.7 % — SIGNIFICANT CHANGE UP (ref 0–2)
BUN SERPL-MCNC: 17 MG/DL — SIGNIFICANT CHANGE UP (ref 7–23)
CALCIUM SERPL-MCNC: 8.7 MG/DL — SIGNIFICANT CHANGE UP (ref 8.4–10.5)
CHLORIDE SERPL-SCNC: 101 MMOL/L — SIGNIFICANT CHANGE UP (ref 96–108)
CO2 SERPL-SCNC: 22 MMOL/L — SIGNIFICANT CHANGE UP (ref 22–31)
CREAT SERPL-MCNC: 1.06 MG/DL — SIGNIFICANT CHANGE UP (ref 0.5–1.3)
CRP SERPL-MCNC: 22 MG/L — HIGH (ref 0–4)
CULTURE RESULTS: ABNORMAL
EGFR: 52 ML/MIN/1.73M2 — LOW
EOSINOPHIL # BLD AUTO: 0.18 K/UL — SIGNIFICANT CHANGE UP (ref 0–0.5)
EOSINOPHIL NFR BLD AUTO: 3.3 % — SIGNIFICANT CHANGE UP (ref 0–6)
ERYTHROCYTE [SEDIMENTATION RATE] IN BLOOD: 13 MM/HR — SIGNIFICANT CHANGE UP (ref 0–20)
GLUCOSE SERPL-MCNC: 99 MG/DL — SIGNIFICANT CHANGE UP (ref 70–99)
HCT VFR BLD CALC: 36.1 % — SIGNIFICANT CHANGE UP (ref 34.5–45)
HGB BLD-MCNC: 12.1 G/DL — SIGNIFICANT CHANGE UP (ref 11.5–15.5)
IMM GRANULOCYTES NFR BLD AUTO: 0.4 % — SIGNIFICANT CHANGE UP (ref 0–0.9)
LYMPHOCYTES # BLD AUTO: 1.56 K/UL — SIGNIFICANT CHANGE UP (ref 1–3.3)
LYMPHOCYTES # BLD AUTO: 28.8 % — SIGNIFICANT CHANGE UP (ref 13–44)
MAGNESIUM SERPL-MCNC: 2.1 MG/DL — SIGNIFICANT CHANGE UP (ref 1.6–2.6)
MCHC RBC-ENTMCNC: 30.3 PG — SIGNIFICANT CHANGE UP (ref 27–34)
MCHC RBC-ENTMCNC: 33.5 G/DL — SIGNIFICANT CHANGE UP (ref 32–36)
MCV RBC AUTO: 90.5 FL — SIGNIFICANT CHANGE UP (ref 80–100)
METHOD TYPE: SIGNIFICANT CHANGE UP
MONOCYTES # BLD AUTO: 0.56 K/UL — SIGNIFICANT CHANGE UP (ref 0–0.9)
MONOCYTES NFR BLD AUTO: 10.3 % — SIGNIFICANT CHANGE UP (ref 2–14)
NEUTROPHILS # BLD AUTO: 3.06 K/UL — SIGNIFICANT CHANGE UP (ref 1.8–7.4)
NEUTROPHILS NFR BLD AUTO: 56.5 % — SIGNIFICANT CHANGE UP (ref 43–77)
NRBC # BLD: 0 /100 WBCS — SIGNIFICANT CHANGE UP (ref 0–0)
ORGANISM # SPEC MICROSCOPIC CNT: ABNORMAL
PHOSPHATE SERPL-MCNC: 4.2 MG/DL — SIGNIFICANT CHANGE UP (ref 2.5–4.5)
PLATELET # BLD AUTO: 207 K/UL — SIGNIFICANT CHANGE UP (ref 150–400)
POTASSIUM SERPL-MCNC: 3.9 MMOL/L — SIGNIFICANT CHANGE UP (ref 3.5–5.3)
POTASSIUM SERPL-SCNC: 3.9 MMOL/L — SIGNIFICANT CHANGE UP (ref 3.5–5.3)
RBC # BLD: 3.99 M/UL — SIGNIFICANT CHANGE UP (ref 3.8–5.2)
RBC # FLD: 13.3 % — SIGNIFICANT CHANGE UP (ref 10.3–14.5)
SODIUM SERPL-SCNC: 135 MMOL/L — SIGNIFICANT CHANGE UP (ref 135–145)
WBC # BLD: 5.42 K/UL — SIGNIFICANT CHANGE UP (ref 3.8–10.5)
WBC # FLD AUTO: 5.42 K/UL — SIGNIFICANT CHANGE UP (ref 3.8–10.5)

## 2024-12-11 PROCEDURE — 99232 SBSQ HOSP IP/OBS MODERATE 35: CPT | Mod: GC

## 2024-12-11 PROCEDURE — 99232 SBSQ HOSP IP/OBS MODERATE 35: CPT

## 2024-12-11 PROCEDURE — 93010 ELECTROCARDIOGRAM REPORT: CPT

## 2024-12-11 RX ORDER — ACETAMINOPHEN, DIPHENHYDRAMINE HCL, PHENYLEPHRINE HCL 325; 25; 5 MG/1; MG/1; MG/1
3 TABLET ORAL AT BEDTIME
Refills: 0 | Status: DISCONTINUED | OUTPATIENT
Start: 2024-12-11 | End: 2024-12-12

## 2024-12-11 RX ADMIN — PIPERACILLIN SODIUM AND TAZOBACTAM SODIUM 25 GRAM(S): 4; .5 INJECTION, POWDER, LYOPHILIZED, FOR SOLUTION INTRAVENOUS at 14:18

## 2024-12-11 RX ADMIN — Medication 5000 UNIT(S): at 14:18

## 2024-12-11 RX ADMIN — METOPROLOL TARTRATE 25 MILLIGRAM(S): 100 TABLET, FILM COATED ORAL at 17:11

## 2024-12-11 RX ADMIN — Medication 5000 UNIT(S): at 05:27

## 2024-12-11 RX ADMIN — FLUTICASONE PROPIONATE AND SALMETEROL XINAFOATE 1 DOSE(S): 45; 21 AEROSOL, METERED RESPIRATORY (INHALATION) at 05:27

## 2024-12-11 RX ADMIN — PIPERACILLIN SODIUM AND TAZOBACTAM SODIUM 25 GRAM(S): 4; .5 INJECTION, POWDER, LYOPHILIZED, FOR SOLUTION INTRAVENOUS at 21:35

## 2024-12-11 RX ADMIN — Medication 5000 UNIT(S): at 21:35

## 2024-12-11 RX ADMIN — POLYETHYLENE GLYCOL 3350 17 GRAM(S): 17 POWDER, FOR SOLUTION ORAL at 11:21

## 2024-12-11 RX ADMIN — ESCITALOPRAM OXALATE 10 MILLIGRAM(S): 10 TABLET, FILM COATED ORAL at 11:21

## 2024-12-11 RX ADMIN — Medication 81 MILLIGRAM(S): at 11:21

## 2024-12-11 RX ADMIN — PANTOPRAZOLE SODIUM 40 MILLIGRAM(S): 40 TABLET, DELAYED RELEASE ORAL at 05:27

## 2024-12-11 RX ADMIN — PIPERACILLIN SODIUM AND TAZOBACTAM SODIUM 25 GRAM(S): 4; .5 INJECTION, POWDER, LYOPHILIZED, FOR SOLUTION INTRAVENOUS at 05:27

## 2024-12-11 RX ADMIN — Medication 125 MICROGRAM(S): at 05:27

## 2024-12-11 RX ADMIN — METOPROLOL TARTRATE 25 MILLIGRAM(S): 100 TABLET, FILM COATED ORAL at 05:27

## 2024-12-11 RX ADMIN — FLUTICASONE PROPIONATE AND SALMETEROL XINAFOATE 1 DOSE(S): 45; 21 AEROSOL, METERED RESPIRATORY (INHALATION) at 17:11

## 2024-12-11 NOTE — PROGRESS NOTE ADULT - SUBJECTIVE AND OBJECTIVE BOX
***************************************************************  Adamaris Schmidt, PGY-1  Internal Medicine   Available on [employer] TEAMS  ***************************************************************    VANI GONZALEZ  84y  MRN: [id number]    Patient is a 84y old  Female who presents with a chief complaint of SIRS+ i/s/o n/v/d, abd pain (10 Dec 2024 15:21)      Interval/Overnight Events: no events ON.     Subjective: Pt seen and examined at bedside. Denies fever, CP, SOB, abn pain, N/V, dysuria. Tolerating diet.      MEDICATIONS  (STANDING):  aspirin  chewable 81 milliGRAM(s) Oral daily  escitalopram 10 milliGRAM(s) Oral daily  fluticasone propionate/ salmeterol 500-50 MICROgram(s) Diskus 1 Dose(s) Inhalation two times a day  heparin   Injectable 5000 Unit(s) SubCutaneous every 8 hours  influenza  Vaccine (HIGH DOSE) 0.5 milliLiter(s) IntraMuscular once  levothyroxine 125 MICROGram(s) Oral daily  metoprolol tartrate 25 milliGRAM(s) Oral two times a day  pantoprazole    Tablet 40 milliGRAM(s) Oral before breakfast  piperacillin/tazobactam IVPB.. 3.375 Gram(s) IV Intermittent every 8 hours  polyethylene glycol 3350 17 Gram(s) Oral daily  sodium chloride 0.9%. 1000 milliLiter(s) (75 mL/Hr) IV Continuous <Continuous>    MEDICATIONS  (PRN):  lidocaine   4% Patch 1 Patch Transdermal daily PRN back pain  ondansetron Injectable 4 milliGRAM(s) IV Push every 6 hours PRN Nausea and/or Vomiting      Objective:    Vitals: Vital Signs Last 24 Hrs  T(C): 36.6 (12-11-24 @ 04:00), Max: 36.7 (12-10-24 @ 20:04)  T(F): 97.8 (12-11-24 @ 04:00), Max: 98.1 (12-10-24 @ 20:04)  HR: 60 (12-11-24 @ 04:00) (60 - 85)  BP: 171/73 (12-11-24 @ 04:00) (128/79 - 171/73)  BP(mean): --  RR: 17 (12-11-24 @ 04:00) (17 - 18)  SpO2: 96% (12-11-24 @ 04:00) (96% - 97%)                I&O's Summary    09 Dec 2024 07:01  -  10 Dec 2024 07:00  --------------------------------------------------------  IN: 700 mL / OUT: 1200 mL / NET: -500 mL    10 Dec 2024 07:01  -  11 Dec 2024 06:14  --------------------------------------------------------  IN: 900 mL / OUT: 300 mL / NET: 600 mL        PHYSICAL EXAM:  GENERAL: NAD  HEAD:  Atraumatic, Normocephalic  EYES: EOMI, conjunctiva and sclera clear  CHEST/LUNG: Clear to auscultation bilaterally; No rales, rhonchi, wheezing, or rubs  HEART: Regular rate and rhythm; No murmurs, rubs, or gallops  ABDOMEN: Soft, Nontender, Nondistended;   SKIN: No rashes or lesions  NERVOUS SYSTEM:  Alert & Oriented X3, no focal deficits    LABS:                        12.5   5.74  )-----------( 199      ( 10 Dec 2024 06:35 )             37.5                         13.1   6.17  )-----------( 197      ( 09 Dec 2024 06:44 )             38.6                         12.3   11.25 )-----------( 208      ( 08 Dec 2024 06:44 )             36.7     12-10    135  |  100  |  13  ----------------------------<  88  4.1   |  22  |  0.99  12-09    135  |  98  |  11  ----------------------------<  98  3.3[L]   |  23  |  0.84  12-09    134[L]  |  99  |  12  ----------------------------<  106[H]  3.5   |  23  |  1.00    Ca    8.7      10 Dec 2024 06:36  Ca    8.9      09 Dec 2024 06:52  Ca    8.8      09 Dec 2024 01:06  Phos  3.4     12-10  Mg     2.2     12-10    TPro  6.0  /  Alb  3.3  /  TBili  0.4  /  DBili  x   /  AST  36  /  ALT  42  /  AlkPhos  104  12-10  TPro  6.2  /  Alb  3.4  /  TBili  0.5  /  DBili  x   /  AST  59[H]  /  ALT  62[H]  /  AlkPhos  112  12-09  TPro  6.1  /  Alb  3.3  /  TBili  0.5  /  DBili  x   /  AST  64[H]  /  ALT  64[H]  /  AlkPhos  113  12-09    CAPILLARY BLOOD GLUCOSE            Urinalysis Basic - ( 10 Dec 2024 06:36 )    Color: x / Appearance: x / SG: x / pH: x  Gluc: 88 mg/dL / Ketone: x  / Bili: x / Urobili: x   Blood: x / Protein: x / Nitrite: x   Leuk Esterase: x / RBC: x / WBC x   Sq Epi: x / Non Sq Epi: x / Bacteria: x          RADIOLOGY & ADDITIONAL TESTS:         ***************************************************************  Adamaris Schmidt, PGY-1  Internal Medicine   Available on [employer] TEAMS  ***************************************************************    VANI GONZALEZ  84y  MRN: [id number]    Patient is a 84y old  Female who presents with a chief complaint of SIRS+ i/s/o n/v/d, abd pain (10 Dec 2024 15:21)      Interval/Overnight Events: no events ON.     Subjective: Pt seen and examined at bedside. Reports that her back pain is mild today. Feels well. Denies fever, cough, chest pain, shortness of breath. Had one small bowel movement yesterday. Tolerating diet.      MEDICATIONS  (STANDING):  aspirin  chewable 81 milliGRAM(s) Oral daily  escitalopram 10 milliGRAM(s) Oral daily  fluticasone propionate/ salmeterol 500-50 MICROgram(s) Diskus 1 Dose(s) Inhalation two times a day  heparin   Injectable 5000 Unit(s) SubCutaneous every 8 hours  influenza  Vaccine (HIGH DOSE) 0.5 milliLiter(s) IntraMuscular once  levothyroxine 125 MICROGram(s) Oral daily  metoprolol tartrate 25 milliGRAM(s) Oral two times a day  pantoprazole    Tablet 40 milliGRAM(s) Oral before breakfast  piperacillin/tazobactam IVPB.. 3.375 Gram(s) IV Intermittent every 8 hours  polyethylene glycol 3350 17 Gram(s) Oral daily  sodium chloride 0.9%. 1000 milliLiter(s) (75 mL/Hr) IV Continuous <Continuous>    MEDICATIONS  (PRN):  lidocaine   4% Patch 1 Patch Transdermal daily PRN back pain  ondansetron Injectable 4 milliGRAM(s) IV Push every 6 hours PRN Nausea and/or Vomiting      Objective:    Vitals: Vital Signs Last 24 Hrs  T(C): 36.6 (12-11-24 @ 04:00), Max: 36.7 (12-10-24 @ 20:04)  T(F): 97.8 (12-11-24 @ 04:00), Max: 98.1 (12-10-24 @ 20:04)  HR: 60 (12-11-24 @ 04:00) (60 - 85)  BP: 171/73 (12-11-24 @ 04:00) (128/79 - 171/73)  BP(mean): --  RR: 17 (12-11-24 @ 04:00) (17 - 18)  SpO2: 96% (12-11-24 @ 04:00) (96% - 97%)                I&O's Summary    09 Dec 2024 07:01  -  10 Dec 2024 07:00  --------------------------------------------------------  IN: 700 mL / OUT: 1200 mL / NET: -500 mL    10 Dec 2024 07:01  -  11 Dec 2024 06:14  --------------------------------------------------------  IN: 900 mL / OUT: 300 mL / NET: 600 mL        PHYSICAL EXAM:  GENERAL: NAD  HEAD:  Atraumatic, Normocephalic  EYES: EOMI, conjunctiva and sclera clear  CHEST/LUNG: Clear to auscultation bilaterally; No rales, rhonchi, wheezing, or rubs  HEART: Regular rate and rhythm; No murmurs, rubs, or gallops  ABDOMEN: Soft, Nontender, Nondistended;   SKIN: No rashes or lesions  MUSCULOSKELETAL: Mild TTP along spine and paraspinal muscles bilaterally  NERVOUS SYSTEM:  Alert & Oriented X3, no focal deficits    LABS:                        12.5   5.74  )-----------( 199      ( 10 Dec 2024 06:35 )             37.5                         13.1   6.17  )-----------( 197      ( 09 Dec 2024 06:44 )             38.6                         12.3   11.25 )-----------( 208      ( 08 Dec 2024 06:44 )             36.7     12-10    135  |  100  |  13  ----------------------------<  88  4.1   |  22  |  0.99  12-09    135  |  98  |  11  ----------------------------<  98  3.3[L]   |  23  |  0.84  12-09    134[L]  |  99  |  12  ----------------------------<  106[H]  3.5   |  23  |  1.00    Ca    8.7      10 Dec 2024 06:36  Ca    8.9      09 Dec 2024 06:52  Ca    8.8      09 Dec 2024 01:06  Phos  3.4     12-10  Mg     2.2     12-10    TPro  6.0  /  Alb  3.3  /  TBili  0.4  /  DBili  x   /  AST  36  /  ALT  42  /  AlkPhos  104  12-10  TPro  6.2  /  Alb  3.4  /  TBili  0.5  /  DBili  x   /  AST  59[H]  /  ALT  62[H]  /  AlkPhos  112  12-09  TPro  6.1  /  Alb  3.3  /  TBili  0.5  /  DBili  x   /  AST  64[H]  /  ALT  64[H]  /  AlkPhos  113  12-09    CAPILLARY BLOOD GLUCOSE            Urinalysis Basic - ( 10 Dec 2024 06:36 )    Color: x / Appearance: x / SG: x / pH: x  Gluc: 88 mg/dL / Ketone: x  / Bili: x / Urobili: x   Blood: x / Protein: x / Nitrite: x   Leuk Esterase: x / RBC: x / WBC x   Sq Epi: x / Non Sq Epi: x / Bacteria: x          RADIOLOGY & ADDITIONAL TESTS:

## 2024-12-11 NOTE — PROVIDER CONTACT NOTE (CRITICAL VALUE NOTIFICATION) - ACTION/TREATMENT ORDERED:
IV abx zosyn  ordered and administered at 2150
MD made aware. No interventions at this time.
MD Adamaris Schmidt aware. Nursing care ongoing.

## 2024-12-11 NOTE — PROGRESS NOTE ADULT - ASSESSMENT
84F w/ HTN, COPD (30 PYH smoking), hypothyroidism, diverticulosis, CAD/MI (age 36), lung ca s/p partial right pneumonectomy, gangrenous cholecystitis s/p laparoscopic cholecystectomy (12/12/2020) came in with abdominal pain, fever, n/v. On a brief course of Zosyn. Met SIRS criteria initially, however patient is now afebrile and leukocytosis downtrending. Bcx growing pseudomonas. Xray spine with chronic changes. Continuing with Zosyn. Pessary possible source. 84F w/ HTN, COPD (30 PYH smoking), hypothyroidism, diverticulosis, CAD/MI (age 36), lung ca s/p partial right pneumonectomy, gangrenous cholecystitis s/p laparoscopic cholecystectomy (12/12/2020) came in with abdominal pain, fever, n/v. On a brief course of Zosyn. Met SIRS criteria initially, however patient is now afebrile and leukocytosis downtrending. Bcx growing pseudomonas. Xray spine with chronic changes. Continuing with Zosyn. Pessary less likely source given no vaginal discharge. Plan to continue abx for 7-10 days

## 2024-12-11 NOTE — PROGRESS NOTE ADULT - ASSESSMENT
84-year-old female with a past medical history of hypertension, COPD, hypothyroidism, diverticulosis, myocardial infarction, lung cancer status post partial right pneumonectomy, gangrenous cholecystitis status post lap caroline in 2020 who was admitted to the hospital due to abdominal pain.    #Pseudomonas bacteremia  #Leukocytosis, resolved  #Positive urine culture, E. coli    Recommendations  Continue piperacillin/tazobactam at this time  Unclear source for bacteremia - CT imaging with no evidence for infection however patient with history of abdominal surgeries in the past, pessary in place however doubt source of infection given lack of vaginal/pelvic discomfort vaginal discharge  Possible GI translocation as well  Follow pending repeat blood cultures, 12/9 negative to date  GI PCR negative  Plan for 7 day course, end date: 12/14/24  If otherwise ready for discharge, can switch to ciprofloxacin 500 mg q12hr  Follow fever curve and WBC count    Robert Thibodeaux MD  Division of Infectious Diseases

## 2024-12-11 NOTE — PROGRESS NOTE ADULT - SUBJECTIVE AND OBJECTIVE BOX
Follow Up:  bacteremia    Interval History/ROS:  Overnight: No acute events.  Patient remains afebrile.  Otherwise hemodynamically stable on room air.  Latest labs show no leukocytosis, BMP with renal function within normal limits, CRP 22.  Blood cultures from 12/9/2024 remain negative to date.    Patient seen examined bedside.  No new complaints.    Allergies  No Known Allergies        ANTIMICROBIALS:  piperacillin/tazobactam IVPB.. 3.375 every 8 hours      OTHER MEDS:  MEDICATIONS  (STANDING):  aspirin  chewable 81 daily  escitalopram 10 daily  fluticasone propionate/ salmeterol 500-50 MICROgram(s) Diskus 1 two times a day  heparin   Injectable 5000 every 8 hours  influenza  Vaccine (HIGH DOSE) 0.5 once  levothyroxine 125 daily  metoprolol tartrate 25 two times a day  ondansetron Injectable 4 every 6 hours PRN  pantoprazole    Tablet 40 before breakfast  polyethylene glycol 3350 17 daily      Vital Signs Last 24 Hrs  T(C): 36.6 (11 Dec 2024 12:24), Max: 36.8 (11 Dec 2024 08:32)  T(F): 97.8 (11 Dec 2024 12:24), Max: 98.2 (11 Dec 2024 08:32)  HR: 79 (11 Dec 2024 12:24) (60 - 85)  BP: 154/95 (11 Dec 2024 12:24) (128/79 - 171/73)  BP(mean): --  RR: 17 (11 Dec 2024 12:24) (17 - 18)  SpO2: 95% (11 Dec 2024 12:24) (95% - 97%)    Parameters below as of 11 Dec 2024 12:24  Patient On (Oxygen Delivery Method): room air        PHYSICAL EXAM:  Constitutional: non-toxic, no distress  HEAD/EYES: anicteric, no conjunctival injection  ENT:  supple, no thrush  Cardiovascular:   normal S1, S2, no murmur, no edema  Respiratory:  clear BS bilaterally, no wheezes, no rales  GI:  soft, non-tender, normal bowel sounds  :  no huggins, no CVA tenderness  Musculoskeletal:  no synovitis, normal ROM  Neurologic: awake and alert, normal strength, no focal findings  Skin:  no rash, no erythema, no phlebitis  Heme/Onc: no lymphadenopathy   Psychiatric:  awake, alert, appropriate mood                          12.1   5.42  )-----------( 207      ( 11 Dec 2024 06:49 )             36.1       12-11    135  |  101  |  17  ----------------------------<  99  3.9   |  22  |  1.06    Ca    8.7      11 Dec 2024 06:47  Phos  4.2     12-11  Mg     2.1     12-11    TPro  6.0  /  Alb  3.3  /  TBili  0.4  /  DBili  x   /  AST  36  /  ALT  42  /  AlkPhos  104  12-10      Urinalysis Basic - ( 11 Dec 2024 06:47 )    Color: x / Appearance: x / SG: x / pH: x  Gluc: 99 mg/dL / Ketone: x  / Bili: x / Urobili: x   Blood: x / Protein: x / Nitrite: x   Leuk Esterase: x / RBC: x / WBC x   Sq Epi: x / Non Sq Epi: x / Bacteria: x        MICROBIOLOGY:  v    Culture - Blood (collected 09 Dec 2024 17:03)  Source: .Blood BLOOD  Preliminary Report (10 Dec 2024 23:01):    No growth at 24 hours    Culture - Blood (collected 09 Dec 2024 17:00)  Source: .Blood BLOOD  Preliminary Report (10 Dec 2024 23:01):    No growth at 24 hours    Culture - Blood (collected 07 Dec 2024 15:15)  Source: .Blood BLOOD  Gram Stain (08 Dec 2024 17:50):    Growth in aerobic bottle: Gram Negative Rods  Final Report (10 Dec 2024 17:50):    **Please Note**: This is a Corrected Report**    Growth in aerobic bottle: Pseudomonas aeruginosa    Direct identification is available within approximately 3-5    hours either by Blood Panel Multiplexed PCR or Direct    MALDI-TOF. Details: [email or web address]    please note correction  in susceptibility  Organism: Pseudomonas aeruginosa (10 Dec 2024 17:50)      Method Type: AN      -  Aztreonam: S <=4      -  Cefepime: S <=2      -  Ceftazidime: S <=1      -  Ciprofloxacin: S <=0.25      -  Imipenem: S 2      -  Levofloxacin: S <=0.5      -  Meropenem: S <=1      -  Piperacillin/Tazobactam: S <=8      -  Tobramycin: N/I <=2  Organism: Blood Culture PCR (10 Dec 2024 17:50)      Method Type: PCR      -  Pseudomonas aeruginosa: Detec  Organism: Blood Culture PCR  Pseudomonas aeruginosa (10 Dec 2024 08:05)    Culture - Blood (collected 07 Dec 2024 15:05)  Source: .Blood BLOOD  Preliminary Report (10 Dec 2024 19:01):    No growth at 72 Hours    Culture - Urine (collected 07 Dec 2024 14:18)  Source: Clean Catch Clean Catch (Midstream)  Final Report (10 Dec 2024 08:39):    10,000 - 49,000 CFU/mL Escherichia coli    Normal Urogenital gagan present  Organism: Escherichia coli (10 Dec 2024 08:39)  Organism: Escherichia coli (10 Dec 2024 08:39)      Method Type: AN      -  Amoxicillin/Clavulanic Acid: S <=8/4      -  Ampicillin: S <=8 These ampicillin results predict results for amoxicillin      -  Ampicillin/Sulbactam: S <=4/2      -  Aztreonam: S <=4      -  Cefazolin: S <=2 For uncomplicated UTI with K. pneumoniae, E. coli, or P. mirablis: AN <=16 is sensitive and AN >=32 is resistant. This also predicts results for oral agents cefaclor, cefdinir, cefpodoxime, cefprozil, cefuroxime axetil, cephalexin and locarbef for uncomplicated UTI. Note that some isolates may be susceptible to these agents while testing resistant to cefazolin.      -  Cefepime: S <=2      -  Cefoxitin: S <=8      -  Ceftriaxone: S <=1      -  Cefuroxime: S <=4      -  Ciprofloxacin: S <=0.25      -  Ertapenem: S <=0.5      -  Gentamicin: S <=2      -  Imipenem: S <=1      -  Levofloxacin: S <=0.5      -  Meropenem: S <=1      -  Nitrofurantoin: S <=32 Should not be used to treat pyelonephritis      -  Piperacillin/Tazobactam: S <=8      -  Tobramycin: S <=2      -  Trimethoprim/Sulfamethoxazole: S <=0.5/9.5                    RADIOLOGY:  imagin reviewed

## 2024-12-11 NOTE — PROGRESS NOTE ADULT - PROBLEM SELECTOR PLAN 1
presented with epigastric pain, fever, nausea, vomiting  CT a/p: no acute intrab pathology, extensive diverticulosis   - symptoms improved. Still endorsed nausea, however no emesis. Soft bm with no blood  - likely gastroenteritis   EKG (12/7) - QTc 483  Bcx with pseudomonas  Spine xray with chronic age-related changes    Plan  - cw zosyn   - Zofran prn for nausea   - f/u GI PCR  - ID consulted for advice on whether to remove pessary as possible source of infection presented with epigastric pain, fever, nausea, vomiting  CT a/p: no acute intrab pathology, extensive diverticulosis   - symptoms improved. Still endorsed nausea, however no emesis. Soft bm with no blood  - likely gastroenteritis   EKG (12/7) - QTc 483  Bcx with pseudomonas  Spine xray with chronic age-related changes    Plan  - cw zosyn (Day 5/7-10)  - Zofran prn for nausea   - GI PCR negative  - ID rec abx 7-10 days

## 2024-12-11 NOTE — PROVIDER CONTACT NOTE (CRITICAL VALUE NOTIFICATION) - SITUATION
HCC coding opportunities       Chart reviewed, no opportunity found: CHART REVIEWED, NO OPPORTUNITY FOUND        Patients Insurance        Commercial Insurance: [employer]       
abd for abd pain, N/V, hypokalemia
Blood culture from 12/7 resulting in pseudomonas aeruginosa
growth in aerobic bottle, pseudomonas aeruginosa from blood cx drawn 12/7/24

## 2024-12-11 NOTE — PROVIDER CONTACT NOTE (CRITICAL VALUE NOTIFICATION) - ASSESSMENT
growth in aerobic bottle, pseudomonas aeruginosa from blood cx drawn 12/7/24
Pt in no distress. Afebrile during shift.

## 2024-12-11 NOTE — PROVIDER CONTACT NOTE (CRITICAL VALUE NOTIFICATION) - TEST AND RESULT REPORTED:
growth in aerobic bottle, pseudomonas aeruginosa
Blood culture resulting pseudomonas aeruginosa
Gram negative rods growing in aerobic blood culture bottles collected 12/7/24

## 2024-12-11 NOTE — PROGRESS NOTE ADULT - PROBLEM SELECTOR PLAN 3
hypotonic hypovolemic hyponatremia  likely 2/2 GI loss. Avoid overcorrect > 6-8meq/24h    Plan  - hyponatremia improved w/ IVF - c/w NS 75 cc/hr for 12h  - K, Phos replete, f/u repeat BMP hypotonic hypovolemic hyponatremia  likely 2/2 GI loss. Avoid overcorrect > 6-8meq/24h    Plan  - hyponatremia improved w/ IVF  - K, Phos replete, f/u repeat BMP

## 2024-12-11 NOTE — PROGRESS NOTE ADULT - ATTENDING COMMENTS
PAtient seen and evaluated at bedside. no acute complaints. Was upgraded from MARI to home with home PT by physical therapy.        #Pseudomonas bacteremia  -continue zosyn  -unclear etiology, not elucidated on CT a/p, though ? Pessary as cause?  -UCx not pseudomonas, however appears taken after 1st dose of Abx  -repeat BCx pending  -ID consult apprecaited, will follow up if need to exchange pessary prior to d/c    d/c plannign pending result of repeat bcx    rest as above PAtient seen and evaluated at bedside. no acute complaints. Was upgraded from MARI to home with home PT by physical therapy.        #Pseudomonas bacteremia  -continue zosyn  -unclear etiology, not elucidated on CT a/p, though ? Pessary as cause?  -UCx not pseudomonas, however appears taken after 1st dose of Abx  -repeat BCx pending  -ID consult apprecaited, will follow up if need to exchange pessary prior to d/c    d/c plannign pending result of repeat bcx. If negative, can d/c on cipro to complete 7 day course.     rest as above

## 2024-12-12 ENCOUNTER — TRANSCRIPTION ENCOUNTER (OUTPATIENT)
Age: 84
End: 2024-12-12

## 2024-12-12 VITALS
SYSTOLIC BLOOD PRESSURE: 170 MMHG | OXYGEN SATURATION: 93 % | TEMPERATURE: 98 F | HEART RATE: 65 BPM | RESPIRATION RATE: 18 BRPM | DIASTOLIC BLOOD PRESSURE: 79 MMHG

## 2024-12-12 LAB
CULTURE RESULTS: SIGNIFICANT CHANGE UP
SPECIMEN SOURCE: SIGNIFICANT CHANGE UP

## 2024-12-12 PROCEDURE — 99231 SBSQ HOSP IP/OBS SF/LOW 25: CPT

## 2024-12-12 PROCEDURE — 85652 RBC SED RATE AUTOMATED: CPT

## 2024-12-12 PROCEDURE — 87040 BLOOD CULTURE FOR BACTERIA: CPT

## 2024-12-12 PROCEDURE — 83735 ASSAY OF MAGNESIUM: CPT

## 2024-12-12 PROCEDURE — 82330 ASSAY OF CALCIUM: CPT

## 2024-12-12 PROCEDURE — 99232 SBSQ HOSP IP/OBS MODERATE 35: CPT | Mod: GC

## 2024-12-12 PROCEDURE — 83690 ASSAY OF LIPASE: CPT

## 2024-12-12 PROCEDURE — 0241U: CPT

## 2024-12-12 PROCEDURE — 86140 C-REACTIVE PROTEIN: CPT

## 2024-12-12 PROCEDURE — 87086 URINE CULTURE/COLONY COUNT: CPT

## 2024-12-12 PROCEDURE — 87077 CULTURE AEROBIC IDENTIFY: CPT

## 2024-12-12 PROCEDURE — 72100 X-RAY EXAM L-S SPINE 2/3 VWS: CPT

## 2024-12-12 PROCEDURE — 85018 HEMOGLOBIN: CPT

## 2024-12-12 PROCEDURE — 80061 LIPID PANEL: CPT

## 2024-12-12 PROCEDURE — 80053 COMPREHEN METABOLIC PANEL: CPT

## 2024-12-12 PROCEDURE — 81001 URINALYSIS AUTO W/SCOPE: CPT

## 2024-12-12 PROCEDURE — 84295 ASSAY OF SERUM SODIUM: CPT

## 2024-12-12 PROCEDURE — 76705 ECHO EXAM OF ABDOMEN: CPT

## 2024-12-12 PROCEDURE — 87637 SARSCOV2&INF A&B&RSV AMP PRB: CPT

## 2024-12-12 PROCEDURE — 94640 AIRWAY INHALATION TREATMENT: CPT

## 2024-12-12 PROCEDURE — 85025 COMPLETE CBC W/AUTO DIFF WBC: CPT

## 2024-12-12 PROCEDURE — 82803 BLOOD GASES ANY COMBINATION: CPT

## 2024-12-12 PROCEDURE — 97110 THERAPEUTIC EXERCISES: CPT

## 2024-12-12 PROCEDURE — 97161 PT EVAL LOW COMPLEX 20 MIN: CPT

## 2024-12-12 PROCEDURE — 87507 IADNA-DNA/RNA PROBE TQ 12-25: CPT

## 2024-12-12 PROCEDURE — 85014 HEMATOCRIT: CPT

## 2024-12-12 PROCEDURE — 83605 ASSAY OF LACTIC ACID: CPT

## 2024-12-12 PROCEDURE — 82435 ASSAY OF BLOOD CHLORIDE: CPT

## 2024-12-12 PROCEDURE — 99285 EMERGENCY DEPT VISIT HI MDM: CPT

## 2024-12-12 PROCEDURE — 87186 SC STD MICRODIL/AGAR DIL: CPT

## 2024-12-12 PROCEDURE — 84132 ASSAY OF SERUM POTASSIUM: CPT

## 2024-12-12 PROCEDURE — 85027 COMPLETE CBC AUTOMATED: CPT

## 2024-12-12 PROCEDURE — 82947 ASSAY GLUCOSE BLOOD QUANT: CPT

## 2024-12-12 PROCEDURE — 74177 CT ABD & PELVIS W/CONTRAST: CPT | Mod: MC

## 2024-12-12 PROCEDURE — 93005 ELECTROCARDIOGRAM TRACING: CPT

## 2024-12-12 PROCEDURE — 36415 COLL VENOUS BLD VENIPUNCTURE: CPT

## 2024-12-12 PROCEDURE — 87181 SC STD AGAR DILUTION PER AGT: CPT

## 2024-12-12 PROCEDURE — 87150 DNA/RNA AMPLIFIED PROBE: CPT

## 2024-12-12 PROCEDURE — 87635 SARS-COV-2 COVID-19 AMP PRB: CPT

## 2024-12-12 PROCEDURE — 80048 BASIC METABOLIC PNL TOTAL CA: CPT

## 2024-12-12 PROCEDURE — 97116 GAIT TRAINING THERAPY: CPT

## 2024-12-12 PROCEDURE — 84145 PROCALCITONIN (PCT): CPT

## 2024-12-12 PROCEDURE — 84100 ASSAY OF PHOSPHORUS: CPT

## 2024-12-12 PROCEDURE — 96374 THER/PROPH/DIAG INJ IV PUSH: CPT

## 2024-12-12 RX ORDER — METOPROLOL TARTRATE 100 MG/1
1 TABLET, FILM COATED ORAL
Qty: 0 | Refills: 0 | DISCHARGE
Start: 2024-12-12

## 2024-12-12 RX ORDER — HYDRALAZINE HYDROCHLORIDE 10 MG/1
5 TABLET ORAL ONCE
Refills: 0 | Status: COMPLETED | OUTPATIENT
Start: 2024-12-12 | End: 2024-12-12

## 2024-12-12 RX ORDER — CIPROFLOXACIN HCL 750 MG
1 TABLET ORAL
Qty: 4 | Refills: 0
Start: 2024-12-12 | End: 2024-12-13

## 2024-12-12 RX ORDER — LEVOTHYROXINE SODIUM 150 MCG
1 TABLET ORAL
Qty: 0 | Refills: 0 | DISCHARGE
Start: 2024-12-12

## 2024-12-12 RX ORDER — ESCITALOPRAM OXALATE 10 MG/1
1 TABLET, FILM COATED ORAL
Qty: 0 | Refills: 0 | DISCHARGE
Start: 2024-12-12

## 2024-12-12 RX ORDER — CIPROFLOXACIN HCL 750 MG
1 TABLET ORAL
Qty: 2 | Refills: 0
Start: 2024-12-12 | End: 2024-12-12

## 2024-12-12 RX ADMIN — Medication 81 MILLIGRAM(S): at 11:19

## 2024-12-12 RX ADMIN — METOPROLOL TARTRATE 25 MILLIGRAM(S): 100 TABLET, FILM COATED ORAL at 06:29

## 2024-12-12 RX ADMIN — POLYETHYLENE GLYCOL 3350 17 GRAM(S): 17 POWDER, FOR SOLUTION ORAL at 11:19

## 2024-12-12 RX ADMIN — ACETAMINOPHEN, DIPHENHYDRAMINE HCL, PHENYLEPHRINE HCL 3 MILLIGRAM(S): 325; 25; 5 TABLET ORAL at 00:03

## 2024-12-12 RX ADMIN — FLUTICASONE PROPIONATE AND SALMETEROL XINAFOATE 1 DOSE(S): 45; 21 AEROSOL, METERED RESPIRATORY (INHALATION) at 06:29

## 2024-12-12 RX ADMIN — PIPERACILLIN SODIUM AND TAZOBACTAM SODIUM 25 GRAM(S): 4; .5 INJECTION, POWDER, LYOPHILIZED, FOR SOLUTION INTRAVENOUS at 13:51

## 2024-12-12 RX ADMIN — Medication 5000 UNIT(S): at 13:51

## 2024-12-12 RX ADMIN — ESCITALOPRAM OXALATE 10 MILLIGRAM(S): 10 TABLET, FILM COATED ORAL at 11:19

## 2024-12-12 RX ADMIN — HYDRALAZINE HYDROCHLORIDE 5 MILLIGRAM(S): 10 TABLET ORAL at 06:54

## 2024-12-12 RX ADMIN — Medication 125 MICROGRAM(S): at 06:14

## 2024-12-12 RX ADMIN — Medication 5000 UNIT(S): at 06:14

## 2024-12-12 RX ADMIN — PANTOPRAZOLE SODIUM 40 MILLIGRAM(S): 40 TABLET, DELAYED RELEASE ORAL at 06:14

## 2024-12-12 RX ADMIN — PIPERACILLIN SODIUM AND TAZOBACTAM SODIUM 25 GRAM(S): 4; .5 INJECTION, POWDER, LYOPHILIZED, FOR SOLUTION INTRAVENOUS at 06:14

## 2024-12-12 NOTE — PROGRESS NOTE ADULT - REASON FOR ADMISSION
SIRS+ i/s/o n/v/d, abd pain

## 2024-12-12 NOTE — PROGRESS NOTE ADULT - PROBLEM SELECTOR PLAN 3
hypotonic hypovolemic hyponatremia  likely 2/2 GI loss. Avoid overcorrect > 6-8meq/24h    Plan  - hyponatremia improved w/ IVF  - K, Phos replete, f/u repeat BMP

## 2024-12-12 NOTE — PROGRESS NOTE ADULT - ASSESSMENT
84F w/ HTN, COPD (30 PYH smoking), hypothyroidism, diverticulosis, CAD/MI (age 36), lung ca s/p partial right pneumonectomy, gangrenous cholecystitis s/p laparoscopic cholecystectomy (12/12/2020) came in with abdominal pain, fever, n/v. On a brief course of Zosyn. Met SIRS criteria initially, however patient is now afebrile and leukocytosis downtrending. Bcx growing pseudomonas. Xray spine with chronic changes. Continuing with Zosyn. Pessary less likely source given no vaginal discharge. Plan to continue abx for 7-10 days 84F w/ HTN, COPD (30 PYH smoking), hypothyroidism, diverticulosis, CAD/MI (age 36), lung ca s/p partial right pneumonectomy, gangrenous cholecystitis s/p laparoscopic cholecystectomy (12/12/2020) came in with abdominal pain, fever, n/v. On a brief course of Zosyn. Met SIRS criteria initially, however patient is now afebrile and leukocytosis downtrending. Bcx growing pseudomonas. Xray spine with chronic changes. Continuing with Zosyn. Pessary less likely source given no vaginal discharge. Plan to continue abx through 12/14.

## 2024-12-12 NOTE — PROGRESS NOTE ADULT - PROVIDER SPECIALTY LIST ADULT
Infectious Disease
Internal Medicine
Infectious Disease
Internal Medicine

## 2024-12-12 NOTE — DISCHARGE NOTE NURSING/CASE MANAGEMENT/SOCIAL WORK - NSDCPEFALRISK_GEN_ALL_CORE
For information on Fall & Injury Prevention, visit: [email or web address] OR  [email or web address] OR  https://www.cdc.gov/steadi/patient.html

## 2024-12-12 NOTE — PROGRESS NOTE ADULT - SUBJECTIVE AND OBJECTIVE BOX
***************************************************************  Adamaris Schmidt, PGY-1  Internal Medicine   Available on [employer] TEAMS  ***************************************************************    VANI GONZALEZ  84y  MRN: [id number]    Patient is a 84y old  Female who presents with a chief complaint of SIRS+ i/s/o n/v/d, abd pain (11 Dec 2024 15:47)      Interval/Overnight Events: no events ON.     Subjective: Pt seen and examined at bedside. Denies fever, CP, SOB, abn pain, N/V, dysuria. Tolerating diet.      MEDICATIONS  (STANDING):  aspirin  chewable 81 milliGRAM(s) Oral daily  escitalopram 10 milliGRAM(s) Oral daily  fluticasone propionate/ salmeterol 500-50 MICROgram(s) Diskus 1 Dose(s) Inhalation two times a day  heparin   Injectable 5000 Unit(s) SubCutaneous every 8 hours  influenza  Vaccine (HIGH DOSE) 0.5 milliLiter(s) IntraMuscular once  levothyroxine 125 MICROGram(s) Oral daily  melatonin 3 milliGRAM(s) Oral at bedtime  metoprolol tartrate 25 milliGRAM(s) Oral two times a day  pantoprazole    Tablet 40 milliGRAM(s) Oral before breakfast  piperacillin/tazobactam IVPB.. 3.375 Gram(s) IV Intermittent every 8 hours  polyethylene glycol 3350 17 Gram(s) Oral daily    MEDICATIONS  (PRN):  lidocaine   4% Patch 1 Patch Transdermal daily PRN back pain  ondansetron Injectable 4 milliGRAM(s) IV Push every 6 hours PRN Nausea and/or Vomiting      Objective:    Vitals: Vital Signs Last 24 Hrs  T(C): 36.4 (12-12-24 @ 05:04), Max: 36.9 (12-11-24 @ 16:08)  T(F): 97.6 (12-12-24 @ 05:04), Max: 98.4 (12-11-24 @ 16:08)  HR: 64 (12-12-24 @ 05:04) (60 - 79)  BP: 156/83 (12-11-24 @ 23:46) (154/78 - 162/69)  BP(mean): --  RR: 18 (12-12-24 @ 05:04) (17 - 18)  SpO2: 94% (12-12-24 @ 05:04) (93% - 95%)                I&O's Summary    10 Dec 2024 07:01  -  11 Dec 2024 07:00  --------------------------------------------------------  IN: 900 mL / OUT: 300 mL / NET: 600 mL    11 Dec 2024 07:01  -  12 Dec 2024 06:04  --------------------------------------------------------  IN: 1020 mL / OUT: 1000 mL / NET: 20 mL        PHYSICAL EXAM:  GENERAL: NAD  HEAD:  Atraumatic, Normocephalic  EYES: EOMI, conjunctiva and sclera clear  CHEST/LUNG: Clear to auscultation bilaterally; No rales, rhonchi, wheezing, or rubs  HEART: Regular rate and rhythm; No murmurs, rubs, or gallops  ABDOMEN: Soft, Nontender, Nondistended;   SKIN: No rashes or lesions  NERVOUS SYSTEM:  Alert & Oriented X3, no focal deficits    LABS:                        12.1   5.42  )-----------( 207      ( 11 Dec 2024 06:49 )             36.1                         12.5   5.74  )-----------( 199      ( 10 Dec 2024 06:35 )             37.5                         13.1   6.17  )-----------( 197      ( 09 Dec 2024 06:44 )             38.6     12-11    135  |  101  |  17  ----------------------------<  99  3.9   |  22  |  1.06  12-10    135  |  100  |  13  ----------------------------<  88  4.1   |  22  |  0.99  12-09    135  |  98  |  11  ----------------------------<  98  3.3[L]   |  23  |  0.84    Ca    8.7      11 Dec 2024 06:47  Ca    8.7      10 Dec 2024 06:36  Ca    8.9      09 Dec 2024 06:52  Phos  4.2     12-11  Mg     2.1     12-11    TPro  6.0  /  Alb  3.3  /  TBili  0.4  /  DBili  x   /  AST  36  /  ALT  42  /  AlkPhos  104  12-10  TPro  6.2  /  Alb  3.4  /  TBili  0.5  /  DBili  x   /  AST  59[H]  /  ALT  62[H]  /  AlkPhos  112  12-09    CAPILLARY BLOOD GLUCOSE            Urinalysis Basic - ( 11 Dec 2024 06:47 )    Color: x / Appearance: x / SG: x / pH: x  Gluc: 99 mg/dL / Ketone: x  / Bili: x / Urobili: x   Blood: x / Protein: x / Nitrite: x   Leuk Esterase: x / RBC: x / WBC x   Sq Epi: x / Non Sq Epi: x / Bacteria: x          RADIOLOGY & ADDITIONAL TESTS:         ***************************************************************  Adamariselsy Schmidt, PGY-1  Internal Medicine   Available on [employer] TEAMS  ***************************************************************    VANI GONZALEZ  84y  MRN: [id number]    Patient is a 84y old  Female who presents with a chief complaint of SIRS+ i/s/o n/v/d, abd pain (11 Dec 2024 15:47)      Interval/Overnight Events: Patient had blood pressure systolic 180 requiring hydral 5mg overnight.    Subjective: Pt seen and examined at bedside. Patient reports distress over her high blood pressure. Denies any symptoms of chest pain or shortness of breath with the blood pressure. Has resolving back pain. Denies fever, CP, SOB, abn pain, N/V, dysuria. Tolerating diet.      MEDICATIONS  (STANDING):  aspirin  chewable 81 milliGRAM(s) Oral daily  escitalopram 10 milliGRAM(s) Oral daily  fluticasone propionate/ salmeterol 500-50 MICROgram(s) Diskus 1 Dose(s) Inhalation two times a day  heparin   Injectable 5000 Unit(s) SubCutaneous every 8 hours  influenza  Vaccine (HIGH DOSE) 0.5 milliLiter(s) IntraMuscular once  levothyroxine 125 MICROGram(s) Oral daily  melatonin 3 milliGRAM(s) Oral at bedtime  metoprolol tartrate 25 milliGRAM(s) Oral two times a day  pantoprazole    Tablet 40 milliGRAM(s) Oral before breakfast  piperacillin/tazobactam IVPB.. 3.375 Gram(s) IV Intermittent every 8 hours  polyethylene glycol 3350 17 Gram(s) Oral daily    MEDICATIONS  (PRN):  lidocaine   4% Patch 1 Patch Transdermal daily PRN back pain  ondansetron Injectable 4 milliGRAM(s) IV Push every 6 hours PRN Nausea and/or Vomiting      Objective:    Vitals: Vital Signs Last 24 Hrs  T(C): 36.4 (12-12-24 @ 05:04), Max: 36.9 (12-11-24 @ 16:08)  T(F): 97.6 (12-12-24 @ 05:04), Max: 98.4 (12-11-24 @ 16:08)  HR: 64 (12-12-24 @ 05:04) (60 - 79)  BP: 156/83 (12-11-24 @ 23:46) (154/78 - 162/69)  BP(mean): --  RR: 18 (12-12-24 @ 05:04) (17 - 18)  SpO2: 94% (12-12-24 @ 05:04) (93% - 95%)                I&O's Summary    10 Dec 2024 07:01  -  11 Dec 2024 07:00  --------------------------------------------------------  IN: 900 mL / OUT: 300 mL / NET: 600 mL    11 Dec 2024 07:01  -  12 Dec 2024 06:04  --------------------------------------------------------  IN: 1020 mL / OUT: 1000 mL / NET: 20 mL        PHYSICAL EXAM:  GENERAL: NAD  HEAD:  Atraumatic, Normocephalic  EYES: EOMI, conjunctiva and sclera clear  CHEST/LUNG: Clear to auscultation bilaterally; No rales, rhonchi, wheezing, or rubs  HEART: Regular rate and rhythm; No murmurs, rubs, or gallops  ABDOMEN: Soft, Nontender, Nondistended;   SKIN: No rashes or lesions  MUSCULOSKELETAL: Mild tenderness to palpation in lumbar spine, mild tenderness to palpation in paraspinal muscles   NERVOUS SYSTEM:  Alert & Oriented X3, no focal deficits    LABS:                        12.1   5.42  )-----------( 207      ( 11 Dec 2024 06:49 )             36.1                         12.5   5.74  )-----------( 199      ( 10 Dec 2024 06:35 )             37.5                         13.1   6.17  )-----------( 197      ( 09 Dec 2024 06:44 )             38.6     12-11    135  |  101  |  17  ----------------------------<  99  3.9   |  22  |  1.06  12-10    135  |  100  |  13  ----------------------------<  88  4.1   |  22  |  0.99  12-09    135  |  98  |  11  ----------------------------<  98  3.3[L]   |  23  |  0.84    Ca    8.7      11 Dec 2024 06:47  Ca    8.7      10 Dec 2024 06:36  Ca    8.9      09 Dec 2024 06:52  Phos  4.2     12-11  Mg     2.1     12-11    TPro  6.0  /  Alb  3.3  /  TBili  0.4  /  DBili  x   /  AST  36  /  ALT  42  /  AlkPhos  104  12-10  TPro  6.2  /  Alb  3.4  /  TBili  0.5  /  DBili  x   /  AST  59[H]  /  ALT  62[H]  /  AlkPhos  112  12-09    CAPILLARY BLOOD GLUCOSE            Urinalysis Basic - ( 11 Dec 2024 06:47 )    Color: x / Appearance: x / SG: x / pH: x  Gluc: 99 mg/dL / Ketone: x  / Bili: x / Urobili: x   Blood: x / Protein: x / Nitrite: x   Leuk Esterase: x / RBC: x / WBC x   Sq Epi: x / Non Sq Epi: x / Bacteria: x          RADIOLOGY & ADDITIONAL TESTS:

## 2024-12-12 NOTE — DISCHARGE NOTE NURSING/CASE MANAGEMENT/SOCIAL WORK - NSDCFUADDAPPT_GEN_ALL_CORE_FT
APPTS ARE READY TO BE MADE: [x] YES    Best Family or Patient Contact (if needed):    Additional Information about above appointments (if needed):    1: PCP  2:   3:     Other comments or requests:

## 2024-12-12 NOTE — DISCHARGE NOTE NURSING/CASE MANAGEMENT/SOCIAL WORK - PATIENT PORTAL LINK FT
You can access the FollowMyHealth Patient Portal offered by [facility] by registering at the following website: [email or web address]. By joining [employer]’s FollowMyHealth portal, you will also be able to view your health information using other applications (apps) compatible with our system.

## 2024-12-12 NOTE — PROGRESS NOTE ADULT - ATTENDING COMMENTS
Patient seen and evaluated. No complaints att his time. Back pain is improved, ambulating. Upset that is still in hospital.   We discussed elevated BP, patient is asymptomatic at this time. Patient does not have current primary care physician at this time, open to getting one, preferably close to her pulmonoloist.   Patient is stable for discharge on ciprofloxacin until 12/14  Patient to follow up with PMD (will place token to make) for repeat BP out of the hospital, and if still elevated to then add blood pressure medications.   stable for discharge.  d/c planning 32 minutes back to residential

## 2024-12-12 NOTE — PROVIDER CONTACT NOTE (OTHER) - ASSESSMENT
Pt. /95, HR 66, Temp 97.9, 95% on room air. Pt. is not symptomatic. Not c/o pain, no chest pain, no shortness of breath. Manual BP done and was 183/90.
Pt alert but forgetful to situation. b/p 195/85 HR 86. No c/o chest pain or SOB noted. Pt stated she has a headache. Pt very anxious, Able to move all extremities. No facial drooping. Speech clear. Labs obtained at 0100 per MD's order to check for electrolyte imbalance. Pt due for metoprolol 25mg PO this am.
pt A&Ox4, VSS. c/o nausea but no vomiting noted. c/o abdominal discomfort, abdomen non tended. pt attempted to have BM but unable to. last BM 12/7 in am. No c/o CP, SOB, or other discomfort.

## 2024-12-12 NOTE — DISCHARGE NOTE NURSING/CASE MANAGEMENT/SOCIAL WORK - NSSCCARECORD_GEN_ALL_CORE
Negative covid test.  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 [facility]

## 2024-12-12 NOTE — PROGRESS NOTE ADULT - PROBLEM SELECTOR PLAN 1
presented with epigastric pain, fever, nausea, vomiting  CT a/p: no acute intrab pathology, extensive diverticulosis   - symptoms improved. Still endorsed nausea, however no emesis. Soft bm with no blood  - likely gastroenteritis   EKG (12/7) - QTc 483  Bcx with pseudomonas  Spine xray with chronic age-related changes    Plan  - cw zosyn (Day 5/7-10)  - Zofran prn for nausea   - GI PCR negative  - ID rec abx 7-10 days presented with epigastric pain, fever, nausea, vomiting  CT a/p: no acute intrab pathology, extensive diverticulosis   - symptoms improved. Still endorsed nausea, however no emesis. Soft bm with no blood  - likely gastroenteritis   EKG (12/7) - QTc 483  Bcx with pseudomonas  Spine xray with chronic age-related changes    Plan  - discharge on Cipro for 2 days  - Zofran prn for nausea   - GI PCR negative

## 2024-12-12 NOTE — PROVIDER CONTACT NOTE (OTHER) - ACTION/TREATMENT ORDERED:
MD ordered hydralazine 5mg IVP. Med administered as ordered. B/P 151/70 HR 68 SPo2 95% T 98.8, after med administered. Reoriented. Emotional support provided to decrease anxiety.
zofran 4mg IVP ordered.
Provider made aware. Hydralazine 0.25 IV push ordered and administered. Re-check BP after administration. /84. Provider made aware, no further nursing interventions at this time.

## 2024-12-12 NOTE — PROGRESS NOTE ADULT - ASSESSMENT
84-year-old female with a past medical history of hypertension, COPD, hypothyroidism, diverticulosis, myocardial infarction, lung cancer status post partial right pneumonectomy, gangrenous cholecystitis status post lap caroline in 2020 who was admitted to the hospital due to abdominal pain.    #Pseudomonas bacteremia  #Leukocytosis, resolved  #Positive urine culture, E. coli    Recommendations  Continue piperacillin/tazobactam at this time  Unclear source for bacteremia - CT imaging with no evidence for infection however patient with history of abdominal surgeries in the past, pessary in place however doubt source of infection given lack of vaginal/pelvic discomfort vaginal discharge  Possible GI translocation as well  Follow pending repeat blood cultures, 12/9 negative to date  GI PCR negative  Plan for 7 day course, end date: 12/14/24  If otherwise ready for discharge, can switch to PO ciprofloxacin 500 mg q12hr  Follow fever curve and WBC count    ID to sign off. Please contact as issues arise.    Robert Thibodeaux MD  Division of Infectious Diseases

## 2024-12-12 NOTE — DISCHARGE NOTE NURSING/CASE MANAGEMENT/SOCIAL WORK - FINANCIAL ASSISTANCE
[facility] provides services at a reduced cost to those who are determined to be eligible through [facility]’s financial assistance program. Information regarding [facility]’s financial assistance program can be found by going to [email or web address] or by calling [phone].

## 2024-12-12 NOTE — PROGRESS NOTE ADULT - SUBJECTIVE AND OBJECTIVE BOX
Follow Up:  bacteremia    Interval History/ROS:  Overnight: No acute events.  Patient remains afebrile.  Otherwise hemodynamically stable on room air.  Latest labs show no leukocytosis, BMP with renal function within normal limits.  Reviewed blood cultures from 12/9/2023 are negative to date.    Patient seen examined at bedside.  No new complaints.    Allergies  No Known Allergies        ANTIMICROBIALS:  piperacillin/tazobactam IVPB.. 3.375 every 8 hours      OTHER MEDS:  MEDICATIONS  (STANDING):  aspirin  chewable 81 daily  escitalopram 10 daily  fluticasone propionate/ salmeterol 500-50 MICROgram(s) Diskus 1 two times a day  heparin   Injectable 5000 every 8 hours  influenza  Vaccine (HIGH DOSE) 0.5 once  levothyroxine 125 daily  melatonin 3 at bedtime  metoprolol tartrate 25 two times a day  ondansetron Injectable 4 every 6 hours PRN  pantoprazole    Tablet 40 before breakfast  polyethylene glycol 3350 17 daily      Vital Signs Last 24 Hrs  T(C): 36.6 (12 Dec 2024 13:21), Max: 36.9 (11 Dec 2024 16:08)  T(F): 97.9 (12 Dec 2024 13:21), Max: 98.4 (11 Dec 2024 16:08)  HR: 65 (12 Dec 2024 13:21) (59 - 66)  BP: 170/79 (12 Dec 2024 13:21) (154/78 - 186/95)  BP(mean): --  RR: 18 (12 Dec 2024 13:21) (18 - 18)  SpO2: 93% (12 Dec 2024 13:21) (93% - 96%)    Parameters below as of 12 Dec 2024 13:21  Patient On (Oxygen Delivery Method): room air        PHYSICAL EXAM:  Constitutional: non-toxic, no distress  HEAD/EYES: anicteric, no conjunctival injection  ENT:  supple, no thrush  Cardiovascular:   normal S1, S2, no murmur, no edema  Respiratory:  clear BS bilaterally, no wheezes, no rales  GI:  soft, non-tender, normal bowel sounds  :  no huggins, no CVA tenderness  Musculoskeletal:  no synovitis, normal ROM  Neurologic: awake and alert, normal strength, no focal findings  Skin:  no rash, no erythema, no phlebitis  Heme/Onc: no lymphadenopathy   Psychiatric:  awake, alert, appropriate mood                                12.1   5.42  )-----------( 207      ( 11 Dec 2024 06:49 )             36.1       12-11    135  |  101  |  17  ----------------------------<  99  3.9   |  22  |  1.06    Ca    8.7      11 Dec 2024 06:47  Phos  4.2     12-11  Mg     2.1     12-11        Urinalysis Basic - ( 11 Dec 2024 06:47 )    Color: x / Appearance: x / SG: x / pH: x  Gluc: 99 mg/dL / Ketone: x  / Bili: x / Urobili: x   Blood: x / Protein: x / Nitrite: x   Leuk Esterase: x / RBC: x / WBC x   Sq Epi: x / Non Sq Epi: x / Bacteria: x        MICROBIOLOGY:  v    Culture - Blood (collected 09 Dec 2024 17:03)  Source: .Blood BLOOD  Preliminary Report (11 Dec 2024 23:08):    No growth at 48 Hours    Culture - Blood (collected 09 Dec 2024 17:00)  Source: .Blood BLOOD  Preliminary Report (11 Dec 2024 23:08):    No growth at 48 Hours    Culture - Blood (collected 07 Dec 2024 15:15)  Source: .Blood BLOOD  Gram Stain (08 Dec 2024 17:50):    Growth in aerobic bottle: Gram Negative Rods  Final Report (11 Dec 2024 16:58):    **Please Note**: This is a Corrected Report**    Growth in aerobic bottle: Pseudomonas aeruginosa    please note correction in susceptibility    Direct identification is available within approximately 3-5    hours either by Blood Panel Multiplexed PCR or Direct    MALDI-TOF. Details: [email or web address]  Organism: Pseudomonas aeruginosa (11 Dec 2024 16:56)      Method Type: ETEST      -  Tobramycin: S 1  Organism: Pseudomonas aeruginosa (10 Dec 2024 17:50)      Method Type: AN      -  Aztreonam: S <=4      -  Cefepime: S <=2      -  Ceftazidime: S <=1      -  Ciprofloxacin: S <=0.25      -  Imipenem: S 2      -  Levofloxacin: S <=0.5      -  Meropenem: S <=1      -  Piperacillin/Tazobactam: S <=8      -  Tobramycin: N/I <=2  Organism: Blood Culture PCR (10 Dec 2024 17:50)      Method Type: PCR      -  Pseudomonas aeruginosa: Detec  Organism: Blood Culture PCR  Pseudomonas aeruginosa (10 Dec 2024 08:05)    Culture - Blood (collected 07 Dec 2024 15:05)  Source: .Blood BLOOD  Preliminary Report (11 Dec 2024 19:01):    No growth at 4 days                    RADIOLOGY:  imaging reviewed

## 2024-12-16 ENCOUNTER — APPOINTMENT (OUTPATIENT)
Dept: ORTHOPEDIC SURGERY | Facility: CLINIC | Age: 84
End: 2024-12-16
Payer: MEDICARE

## 2024-12-16 VITALS — HEIGHT: 63.5 IN | WEIGHT: 150 LBS | BODY MASS INDEX: 26.25 KG/M2

## 2024-12-16 DIAGNOSIS — M25.462 EFFUSION, RIGHT KNEE: ICD-10-CM

## 2024-12-16 DIAGNOSIS — M25.461 EFFUSION, RIGHT KNEE: ICD-10-CM

## 2024-12-16 PROCEDURE — G2211 COMPLEX E/M VISIT ADD ON: CPT

## 2024-12-16 PROCEDURE — 99213 OFFICE O/P EST LOW 20 MIN: CPT | Mod: 25

## 2024-12-16 PROCEDURE — 20610 DRAIN/INJ JOINT/BURSA W/O US: CPT | Mod: RT

## 2024-12-16 RX ADMIN — METHYLPREDNISOLONE ACETATE 1 MG/ML: 40 INJECTION, SUSPENSION INTRA-ARTICULAR; INTRALESIONAL; INTRAMUSCULAR; SOFT TISSUE at 00:00

## 2024-12-16 RX ADMIN — LIDOCAINE HYDROCHLORIDE 4 %: 10 INJECTION, SOLUTION INFILTRATION; PERINEURAL at 00:00

## 2024-12-17 RX ORDER — LIDOCAINE HYDROCHLORIDE 10 MG/ML
1 INJECTION, SOLUTION INFILTRATION; PERINEURAL
Refills: 0 | Status: COMPLETED | OUTPATIENT
Start: 2024-12-16

## 2024-12-17 RX ORDER — METHYLPRED ACET/NACL,ISO-OS/PF 40 MG/ML
40 VIAL (ML) INJECTION
Refills: 0 | Status: COMPLETED | OUTPATIENT
Start: 2024-12-16

## 2025-02-06 PROBLEM — E03.9 HYPOTHYROIDISM, UNSPECIFIED: Chronic | Status: ACTIVE | Noted: 2024-12-07

## 2025-02-06 PROBLEM — K57.90 DIVERTICULOSIS OF INTESTINE, PART UNSPECIFIED, WITHOUT PERFORATION OR ABSCESS WITHOUT BLEEDING: Chronic | Status: ACTIVE | Noted: 2024-12-07

## 2025-02-11 ENCOUNTER — EMERGENCY (EMERGENCY)
Facility: HOSPITAL | Age: 85
LOS: 1 days | Discharge: NURSING, REHAB OR LONG-TERM CARE | End: 2025-02-11
Attending: EMERGENCY MEDICINE
Payer: MEDICARE

## 2025-02-11 VITALS
HEART RATE: 75 BPM | WEIGHT: 145.06 LBS | DIASTOLIC BLOOD PRESSURE: 89 MMHG | HEIGHT: 64 IN | SYSTOLIC BLOOD PRESSURE: 149 MMHG | TEMPERATURE: 98 F | RESPIRATION RATE: 16 BRPM | OXYGEN SATURATION: 95 %

## 2025-02-11 DIAGNOSIS — Z90.49 ACQUIRED ABSENCE OF OTHER SPECIFIED PARTS OF DIGESTIVE TRACT: Chronic | ICD-10-CM

## 2025-02-11 LAB
ALBUMIN SERPL ELPH-MCNC: 4 G/DL — SIGNIFICANT CHANGE UP (ref 3.3–5)
ALP SERPL-CCNC: 102 U/L — SIGNIFICANT CHANGE UP (ref 40–120)
ALT FLD-CCNC: 10 U/L — SIGNIFICANT CHANGE UP (ref 10–45)
ANION GAP SERPL CALC-SCNC: 12 MMOL/L — SIGNIFICANT CHANGE UP (ref 5–17)
APTT BLD: 25.1 SEC — SIGNIFICANT CHANGE UP (ref 24.5–35.6)
AST SERPL-CCNC: 18 U/L — SIGNIFICANT CHANGE UP (ref 10–40)
BASOPHILS # BLD AUTO: 0.05 K/UL — SIGNIFICANT CHANGE UP (ref 0–0.2)
BASOPHILS NFR BLD AUTO: 0.6 % — SIGNIFICANT CHANGE UP (ref 0–2)
BILIRUB SERPL-MCNC: 0.4 MG/DL — SIGNIFICANT CHANGE UP (ref 0.2–1.2)
BUN SERPL-MCNC: 17 MG/DL — SIGNIFICANT CHANGE UP (ref 7–23)
CALCIUM SERPL-MCNC: 9.4 MG/DL — SIGNIFICANT CHANGE UP (ref 8.4–10.5)
CHLORIDE SERPL-SCNC: 97 MMOL/L — SIGNIFICANT CHANGE UP (ref 96–108)
CO2 SERPL-SCNC: 27 MMOL/L — SIGNIFICANT CHANGE UP (ref 22–31)
CREAT SERPL-MCNC: 0.94 MG/DL — SIGNIFICANT CHANGE UP (ref 0.5–1.3)
D DIMER BLD IA.RAPID-MCNC: 421 NG/ML DDU — HIGH
EGFR: 60 ML/MIN/1.73M2 — SIGNIFICANT CHANGE UP
EOSINOPHIL # BLD AUTO: 0.05 K/UL — SIGNIFICANT CHANGE UP (ref 0–0.5)
EOSINOPHIL NFR BLD AUTO: 0.6 % — SIGNIFICANT CHANGE UP (ref 0–6)
GLUCOSE SERPL-MCNC: 103 MG/DL — HIGH (ref 70–99)
HCT VFR BLD CALC: 40.3 % — SIGNIFICANT CHANGE UP (ref 34.5–45)
HGB BLD-MCNC: 13.5 G/DL — SIGNIFICANT CHANGE UP (ref 11.5–15.5)
IMM GRANULOCYTES NFR BLD AUTO: 0.3 % — SIGNIFICANT CHANGE UP (ref 0–0.9)
INR BLD: 1 RATIO — SIGNIFICANT CHANGE UP (ref 0.85–1.16)
LIDOCAIN IGE QN: 27 U/L — SIGNIFICANT CHANGE UP (ref 7–60)
LYMPHOCYTES # BLD AUTO: 2.05 K/UL — SIGNIFICANT CHANGE UP (ref 1–3.3)
LYMPHOCYTES # BLD AUTO: 26 % — SIGNIFICANT CHANGE UP (ref 13–44)
MAGNESIUM SERPL-MCNC: 1.9 MG/DL — SIGNIFICANT CHANGE UP (ref 1.6–2.6)
MCHC RBC-ENTMCNC: 30.4 PG — SIGNIFICANT CHANGE UP (ref 27–34)
MCHC RBC-ENTMCNC: 33.5 G/DL — SIGNIFICANT CHANGE UP (ref 32–36)
MCV RBC AUTO: 90.8 FL — SIGNIFICANT CHANGE UP (ref 80–100)
MONOCYTES # BLD AUTO: 0.77 K/UL — SIGNIFICANT CHANGE UP (ref 0–0.9)
MONOCYTES NFR BLD AUTO: 9.8 % — SIGNIFICANT CHANGE UP (ref 2–14)
NEUTROPHILS # BLD AUTO: 4.95 K/UL — SIGNIFICANT CHANGE UP (ref 1.8–7.4)
NEUTROPHILS NFR BLD AUTO: 62.7 % — SIGNIFICANT CHANGE UP (ref 43–77)
NRBC BLD AUTO-RTO: 0 /100 WBCS — SIGNIFICANT CHANGE UP (ref 0–0)
PLATELET # BLD AUTO: 282 K/UL — SIGNIFICANT CHANGE UP (ref 150–400)
POTASSIUM SERPL-MCNC: 4 MMOL/L — SIGNIFICANT CHANGE UP (ref 3.5–5.3)
POTASSIUM SERPL-SCNC: 4 MMOL/L — SIGNIFICANT CHANGE UP (ref 3.5–5.3)
PROT SERPL-MCNC: 6.9 G/DL — SIGNIFICANT CHANGE UP (ref 6–8.3)
PROTHROM AB SERPL-ACNC: 11.5 SEC — SIGNIFICANT CHANGE UP (ref 9.9–13.4)
RBC # BLD: 4.44 M/UL — SIGNIFICANT CHANGE UP (ref 3.8–5.2)
RBC # FLD: 13.2 % — SIGNIFICANT CHANGE UP (ref 10.3–14.5)
SODIUM SERPL-SCNC: 136 MMOL/L — SIGNIFICANT CHANGE UP (ref 135–145)
TROPONIN T, HIGH SENSITIVITY RESULT: 20 NG/L — SIGNIFICANT CHANGE UP (ref 0–51)
WBC # BLD: 7.89 K/UL — SIGNIFICANT CHANGE UP (ref 3.8–10.5)
WBC # FLD AUTO: 7.89 K/UL — SIGNIFICANT CHANGE UP (ref 3.8–10.5)

## 2025-02-11 PROCEDURE — 99285 EMERGENCY DEPT VISIT HI MDM: CPT | Mod: 25

## 2025-02-11 PROCEDURE — 82803 BLOOD GASES ANY COMBINATION: CPT

## 2025-02-11 PROCEDURE — 99285 EMERGENCY DEPT VISIT HI MDM: CPT | Mod: GC

## 2025-02-11 PROCEDURE — 85014 HEMATOCRIT: CPT

## 2025-02-11 PROCEDURE — 93010 ELECTROCARDIOGRAM REPORT: CPT

## 2025-02-11 PROCEDURE — 71046 X-RAY EXAM CHEST 2 VIEWS: CPT | Mod: 26

## 2025-02-11 PROCEDURE — 71046 X-RAY EXAM CHEST 2 VIEWS: CPT

## 2025-02-11 PROCEDURE — 84295 ASSAY OF SERUM SODIUM: CPT

## 2025-02-11 PROCEDURE — 84132 ASSAY OF SERUM POTASSIUM: CPT

## 2025-02-11 PROCEDURE — 93005 ELECTROCARDIOGRAM TRACING: CPT

## 2025-02-11 PROCEDURE — 82435 ASSAY OF BLOOD CHLORIDE: CPT

## 2025-02-11 PROCEDURE — 85730 THROMBOPLASTIN TIME PARTIAL: CPT

## 2025-02-11 PROCEDURE — 83690 ASSAY OF LIPASE: CPT

## 2025-02-11 PROCEDURE — 85018 HEMOGLOBIN: CPT

## 2025-02-11 PROCEDURE — 83735 ASSAY OF MAGNESIUM: CPT

## 2025-02-11 PROCEDURE — 36415 COLL VENOUS BLD VENIPUNCTURE: CPT

## 2025-02-11 PROCEDURE — 82330 ASSAY OF CALCIUM: CPT

## 2025-02-11 PROCEDURE — 84484 ASSAY OF TROPONIN QUANT: CPT

## 2025-02-11 PROCEDURE — 83605 ASSAY OF LACTIC ACID: CPT

## 2025-02-11 PROCEDURE — 80053 COMPREHEN METABOLIC PANEL: CPT

## 2025-02-11 PROCEDURE — 82947 ASSAY GLUCOSE BLOOD QUANT: CPT

## 2025-02-11 PROCEDURE — 85610 PROTHROMBIN TIME: CPT

## 2025-02-11 PROCEDURE — 85025 COMPLETE CBC W/AUTO DIFF WBC: CPT

## 2025-02-11 PROCEDURE — 85379 FIBRIN DEGRADATION QUANT: CPT

## 2025-02-11 RX ORDER — ACETAMINOPHEN 160 MG/5ML
1000 SUSPENSION ORAL ONCE
Refills: 0 | Status: DISCONTINUED | OUTPATIENT
Start: 2025-02-11 | End: 2025-02-11

## 2025-02-11 RX ORDER — LIDOCAINE HYDROCHLORIDE 30 MG/G
1 CREAM TOPICAL ONCE
Refills: 0 | Status: COMPLETED | OUTPATIENT
Start: 2025-02-11 | End: 2025-02-11

## 2025-02-11 RX ADMIN — LIDOCAINE HYDROCHLORIDE 1 PATCH: 30 CREAM TOPICAL at 14:48

## 2025-02-11 NOTE — ED PROVIDER NOTE - ATTENDING CONTRIBUTION TO CARE
Chief Complaint:    - Right chest wall pain   HPI:    - The patient is an 84-year-old individual who presents to the Emergency Department with right chest wall pain. The pain started two days ago when the patient was attempting to lift a mattress backwards onto a bed. Since the incident, the pain has worsened and is now exacerbated by inspiration. The patient denies experiencing any fevers, nausea, vomiting, shortness of breath, diarrhea, or dysuria. Of note, the patient had a recent admission in December 2024 for abdominal pain, during which they were found to have pseudomonal bacteremia and a UTI with E. coli positive urine culture.   Past Medical History:    -  COPD  -  Hypertension  -  Hypothyroidism  -  Diverticulosis  -  Prior myocardial infarction  -  Lung cancer  -  Status post partial right pneumonectomy  -  Gangrenous cholecystitis  -  Status post laparoscopic cholecystectomy in 2020  -  Recent admission in December 2024 for abdominal pain with pseudomonal bacteremia and UTI   Past Surgical History:    -  Partial right pneumonectomy    -  Laparoscopic cholecystectomy in 2020   Review of Systems:    -  Negative for fevers    -  Negative for nausea    -  Negative for vomiting    -  Negative for shortness of breath    -  Negative for diarrhea    -  Negative for dysuria   Physical Examination:    -  General: Patient is well-appearing, normocephalic, atraumatic    -  HEENT: Conjunctiva appear normal, no tonsils or exudates    -  Cardiovascular: Regular rate and rhythm, no murmurs appreciated    -  Chest: Tenderness to the right chest wall over the anterior rib region, worse with movement and palpation    -  Skin: No rashes, ecchymoses, petechiae, or vesicles appreciated over the affected region or other parts of the body or extremities    -  Neurological: Patient is alert and cooperative   Labs and Studies:    -  Cardiac monitor  -  Troponin  -  CBC  -  CMP  -  PT-INR  -  Lipase  -  Coagulation panel  -  CTA chest for pulmonary embolism evaluation  -  Chest x-ray, PA and lateral  -  EKG   Medical Decision Making:    -  The patient is an 84-year-old individual with a complex medical history presenting with right chest wall pain following a recent lifting incident. Given the patient's age, history of cardiovascular disease, and the nature of the pain, it is crucial to rule out serious cardiac and pulmonary conditions.    -  The differential diagnosis includes musculoskeletal chest pain, acute coronary syndrome, pulmonary embolism, pneumothorax, and rib fracture. The pain's association with inspiration and worsening with movement suggests a possible musculoskeletal etiology, but given the patient's risk factors, other life-threatening conditions must be excluded.    -  I have ordered a comprehensive set of diagnostic tests to evaluate for potential cardiac, pulmonary, and other systemic issues. These include cardiac monitoring, troponin levels, complete blood count, comprehensive metabolic panel, coagulation studies, and lipase to assess for any underlying inflammatory or infectious processes.    -  A CTA chest has been ordered to evaluate for pulmonary embolism, given the patient's age and recent hospitalization, which are risk factors for venous thromboembolism. The chest x-ray will help assess for any rib fractures, pneumothorax, or other pulmonary pathologies.    -  An EKG will be performed to evaluate for any acute cardiac changes that may not be apparent on physical examination.    -  The patient will be closely monitored in the Emergency Department while awaiting test results. Pain management will be addressed as needed, and the treatment plan will be adjusted based on the results of the diagnostic studies.    -  Once all results are available, I will reassess the patient's condition and determine the appropriate disposition, which may include discharge with follow-up instructions if all serious conditions are ruled out, or admission for further management if any significant abnormalities are found.    -  Return precautions will be discussed with the patient, including instructions to return immediately for worsening pain, shortness of breath, fever, or any new concerning symptoms.   Impression:    -  Right chest wall pain, likely musculoskeletal, but requiring further evaluation to rule out more serious conditions    -  History of COPD, hypertension, hypothyroidism, diverticulosis, prior myocardial infarction, lung cancer status post partial right pneumonectomy    -  Recent history of pseudomonal bacteremia and UTI

## 2025-02-11 NOTE — ED PROVIDER NOTE - CLINICAL SUMMARY MEDICAL DECISION MAKING FREE TEXT BOX
84-year-old female with a past medical history of hypertension, COPD, hypothyroidism, diverticulosis, myocardial infarction, lung cancer status post partial right pneumonectomy, gangrenous cholecystitis status post lap caroline in 2020, s/p recent admission in dec 2024 for abd pain and found to have pseudomonal bacteremia and UTI (cx + e. coli), presents for R chest wall pain s/p lifting a mattress 2d ago. on arrival vital signs within normal limits, on exam RRR, lungs clear bilaterally to auscultation, abdomen soft non tender, +chest wall tenderness on R, no overlying skin changes. likely muscle strain given pt pain started after lifting mattress, but given pleuritic pain and recent hospitalization cannot perc out, high risk, will get ctpe

## 2025-02-11 NOTE — ED PROVIDER NOTE - PHYSICAL EXAMINATION
GENERAL: well appearing in no acute distress, non-toxic appearing  HEAD: normocephalic, atraumatic  HEENT: normal conjunctiva, oral mucosa moist, uvula midline, no tonsilar exudates, neck supple, no JVD  CARDIAC: regular rate and rhythm, normal S1S2, no appreciable murmurs, 2+ pulses in UE/LE b/l  PULM: normal breath sounds, clear to ascultation bilaterally, no rales, rhonchi, wheezing  GI: abdomen nondistended, soft, nontender, no guarding, rebound tenderness  MSK: chest wall tender on R rib region worse with movement  SKIN: no rashes

## 2025-02-11 NOTE — ED PROVIDER NOTE - PROGRESS NOTE DETAILS
Praneeth PGY3: Pt was reassessed and is doing well. Not complaining of any pain and refusing CT scan of chest. Not having pulmonary symptoms, O2 Saturation >95% on RA. D-dimer + by 1 point on age-adjusted; engaged in shared decision making and agreed to forego CT scan and she will follow up with her pulmonologist. Results, including any incidental findings, were discussed. Follow up and return precautions were discussed. Patient verbalized understanding

## 2025-02-11 NOTE — ED PROVIDER NOTE - OBJECTIVE STATEMENT
84-year-old female with a past medical history of hypertension, COPD, hypothyroidism, diverticulosis, myocardial infarction, lung cancer status post partial right pneumonectomy, gangrenous cholecystitis status post lap caroline in 2020, s/p recent admission in dec 2024 for abd pain and found to have pseudomonal bacteremia and UTI (cx + e. coli), presents for R chest wall pain s/p lifting a mattress 2d ago. states she was trying to lift the mattress back onto the bed and noticed the pain in her chest wall which has gotten worse. she now has pain with inspiration. denies any fevers, nausea, vomiting, sob, diarrhea, dysuria.

## 2025-02-11 NOTE — ED PROVIDER NOTE - NSFOLLOWUPINSTRUCTIONS_ED_ALL_ED_FT
You were seen in the Emergency Department for chest pain after lifting a mattress. Blood work, ekg, and chest xray was done and was nonactionable. Your D-dimer was positive by 1 point but you are not having pain or concerning features for a blood clot in your lung so we engaged in shared decision making and agreed to forego a follow up CTA as likely this is muscular. Take tylenol 650mg-1000mg every 6-8 hours as needed for continued pain.    1) Continue all previously prescribed medications as directed.    2) Follow up with your primary care physician - take copies of your results.    3) Return to the Emergency Department for worsening or persistent symptoms, and/or ANY NEW OR CONCERNING SYMPTOMS.

## 2025-02-11 NOTE — ED ADULT NURSE REASSESSMENT NOTE - NS ED NURSE REASSESS COMMENT FT1
pt using wheel chair  to bathroom with 1 assistance. pt resting comfortably. no acute distress noted. no medical complaints at this time. IV c/d/i. comfort and safety measures maintained.

## 2025-02-11 NOTE — ED PROVIDER NOTE - PATIENT PORTAL LINK FT
You can access the FollowMyHealth Patient Portal offered by [facility] by registering at the following website: [email or web address]. By joining [employer]’s FollowMyHealth portal, you will also be able to view your health information using other applications (apps) compatible with our system.

## 2025-02-11 NOTE — ED ADULT TRIAGE NOTE - CHIEF COMPLAINT QUOTE
pain with inhalation x3 days after "moving her mattress"  also c/o "pins and needles" to R hand x several weeks

## 2025-02-11 NOTE — ED ADULT NURSE NOTE - OBJECTIVE STATEMENT
85 yo presents to the ED from [place] by EMS. A&Ox4, ambulatory with one assist c/o pain with inhalation x3 days after "moving her mattress". pt also c/o "pins and needles" to R hand x several weeks. EMS reports pt was sent in due to cough starting today. denies any fever, chills. no resp distress upon assessment. plan of care discussed. safety and comfort measures maintained.

## 2025-02-12 VITALS
HEART RATE: 79 BPM | RESPIRATION RATE: 16 BRPM | OXYGEN SATURATION: 94 % | DIASTOLIC BLOOD PRESSURE: 92 MMHG | SYSTOLIC BLOOD PRESSURE: 159 MMHG | TEMPERATURE: 98 F

## 2025-03-03 ENCOUNTER — APPOINTMENT (OUTPATIENT)
Dept: ORTHOPEDIC SURGERY | Facility: CLINIC | Age: 85
End: 2025-03-03
Payer: MEDICARE

## 2025-03-03 DIAGNOSIS — M17.10 UNILATERAL PRIMARY OSTEOARTHRITIS, UNSPECIFIED KNEE: ICD-10-CM

## 2025-03-03 PROCEDURE — 99213 OFFICE O/P EST LOW 20 MIN: CPT | Mod: 25

## 2025-03-03 PROCEDURE — 20610 DRAIN/INJ JOINT/BURSA W/O US: CPT | Mod: RT

## 2025-03-03 RX ADMIN — LIDOCAINE HYDROCHLORIDE 4 %: 10 INJECTION, SOLUTION INFILTRATION; PERINEURAL at 00:00

## 2025-03-03 RX ADMIN — METHYLPREDNISOLONE ACETATE 1 MG/ML: 40 INJECTION, SUSPENSION INTRA-ARTICULAR; INTRALESIONAL; INTRAMUSCULAR; SOFT TISSUE at 00:00

## 2025-03-05 RX ORDER — LIDOCAINE HYDROCHLORIDE 10 MG/ML
1 INJECTION, SOLUTION INFILTRATION; PERINEURAL
Qty: 0 | Refills: 0 | Status: COMPLETED | OUTPATIENT
Start: 2025-03-03

## 2025-03-05 RX ORDER — METHYLPRED ACET/NACL,ISO-OS/PF 40 MG/ML
40 VIAL (ML) INJECTION
Refills: 0 | Status: COMPLETED | OUTPATIENT
Start: 2025-03-03

## 2025-04-03 NOTE — PATIENT PROFILE ADULT - FUNCTIONAL SCREEN CURRENT LEVEL: COMMUNICATION, MLM
-- DO NOT REPLY / DO NOT REPLY ALL --  -- This inbox is not monitored. If this was sent to the wrong provider or department, reroute message to P E[facility] Reroute pool. --  -- Message is from [facility] ([facility]) --    General Patient Message: Pt is scheduled for CPE on 7/11, asking if provider will want labs done prior to this.       Alternative phone number: NA    Can a detailed message be left? Yes - Voicemail   Patient has been advised the message will be addressed within 2-3 business days.                 N/A 0 = understands/communicates without difficulty

## 2025-04-08 NOTE — PROGRESS NOTE ADULT - SUBJECTIVE AND OBJECTIVE BOX
Chief Complaint: Abdominal pain  Reason for consult: C/f cholangitis    Interval Events:   - Patient with no additional fevers/chills. No abdominal pain.    Allergies:  No Known Allergies    Hospital Medications:  ALBUTerol    90 MICROgram(s) HFA Inhaler 2 Puff(s) Inhalation every 6 hours PRN  budesonide  80 MICROgram(s)/formoterol 4.5 MICROgram(s) Inhaler 2 Puff(s) Inhalation two times a day  lactated ringers. 1000 milliLiter(s) IV Continuous <Continuous>  levothyroxine Injectable 94 MICROGram(s) IV Push at bedtime  melatonin 3 milliGRAM(s) Oral at bedtime  metoprolol tartrate Injectable 5 milliGRAM(s) IV Push every 6 hours  piperacillin/tazobactam IVPB.. 3.375 Gram(s) IV Intermittent every 8 hours  potassium chloride  10 mEq/100 mL IVPB 10 milliEquivalent(s) IV Intermittent every 1 hour  potassium phosphate IVPB 15 milliMole(s) IV Intermittent once    PMHX/PSHX:  MI (myocardial infarction)    Hypertension    History of appendectomy    Family history:  Family history of colonic diverticulitis (Mother, Sibling)    ROS:     General:  No wt loss, fevers, chills, night sweats, fatigue,   Eyes:  Good vision, no reported pain  ENT:  No sore throat, pain, runny nose, dysphagia  CV:  No pain, palpitations, hypo/hypertension  Pulm:  No dyspnea, cough, tachypnea, wheezing  GI:  No pain, No nausea, No vomiting, No diarrhea, No constipation, No weight loss, No fever, No pruritis, No rectal bleeding, No tarry stools, No dysphagia  :  No pain, bleeding, incontinence, nocturia  Muscle:  No pain, weakness  Neuro:  No weakness, tingling, memory problems  Psych:  No fatigue, insomnia, mood problems, depression  Endocrine:  No polyuria, polydipsia, cold/heat intolerance  Heme:  No petechiae, ecchymosis, easy bruisability  Skin:  No rash, tattoos, scars, edema    PHYSICAL EXAM:   Vital Signs:  Vital Signs Last 24 Hrs  T(C): 36.8 (2021 14:09), Max: 37.2 (2021 12:53)  T(F): 98.3 (2021 14:09), Max: 99 (2021 12:53)  HR: 73 (2021 14:09) (73 - 94)  BP: 178/74 (2021 14:09) (138/69 - 178/74)  BP(mean): --  RR: 16 (2021 14:09) (16 - 20)  SpO2: 94% (2021 14:09) (94% - 96%)    GENERAL:  No acute distress  HEENT:  Normocephalic/atraumatic, no scleral icterus  CHEST:  Normal effort, no accessory muscle use  HEART:  Regular rate and rhythm  ABDOMEN:  Soft, non-tender, non-distended  EXTREMITIES: No cyanosis, clubbing, or edema  SKIN:  No rash/erythema  NEURO:  Alert and oriented x 3    LABS:                        11.3   11.01 )-----------( 224      ( 2021 06:23 )             34.8     Mean Cell Volume: 92.1 fl (- @ 06:23)        134<L>  |  102  |  9   ----------------------------<  102<H>  3.4<L>   |  23  |  0.65    Ca    8.8      2021 06:23  Phos  2.2       Mg     1.8         TPro  5.9<L>  /  Alb  3.0<L>  /  TBili  3.1<H>  /  DBili  x   /  AST  99<H>  /  ALT  134<H>  /  AlkPhos  293<H>      LIVER FUNCTIONS - ( 2021 06:23 )  Alb: 3.0 g/dL / Pro: 5.9 g/dL / ALK PHOS: 293 U/L / ALT: 134 U/L / AST: 99 U/L / GGT: x           PT/INR - ( 2021 22:56 )   PT: 12.2 sec;   INR: 1.07 ratio      PTT - ( 2021 22:56 )  PTT:29.0 sec  Urinalysis Basic - ( 2021 00:38 )    Color: Yellow / Appearance: Clear / S.010 / pH: x  Gluc: x / Ketone: Negative  / Bili: Negative / Urobili: <2 mg/dL   Blood: x / Protein: Negative / Nitrite: Negative   Leuk Esterase: Moderate / RBC: 2 /HPF / WBC 25-50 /HPF   Sq Epi: x / Non Sq Epi: moderate /HPF / Bacteria: small               11.3   11.01 )-----------( 224      ( 2021 06:23 )             34.8                         11.5   12.00 )-----------( 242      ( 2021 17:40 )             35.9                         11.2   14.15 )-----------( 244      ( 2021 08:21 )             34.7                         11.5   11.80 )-----------( 297      ( 2021 22:42 )             37.9     Imaging:    < from: MR MRCP w/wo IV Cont (21 @ 12:26) >    EXAM:  MR MRCP WAW IC                          PROCEDURE DATE:  2021      INTERPRETATION:  CLINICAL INFORMATION: Recent cholecystectomy 2020. Right upper quadrant pain. Rule out cholangitis, common bile duct stone.    COMPARISON: CT abdomen/pelvis 2021. MRI abdomen 2020.    CONTRAST/COMPLICATIONS:  IV Contrast: Gadavist 7.5 cc administered / 0 cc discarded.  Oral Contrast: NONE  Complications: None reported at time of study completion    PROCEDURE:  MRI of the abdomen was performed.  MRCP was performed.    FINDINGS:  LOWER CHEST: Within normal limits.    LIVER: Within normal limits.  BILE DUCTS: Normal caliber. No choledocholithiasis. Mild wall enhancement of the extrahepatic biliary tree.  GALLBLADDER: Post cholecystectomy.  SPLEEN: Within normal limits.  PANCREAS: Within normal limits.  ADRENALS: Within normal limits.  KIDNEYS/URETERS: Duplicated left renal collecting system. Bilateral renal cysts.    VISUALIZED PORTIONS:  BOWEL: Colonic peribiliary duodenal diverticulum. Diverticulosis.  PERITONEUM: No ascites.  VESSELS: Hepatic and portal veins are patent.  RETROPERITONEUM/LYMPH NODES: No lymphadenopathy.  ABDOMINAL WALL: Within normal limits.  BONES: Lumbar degenerative disc disease.    IMPRESSION:  No biliary ductal dilatation or choledocholithiasis.    Mild enhancement of the extrahepatic biliary tree consistent with cholangitis.    Bradly Mccarthy MD; Fellow Radiology  This document has been electronically signed.  SARAH ARROYO MD; Attending Radiologist  This document has been electronically signed. 2021  3:03PM    < end of copied text >            
Surgery ACS Team Daily Progress Note    SUBJECTIVE: Patient seen and examined during the morning round, pain has improved, MRCP negative for choledocholithiasis, pending ERCP     OBJECTIVE:   Vital Signs Last 24 Hrs  T(C): 36.8 (21 Jan 2021 14:09), Max: 37.2 (21 Jan 2021 12:53)  T(F): 98.3 (21 Jan 2021 14:09), Max: 99 (21 Jan 2021 12:53)  HR: 70 (21 Jan 2021 15:52) (70 - 94)  BP: 162/74 (21 Jan 2021 15:52) (149/65 - 178/74)  BP(mean): --  RR: 16 (21 Jan 2021 14:09) (16 - 20)  SpO2: 94% (21 Jan 2021 14:09) (94% - 96%)    PHYSICAL EXAM:  Constitutional: resting in bed with no acute distress  Respiratory:  unlabored breathing  Gastrointestinal: Abdomen soft, nontender    Extremities:  No edema, no calf tenderness    RADIOLOGY & ADDITIONAL STUDIES:   EXAM:  MR MRCP [facility]AW                         PROCEDURE DATE:  01/20/2021    IMPRESSION:  No biliary ductal dilatation or choledocholithiasis.  Mild enhancement of the extrahepatic biliary tree consistent with cholangitis.    
ACS AM Progress Note      Subjective:  Pt seen and examined at bedside this AM  No acute events overnight.  Voiding appropriately  Denies chest pain, shortness of breath, dizziness, nausea, vomiting.      Vital Signs Last 24 Hrs  T(C): 36.7 (23 Jan 2021 04:52), Max: 37.1 (22 Jan 2021 12:35)  T(F): 98 (23 Jan 2021 04:52), Max: 98.7 (22 Jan 2021 12:35)  HR: 70 (23 Jan 2021 04:52) (69 - 77)  BP: 174/79 (23 Jan 2021 04:52) (147/85 - 174/79)  BP(mean): --  RR: 18 (23 Jan 2021 04:52) (18 - 18)  SpO2: 95% (23 Jan 2021 04:52) (95% - 96%)      Physical Exam:  General Appearance:  Appears well, NAD, AAO x3  HEENT: atraumatic, normocephalic, PERRLA, no C-spine tenderness or step offs, trachea midline,  Chest: Equal expansion bilaterally, equal breath sounds  CV: Pulse regular presently  Abdomen: Soft, mild distension, nontender.  Extremities:  Grossly symmetric, warm and well perfused 4x.       I&O's Detail    22 Jan 2021 07:01  -  23 Jan 2021 07:00  --------------------------------------------------------  IN:    Oral Fluid: 910 mL  Total IN: 910 mL    OUT:    Voided (mL): 1100 mL  Total OUT: 1100 mL    Total NET: -190 mL      LABS:                        11.4   4.78  )-----------( 244      ( 23 Jan 2021 07:14 )             35.2     01-23    138  |  104  |  6<L>  ----------------------------<  98  3.4<L>   |  21<L>  |  0.71    Ca    8.8      23 Jan 2021 07:14  Phos  3.2     01-23  Mg     2.2     01-23    TPro  6.1  /  Alb  3.0<L>  /  TBili  0.7  /  DBili  0.3<H>  /  AST  26  /  ALT  59<H>  /  AlkPhos  223<H>  01-23          Medications  ALBUTerol    90 MICROgram(s) HFA Inhaler 2 Puff(s) Inhalation every 6 hours PRN  budesonide  80 MICROgram(s)/formoterol 4.5 MICROgram(s) Inhaler 2 Puff(s) Inhalation two times a day  enoxaparin Injectable 40 milliGRAM(s) SubCutaneous daily  escitalopram 10 milliGRAM(s) Oral daily  levothyroxine 125 MICROGram(s) Oral daily  melatonin 3 milliGRAM(s) Oral at bedtime  metoprolol tartrate 25 milliGRAM(s) Oral two times a day  pantoprazole    Tablet 40 milliGRAM(s) Oral before breakfast  piperacillin/tazobactam IVPB.. 3.375 Gram(s) IV Intermittent every 8 hours  simvastatin 20 milliGRAM(s) Oral at bedtime    Advair Diskus 100 mcg-50 mcg inhalation powder: 1 puff(s) inhaled 2 times a day  aspirin 81 mg oral tablet, chewable: 1 tab(s) orally once a day  Colace 100 mg oral capsule: 1 cap(s) orally 2 times a day  Lexapro 10 mg oral tablet: 1 tab(s) orally once a day  metoprolol tartrate 25 mg oral tablet: 1 tab(s) orally 2 times a day  pantoprazole 40 mg oral delayed release tablet: 1 tab(s) orally once a day (before a meal)  Please continue for 30 days   Synthroid 125 mcg (0.125 mg) oral tablet: 1 tab(s) orally once a day HOLD ON SUNDAYS  Zocor 20 mg oral tablet: 1 tab(s) orally once a day (at bedtime)      RADIOLOGY & ADDITIONAL STUDIES:  
ACS AM Progress Note      Subjective:  Pt seen and examined at bedside this AM  No acute events overnight.  Voiding appropriately  Denies chest pain, shortness of breath, dizziness, nausea, vomiting.      Vitals  Vital Signs Last 24 Hrs  T(C): 36.7 (22 Jan 2021 05:55), Max: 37.3 (21 Jan 2021 17:57)  T(F): 98.1 (22 Jan 2021 05:55), Max: 99.1 (21 Jan 2021 17:57)  HR: 78 (22 Jan 2021 05:55) (66 - 84)  BP: 168/84 (22 Jan 2021 05:55) (145/65 - 178/74)  BP(mean): --  RR: 18 (22 Jan 2021 05:55) (16 - 19)  SpO2: 94% (22 Jan 2021 05:55) (94% - 97%)      Physical Exam:  General Appearance:  Appears well, NAD, AAO x3  HEENT: atraumatic, normocephalic, PERRLA, no C-spine tenderness or step offs, trachea midline,  Chest: Equal expansion bilaterally, equal breath sounds  CV: Pulse regular presently  Abdomen: Soft, mild distension, nontender.  Extremities:  Grossly symmetric, warm and well perfused 4x.         I&O's Summary  21 Jan 2021 07:01  -  22 Jan 2021 07:00  --------------------------------------------------------  IN: 1000 mL / OUT: 1800 mL / NET: -800 mL         LABS:                        11.3   11.01 )-----------( 224      ( 21 Jan 2021 06:23 )             34.8     01-21    134<L>  |  102  |  9   ----------------------------<  102<H>  3.4<L>   |  23  |  0.65    Ca    8.8      21 Jan 2021 06:23  Phos  2.2     01-21  Mg     1.8     01-21    TPro  5.9<L>  /  Alb  3.0<L>  /  TBili  3.1<H>  /  DBili  x   /  AST  99<H>  /  ALT  134<H>  /  AlkPhos  293<H>  01-21          Medications  ALBUTerol    90 MICROgram(s) HFA Inhaler 2 Puff(s) Inhalation every 6 hours PRN  budesonide  80 MICROgram(s)/formoterol 4.5 MICROgram(s) Inhaler 2 Puff(s) Inhalation two times a day  enoxaparin Injectable 40 milliGRAM(s) SubCutaneous daily  escitalopram 10 milliGRAM(s) Oral daily  levothyroxine 125 MICROGram(s) Oral daily  melatonin 3 milliGRAM(s) Oral at bedtime  metoprolol tartrate 25 milliGRAM(s) Oral two times a day  pantoprazole    Tablet 40 milliGRAM(s) Oral before breakfast  piperacillin/tazobactam IVPB.. 3.375 Gram(s) IV Intermittent every 8 hours  simvastatin 20 milliGRAM(s) Oral at bedtime    Advair Diskus 100 mcg-50 mcg inhalation powder: 1 puff(s) inhaled 2 times a day  aspirin 81 mg oral tablet, chewable: 1 tab(s) orally once a day  Colace 100 mg oral capsule: 1 cap(s) orally 2 times a day  Lexapro 10 mg oral tablet: 1 tab(s) orally once a day  metoprolol tartrate 25 mg oral tablet: 1 tab(s) orally 2 times a day  pantoprazole 40 mg oral delayed release tablet: 1 tab(s) orally once a day (before a meal)  Please continue for 30 days   Synthroid 125 mcg (0.125 mg) oral tablet: 1 tab(s) orally once a day HOLD ON SUNDAYS  Zocor 20 mg oral tablet: 1 tab(s) orally once a day (at bedtime)      RADIOLOGY & ADDITIONAL STUDIES:  
Chief Complaint: Abdominal pain  Reason for consult: C/f cholangitis    Interval Events:   - Patient feels well this morning  - She denies fevers/chills, nausea/vomiting, and abdominal pain    Allergies:  No Known Allergies    MEDICATIONS  (STANDING):  budesonide  80 MICROgram(s)/formoterol 4.5 MICROgram(s) Inhaler 2 Puff(s) Inhalation two times a day  enoxaparin Injectable 40 milliGRAM(s) SubCutaneous daily  escitalopram 10 milliGRAM(s) Oral daily  levothyroxine 125 MICROGram(s) Oral daily  magnesium sulfate  IVPB 2 Gram(s) IV Intermittent once  melatonin 3 milliGRAM(s) Oral at bedtime  metoprolol tartrate 25 milliGRAM(s) Oral two times a day  pantoprazole    Tablet 40 milliGRAM(s) Oral before breakfast  piperacillin/tazobactam IVPB.. 3.375 Gram(s) IV Intermittent every 8 hours  potassium chloride    Tablet ER 20 milliEquivalent(s) Oral every 2 hours  potassium phosphate / sodium phosphate Powder (PHOS-NaK) 1 Packet(s) Oral once  simvastatin 20 milliGRAM(s) Oral at bedtime    MEDICATIONS  (PRN):  ALBUTerol    90 MICROgram(s) HFA Inhaler 2 Puff(s) Inhalation every 6 hours PRN Shortness of Breath and/or Wheezing    PMHX/PSHX:  MI (myocardial infarction)    Hypertension    History of appendectomy    Family history:  Family history of colonic diverticulitis (Mother, Sibling)    ROS:     General:  No wt loss, fevers, chills, night sweats, fatigue,   Eyes:  Good vision, no reported pain  ENT:  No sore throat, pain, runny nose, dysphagia  CV:  No pain, palpitations, hypo/hypertension  Pulm:  No dyspnea, cough, tachypnea, wheezing  GI:  No pain, No nausea, No vomiting, No diarrhea, No constipation, No weight loss, No fever, No pruritis, No rectal bleeding, No tarry stools, No dysphagia  :  No pain, bleeding, incontinence, nocturia  Muscle:  No pain, weakness  Neuro:  No weakness, tingling, memory problems  Psych:  No fatigue, insomnia, mood problems, depression  Endocrine:  No polyuria, polydipsia, cold/heat intolerance  Heme:  No petechiae, ecchymosis, easy bruisability  Skin:  No rash, tattoos, scars, edema    PHYSICAL EXAM:   Vital Signs:  Vital Signs Last 24 Hrs  T(C): 36.6 (22 Jan 2021 09:25), Max: 37.3 (21 Jan 2021 17:57)  T(F): 97.8 (22 Jan 2021 09:25), Max: 99.1 (21 Jan 2021 17:57)  HR: 77 (22 Jan 2021 09:25) (66 - 84)  BP: 148/84 (22 Jan 2021 09:25) (145/65 - 178/74)  BP(mean): --  RR: 18 (22 Jan 2021 09:25) (16 - 19)  SpO2: 96% (22 Jan 2021 09:25) (94% - 97%)    GENERAL:  No acute distress  HEENT:  Normocephalic/atraumatic, no scleral icterus  CHEST:  Normal effort, no accessory muscle use  HEART:  Regular rate and rhythm  ABDOMEN:  Soft, non-tender, non-distended  EXTREMITIES: No cyanosis, clubbing, or edema  SKIN:  No rash/erythema  NEURO:  Alert and oriented x 3    LABS:                        10.5   6.31  )-----------( 206      ( 22 Jan 2021 07:46 )             32.8     01-22    138  |  103  |  6<L>  ----------------------------<  78  3.3<L>   |  24  |  0.61    Ca    8.7      22 Jan 2021 07:47  Phos  2.6     01-22  Mg     1.8     01-22    TPro  5.7<L>  /  Alb  2.9<L>  /  TBili  1.0  /  DBili  0.5<H>  /  AST  43<H>  /  ALT  82<H>  /  AlkPhos  241<H>  01-22    LIVER FUNCTIONS - ( 22 Jan 2021 07:47 )  Alb: 2.9 g/dL / Pro: 5.7 g/dL / ALK PHOS: 241 U/L / ALT: 82 U/L / AST: 43 U/L / GGT: x               Amylase Serum--      Lipase serum23       Ammonia--    Imaging:    Reviewed            
TRAUMA SURGERY [facility] MEDICINE PROGRESS NOTE    Patient is a 80y old  Female who presents with a chief complaint of Abdominal pain (2021 14:34)    SUBJECTIVE / OVERNIGHT EVENTS:  overnight no events  pt reports feeling improved now  no n/v/f/chills, cp, sob, abd pain    CAPILLARY BLOOD GLUCOSE        I&O's Summary    2021 07:01  -  2021 07:00  --------------------------------------------------------  IN: 0 mL / OUT: 150 mL / NET: -150 mL        Vital Signs Last 24 Hrs  T(C): 36.8 (2021 14:09), Max: 37.2 (2021 12:53)  T(F): 98.3 (2021 14:09), Max: 99 (2021 12:53)  HR: 73 (2021 14:09) (73 - 94)  BP: 178/74 (2021 14:09) (138/69 - 178/74)  BP(mean): --  RR: 16 (2021 14:09) (16 - 20)  SpO2: 94% (2021 14:09) (94% - 96%)    PHYSICAL EXAM:  GENERAL: NAD, well-developed  HEAD:  NCAT  EYES: EOMI  NECK: Supple, No JVD  CHEST/LUNG: Clear to auscultation bilaterally; No wheeze  HEART: Reg rate. No M/R/G.  ABDOMEN: Soft, no rebound/guarding  EXTREMITIES:  2+ Peripheral Pulses, No clubbing, cyanosis, or edema  PSYCH: AAOx3  SKIN: No rashes or lesions    LABS:                        11.3   11.01 )-----------( 224      ( 2021 06:23 )             34.8     21    134<L>  |  102  |  9   ----------------------------<  102<H>  3.4<L>   |  23  |  0.65    Ca    8.8      2021 06:23  Phos  2.2     01-21  Mg     1.8         TPro  5.9<L>  /  Alb  3.0<L>  /  TBili  3.1<H>  /  DBili  x   /  AST  99<H>  /  ALT  134<H>  /  AlkPhos  293<H>      PT/INR - ( 2021 22:56 )   PT: 12.2 sec;   INR: 1.07 ratio         PTT - ( 2021 22:56 )  PTT:29.0 sec      Urinalysis Basic - ( 2021 00:38 )    Color: Yellow / Appearance: Clear / S.010 / pH: x  Gluc: x / Ketone: Negative  / Bili: Negative / Urobili: <2 mg/dL   Blood: x / Protein: Negative / Nitrite: Negative   Leuk Esterase: Moderate / RBC: 2 /HPF / WBC 25-50 /HPF   Sq Epi: x / Non Sq Epi: moderate /HPF / Bacteria: small        Culture - Blood (collected 2021 13:11)  Source: .Blood Blood  Preliminary Report (2021 14:01):    No growth to date.    Culture - Blood (collected 2021 13:11)  Source: .Blood Blood  Preliminary Report (2021 14:01):    No growth to date.    Culture - Urine (collected 2021 07:02)  Source: .Urine Clean Catch (Midstream)  Final Report (2021 08:14):    <10,000 CFU/mL Normal Urogenital Lisa        MEDICATIONS  (STANDING):  budesonide  80 MICROgram(s)/formoterol 4.5 MICROgram(s) Inhaler 2 Puff(s) Inhalation two times a day  lactated ringers. 1000 milliLiter(s) (100 mL/Hr) IV Continuous <Continuous>  levothyroxine Injectable 94 MICROGram(s) IV Push at bedtime  melatonin 3 milliGRAM(s) Oral at bedtime  metoprolol tartrate Injectable 5 milliGRAM(s) IV Push every 6 hours  piperacillin/tazobactam IVPB.. 3.375 Gram(s) IV Intermittent every 8 hours  potassium chloride  10 mEq/100 mL IVPB 10 milliEquivalent(s) IV Intermittent every 1 hour  potassium phosphate IVPB 15 milliMole(s) IV Intermittent once    MEDICATIONS  (PRN):  ALBUTerol    90 MICROgram(s) HFA Inhaler 2 Puff(s) Inhalation every 6 hours PRN Shortness of Breath and/or Wheezing    Home Medications:  Advair Diskus 100 mcg-50 mcg inhalation powder: 1 puff(s) inhaled 2 times a day (14 Dec 2020 14:24)  aspirin 81 mg oral tablet, chewable: 1 tab(s) orally once a day (12 Dec 2020 15:34)  Colace 100 mg oral capsule: 1 cap(s) orally 2 times a day (12 Dec 2020 15:34)  Lexapro 10 mg oral tablet: 1 tab(s) orally once a day (14 Dec 2020 14:24)  metoprolol tartrate 25 mg oral tablet: 1 tab(s) orally 2 times a day (12 Dec 2020 15:34)  pantoprazole 40 mg oral delayed release tablet: 1 tab(s) orally once a day (before a meal)  Please continue for 30 days  (21 Dec 2020 11:28)  Synthroid 125 mcg (0.125 mg) oral tablet: 1 tab(s) orally once a day HOLD ON SUNDAYS (14 Dec 2020 14:06)  Zocor 20 mg oral tablet: 1 tab(s) orally once a day (at bedtime) (12 Dec 2020 15:34)    
TRAUMA SURGERY [facility] MEDICINE PROGRESS NOTE    Patient is a 80y old  Female who presents with a chief complaint of Abdominal pain (22 Jan 2021 09:56)    SUBJECTIVE / OVERNIGHT EVENTS:  overnight no acute events  no n/v/f/chills, cp, sob, abd pain  feels well and improved overall    CAPILLARY BLOOD GLUCOSE    I&O's Summary    21 Jan 2021 07:01  -  22 Jan 2021 07:00  --------------------------------------------------------  IN: 2650 mL / OUT: 1800 mL / NET: 850 mL    22 Jan 2021 07:01  -  22 Jan 2021 13:17  --------------------------------------------------------  IN: 250 mL / OUT: 0 mL / NET: 250 mL    Vital Signs Last 24 Hrs  T(C): 37.1 (22 Jan 2021 12:35), Max: 37.3 (21 Jan 2021 17:57)  T(F): 98.7 (22 Jan 2021 12:35), Max: 99.1 (21 Jan 2021 17:57)  HR: 70 (22 Jan 2021 12:35) (66 - 78)  BP: 156/81 (22 Jan 2021 12:35) (145/65 - 178/74)  BP(mean): --  RR: 18 (22 Jan 2021 12:35) (16 - 18)  SpO2: 96% (22 Jan 2021 12:35) (94% - 97%)    PHYSICAL EXAM:  GENERAL: NAD, well-developed  HEAD:  NCAT  EYES: EOMI  NECK: Supple, No JVD  CHEST/LUNG: Clear to auscultation bilaterally; No wheeze  HEART: Reg rate. No M/R/G.  ABDOMEN: Soft, no rebound/guarding  EXTREMITIES:  2+ Peripheral Pulses, No clubbing, cyanosis, or edema  PSYCH: AAOx3  SKIN: No rashes or lesions      LABS:                        10.5   6.31  )-----------( 206      ( 22 Jan 2021 07:46 )             32.8     01-22    138  |  103  |  6<L>  ----------------------------<  78  3.3<L>   |  24  |  0.61    Ca    8.7      22 Jan 2021 07:47  Phos  2.6     01-22  Mg     1.8     01-22    TPro  5.7<L>  /  Alb  2.9<L>  /  TBili  1.0  /  DBili  0.5<H>  /  AST  43<H>  /  ALT  82<H>  /  AlkPhos  241<H>  01-22      Culture - Blood (collected 20 Jan 2021 13:11)  Source: .Blood Blood  Preliminary Report (21 Jan 2021 14:01):    No growth to date.    Culture - Blood (collected 20 Jan 2021 13:11)  Source: .Blood Blood  Preliminary Report (21 Jan 2021 14:01):    No growth to date.    Culture - Urine (collected 20 Jan 2021 07:02)  Source: .Urine Clean Catch (Midstream)  Final Report (21 Jan 2021 08:14):    <10,000 CFU/mL Normal Urogenital Lisa        MEDICATIONS  (STANDING):  budesonide  80 MICROgram(s)/formoterol 4.5 MICROgram(s) Inhaler 2 Puff(s) Inhalation two times a day  enoxaparin Injectable 40 milliGRAM(s) SubCutaneous daily  escitalopram 10 milliGRAM(s) Oral daily  levothyroxine 125 MICROGram(s) Oral daily  melatonin 3 milliGRAM(s) Oral at bedtime  metoprolol tartrate 25 milliGRAM(s) Oral two times a day  pantoprazole    Tablet 40 milliGRAM(s) Oral before breakfast  piperacillin/tazobactam IVPB.. 3.375 Gram(s) IV Intermittent every 8 hours  potassium chloride    Tablet ER 20 milliEquivalent(s) Oral every 2 hours  simvastatin 20 milliGRAM(s) Oral at bedtime    MEDICATIONS  (PRN):  ALBUTerol    90 MICROgram(s) HFA Inhaler 2 Puff(s) Inhalation every 6 hours PRN Shortness of Breath and/or Wheezing  
Vaccine status unknown

## 2025-06-02 ENCOUNTER — APPOINTMENT (OUTPATIENT)
Dept: ORTHOPEDIC SURGERY | Facility: CLINIC | Age: 85
End: 2025-06-02
Payer: MEDICARE

## 2025-06-02 DIAGNOSIS — M17.10 UNILATERAL PRIMARY OSTEOARTHRITIS, UNSPECIFIED KNEE: ICD-10-CM

## 2025-06-02 PROCEDURE — 99213 OFFICE O/P EST LOW 20 MIN: CPT

## 2025-06-02 PROCEDURE — G2211 COMPLEX E/M VISIT ADD ON: CPT

## 2025-07-02 ENCOUNTER — NON-APPOINTMENT (OUTPATIENT)
Age: 85
End: 2025-07-02

## 2025-07-02 ENCOUNTER — APPOINTMENT (OUTPATIENT)
Dept: NEUROLOGY | Facility: CLINIC | Age: 85
End: 2025-07-02
Payer: MEDICARE

## 2025-07-02 VITALS
BODY MASS INDEX: 27.82 KG/M2 | WEIGHT: 159 LBS | HEIGHT: 63.5 IN | HEART RATE: 75 BPM | DIASTOLIC BLOOD PRESSURE: 77 MMHG | SYSTOLIC BLOOD PRESSURE: 149 MMHG

## 2025-07-02 PROCEDURE — G2211 COMPLEX E/M VISIT ADD ON: CPT

## 2025-07-02 PROCEDURE — 99205 OFFICE O/P NEW HI 60 MIN: CPT

## 2025-07-02 RX ORDER — OMEPRAZOLE 20 MG/1
20 TABLET, DELAYED RELEASE ORAL
Refills: 0 | Status: ACTIVE | COMMUNITY
Start: 2025-07-02

## 2025-07-09 LAB
ALBUMIN SERPL ELPH-MCNC: 4 G/DL
ALP BLD-CCNC: 116 U/L
ALT SERPL-CCNC: 10 U/L
ANION GAP SERPL CALC-SCNC: 12 MMOL/L
AST SERPL-CCNC: 21 U/L
BILIRUB SERPL-MCNC: 0.3 MG/DL
BUN SERPL-MCNC: 15 MG/DL
CALCIUM SERPL-MCNC: 9.3 MG/DL
CHLORIDE SERPL-SCNC: 97 MMOL/L
CO2 SERPL-SCNC: 25 MMOL/L
CREAT SERPL-MCNC: 0.87 MG/DL
EGFRCR SERPLBLD CKD-EPI 2021: 65 ML/MIN/1.73M2
GLUCOSE SERPL-MCNC: 86 MG/DL
POTASSIUM SERPL-SCNC: 4.5 MMOL/L
PROT SERPL-MCNC: 6.4 G/DL
SODIUM SERPL-SCNC: 134 MMOL/L

## 2025-07-10 LAB
B BURGDOR AB SER-IMP: NEGATIVE
B BURGDOR IGG+IGM SER QL: 0.23 INDEX
FOLATE SERPL-MCNC: 19.3 NG/ML
HCT VFR BLD CALC: 37.4 %
HGB BLD-MCNC: 12.6 G/DL
HIV1+2 AB SPEC QL IA.RAPID: NONREACTIVE
MCHC RBC-ENTMCNC: 30.5 PG
MCHC RBC-ENTMCNC: 33.7 G/DL
MCV RBC AUTO: 90.6 FL
PLATELET # BLD AUTO: 329 K/UL
RBC # BLD: 4.13 M/UL
RBC # FLD: 13.5 %
T PALLIDUM AB SER QL IA: NEGATIVE
TSH SERPL-ACNC: 4.52 UIU/ML
VIT B12 SERPL-MCNC: 371 PG/ML
WBC # FLD AUTO: 6.99 K/UL

## 2025-07-11 ENCOUNTER — APPOINTMENT (OUTPATIENT)
Dept: NEUROLOGY | Facility: CLINIC | Age: 85
End: 2025-07-11

## 2025-07-12 LAB
HOMOCYSTEINE LEVEL: 12.3 UMOL/L
METHYLMALONATE SERPL-SCNC: 287 NMOL/L

## 2025-08-25 ENCOUNTER — TRANSCRIPTION ENCOUNTER (OUTPATIENT)
Age: 85
End: 2025-08-25